# Patient Record
Sex: FEMALE | Race: WHITE | NOT HISPANIC OR LATINO | URBAN - METROPOLITAN AREA
[De-identification: names, ages, dates, MRNs, and addresses within clinical notes are randomized per-mention and may not be internally consistent; named-entity substitution may affect disease eponyms.]

---

## 2019-06-19 ENCOUNTER — INPATIENT (INPATIENT)
Facility: HOSPITAL | Age: 84
LOS: 2 days | Discharge: HOME | DRG: 305 | End: 2019-06-22
Attending: INTERNAL MEDICINE | Admitting: INTERNAL MEDICINE
Payer: MEDICARE

## 2019-06-19 VITALS
DIASTOLIC BLOOD PRESSURE: 79 MMHG | HEIGHT: 64 IN | SYSTOLIC BLOOD PRESSURE: 162 MMHG | RESPIRATION RATE: 20 BRPM | TEMPERATURE: 98 F | OXYGEN SATURATION: 95 % | HEART RATE: 91 BPM | WEIGHT: 175.05 LBS

## 2019-06-19 DIAGNOSIS — Z90.710 ACQUIRED ABSENCE OF BOTH CERVIX AND UTERUS: Chronic | ICD-10-CM

## 2019-06-19 DIAGNOSIS — N39.0 URINARY TRACT INFECTION, SITE NOT SPECIFIED: ICD-10-CM

## 2019-06-19 DIAGNOSIS — Z29.9 ENCOUNTER FOR PROPHYLACTIC MEASURES, UNSPECIFIED: ICD-10-CM

## 2019-06-19 DIAGNOSIS — R53.1 WEAKNESS: ICD-10-CM

## 2019-06-19 DIAGNOSIS — Z87.19 PERSONAL HISTORY OF OTHER DISEASES OF THE DIGESTIVE SYSTEM: Chronic | ICD-10-CM

## 2019-06-19 DIAGNOSIS — R09.89 OTHER SPECIFIED SYMPTOMS AND SIGNS INVOLVING THE CIRCULATORY AND RESPIRATORY SYSTEMS: ICD-10-CM

## 2019-06-19 DIAGNOSIS — I10 ESSENTIAL (PRIMARY) HYPERTENSION: ICD-10-CM

## 2019-06-19 DIAGNOSIS — M79.89 OTHER SPECIFIED SOFT TISSUE DISORDERS: ICD-10-CM

## 2019-06-19 DIAGNOSIS — I48.91 UNSPECIFIED ATRIAL FIBRILLATION: ICD-10-CM

## 2019-06-19 LAB
ALBUMIN SERPL ELPH-MCNC: 3.5 G/DL — SIGNIFICANT CHANGE UP (ref 3.5–5)
ALP SERPL-CCNC: 120 U/L — SIGNIFICANT CHANGE UP (ref 40–120)
ALT FLD-CCNC: 20 U/L DA — SIGNIFICANT CHANGE UP (ref 10–60)
ANION GAP SERPL CALC-SCNC: 8 MMOL/L — SIGNIFICANT CHANGE UP (ref 5–17)
APPEARANCE UR: CLEAR — SIGNIFICANT CHANGE UP
APTT BLD: 32 SEC — SIGNIFICANT CHANGE UP (ref 27.5–36.3)
AST SERPL-CCNC: 14 U/L — SIGNIFICANT CHANGE UP (ref 10–40)
BASOPHILS # BLD AUTO: 0.04 K/UL — SIGNIFICANT CHANGE UP (ref 0–0.2)
BASOPHILS NFR BLD AUTO: 0.5 % — SIGNIFICANT CHANGE UP (ref 0–2)
BILIRUB SERPL-MCNC: 0.4 MG/DL — SIGNIFICANT CHANGE UP (ref 0.2–1.2)
BILIRUB UR-MCNC: NEGATIVE — SIGNIFICANT CHANGE UP
BUN SERPL-MCNC: 14 MG/DL — SIGNIFICANT CHANGE UP (ref 7–18)
CALCIUM SERPL-MCNC: 8.8 MG/DL — SIGNIFICANT CHANGE UP (ref 8.4–10.5)
CHLORIDE SERPL-SCNC: 102 MMOL/L — SIGNIFICANT CHANGE UP (ref 96–108)
CO2 SERPL-SCNC: 27 MMOL/L — SIGNIFICANT CHANGE UP (ref 22–31)
COLOR SPEC: YELLOW — SIGNIFICANT CHANGE UP
CREAT SERPL-MCNC: 1.16 MG/DL — SIGNIFICANT CHANGE UP (ref 0.5–1.3)
DIFF PNL FLD: ABNORMAL
EOSINOPHIL # BLD AUTO: 0.16 K/UL — SIGNIFICANT CHANGE UP (ref 0–0.5)
EOSINOPHIL NFR BLD AUTO: 2 % — SIGNIFICANT CHANGE UP (ref 0–6)
GLUCOSE SERPL-MCNC: 108 MG/DL — HIGH (ref 70–99)
GLUCOSE UR QL: NEGATIVE — SIGNIFICANT CHANGE UP
HCT VFR BLD CALC: 44.2 % — SIGNIFICANT CHANGE UP (ref 34.5–45)
HGB BLD-MCNC: 14.8 G/DL — SIGNIFICANT CHANGE UP (ref 11.5–15.5)
IMM GRANULOCYTES NFR BLD AUTO: 0.5 % — SIGNIFICANT CHANGE UP (ref 0–1.5)
INR BLD: 1.03 RATIO — SIGNIFICANT CHANGE UP (ref 0.88–1.16)
KETONES UR-MCNC: NEGATIVE — SIGNIFICANT CHANGE UP
LEUKOCYTE ESTERASE UR-ACNC: ABNORMAL
LYMPHOCYTES # BLD AUTO: 2.62 K/UL — SIGNIFICANT CHANGE UP (ref 1–3.3)
LYMPHOCYTES # BLD AUTO: 32.5 % — SIGNIFICANT CHANGE UP (ref 13–44)
MCHC RBC-ENTMCNC: 31 PG — SIGNIFICANT CHANGE UP (ref 27–34)
MCHC RBC-ENTMCNC: 33.5 GM/DL — SIGNIFICANT CHANGE UP (ref 32–36)
MCV RBC AUTO: 92.5 FL — SIGNIFICANT CHANGE UP (ref 80–100)
MONOCYTES # BLD AUTO: 0.89 K/UL — SIGNIFICANT CHANGE UP (ref 0–0.9)
MONOCYTES NFR BLD AUTO: 11 % — SIGNIFICANT CHANGE UP (ref 2–14)
NEUTROPHILS # BLD AUTO: 4.32 K/UL — SIGNIFICANT CHANGE UP (ref 1.8–7.4)
NEUTROPHILS NFR BLD AUTO: 53.5 % — SIGNIFICANT CHANGE UP (ref 43–77)
NITRITE UR-MCNC: NEGATIVE — SIGNIFICANT CHANGE UP
NRBC # BLD: 0 /100 WBCS — SIGNIFICANT CHANGE UP (ref 0–0)
NT-PROBNP SERPL-SCNC: 147 PG/ML — SIGNIFICANT CHANGE UP (ref 0–450)
PH UR: 6 — SIGNIFICANT CHANGE UP (ref 5–8)
PLATELET # BLD AUTO: 238 K/UL — SIGNIFICANT CHANGE UP (ref 150–400)
POTASSIUM SERPL-MCNC: 4.1 MMOL/L — SIGNIFICANT CHANGE UP (ref 3.5–5.3)
POTASSIUM SERPL-SCNC: 4.1 MMOL/L — SIGNIFICANT CHANGE UP (ref 3.5–5.3)
PROT SERPL-MCNC: 7.1 G/DL — SIGNIFICANT CHANGE UP (ref 6–8.3)
PROT UR-MCNC: 15
PROTHROM AB SERPL-ACNC: 11.5 SEC — SIGNIFICANT CHANGE UP (ref 10–12.9)
RBC # BLD: 4.78 M/UL — SIGNIFICANT CHANGE UP (ref 3.8–5.2)
RBC # FLD: 12.3 % — SIGNIFICANT CHANGE UP (ref 10.3–14.5)
SODIUM SERPL-SCNC: 137 MMOL/L — SIGNIFICANT CHANGE UP (ref 135–145)
SP GR SPEC: 1.01 — SIGNIFICANT CHANGE UP (ref 1.01–1.02)
TROPONIN I SERPL-MCNC: <0.015 NG/ML — SIGNIFICANT CHANGE UP (ref 0–0.04)
UROBILINOGEN FLD QL: NEGATIVE — SIGNIFICANT CHANGE UP
WBC # BLD: 8.07 K/UL — SIGNIFICANT CHANGE UP (ref 3.8–10.5)
WBC # FLD AUTO: 8.07 K/UL — SIGNIFICANT CHANGE UP (ref 3.8–10.5)

## 2019-06-19 PROCEDURE — 73610 X-RAY EXAM OF ANKLE: CPT | Mod: 26,RT

## 2019-06-19 PROCEDURE — 93971 EXTREMITY STUDY: CPT | Mod: 26,RT

## 2019-06-19 PROCEDURE — 99285 EMERGENCY DEPT VISIT HI MDM: CPT

## 2019-06-19 PROCEDURE — 71045 X-RAY EXAM CHEST 1 VIEW: CPT | Mod: 26

## 2019-06-19 RX ORDER — AMLODIPINE BESYLATE 2.5 MG/1
5 TABLET ORAL ONCE
Refills: 0 | Status: COMPLETED | OUTPATIENT
Start: 2019-06-19 | End: 2019-06-19

## 2019-06-19 RX ORDER — HEPARIN SODIUM 5000 [USP'U]/ML
5000 INJECTION INTRAVENOUS; SUBCUTANEOUS EVERY 8 HOURS
Refills: 0 | Status: DISCONTINUED | OUTPATIENT
Start: 2019-06-19 | End: 2019-06-22

## 2019-06-19 RX ORDER — POLYETHYLENE GLYCOL 3350 17 G/17G
17 POWDER, FOR SOLUTION ORAL DAILY
Refills: 0 | Status: DISCONTINUED | OUTPATIENT
Start: 2019-06-19 | End: 2019-06-22

## 2019-06-19 RX ORDER — GABAPENTIN 400 MG/1
100 CAPSULE ORAL THREE TIMES A DAY
Refills: 0 | Status: DISCONTINUED | OUTPATIENT
Start: 2019-06-19 | End: 2019-06-22

## 2019-06-19 RX ORDER — ZOLPIDEM TARTRATE 10 MG/1
5 TABLET ORAL AT BEDTIME
Refills: 0 | Status: DISCONTINUED | OUTPATIENT
Start: 2019-06-19 | End: 2019-06-22

## 2019-06-19 RX ORDER — CEFTRIAXONE 500 MG/1
1000 INJECTION, POWDER, FOR SOLUTION INTRAMUSCULAR; INTRAVENOUS ONCE
Refills: 0 | Status: COMPLETED | OUTPATIENT
Start: 2019-06-19 | End: 2019-06-19

## 2019-06-19 RX ORDER — CEFTRIAXONE 500 MG/1
1000 INJECTION, POWDER, FOR SOLUTION INTRAMUSCULAR; INTRAVENOUS EVERY 24 HOURS
Refills: 0 | Status: DISCONTINUED | OUTPATIENT
Start: 2019-06-20 | End: 2019-06-20

## 2019-06-19 RX ORDER — AMLODIPINE BESYLATE 2.5 MG/1
10 TABLET ORAL DAILY
Refills: 0 | Status: DISCONTINUED | OUTPATIENT
Start: 2019-06-20 | End: 2019-06-22

## 2019-06-19 RX ORDER — ASPIRIN/CALCIUM CARB/MAGNESIUM 324 MG
81 TABLET ORAL DAILY
Refills: 0 | Status: DISCONTINUED | OUTPATIENT
Start: 2019-06-19 | End: 2019-06-22

## 2019-06-19 RX ORDER — PANTOPRAZOLE SODIUM 20 MG/1
40 TABLET, DELAYED RELEASE ORAL
Refills: 0 | Status: DISCONTINUED | OUTPATIENT
Start: 2019-06-19 | End: 2019-06-22

## 2019-06-19 RX ADMIN — AMLODIPINE BESYLATE 5 MILLIGRAM(S): 2.5 TABLET ORAL at 22:16

## 2019-06-19 RX ADMIN — CEFTRIAXONE 100 MILLIGRAM(S): 500 INJECTION, POWDER, FOR SOLUTION INTRAMUSCULAR; INTRAVENOUS at 18:42

## 2019-06-19 RX ADMIN — ZOLPIDEM TARTRATE 5 MILLIGRAM(S): 10 TABLET ORAL at 23:05

## 2019-06-19 RX ADMIN — CEFTRIAXONE 1000 MILLIGRAM(S): 500 INJECTION, POWDER, FOR SOLUTION INTRAMUSCULAR; INTRAVENOUS at 20:00

## 2019-06-19 RX ADMIN — HEPARIN SODIUM 5000 UNIT(S): 5000 INJECTION INTRAVENOUS; SUBCUTANEOUS at 22:16

## 2019-06-19 RX ADMIN — GABAPENTIN 100 MILLIGRAM(S): 400 CAPSULE ORAL at 22:17

## 2019-06-19 NOTE — H&P ADULT - PROBLEM SELECTOR PLAN 1
-patient noted to have right leg swelling, more at ankle  -Follow US duplex to r/o DVT - high risk given hx of malignancy   -X-Ray ankle to r/o any occult fracture though denies any trauma.   -No signs of cellulitis, no warmth, redness or wbc count.   -Follow ECHO

## 2019-06-19 NOTE — H&P ADULT - ASSESSMENT
Patient is a 89 year old female, from home, 24x7 HHA during weekdays, walks with the help of walker, PMH significant for ovarian cancer 1985 s/p hysterectomy colon cancer 2013 s/p colon resection, L baker's cyst, spinal stenosis, Hypertension, Afib 2017 currently off Eliquis as per her cardiologist, denies any hx of bleeding. Patient's daughter stated that her blood pressure has been fluctuating since last one week, it was noted to be in 150-160's mostly. Her BP has been well controlled on amlodipine for past one year, which is why daughter got concerned. Patient also reported right lower leg swelling x last few days,  mostly at ankle, she denies any pain, recent immobility, no falls, no trauma, no discoloration. She denies any aggravating or relieving factors. She denies any Chest pain, shortness pain, no dyspnea PND, orthopnea, palpitations. All other ROS negative.      In ED, /56, HR 88, SPO2 97%, RR 20

## 2019-06-19 NOTE — H&P ADULT - PROBLEM SELECTOR PLAN 5
IMPROVE VTE score:  [ ] Previous VTE                                                3  [ ] Thrombophilia                                             2  [ ] Lower limb paralysis                                  2        (unable to hold up >15 seconds)    [ ] Current Cancer (within 6 months)            2   [x] Immobilization > 24 hrs                              1  [ ] ICU/CCU stay > 24 hours                            1  [x] Age > 60                                                         1  Improve Score- 2   Heparin SC

## 2019-06-19 NOTE — H&P ADULT - NSHPPHYSICALEXAM_GEN_ALL_CORE
CONSTITUTIONAL: Well appearing, well nourished, awake, alert and in no apparent distress  ENMT: Airway patent, Nasal mucosa clear. Mouth with normal mucosa. Throat has no vesicles, no oropharyngeal exudates and uvula is midline.  EYES: Clear bilaterally, pupils equal, round and reactive to light. EOMI.  CARDIAC: Normal rate, regular rhythm.  Heart sounds S1, S2.  No murmurs, rubs or gallops   RESPIRATORY: Breath sounds clear and equal bilaterally. No wheezes, rhales or rhonchi  MUSCULOSKELETAL: Spine appears normal, range of motion is not limited, no muscle or joint tenderness  EXTREMITIES: right lower extremity swollen , pulses b/l 2+  NEUROLOGICAL: Alert and oriented, no focal deficits, no motor or sensory deficits.  SKIN: No rash, skin turgor

## 2019-06-19 NOTE — ED PROVIDER NOTE - OBJECTIVE STATEMENT
89 y/o F with a significant PMHx of ovarian cancer, colon cancer, in remission and DVT in L leg, was later told it was never there, not on anticoagulation medications, presents to the ED with complaints of R leg swelling x 1 week, primarily around R ankle; denies trauma. Patient endorses fluctuating blood pressure and palpitations. Patient seen by Dr. Muller 5 days ago and was told she had atrial fibrillation at the time. Patient went back today due to fluctuating blood pressure between 140 and 160 systolic; was told to come in for evaluation. Patient also endorsing generalized fatigue. Denies calf pain, chest pain, shortness of breath, nausea, vomiting or any other acute complaints.

## 2019-06-19 NOTE — H&P ADULT - HISTORY OF PRESENT ILLNESS
Patient is a 89 year old female, from home, 24x7 HHA during weekdays, walks with the help of walker, [facility] significant for ovarian cancer 1985 s/p hysterectomy colon cancer 2013 s/p colon resection, L baker's cyst, spinal stenosis, Hypertension, Afib 2017 currently off Eliquis as per her cardiologist, denies any hx of bleeding. Patient's daughter stated that her blood pressure has been fluctuating since last one week, it was noted to be in 150-160's mostly. Her BP has been well controlled on amlodipine for past one year, which is why daughter got concerned. She was also started on furosemide few days ago by her PCP but was asked to stop it this am.     Patient also reported right lower leg swelling x last few days,  mostly at ankle, she denies any pain, recent immobility, no falls, no trauma, no discoloration. She denies any aggravating or relieving factors. She had similar symptoms 2 years ago of high BP and leg swelling. She was Patient is a 89 year old female, from home, 24x7 HHA during weekdays, walks with the help of walker, [facility] significant for ovarian cancer 1985 s/p hysterectomy colon cancer 2013 s/p colon resection, L baker's cyst, spinal stenosis, Hypertension, Afib 2017 currently off Eliquis as per her cardiologist, denies any hx of bleeding. Patient's daughter stated that her blood pressure has been fluctuating since last one week, it was noted to be in 150-160's mostly. Her BP has been well controlled on amlodipine for past one year, which is why daughter got concerned. She was also started on furosemide few days ago by her PCP but was asked to stop it this am.     Patient also reported right lower leg swelling x last few days,  mostly at ankle, she denies any pain, recent immobility, no falls, no trauma, no discoloration. She denies any aggravating or relieving factors. She had similar symptoms 2 years ago of high BP and leg swelling. She was taken to [facility] where she was told to have DVT, however, on further testing was told there was NO blood clot. She denies any Chest pain, shortness pain, no dyspnea PND, orthopnea, palpitations. All other ROS negative.

## 2019-06-19 NOTE — H&P ADULT - PROBLEM SELECTOR PLAN 3
-UA positive  -No urinary symptoms, Follow urine cx**  -Start ceftriaxone, if urine cx negative,  may stop antibiotics in 3 days.

## 2019-06-19 NOTE — ED ADULT NURSE NOTE - ED STAT RN HANDOFF DETAILS 2
received   pt;in bed at 1910 pt.is alert and oriented x 3.denies  chest pain. on CM with NSR.transfer to [address] report given to tele liberty husain.not in distress

## 2019-06-19 NOTE — H&P ADULT - PROBLEM SELECTOR PLAN 2
-Patient's BP noted to be 167/56, she reports that it fluctuates  -Start -Patient's BP noted to be 167/56, she reports that it fluctuates  -C/w amlodipine for now  -Monitor BP, may add more medications if remains elevated.  -Follow ECHO

## 2019-06-19 NOTE — PATIENT PROFILE ADULT - VISION (WITH CORRECTIVE LENSES IF THE PATIENT USUALLY WEARS THEM):
Normal vision: sees adequately in most situations; can see medication labels, newsprint intermittent

## 2019-06-19 NOTE — ED PROVIDER NOTE - CLINICAL SUMMARY MEDICAL DECISION MAKING FREE TEXT BOX
89 y/o F presents with generalized fatigue and R leg swelling, otherwise vitals stable. Will obtain blood work, CXR, ultrasound of lower extremities and reassess.

## 2019-06-20 LAB
24R-OH-CALCIDIOL SERPL-MCNC: 25.6 NG/ML — LOW (ref 30–80)
ALBUMIN SERPL ELPH-MCNC: 3.3 G/DL — LOW (ref 3.5–5)
ALP SERPL-CCNC: 109 U/L — SIGNIFICANT CHANGE UP (ref 40–120)
ALT FLD-CCNC: 18 U/L DA — SIGNIFICANT CHANGE UP (ref 10–60)
ANION GAP SERPL CALC-SCNC: 7 MMOL/L — SIGNIFICANT CHANGE UP (ref 5–17)
AST SERPL-CCNC: 14 U/L — SIGNIFICANT CHANGE UP (ref 10–40)
BASOPHILS # BLD AUTO: 0.06 K/UL — SIGNIFICANT CHANGE UP (ref 0–0.2)
BASOPHILS NFR BLD AUTO: 0.7 % — SIGNIFICANT CHANGE UP (ref 0–2)
BILIRUB SERPL-MCNC: 0.6 MG/DL — SIGNIFICANT CHANGE UP (ref 0.2–1.2)
BUN SERPL-MCNC: 15 MG/DL — SIGNIFICANT CHANGE UP (ref 7–18)
CALCIUM SERPL-MCNC: 9 MG/DL — SIGNIFICANT CHANGE UP (ref 8.4–10.5)
CHLORIDE SERPL-SCNC: 102 MMOL/L — SIGNIFICANT CHANGE UP (ref 96–108)
CHOLEST SERPL-MCNC: 156 MG/DL — SIGNIFICANT CHANGE UP (ref 10–199)
CO2 SERPL-SCNC: 28 MMOL/L — SIGNIFICANT CHANGE UP (ref 22–31)
CREAT SERPL-MCNC: 1.05 MG/DL — SIGNIFICANT CHANGE UP (ref 0.5–1.3)
CULTURE RESULTS: SIGNIFICANT CHANGE UP
EOSINOPHIL # BLD AUTO: 0.24 K/UL — SIGNIFICANT CHANGE UP (ref 0–0.5)
EOSINOPHIL NFR BLD AUTO: 2.9 % — SIGNIFICANT CHANGE UP (ref 0–6)
ERYTHROCYTE [SEDIMENTATION RATE] IN BLOOD: 12 MM/HR — SIGNIFICANT CHANGE UP (ref 0–20)
GLUCOSE SERPL-MCNC: 104 MG/DL — HIGH (ref 70–99)
HBA1C BLD-MCNC: 5.2 % — SIGNIFICANT CHANGE UP (ref 4–5.6)
HCT VFR BLD CALC: 41 % — SIGNIFICANT CHANGE UP (ref 34.5–45)
HDLC SERPL-MCNC: 45 MG/DL — LOW
HGB BLD-MCNC: 13.6 G/DL — SIGNIFICANT CHANGE UP (ref 11.5–15.5)
IMM GRANULOCYTES NFR BLD AUTO: 0.4 % — SIGNIFICANT CHANGE UP (ref 0–1.5)
LIPID PNL WITH DIRECT LDL SERPL: 85 MG/DL — SIGNIFICANT CHANGE UP
LYMPHOCYTES # BLD AUTO: 2.73 K/UL — SIGNIFICANT CHANGE UP (ref 1–3.3)
LYMPHOCYTES # BLD AUTO: 33.2 % — SIGNIFICANT CHANGE UP (ref 13–44)
MAGNESIUM SERPL-MCNC: 2.1 MG/DL — SIGNIFICANT CHANGE UP (ref 1.6–2.6)
MCHC RBC-ENTMCNC: 31.1 PG — SIGNIFICANT CHANGE UP (ref 27–34)
MCHC RBC-ENTMCNC: 33.2 GM/DL — SIGNIFICANT CHANGE UP (ref 32–36)
MCV RBC AUTO: 93.8 FL — SIGNIFICANT CHANGE UP (ref 80–100)
MONOCYTES # BLD AUTO: 0.76 K/UL — SIGNIFICANT CHANGE UP (ref 0–0.9)
MONOCYTES NFR BLD AUTO: 9.2 % — SIGNIFICANT CHANGE UP (ref 2–14)
NEUTROPHILS # BLD AUTO: 4.41 K/UL — SIGNIFICANT CHANGE UP (ref 1.8–7.4)
NEUTROPHILS NFR BLD AUTO: 53.6 % — SIGNIFICANT CHANGE UP (ref 43–77)
NRBC # BLD: 0 /100 WBCS — SIGNIFICANT CHANGE UP (ref 0–0)
PHOSPHATE SERPL-MCNC: 3.6 MG/DL — SIGNIFICANT CHANGE UP (ref 2.5–4.5)
PLATELET # BLD AUTO: 224 K/UL — SIGNIFICANT CHANGE UP (ref 150–400)
POTASSIUM SERPL-MCNC: 3.8 MMOL/L — SIGNIFICANT CHANGE UP (ref 3.5–5.3)
POTASSIUM SERPL-SCNC: 3.8 MMOL/L — SIGNIFICANT CHANGE UP (ref 3.5–5.3)
PROT SERPL-MCNC: 6.5 G/DL — SIGNIFICANT CHANGE UP (ref 6–8.3)
RBC # BLD: 4.37 M/UL — SIGNIFICANT CHANGE UP (ref 3.8–5.2)
RBC # FLD: 12.1 % — SIGNIFICANT CHANGE UP (ref 10.3–14.5)
SODIUM SERPL-SCNC: 137 MMOL/L — SIGNIFICANT CHANGE UP (ref 135–145)
SPECIMEN SOURCE: SIGNIFICANT CHANGE UP
TOTAL CHOLESTEROL/HDL RATIO MEASUREMENT: 3.5 RATIO — SIGNIFICANT CHANGE UP (ref 3.3–7.1)
TRIGL SERPL-MCNC: 131 MG/DL — SIGNIFICANT CHANGE UP (ref 10–149)
TSH SERPL-MCNC: 3.71 UU/ML — SIGNIFICANT CHANGE UP (ref 0.34–4.82)
VIT B12 SERPL-MCNC: 1063 PG/ML — SIGNIFICANT CHANGE UP (ref 232–1245)
WBC # BLD: 8.23 K/UL — SIGNIFICANT CHANGE UP (ref 3.8–10.5)
WBC # FLD AUTO: 8.23 K/UL — SIGNIFICANT CHANGE UP (ref 3.8–10.5)

## 2019-06-20 RX ADMIN — GABAPENTIN 100 MILLIGRAM(S): 400 CAPSULE ORAL at 13:25

## 2019-06-20 RX ADMIN — ZOLPIDEM TARTRATE 5 MILLIGRAM(S): 10 TABLET ORAL at 22:10

## 2019-06-20 RX ADMIN — AMLODIPINE BESYLATE 10 MILLIGRAM(S): 2.5 TABLET ORAL at 06:03

## 2019-06-20 RX ADMIN — POLYETHYLENE GLYCOL 3350 17 GRAM(S): 17 POWDER, FOR SOLUTION ORAL at 17:22

## 2019-06-20 RX ADMIN — CEFTRIAXONE 100 MILLIGRAM(S): 500 INJECTION, POWDER, FOR SOLUTION INTRAMUSCULAR; INTRAVENOUS at 06:28

## 2019-06-20 RX ADMIN — PANTOPRAZOLE SODIUM 40 MILLIGRAM(S): 20 TABLET, DELAYED RELEASE ORAL at 06:03

## 2019-06-20 RX ADMIN — GABAPENTIN 100 MILLIGRAM(S): 400 CAPSULE ORAL at 22:11

## 2019-06-20 RX ADMIN — Medication 81 MILLIGRAM(S): at 12:58

## 2019-06-20 RX ADMIN — HEPARIN SODIUM 5000 UNIT(S): 5000 INJECTION INTRAVENOUS; SUBCUTANEOUS at 13:23

## 2019-06-20 RX ADMIN — GABAPENTIN 100 MILLIGRAM(S): 400 CAPSULE ORAL at 06:03

## 2019-06-20 RX ADMIN — HEPARIN SODIUM 5000 UNIT(S): 5000 INJECTION INTRAVENOUS; SUBCUTANEOUS at 22:11

## 2019-06-20 RX ADMIN — HEPARIN SODIUM 5000 UNIT(S): 5000 INJECTION INTRAVENOUS; SUBCUTANEOUS at 06:03

## 2019-06-20 NOTE — PROGRESS NOTE ADULT - PROBLEM SELECTOR PLAN 2
-BP in 160's   -C/w amlodipine for now  -Monitor BP, may add more medications if remains elevated.  -Follow ECHO for LVH, if present may add losartan

## 2019-06-20 NOTE — PROGRESS NOTE ADULT - PROBLEM SELECTOR PLAN 1
-patient noted to have right leg swelling, more at ankle  -US duplex- no DVT   -X-Ray ankle to r/o any occult fracture though denies any trauma.   -No signs of cellulitis, no warmth, redness or wbc count.   -Follow ECHO

## 2019-06-20 NOTE — PROGRESS NOTE ADULT - SUBJECTIVE AND OBJECTIVE BOX
PGY 1 Note discussed with supervising resident and primary attending    Patient is a 89y old  Female who presents with a chief complaint of Fluctuating BP, leg swelling (2019 20:23)      INTERVAL HPI/OVERNIGHT EVENTS: no events noted overnight.    MEDICATIONS  (STANDING):  amLODIPine   Tablet 10 milliGRAM(s) Oral daily  aspirin enteric coated 81 milliGRAM(s) Oral daily  cefTRIAXone   IVPB 1000 milliGRAM(s) IV Intermittent every 24 hours  gabapentin 100 milliGRAM(s) Oral three times a day  heparin  Injectable 5000 Unit(s) SubCutaneous every 8 hours  pantoprazole    Tablet 40 milliGRAM(s) Oral before breakfast  polyethylene glycol 3350 17 Gram(s) Oral daily  zolpidem 5 milliGRAM(s) Oral at bedtime    MEDICATIONS  (PRN):  guaiFENesin    Syrup 100 milliGRAM(s) Oral every 6 hours PRN Cough      __________________________________________________  REVIEW OF SYSTEMS:    CONSTITUTIONAL: No fever,   EYES: no acute visual disturbances  NECK: No pain or stiffness  RESPIRATORY: No cough; No shortness of breath  CARDIOVASCULAR: No chest pain, no palpitations  GASTROINTESTINAL: No pain. No nausea or vomiting; No diarrhea   NEUROLOGICAL: No headache or numbness, no tremors  MUSCULOSKELETAL: No joint pain, no muscle pain  GENITOURINARY: no dysuria, no frequency, no hesitancy  PSYCHIATRY: no depression , no anxiety  ALL OTHER  ROS negative        Vital Signs Last 24 Hrs  T(C): 36.7 (2019 11:22), Max: 36.8 (2019 23:45)  T(F): 98 (2019 11:22), Max: 98.2 (2019 23:45)  HR: 83 (2019 11:22) (77 - 91)  BP: 160/50 (2019 11:22) (142/56 - 183/65)  BP(mean): --  RR: 16 (2019 11:22) (16 - 20)  SpO2: 97% (2019 11:22) (94% - 97%)    ________________________________________________  PHYSICAL EXAM:  GENERAL: NAD, elderly female   HEENT: Normocephalic;  conjunctivae and sclerae clear; moist mucous membranes;   NECK : supple  CHEST/LUNG: Clear to auscultation bilaterally with good air entry   HEART: S1 S2  regular; no murmurs, gallops or rubs  ABDOMEN: Soft, Nontender, Nondistended; Bowel sounds present  EXTREMITIES: no cyanosis; no edema; no calf tenderness, right ankle swollen   SKIN: warm and dry; no rash  NERVOUS SYSTEM:  Awake and alert; Oriented  to place, person and time ; no new deficits    _________________________________________________  LABS:                        13.6   8.23  )-----------( 224      ( 2019 06:31 )             41.0     06-20    137  |  102  |  15  ----------------------------<  104<H>  3.8   |  28  |  1.05    Ca    9.0      2019 06:31  Phos  3.6     06-20  Mg     2.1     06-20    TPro  6.5  /  Alb  3.3<L>  /  TBili  0.6  /  DBili  x   /  AST  14  /  ALT  18  /  AlkPhos  109  06-20    PT/INR - ( 2019 18:27 )   PT: 11.5 sec;   INR: 1.03 ratio         PTT - ( 2019 18:27 )  PTT:32.0 sec  Urinalysis Basic - ( 2019 17:29 )    Color: Yellow / Appearance: Clear / S.015 / pH: x  Gluc: x / Ketone: Negative  / Bili: Negative / Urobili: Negative   Blood: x / Protein: 15 / Nitrite: Negative   Leuk Esterase: Moderate / RBC: 0-2 /HPF / WBC 26-50 /HPF   Sq Epi: x / Non Sq Epi: Moderate /HPF / Bacteria: Few /HPF      CAPILLARY BLOOD GLUCOSE            RADIOLOGY & ADDITIONAL TESTS:    Imaging Personally Reviewed:  YES    Consultant(s) Notes Reviewed:   YES    Care Discussed with Consultants : YES     Plan of care was discussed with patient and /or primary care giver; all questions and concerns were addressed and care was aligned with patient's wishes.

## 2019-06-21 ENCOUNTER — TRANSCRIPTION ENCOUNTER (OUTPATIENT)
Age: 84
End: 2019-06-21

## 2019-06-21 RX ORDER — LOSARTAN POTASSIUM 100 MG/1
25 TABLET, FILM COATED ORAL DAILY
Refills: 0 | Status: DISCONTINUED | OUTPATIENT
Start: 2019-06-21 | End: 2019-06-22

## 2019-06-21 RX ORDER — FUROSEMIDE 40 MG
20 TABLET ORAL ONCE
Refills: 0 | Status: COMPLETED | OUTPATIENT
Start: 2019-06-21 | End: 2019-06-21

## 2019-06-21 RX ADMIN — Medication 81 MILLIGRAM(S): at 13:12

## 2019-06-21 RX ADMIN — AMLODIPINE BESYLATE 10 MILLIGRAM(S): 2.5 TABLET ORAL at 06:38

## 2019-06-21 RX ADMIN — Medication 100 MILLIGRAM(S): at 19:18

## 2019-06-21 RX ADMIN — PANTOPRAZOLE SODIUM 40 MILLIGRAM(S): 20 TABLET, DELAYED RELEASE ORAL at 06:38

## 2019-06-21 RX ADMIN — GABAPENTIN 100 MILLIGRAM(S): 400 CAPSULE ORAL at 23:09

## 2019-06-21 RX ADMIN — HEPARIN SODIUM 5000 UNIT(S): 5000 INJECTION INTRAVENOUS; SUBCUTANEOUS at 21:24

## 2019-06-21 RX ADMIN — GABAPENTIN 100 MILLIGRAM(S): 400 CAPSULE ORAL at 13:12

## 2019-06-21 RX ADMIN — HEPARIN SODIUM 5000 UNIT(S): 5000 INJECTION INTRAVENOUS; SUBCUTANEOUS at 06:38

## 2019-06-21 RX ADMIN — Medication 20 MILLIGRAM(S): at 13:13

## 2019-06-21 RX ADMIN — Medication 100 MILLIGRAM(S): at 09:18

## 2019-06-21 RX ADMIN — ZOLPIDEM TARTRATE 5 MILLIGRAM(S): 10 TABLET ORAL at 23:09

## 2019-06-21 RX ADMIN — HEPARIN SODIUM 5000 UNIT(S): 5000 INJECTION INTRAVENOUS; SUBCUTANEOUS at 13:12

## 2019-06-21 RX ADMIN — GABAPENTIN 100 MILLIGRAM(S): 400 CAPSULE ORAL at 06:38

## 2019-06-21 NOTE — PROGRESS NOTE ADULT - SUBJECTIVE AND OBJECTIVE BOX
PGY 1 Note discussed with supervising resident and primary attending    Patient is a 89y old  Female who presents with a chief complaint of Fluctuating BP, leg swelling (2019 09:27)      INTERVAL HPI/OVERNIGHT EVENTS: no events noted overnight.    MEDICATIONS  (STANDING):  amLODIPine   Tablet 10 milliGRAM(s) Oral daily  aspirin enteric coated 81 milliGRAM(s) Oral daily  furosemide   Injectable 20 milliGRAM(s) IV Push once  gabapentin 100 milliGRAM(s) Oral three times a day  heparin  Injectable 5000 Unit(s) SubCutaneous every 8 hours  losartan 25 milliGRAM(s) Oral daily  pantoprazole    Tablet 40 milliGRAM(s) Oral before breakfast  polyethylene glycol 3350 17 Gram(s) Oral daily  zolpidem 5 milliGRAM(s) Oral at bedtime    MEDICATIONS  (PRN):  guaiFENesin    Syrup 100 milliGRAM(s) Oral every 6 hours PRN Cough      __________________________________________________  REVIEW OF SYSTEMS:    CONSTITUTIONAL: No fever,   EYES: no acute visual disturbances  NECK: No pain or stiffness  RESPIRATORY: No cough; No shortness of breath  CARDIOVASCULAR: No chest pain, no palpitations  GASTROINTESTINAL: No pain. No nausea or vomiting; No diarrhea   NEUROLOGICAL: No headache or numbness, no tremors  MUSCULOSKELETAL: No joint pain, no muscle pain  GENITOURINARY: no dysuria, no frequency, no hesitancy  PSYCHIATRY: no depression , no anxiety  ALL OTHER  ROS negative        Vital Signs Last 24 Hrs  T(C): 36.8 (2019 07:31), Max: 36.9 (2019 15:58)  T(F): 98.2 (2019 07:31), Max: 98.4 (2019 15:58)  HR: 82 (2019 07:31) (77 - 88)  BP: 138/58 (2019 07:31) (129/44 - 160/50)  BP(mean): --  RR: 18 (2019 07:31) (16 - 19)  SpO2: 98% (2019 07:31) (95% - 98%)    ________________________________________________  PHYSICAL EXAM:  GENERAL: NAD  HEENT: Normocephalic;  conjunctivae and sclerae clear; moist mucous membranes;   NECK : supple  CHEST/LUNG: Clear to auscultation bilaterally with good air entry   HEART: S1 S2  regular; no murmurs, gallops or rubs  ABDOMEN: Soft, Nontender, Nondistended; Bowel sounds present  EXTREMITIES: no cyanosis; b/l leg swelling   SKIN: warm and dry; no rash  NERVOUS SYSTEM:  Awake and alert; Oriented  to place, person and time ; no new deficits    _________________________________________________  LABS:                        13.6   8.23  )-----------( 224      ( 2019 06:31 )             41.0     06-20    137  |  102  |  15  ----------------------------<  104<H>  3.8   |  28  |  1.05    Ca    9.0      2019 06:31  Phos  3.6     06-20  Mg     2.1     06-20    TPro  6.5  /  Alb  3.3<L>  /  TBili  0.6  /  DBili  x   /  AST  14  /  ALT  18  /  AlkPhos  109  06-20    PT/INR - ( 2019 18:27 )   PT: 11.5 sec;   INR: 1.03 ratio         PTT - ( 2019 18:27 )  PTT:32.0 sec  Urinalysis Basic - ( 2019 17:29 )    Color: Yellow / Appearance: Clear / S.015 / pH: x  Gluc: x / Ketone: Negative  / Bili: Negative / Urobili: Negative   Blood: x / Protein: 15 / Nitrite: Negative   Leuk Esterase: Moderate / RBC: 0-2 /HPF / WBC 26-50 /HPF   Sq Epi: x / Non Sq Epi: Moderate /HPF / Bacteria: Few /HPF      CAPILLARY BLOOD GLUCOSE            RADIOLOGY & ADDITIONAL TESTS:    Imaging Personally Reviewed:  YES    Consultant(s) Notes Reviewed:   YES    Care Discussed with Consultants : YES     Plan of care was discussed with patient and /or primary care giver; all questions and concerns were addressed and care was aligned with patient's wishes.

## 2019-06-21 NOTE — PROGRESS NOTE ADULT - PROBLEM SELECTOR PLAN 1
-patient noted to have right leg swelling, more at ankle  -US duplex- no DVT   -X-Ray ankle- cannot r/o medial malleolar fx, however no pain and full ROM, no further work up needed.   -Follow ECHO- grade 4 DD, EF 55%, mild LVH  -S/p one dose of IV Lasix  -Losartan, HCTZ and amlodipine on discharge  -DC planning-tomorrow.

## 2019-06-21 NOTE — DISCHARGE NOTE PROVIDER - CARE PROVIDER_API CALL
Vipin Muller)  Medicine  Dept Director  [address] [place], [place] [id number]  Phone: [phone]  Fax: [phone]  Follow Up Time: 1 week

## 2019-06-21 NOTE — DISCHARGE NOTE PROVIDER - HOSPITAL COURSE
Patient is a 89 year old female, from home, 24x7 HHA during weekdays, walks with the help of walker, [facility] significant for ovarian cancer 1985 s/p hysterectomy colon cancer 2013 s/p colon resection, L baker's cyst, spinal stenosis, Hypertension, Afib 2017 currently off Eliquis as per her cardiologist, denies any hx of bleeding. Patient's daughter stated that her blood pressure has been fluctuating since last one week, it was noted to be in 150-160's mostly. Her BP has been well controlled on amlodipine for past one year, which is why daughter got concerned. She was also started on furosemide few days ago by her PCP but was asked to stop it this am.         Patient also reported right lower leg swelling x last few days,  mostly at ankle, she denies any pain, recent immobility, no falls, no trauma, no discoloration. She denies any aggravating or relieving factors. She had similar symptoms 2 years ago of high BP and leg swelling. She was taken to [facility] where she was told to have DVT, however, on further testing was told there was NO blood clot. She denies any Chest pain, shortness pain, no dyspnea PND, orthopnea, palpitations. All other ROS negative.              Patient was admitted, there was concern for UTI, urine culture was negative. ECHO was done which showed, grade IV DD, EF was normal. Losartan was added for BP control.     Given lower leg edema, US duplex was negative for DVT. X-ray ankle was done, it could not rule out medial malleolar fracture but patient ahd no pain and ROM was intact. No further evaluation needed at this time.         Given patient's improved clinical status and current hemodynamic stability, decision was made to discharge. Discussed with attending    Please refer to patient's complete medical chart with documents for a full hospital course, for this is only a brief summary.

## 2019-06-21 NOTE — DISCHARGE NOTE PROVIDER - NSDCCPCAREPLAN_GEN_ALL_CORE_FT
PRINCIPAL DISCHARGE DIAGNOSIS  Diagnosis: Uncontrolled hypertension  Assessment and Plan of Treatment: You presented with uncontrolled hypertension, cardiologist Dr. Muller was consulted, blood presure was monitored, ECHO showed: mild left ventricular hypertrophy(mild thickening of heart muscles)      SECONDARY DISCHARGE DIAGNOSES  Diagnosis: Afib  Assessment and Plan of Treatment: Afib    Diagnosis: Leg swelling  Assessment and Plan of Treatment: Leg swelling PRINCIPAL DISCHARGE DIAGNOSIS  Diagnosis: Uncontrolled hypertension  Assessment and Plan of Treatment: You presented with uncontrolled hypertension, cardiologist Dr. Muller was consulted, blood presure was monitored, ECHO showed: mild left ventricular hypertrophy(mild thickening of heart muscles) and impaired relaxation of heart. Losartan is added. Please continue taking medications as prescribed.      SECONDARY DISCHARGE DIAGNOSES  Diagnosis: Afib  Assessment and Plan of Treatment: Afib    Diagnosis: Leg swelling  Assessment and Plan of Treatment: Leg swelling PRINCIPAL DISCHARGE DIAGNOSIS  Diagnosis: Uncontrolled hypertension  Assessment and Plan of Treatment: You presented with uncontrolled hypertension, cardiologist Dr. Muller was consulted, blood presure was monitored, ECHO showed: mild left ventricular hypertrophy(mild thickening of heart muscles) and impaired relaxation of heart. Losartan-HCTz is added. Please continue taking medications as prescribed.      SECONDARY DISCHARGE DIAGNOSES  Diagnosis: Leg swelling  Assessment and Plan of Treatment: You complained of leg swelling, there was concern for blood clot in leg, US duplex was done which was normal. Xray right ankle was also done- a small hairline fracture could not excluded but given no pain and full range of movement at joint, no further work up will be done.    Diagnosis: Afib  Assessment and Plan of Treatment: You have hx of atrial fibrillation, currently normal rythm and stable. Follow up with your cardiologist

## 2019-06-22 ENCOUNTER — TRANSCRIPTION ENCOUNTER (OUTPATIENT)
Age: 84
End: 2019-06-22

## 2019-06-22 VITALS
TEMPERATURE: 98 F | HEART RATE: 85 BPM | DIASTOLIC BLOOD PRESSURE: 57 MMHG | OXYGEN SATURATION: 95 % | RESPIRATION RATE: 16 BRPM | SYSTOLIC BLOOD PRESSURE: 124 MMHG

## 2019-06-22 PROCEDURE — 85610 PROTHROMBIN TIME: CPT

## 2019-06-22 PROCEDURE — 93005 ELECTROCARDIOGRAM TRACING: CPT

## 2019-06-22 PROCEDURE — 99285 EMERGENCY DEPT VISIT HI MDM: CPT | Mod: 25

## 2019-06-22 PROCEDURE — 84443 ASSAY THYROID STIM HORMONE: CPT

## 2019-06-22 PROCEDURE — 71045 X-RAY EXAM CHEST 1 VIEW: CPT

## 2019-06-22 PROCEDURE — 84100 ASSAY OF PHOSPHORUS: CPT

## 2019-06-22 PROCEDURE — 80061 LIPID PANEL: CPT

## 2019-06-22 PROCEDURE — 81001 URINALYSIS AUTO W/SCOPE: CPT

## 2019-06-22 PROCEDURE — 85652 RBC SED RATE AUTOMATED: CPT

## 2019-06-22 PROCEDURE — 87086 URINE CULTURE/COLONY COUNT: CPT

## 2019-06-22 PROCEDURE — 36415 COLL VENOUS BLD VENIPUNCTURE: CPT

## 2019-06-22 PROCEDURE — 84484 ASSAY OF TROPONIN QUANT: CPT

## 2019-06-22 PROCEDURE — 73610 X-RAY EXAM OF ANKLE: CPT

## 2019-06-22 PROCEDURE — 82306 VITAMIN D 25 HYDROXY: CPT

## 2019-06-22 PROCEDURE — 85730 THROMBOPLASTIN TIME PARTIAL: CPT

## 2019-06-22 PROCEDURE — 80053 COMPREHEN METABOLIC PANEL: CPT

## 2019-06-22 PROCEDURE — 93306 TTE W/DOPPLER COMPLETE: CPT

## 2019-06-22 PROCEDURE — 96374 THER/PROPH/DIAG INJ IV PUSH: CPT

## 2019-06-22 PROCEDURE — 85027 COMPLETE CBC AUTOMATED: CPT

## 2019-06-22 PROCEDURE — 83880 ASSAY OF NATRIURETIC PEPTIDE: CPT

## 2019-06-22 PROCEDURE — 82607 VITAMIN B-12: CPT

## 2019-06-22 PROCEDURE — 83036 HEMOGLOBIN GLYCOSYLATED A1C: CPT

## 2019-06-22 PROCEDURE — 83735 ASSAY OF MAGNESIUM: CPT

## 2019-06-22 PROCEDURE — 93971 EXTREMITY STUDY: CPT

## 2019-06-22 RX ORDER — LOSARTAN/HYDROCHLOROTHIAZIDE 100MG-25MG
1 TABLET ORAL
Qty: 30 | Refills: 0
Start: 2019-06-22 | End: 2019-07-21

## 2019-06-22 RX ORDER — ASPIRIN/CALCIUM CARB/MAGNESIUM 324 MG
1 TABLET ORAL
Qty: 0 | Refills: 0 | DISCHARGE

## 2019-06-22 RX ADMIN — PANTOPRAZOLE SODIUM 40 MILLIGRAM(S): 20 TABLET, DELAYED RELEASE ORAL at 05:58

## 2019-06-22 RX ADMIN — Medication 81 MILLIGRAM(S): at 11:27

## 2019-06-22 RX ADMIN — GABAPENTIN 100 MILLIGRAM(S): 400 CAPSULE ORAL at 05:54

## 2019-06-22 RX ADMIN — HEPARIN SODIUM 5000 UNIT(S): 5000 INJECTION INTRAVENOUS; SUBCUTANEOUS at 05:54

## 2019-06-22 RX ADMIN — LOSARTAN POTASSIUM 25 MILLIGRAM(S): 100 TABLET, FILM COATED ORAL at 05:54

## 2019-06-22 RX ADMIN — GABAPENTIN 100 MILLIGRAM(S): 400 CAPSULE ORAL at 13:00

## 2019-06-22 RX ADMIN — AMLODIPINE BESYLATE 10 MILLIGRAM(S): 2.5 TABLET ORAL at 05:54

## 2019-06-22 NOTE — DISCHARGE NOTE NURSING/CASE MANAGEMENT/SOCIAL WORK - NSDCDPATPORTLINK_GEN_ALL_CORE
You can access the [facility] Patient Portal, offered by [facility], by registering with the following website: [email or web address]

## 2019-12-13 NOTE — ED PROVIDER NOTE - CADM POA PRESS ULCER
Per order of Dr. Lopez Mitchell a post void residual was done via Bladder scanner.  PVR was 341 cc.     Patient did leave and come back after urinating a little.  PVR was still 343 cc.     No

## 2020-01-23 ENCOUNTER — INPATIENT (INPATIENT)
Facility: HOSPITAL | Age: 85
LOS: 3 days | Discharge: HOME | DRG: 690 | End: 2020-01-27
Attending: INTERNAL MEDICINE | Admitting: INTERNAL MEDICINE
Payer: MEDICARE

## 2020-01-23 VITALS
DIASTOLIC BLOOD PRESSURE: 81 MMHG | HEART RATE: 83 BPM | SYSTOLIC BLOOD PRESSURE: 163 MMHG | WEIGHT: 171.96 LBS | OXYGEN SATURATION: 96 % | TEMPERATURE: 98 F | HEIGHT: 64 IN | RESPIRATION RATE: 18 BRPM

## 2020-01-23 DIAGNOSIS — Z87.19 PERSONAL HISTORY OF OTHER DISEASES OF THE DIGESTIVE SYSTEM: Chronic | ICD-10-CM

## 2020-01-23 DIAGNOSIS — Z90.710 ACQUIRED ABSENCE OF BOTH CERVIX AND UTERUS: Chronic | ICD-10-CM

## 2020-01-23 LAB
ALBUMIN SERPL ELPH-MCNC: 3.8 G/DL — SIGNIFICANT CHANGE UP (ref 3.3–5)
ALP SERPL-CCNC: 169 U/L — HIGH (ref 40–120)
ALT FLD-CCNC: 163 U/L — HIGH (ref 10–45)
ANION GAP SERPL CALC-SCNC: 16 MMOL/L — SIGNIFICANT CHANGE UP (ref 5–17)
APPEARANCE UR: CLEAR — SIGNIFICANT CHANGE UP
AST SERPL-CCNC: 119 U/L — HIGH (ref 10–40)
BACTERIA # UR AUTO: ABNORMAL
BASOPHILS # BLD AUTO: 0.03 K/UL — SIGNIFICANT CHANGE UP (ref 0–0.2)
BASOPHILS NFR BLD AUTO: 0.3 % — SIGNIFICANT CHANGE UP (ref 0–2)
BILIRUB SERPL-MCNC: 0.7 MG/DL — SIGNIFICANT CHANGE UP (ref 0.2–1.2)
BILIRUB UR-MCNC: NEGATIVE — SIGNIFICANT CHANGE UP
BUN SERPL-MCNC: 17 MG/DL — SIGNIFICANT CHANGE UP (ref 7–23)
CALCIUM SERPL-MCNC: 9.4 MG/DL — SIGNIFICANT CHANGE UP (ref 8.4–10.5)
CHLORIDE SERPL-SCNC: 90 MMOL/L — LOW (ref 96–108)
CO2 SERPL-SCNC: 20 MMOL/L — LOW (ref 22–31)
COLOR SPEC: YELLOW — SIGNIFICANT CHANGE UP
CREAT SERPL-MCNC: 0.95 MG/DL — SIGNIFICANT CHANGE UP (ref 0.5–1.3)
DIFF PNL FLD: NEGATIVE — SIGNIFICANT CHANGE UP
EOSINOPHIL # BLD AUTO: 0.14 K/UL — SIGNIFICANT CHANGE UP (ref 0–0.5)
EOSINOPHIL NFR BLD AUTO: 1.2 % — SIGNIFICANT CHANGE UP (ref 0–6)
EPI CELLS # UR: 4 /HPF — SIGNIFICANT CHANGE UP
GAS PNL BLDV: SIGNIFICANT CHANGE UP
GLUCOSE SERPL-MCNC: 130 MG/DL — HIGH (ref 70–99)
GLUCOSE UR QL: NEGATIVE — SIGNIFICANT CHANGE UP
HCT VFR BLD CALC: 39.2 % — SIGNIFICANT CHANGE UP (ref 34.5–45)
HGB BLD-MCNC: 13.3 G/DL — SIGNIFICANT CHANGE UP (ref 11.5–15.5)
HYALINE CASTS # UR AUTO: 1 /LPF — SIGNIFICANT CHANGE UP (ref 0–2)
IMM GRANULOCYTES NFR BLD AUTO: 0.3 % — SIGNIFICANT CHANGE UP (ref 0–1.5)
KETONES UR-MCNC: NEGATIVE — SIGNIFICANT CHANGE UP
LEUKOCYTE ESTERASE UR-ACNC: ABNORMAL
LIDOCAIN IGE QN: 624 U/L — HIGH (ref 7–60)
LYMPHOCYTES # BLD AUTO: 26 % — SIGNIFICANT CHANGE UP (ref 13–44)
LYMPHOCYTES # BLD AUTO: 3.02 K/UL — SIGNIFICANT CHANGE UP (ref 1–3.3)
MCHC RBC-ENTMCNC: 31.2 PG — SIGNIFICANT CHANGE UP (ref 27–34)
MCHC RBC-ENTMCNC: 33.9 GM/DL — SIGNIFICANT CHANGE UP (ref 32–36)
MCV RBC AUTO: 92 FL — SIGNIFICANT CHANGE UP (ref 80–100)
MONOCYTES # BLD AUTO: 0.99 K/UL — HIGH (ref 0–0.9)
MONOCYTES NFR BLD AUTO: 8.5 % — SIGNIFICANT CHANGE UP (ref 2–14)
NEUTROPHILS # BLD AUTO: 7.4 K/UL — SIGNIFICANT CHANGE UP (ref 1.8–7.4)
NEUTROPHILS NFR BLD AUTO: 63.7 % — SIGNIFICANT CHANGE UP (ref 43–77)
NITRITE UR-MCNC: NEGATIVE — SIGNIFICANT CHANGE UP
NRBC # BLD: 0 /100 WBCS — SIGNIFICANT CHANGE UP (ref 0–0)
PH UR: 6.5 — SIGNIFICANT CHANGE UP (ref 5–8)
PLATELET # BLD AUTO: 231 K/UL — SIGNIFICANT CHANGE UP (ref 150–400)
POTASSIUM SERPL-MCNC: 4.5 MMOL/L — SIGNIFICANT CHANGE UP (ref 3.5–5.3)
POTASSIUM SERPL-SCNC: 4.5 MMOL/L — SIGNIFICANT CHANGE UP (ref 3.5–5.3)
PROT SERPL-MCNC: 6.9 G/DL — SIGNIFICANT CHANGE UP (ref 6–8.3)
PROT UR-MCNC: NEGATIVE — SIGNIFICANT CHANGE UP
RAPID RVP RESULT: SIGNIFICANT CHANGE UP
RBC # BLD: 4.26 M/UL — SIGNIFICANT CHANGE UP (ref 3.8–5.2)
RBC # FLD: 12.3 % — SIGNIFICANT CHANGE UP (ref 10.3–14.5)
RBC CASTS # UR COMP ASSIST: 3 /HPF — SIGNIFICANT CHANGE UP (ref 0–4)
SODIUM SERPL-SCNC: 126 MMOL/L — LOW (ref 135–145)
SP GR SPEC: 1.01 — SIGNIFICANT CHANGE UP (ref 1.01–1.02)
TROPONIN T, HIGH SENSITIVITY RESULT: 36 NG/L — SIGNIFICANT CHANGE UP (ref 0–51)
TROPONIN T, HIGH SENSITIVITY RESULT: 44 NG/L — SIGNIFICANT CHANGE UP (ref 0–51)
UROBILINOGEN FLD QL: ABNORMAL
WBC # BLD: 11.62 K/UL — HIGH (ref 3.8–10.5)
WBC # FLD AUTO: 11.62 K/UL — HIGH (ref 3.8–10.5)
WBC UR QL: 53 /HPF — HIGH (ref 0–5)

## 2020-01-23 PROCEDURE — 99285 EMERGENCY DEPT VISIT HI MDM: CPT | Mod: GC

## 2020-01-23 PROCEDURE — 74177 CT ABD & PELVIS W/CONTRAST: CPT | Mod: 26

## 2020-01-23 PROCEDURE — 93010 ELECTROCARDIOGRAM REPORT: CPT | Mod: GC

## 2020-01-23 PROCEDURE — 71046 X-RAY EXAM CHEST 2 VIEWS: CPT | Mod: 26

## 2020-01-23 RX ORDER — SODIUM CHLORIDE 9 MG/ML
1000 INJECTION INTRAMUSCULAR; INTRAVENOUS; SUBCUTANEOUS ONCE
Refills: 0 | Status: COMPLETED | OUTPATIENT
Start: 2020-01-23 | End: 2020-01-23

## 2020-01-23 RX ADMIN — SODIUM CHLORIDE 1000 MILLILITER(S): 9 INJECTION INTRAMUSCULAR; INTRAVENOUS; SUBCUTANEOUS at 21:47

## 2020-01-23 NOTE — ED PROVIDER NOTE - ATTENDING CONTRIBUTION TO CARE
89y F hx of PPM, AFib on AC, HTN, ovarian and colon CA, GERD here with c/o epigastric pressure like pain assoc with some difficulty breathing, intermittent, lasts hours at a time, relief with antacids. +Cough. 89 y/o F pmhx of colon cancer, ovarian cancer, anxiety, GERD, HTN, and pshx of total hysterectomy, SBO, and recent dx of UTI presents to the ED c/o epigastric pain that began X3 days. As per daughter, pain has been intermittent throughout the days. has taken Swapna-[place] with relief.  Last night, pt experienced intense pain out of the 3 days. Some difficulty taking deep breath in, but that has been ongoing for the past year. Denies change in color stool, abd pain, fever, n/v/d. No hx of gallstones or alcohol use.    On exam elderly female, well appearing in nad, RRR, lungs CTA BL, +BS, soft, NTND, neg murphys, no grr. Ext wwp. Possibly gastritis, gerd, pancreatitis, ACS. Will check labs, CXR, EKG, Pain medication as needed and consider imaging pending lab results.

## 2020-01-23 NOTE — ED PROVIDER NOTE - PSH
H/O small bowel obstruction    H/O total hysterectomy  with BSO  No significant past surgical history

## 2020-01-23 NOTE — ED ADULT NURSE NOTE - NSIMPLEMENTINTERV_GEN_ALL_ED
Implemented All Fall with Harm Risk Interventions:  [place] to call system. Call bell, personal items and telephone within reach. Instruct patient to call for assistance. Room bathroom lighting operational. Non-slip footwear when patient is off stretcher. Physically safe environment: no spills, clutter or unnecessary equipment. Stretcher in lowest position, wheels locked, appropriate side rails in place. Provide visual cue, wrist band, yellow gown, etc. Monitor gait and stability. Monitor for mental status changes and reorient to person, place, and time. Review medications for side effects contributing to fall risk. Reinforce activity limits and safety measures with patient and family. Provide visual clues: red socks.

## 2020-01-23 NOTE — ED PROVIDER NOTE - CLINICAL SUMMARY MEDICAL DECISION MAKING FREE TEXT BOX
Inga Main MD: 89yo F with PMH of afib not on AC s/p pacemaker placed 9/19, HTN, ovarian ca s/p hysterectomy, colon ca s/p colectomy, GERD who presents with chest pain for 3 days. Pt hemodynamically stable, afebrile, not hypoxic, well appearing. Abd is unremarkable. No active CP in ED with normal paced EKG. Recent cough with R crackles, will get RVP, CXR to r/o flu, pna. Must consider ACS, will get trop, CXR. Possible pain is due to worsening acid reflux, will reassess and dispo accordingly.

## 2020-01-23 NOTE — ED PROVIDER NOTE - PHYSICAL EXAMINATION
CONSTITUTIONAL: Nontoxic, well nourished, well developed, elderly female, resting comfortably in no acute distress  HEAD: Normocephalic; atraumatic  EYES: Normal inspection, EOMI  ENMT: External appears normal; normal oropharynx  NECK: Supple; non-tender; no cervical lymphadenopathy  CARD: RRR; no audible murmurs, rubs, or gallops  RESP: No respiratory distress, R basilar crackles   ABD: Soft, non-distended; non-tender; no rebound or guarding  EXT: No LE pitting edema or calf tenderness; distal pulses intact with good capillary refill  SKIN: Warm, dry, intact  NEURO: aaox3, moving all extremities spontaneously

## 2020-01-23 NOTE — ED ADULT NURSE NOTE - OBJECTIVE STATEMENT
89 YOF A&OX3 ambulatory with walker with pmh of pacemaker, a-fib, HTN, ovarian cancer, colon cancer presents to ED for intermittent chest pain since last tuesday. pt states has had 3 episodes of chest pain that would last about an hour and then go away. pt placed on cardiac monitor shows paced rhythm. pt noted to have swelling of calves bilaterally, pt states this is normal for her. pt denies chest pain, sob, n/v/d, headaches, dizziness, blurry vision at this time. pt appears to be in no acute distress. safety maintained.

## 2020-01-23 NOTE — ED PROVIDER NOTE - PROGRESS NOTE DETAILS
HPI: 89 y/o F pmhx of colon cancer, ovarian cancer, anxiety, GERD, HTN, and pshx of total hysterectomy, SBO, and recent dx of UTI presents to the ED c/o epigastric pain that began X3 days. As per daughter, pain has been intermittent throughout the days. In the past year, pt has been breathing heavily and been SOB. Last night, pt experienced intense pain out of the 3 days. Pt has taken Swapna-Warren with relief. +pain with breathing. Denies change in color stool, abd pain, fever, n/v/d. No hx of gallstones or alcohol use. mdm: 89 y/o F presents with epigastric pain. No tenderness on exam. Cardiac v. GI. Will check labs, EKG, and CXR. Pain medication as needed and consider imaging pending lab results.     PE: well appearing, no reproducible abd pain. No guarding or rebound Inga Main MD: Pt denies pain. Elevated lipase, LFTs concerning for pancreatitis. CT unremarkable. No gallstones on US. Will treat UTI with ceftriaxone and admit for pancreatitis. Spoke to Dr. Lew on the phone, will admit under him. PE: well appearing, no reproducible abd pain. No guarding or rebound

## 2020-01-23 NOTE — ED PROVIDER NOTE - CARE PLAN
Principal Discharge DX:	Pancreatitis  Secondary Diagnosis:	Cystitis Principal Discharge DX:	Pancreatitis  Secondary Diagnosis:	Cystitis  Secondary Diagnosis:	Elevated LFTs  Secondary Diagnosis:	Hyponatremia

## 2020-01-23 NOTE — ED PROVIDER NOTE - NS ED ROS FT
General: denies fever, chills  HENT: denies nasal congestion, sore throat, rhinorrhea  Eyes: denies vision changes  CV: +chest pain  Resp: denies difficulty breathing, +cough  Abdominal: denies nausea, vomiting, diarrhea, abdominal pain, blood in stool, dark stool  : denies pain with urination  MSK: denies recent trauma  Neuro: denies headaches, numbness, tingling, dizziness, lightheadedness.  Skin: denies new rashes  Endocrine: denies recent weight loss

## 2020-01-23 NOTE — ED PROVIDER NOTE - OBJECTIVE STATEMENT
Inga Main MD: 89yo F with PMH of afib not on AC s/p pacemaker placed 9/19, HTN, ovarian ca s/p hysterectomy, colon ca s/p colectomy, GERD who presents with chest pain for 3 days. As per daughter at bedside, pt has pressure in epigastric region lasting hours each day with some difficulty taking a breath during the episodes, relief with Pepto-Bismol and Swapna-Royal. Hx of reflux and regurgitation episodes since colon surgery. Admits to recent dry cough, sneezing today. Recent UTI, final dose of nitrofurantoin today. Recent echo on 6/19 with EF of 55%. No fever, chills, SOB, N/V/D, syncope, lightheadedness, dysuria, hematuria, vaginal bleeding/discharge, hematochezia, melena, focal neurological deficits, recent sick contacts, recent travel. Pt lives at home with aid 24/7.

## 2020-01-23 NOTE — ED PROVIDER NOTE - PMH
Anxiety    Cancer    Colon cancer    GERD (gastroesophageal reflux disease)    Hypertension    Ovarian cancer

## 2020-01-24 DIAGNOSIS — I48.0 PAROXYSMAL ATRIAL FIBRILLATION: ICD-10-CM

## 2020-01-24 DIAGNOSIS — N30.00 ACUTE CYSTITIS WITHOUT HEMATURIA: ICD-10-CM

## 2020-01-24 DIAGNOSIS — I10 ESSENTIAL (PRIMARY) HYPERTENSION: ICD-10-CM

## 2020-01-24 DIAGNOSIS — E87.1 HYPO-OSMOLALITY AND HYPONATREMIA: ICD-10-CM

## 2020-01-24 DIAGNOSIS — R07.9 CHEST PAIN, UNSPECIFIED: ICD-10-CM

## 2020-01-24 DIAGNOSIS — K21.9 GASTRO-ESOPHAGEAL REFLUX DISEASE WITHOUT ESOPHAGITIS: ICD-10-CM

## 2020-01-24 DIAGNOSIS — Z29.9 ENCOUNTER FOR PROPHYLACTIC MEASURES, UNSPECIFIED: ICD-10-CM

## 2020-01-24 DIAGNOSIS — K85.90 ACUTE PANCREATITIS WITHOUT NECROSIS OR INFECTION, UNSPECIFIED: ICD-10-CM

## 2020-01-24 DIAGNOSIS — R94.5 ABNORMAL RESULTS OF LIVER FUNCTION STUDIES: ICD-10-CM

## 2020-01-24 DIAGNOSIS — Z02.9 ENCOUNTER FOR ADMINISTRATIVE EXAMINATIONS, UNSPECIFIED: ICD-10-CM

## 2020-01-24 DIAGNOSIS — R74.8 ABNORMAL LEVELS OF OTHER SERUM ENZYMES: ICD-10-CM

## 2020-01-24 DIAGNOSIS — K85.30 DRUG INDUCED ACUTE PANCREATITIS WITHOUT NECROSIS OR INFECTION: ICD-10-CM

## 2020-01-24 PROBLEM — C18.9 MALIGNANT NEOPLASM OF COLON, UNSPECIFIED: Chronic | Status: ACTIVE | Noted: 2019-06-19

## 2020-01-24 PROBLEM — C56.9 MALIGNANT NEOPLASM OF UNSPECIFIED OVARY: Chronic | Status: ACTIVE | Noted: 2019-06-19

## 2020-01-24 LAB
ANION GAP SERPL CALC-SCNC: 9 MMOL/L — SIGNIFICANT CHANGE UP (ref 5–17)
BUN SERPL-MCNC: 13 MG/DL — SIGNIFICANT CHANGE UP (ref 7–23)
CALCIUM SERPL-MCNC: 9.2 MG/DL — SIGNIFICANT CHANGE UP (ref 8.4–10.5)
CHLORIDE SERPL-SCNC: 99 MMOL/L — SIGNIFICANT CHANGE UP (ref 96–108)
CO2 SERPL-SCNC: 27 MMOL/L — SIGNIFICANT CHANGE UP (ref 22–31)
CREAT SERPL-MCNC: 0.92 MG/DL — SIGNIFICANT CHANGE UP (ref 0.5–1.3)
GLUCOSE SERPL-MCNC: 130 MG/DL — HIGH (ref 70–99)
POTASSIUM SERPL-MCNC: 3.9 MMOL/L — SIGNIFICANT CHANGE UP (ref 3.5–5.3)
POTASSIUM SERPL-SCNC: 3.9 MMOL/L — SIGNIFICANT CHANGE UP (ref 3.5–5.3)
SODIUM SERPL-SCNC: 135 MMOL/L — SIGNIFICANT CHANGE UP (ref 135–145)

## 2020-01-24 PROCEDURE — 99223 1ST HOSP IP/OBS HIGH 75: CPT

## 2020-01-24 PROCEDURE — 76705 ECHO EXAM OF ABDOMEN: CPT | Mod: 26,RT

## 2020-01-24 RX ORDER — CEFTRIAXONE 500 MG/1
1000 INJECTION, POWDER, FOR SOLUTION INTRAMUSCULAR; INTRAVENOUS EVERY 24 HOURS
Refills: 0 | Status: COMPLETED | OUTPATIENT
Start: 2020-01-25 | End: 2020-01-26

## 2020-01-24 RX ORDER — ZOLPIDEM TARTRATE 10 MG/1
2.5 TABLET ORAL AT BEDTIME
Refills: 0 | Status: DISCONTINUED | OUTPATIENT
Start: 2020-01-24 | End: 2020-01-27

## 2020-01-24 RX ORDER — POLYETHYLENE GLYCOL 3350 17 G/17G
17 POWDER, FOR SOLUTION ORAL AT BEDTIME
Refills: 0 | Status: DISCONTINUED | OUTPATIENT
Start: 2020-01-24 | End: 2020-01-27

## 2020-01-24 RX ORDER — SODIUM CHLORIDE 9 MG/ML
1000 INJECTION INTRAMUSCULAR; INTRAVENOUS; SUBCUTANEOUS ONCE
Refills: 0 | Status: COMPLETED | OUTPATIENT
Start: 2020-01-24 | End: 2020-01-24

## 2020-01-24 RX ORDER — NYSTATIN CREAM 100000 [USP'U]/G
1 CREAM TOPICAL EVERY 12 HOURS
Refills: 0 | Status: DISCONTINUED | OUTPATIENT
Start: 2020-01-24 | End: 2020-01-27

## 2020-01-24 RX ORDER — CEFTRIAXONE 500 MG/1
1000 INJECTION, POWDER, FOR SOLUTION INTRAMUSCULAR; INTRAVENOUS ONCE
Refills: 0 | Status: COMPLETED | OUTPATIENT
Start: 2020-01-24 | End: 2020-01-24

## 2020-01-24 RX ORDER — GABAPENTIN 400 MG/1
100 CAPSULE ORAL THREE TIMES A DAY
Refills: 0 | Status: DISCONTINUED | OUTPATIENT
Start: 2020-01-24 | End: 2020-01-27

## 2020-01-24 RX ORDER — ASPIRIN/CALCIUM CARB/MAGNESIUM 324 MG
81 TABLET ORAL DAILY
Refills: 0 | Status: DISCONTINUED | OUTPATIENT
Start: 2020-01-24 | End: 2020-01-27

## 2020-01-24 RX ORDER — PANTOPRAZOLE SODIUM 20 MG/1
40 TABLET, DELAYED RELEASE ORAL
Refills: 0 | Status: DISCONTINUED | OUTPATIENT
Start: 2020-01-24 | End: 2020-01-27

## 2020-01-24 RX ORDER — HEPARIN SODIUM 5000 [USP'U]/ML
5000 INJECTION INTRAVENOUS; SUBCUTANEOUS EVERY 8 HOURS
Refills: 0 | Status: DISCONTINUED | OUTPATIENT
Start: 2020-01-24 | End: 2020-01-27

## 2020-01-24 RX ORDER — ATENOLOL 25 MG/1
25 TABLET ORAL DAILY
Refills: 0 | Status: DISCONTINUED | OUTPATIENT
Start: 2020-01-24 | End: 2020-01-27

## 2020-01-24 RX ORDER — AMLODIPINE BESYLATE 2.5 MG/1
1 TABLET ORAL
Qty: 0 | Refills: 0 | DISCHARGE

## 2020-01-24 RX ADMIN — ATENOLOL 25 MILLIGRAM(S): 25 TABLET ORAL at 11:47

## 2020-01-24 RX ADMIN — ZOLPIDEM TARTRATE 2.5 MILLIGRAM(S): 10 TABLET ORAL at 22:27

## 2020-01-24 RX ADMIN — SODIUM CHLORIDE 1000 MILLILITER(S): 9 INJECTION INTRAMUSCULAR; INTRAVENOUS; SUBCUTANEOUS at 04:48

## 2020-01-24 RX ADMIN — GABAPENTIN 100 MILLIGRAM(S): 400 CAPSULE ORAL at 21:19

## 2020-01-24 RX ADMIN — HEPARIN SODIUM 5000 UNIT(S): 5000 INJECTION INTRAVENOUS; SUBCUTANEOUS at 21:20

## 2020-01-24 RX ADMIN — NYSTATIN CREAM 1 APPLICATION(S): 100000 CREAM TOPICAL at 16:05

## 2020-01-24 RX ADMIN — PANTOPRAZOLE SODIUM 40 MILLIGRAM(S): 20 TABLET, DELAYED RELEASE ORAL at 11:47

## 2020-01-24 RX ADMIN — Medication 81 MILLIGRAM(S): at 11:47

## 2020-01-24 RX ADMIN — CEFTRIAXONE 100 MILLIGRAM(S): 500 INJECTION, POWDER, FOR SOLUTION INTRAMUSCULAR; INTRAVENOUS at 04:47

## 2020-01-24 RX ADMIN — GABAPENTIN 100 MILLIGRAM(S): 400 CAPSULE ORAL at 11:47

## 2020-01-24 RX ADMIN — HEPARIN SODIUM 5000 UNIT(S): 5000 INJECTION INTRAVENOUS; SUBCUTANEOUS at 11:48

## 2020-01-24 RX ADMIN — Medication 1 TABLET(S): at 11:47

## 2020-01-24 NOTE — H&P ADULT - PROBLEM SELECTOR PLAN 10
Transitions of Care Status:  1.  Name of PCP:  2.  PCP Contacted on Admission: [ ] Y    [ ] N    3.  PCP contacted at Discharge: [ ] Y    [ ] N    [ ] N/A  4.  Post-Discharge Appointment Date and Location:  5.  Summary of Handoff given to PCP: Transitions of Care Status:  1.  Name of PCP: Yohana Lew  2.  PCP Contacted on Admission: [x ] Y    [ ] N    3.  PCP contacted at Discharge: [ ] Y    [ ] N    [ ] N/A  4.  Post-Discharge Appointment Date and Location:  5.  Summary of Handoff given to PCP:

## 2020-01-24 NOTE — H&P ADULT - ASSESSMENT
89F with h/o Afib not on AC s/p pacemaker placed 9/19, HTN, ovarian ca s/p hysterectomy, colon ca s/p colectomy, GERD who presents with c/o burning epigastric  pain for 3 days. Physical exam is unremarkable. Labs are notable for negative hs trop x 2, hyponatremia, elevated LFTs, elevated Lipase, leukocytosis and UA positive for UTI. CT abd is negative for pancreatitis, RUQ US is unremarkable.

## 2020-01-24 NOTE — H&P ADULT - PROBLEM SELECTOR PLAN 2
- s/p recent outpt treatment for UTI ( nitrofurantoin x 4 days)  - UA positive ; pt also w/mild leukocytosis  - c/w Ceftriaxone x 3 days  - f/up urine cx - s/p recent outpt treatment for UTI ( nitrofurantoin x 5 days)  - UA positive ; pt also w/mild leukocytosis  - c/w Ceftriaxone x 3 days  - f/up urine cx

## 2020-01-24 NOTE — CONSULT NOTE ADULT - ATTENDING COMMENTS
Patient was seen and examined,interim events noted,labs and radiology studies reviewed.  Vipin Muller MD,FACC.  [address] [place] [address].  [id number].  [phone]

## 2020-01-24 NOTE — H&P ADULT - PROBLEM SELECTOR PLAN 1
- does not appear to be cardiac in etiology  - negative trops x 2, no EKG changes  - pain is epigastric and burning in nature ; now resolved  - will start Protonix 40mg po qd  - Seen by Cardiology ; f/up reccs

## 2020-01-24 NOTE — H&P ADULT - PROBLEM SELECTOR PLAN 7
- unclear etiology  - CT ABD, RUQ US unremarkable  - will send hepatitis panel - unclear etiology ; likely medication side effect from Nitrofurantoin  - Pt denies abd pain   CT ABD, RUQ US unremarkable  - will monitor

## 2020-01-24 NOTE — H&P ADULT - NSICDXPASTMEDICALHX_GEN_ALL_CORE_FT
PAST MEDICAL HISTORY:  Anxiety     Cancer     Colon cancer     GERD (gastroesophageal reflux disease)     Hypertension     Ovarian cancer

## 2020-01-24 NOTE — H&P ADULT - NSHPSOCIALHISTORY_GEN_ALL_CORE
Lives with family  Denies tobacco/Etoh/Illicit drug use Lives alone ; has 24/7 HHA  Pt's daughter Mercy much involved in pt's care   Denies tobacco/Etoh/Illicit drug use

## 2020-01-24 NOTE — H&P ADULT - PROBLEM SELECTOR PLAN 3
- Na 126 ; may be 2/2 poor PO intake  - s/p 2L IVF in ED  - repeat BMP now  - if persistent will send urine studies

## 2020-01-24 NOTE — H&P ADULT - HISTORY OF PRESENT ILLNESS
89yo F with PMH of afib not on AC s/p pacemaker placed 9/19, HTN, ovarian ca s/p hysterectomy, colon ca s/p colectomy, GERD who presents with chest pain for 3 days 89F with h/o Afib not on AC s/p pacemaker placed 9/19, HTN, ovarian ca s/p hysterectomy, colon ca s/p colectomy, GERD who presents with c/o chest pain for 3 days. Pain is epigastric, burning in nature , occurs at rest and on exertion. She notes mild associated sob. She denies CP at this time. Of note pt was recently treated for UTI after presenting to Urgent care for generalized weakness and malaise. She completed a course of Nitrofurantoin ( 4 days). Denies abd pain, dysuria, fever/chills, n/v, hematuria, urgency, frequency. Pt reports 2 episodes of diarrhea ( yesterday and one this morning).    ED COURSE  VS :153/53  74  18  O2 95%on room air T 97.7F  Labs:  wbc  11.62 trop 44, 36  Na 126  bun/cr 17/0.95       BILI 0.7 RVP negative  UA: LE+, WBC 53, bact few  Lipase 624  Treatment : Ceftriaxone 1g ivpb x 1, IVF NS 1L x 2 89F with h/o Afib not on AC s/p pacemaker placed 9/19, HTN, ovarian ca s/p hysterectomy, colon ca s/p colectomy, GERD who presents with c/o chest pain for 3 days. Pain is epigastric, burning in nature , occurs at rest and on exertion. She notes mild associated sob. She denies CP at this time. Of note pt was recently treated for UTI after presenting to Urgent care for generalized weakness and malaise. She completed a course of Nitrofurantoin ( 5 days), she was also prescribed Phenazopyridine. Denies abd pain, dysuria, fever/chills, n/v, hematuria, urgency, frequency. Pt reports 2 episodes of diarrhea ( yesterday and one this morning).    ED COURSE  VS :153/53  74  18  O2 95%on room air T 97.7F  Labs:  wbc  11.62 trop 44, 36  Na 126  bun/cr 17/0.95       BILI 0.7 RVP negative  UA: LE+, WBC 53, bact few  Lipase 624  Treatment : Ceftriaxone 1g ivpb x 1, IVF NS 1L x 2

## 2020-01-24 NOTE — H&P ADULT - NSICDXPASTSURGICALHX_GEN_ALL_CORE_FT
PAST SURGICAL HISTORY:  H/O small bowel obstruction     H/O total hysterectomy with BSO    No significant past surgical history

## 2020-01-24 NOTE — H&P ADULT - PROBLEM SELECTOR PLAN 6
- unclear etiology  - clinically pt does not show signs/symptoms of pancreatitis  - CT negative for pancreatitis  - will monitor - unclear etiology ; may be 2/2 Nitrofurantoin (known adverse reaction is pancreatitis)  - clinically pt does not show signs/symptoms of pancreatitis  - CT negative for pancreatitis  - will monitor

## 2020-01-24 NOTE — CONSULT NOTE ADULT - SUBJECTIVE AND OBJECTIVE BOX
CHIEF COMPLAINT:Chest Pain    HPI:89yo F with PMH of afib not on AC s/p pacemaker placed 9/19, HTN, ovarian ca s/p hysterectomy, colon ca s/p colectomy, GERD who presents with chest pain for 3 days. Recent UTI, final dose of nitrofurantoin today. Recent echo on 6/19 with EF of 55%. No fever, chills, SOB, N/V/D, syncope, lightheadedness, dysuria, hematuria, vaginal bleeding/discharge, hematochezia, melena, focal neurological deficits, recent sick contacts, recent travel.       PAST MEDICAL & SURGICAL HISTORY:  Colon cancer  Ovarian cancer  Anxiety  GERD (gastroesophageal reflux disease)  Cancer  Hypertension  No significant past surgical history  H/O total hysterectomy: with BSO  H/O small bowel obstruction      MEDICATIONS  (HOME):  · 	losartan-hydrochlorothiazide 50mg-12.5mg oral tablet: 1 tab(s) orally once a day   · 	aspirin 81 mg oral delayed release tablet: 1 tab(s) orally once a day  · 	amLODIPine 10 mg oral tablet: 1 tab(s) orally once a day  · 	omeprazole 20 mg oral delayed release capsule: 1 cap(s) orally once a day  · 	meloxicam 15 mg oral tablet: 1 tab(s) orally once a day  · 	zolpidem 5 mg oral tablet: 0.5 tab(s) orally once a day (at bedtime)  · 	promethazine 6.25 mg/5 mL oral syrup: 5 milliliter(s) orally 3 times a day  · 	gabapentin 100 mg oral capsule: 1 cap(s) orally 3 times a day      FAMILY HISTORY:  No family history of premature coronary artery disease or sudden cardiac death    SOCIAL HISTORY:  Smoking-Non Smoker  Alcohol-Denies  Ilicit Drug use-Denies    REVIEW OF SYSTEMS:  Constitutional: [ ] fever, [ ]weight loss, [x ]fatigue Activity [ ] Bedbound,[x ] Ambulates [ ] Unassisted[x ] Cane/Walker [ ] Assistence.  Eyes: [ ] visual changes  Respiratory: [ ]shortness of breath;  [ ] cough, [ ]wheezing, [ ]chills, [ ]hemoptysis  Cardiovascular: [x ] chest pain, [ ]palpitations, [ ]dizziness,  [ ]leg swelling[ ]orthopnea [ ]PND  Gastrointestinal: [x ] abdominal pain, [x ]nausea, [ ]vomiting,  [ ]diarrhea,[ ]constipation  Genitourinary: [ ] dysuria, [ ] hematuria  Neurologic: [ ] headaches [ ] tremors[ ] weakness  Skin: [ ] itching, [ ]burning, [ ] rashes  Endocrine: [ ] heat or cold intolerance  Musculoskeletal: [ ] joint pain or swelling; [ ] muscle, back, or extremity pain  Psychiatric: [ ] depression, [ ]anxiety, [ ]mood swings, or [ ]difficulty sleeping  Hematologic: [ ] easy bruising, [ ] bleeding gums       [ x] All others negative	  [ ] Unable to obtain    Vital Signs Last 24 Hrs  T(C): 37 (24 Jan 2020 05:11), Max: 37 (24 Jan 2020 05:11)  T(F): 98.6 (24 Jan 2020 05:11), Max: 98.6 (24 Jan 2020 05:11)  HR: 87 (24 Jan 2020 05:11) (74 - 87)  BP: 157/62 (24 Jan 2020 05:11) (153/53 - 163/81)  RR: 18 (24 Jan 2020 05:11) (14 - 19)  SpO2: 95% (24 Jan 2020 05:11) (95% - 100%)  I&O's Summary      PHYSICAL EXAM:  General: No acute distress BMI-21  HEENT: EOMI, PERRL[ ] Icteric  Neck: Supple, No JVD  Lungs: Equal air entry bilaterally; [ ] Rales [ ] Rhonchi [ ] Wheezing  Heart: Regular rate and rhythm;[x ] Murmurs-  2 /6 [x ] Systolic [ ] Diastolic [ ] Radiation,No rubs, or gallops  Abdomen: Epigastric tender, bowel sounds present  Extremities: No clubbing, cyanosis, or edema[ ] Calf tenderness  Nervous system:  Alert & Oriented X3, no focal deficits  Psychiatric: Normal affect  Skin: No rashes or lesions      LABS:  01-23    126<L>  |  90<L>  |  17  ----------------------------<  130<H>  4.5   |  20<L>  |  0.95    Ca    9.4      23 Jan 2020 20:24    TPro  6.9  /  Alb  3.8  /  TBili  0.7  /  DBili  x   /  AST  119<H>  /  ALT  163<H>  /  AlkPhos  169<H>  01-23    Creatinine Trend: 0.95<--                        13.3   11.62 )-----------( 231      ( 23 Jan 2020 20:24 )             39.2     Lipase, Serum (01.23.20 @ 20:24)    Lipase, Serum: 624 U/L        RADIOLOGY:   CT ABDOMEN AND PELVIS IC               IMPRESSION: -No CT evidence of pancreatitis. No bowel obstruction or inflammation. Sigmoid anastomosis.     US ABDOMEN RT UPR QUADRANT      IMPRESSION:-No cholelithiasis or cholecystitis.    ECG [my interpretation]:Sinus rhythm 1Degree AVB no acute St T wave abnormalities    ECHO:Study Date: 6/20/2019  Grossly normal left ventricular systolic function.  Grade IV diastolic dysfunction.  Mild mitral regurgitation.

## 2020-01-24 NOTE — H&P ADULT - NSHPLABSRESULTS_GEN_ALL_CORE
Labs reviewed : negative hs trop x 2, hyponatremia, elevated LFTs, elevated Lipase, leukocytosis, UA positive for UTI    CT abd /pelvis personally reviewed : No CT evidence of pancreatitis    US ABDOMEN RT UPR QUADRANT :  Liver is within normal limits.   No cholelithiasis or cholecystitis.      EKG personally reviewed : HR 82 bpm, pacemaker: good capture, regular rhythm and appropriate intervals.

## 2020-01-25 LAB
ALBUMIN SERPL ELPH-MCNC: 3.9 G/DL — SIGNIFICANT CHANGE UP (ref 3.3–5)
ALP SERPL-CCNC: 135 U/L — HIGH (ref 40–120)
ALT FLD-CCNC: 82 U/L — HIGH (ref 10–45)
ANION GAP SERPL CALC-SCNC: 13 MMOL/L — SIGNIFICANT CHANGE UP (ref 5–17)
AST SERPL-CCNC: 35 U/L — SIGNIFICANT CHANGE UP (ref 10–40)
BILIRUB SERPL-MCNC: 0.5 MG/DL — SIGNIFICANT CHANGE UP (ref 0.2–1.2)
BUN SERPL-MCNC: 14 MG/DL — SIGNIFICANT CHANGE UP (ref 7–23)
CALCIUM SERPL-MCNC: 9.5 MG/DL — SIGNIFICANT CHANGE UP (ref 8.4–10.5)
CHLORIDE SERPL-SCNC: 99 MMOL/L — SIGNIFICANT CHANGE UP (ref 96–108)
CO2 SERPL-SCNC: 23 MMOL/L — SIGNIFICANT CHANGE UP (ref 22–31)
CREAT SERPL-MCNC: 1 MG/DL — SIGNIFICANT CHANGE UP (ref 0.5–1.3)
CULTURE RESULTS: SIGNIFICANT CHANGE UP
GLUCOSE SERPL-MCNC: 118 MG/DL — HIGH (ref 70–99)
POTASSIUM SERPL-MCNC: 3.8 MMOL/L — SIGNIFICANT CHANGE UP (ref 3.5–5.3)
POTASSIUM SERPL-SCNC: 3.8 MMOL/L — SIGNIFICANT CHANGE UP (ref 3.5–5.3)
PROT SERPL-MCNC: 6.6 G/DL — SIGNIFICANT CHANGE UP (ref 6–8.3)
SODIUM SERPL-SCNC: 135 MMOL/L — SIGNIFICANT CHANGE UP (ref 135–145)
SPECIMEN SOURCE: SIGNIFICANT CHANGE UP

## 2020-01-25 RX ORDER — POTASSIUM CHLORIDE 20 MEQ
20 PACKET (EA) ORAL ONCE
Refills: 0 | Status: COMPLETED | OUTPATIENT
Start: 2020-01-25 | End: 2020-01-25

## 2020-01-25 RX ADMIN — GABAPENTIN 100 MILLIGRAM(S): 400 CAPSULE ORAL at 22:13

## 2020-01-25 RX ADMIN — Medication 1 TABLET(S): at 13:04

## 2020-01-25 RX ADMIN — CEFTRIAXONE 100 MILLIGRAM(S): 500 INJECTION, POWDER, FOR SOLUTION INTRAMUSCULAR; INTRAVENOUS at 04:34

## 2020-01-25 RX ADMIN — Medication 81 MILLIGRAM(S): at 13:04

## 2020-01-25 RX ADMIN — GABAPENTIN 100 MILLIGRAM(S): 400 CAPSULE ORAL at 13:04

## 2020-01-25 RX ADMIN — NYSTATIN CREAM 1 APPLICATION(S): 100000 CREAM TOPICAL at 17:58

## 2020-01-25 RX ADMIN — HEPARIN SODIUM 5000 UNIT(S): 5000 INJECTION INTRAVENOUS; SUBCUTANEOUS at 22:13

## 2020-01-25 RX ADMIN — HEPARIN SODIUM 5000 UNIT(S): 5000 INJECTION INTRAVENOUS; SUBCUTANEOUS at 05:28

## 2020-01-25 RX ADMIN — GABAPENTIN 100 MILLIGRAM(S): 400 CAPSULE ORAL at 05:27

## 2020-01-25 RX ADMIN — PANTOPRAZOLE SODIUM 40 MILLIGRAM(S): 20 TABLET, DELAYED RELEASE ORAL at 05:27

## 2020-01-25 RX ADMIN — HEPARIN SODIUM 5000 UNIT(S): 5000 INJECTION INTRAVENOUS; SUBCUTANEOUS at 13:04

## 2020-01-25 RX ADMIN — NYSTATIN CREAM 1 APPLICATION(S): 100000 CREAM TOPICAL at 05:28

## 2020-01-25 RX ADMIN — Medication 20 MILLIEQUIVALENT(S): at 13:03

## 2020-01-25 RX ADMIN — ATENOLOL 25 MILLIGRAM(S): 25 TABLET ORAL at 05:27

## 2020-01-25 RX ADMIN — ZOLPIDEM TARTRATE 2.5 MILLIGRAM(S): 10 TABLET ORAL at 22:13

## 2020-01-25 NOTE — PROGRESS NOTE ADULT - PROBLEM SELECTOR PLAN 6
- clinically pt does not show signs/symptoms of pancreatitis  - CT negative for pancreatitis  - LFTs are trending down

## 2020-01-25 NOTE — PROGRESS NOTE ADULT - SUBJECTIVE AND OBJECTIVE BOX
PRESENTING CC:    SUBJ:       PMH -reviewed admission note, no change since admission  Heart failure: acute [ ] chronic [ ] acute or chronic [ ] diastolic [ ] systolic [ ] combined systolic and diastolic[ ]  NAILA: ATN[ ] renal medullary necrosis [ ] CKD I [ ]CKDII [ ]CKD III [ ]CKD IV [ ]CKD V [ ]Other pathological lesions [ ]    MEDICATIONS  (STANDING):  aspirin enteric coated 81 milliGRAM(s) Oral daily  ATENolol  Tablet 25 milliGRAM(s) Oral daily  cefTRIAXone   IVPB 1000 milliGRAM(s) IV Intermittent every 24 hours  gabapentin 100 milliGRAM(s) Oral three times a day  heparin  Injectable 5000 Unit(s) SubCutaneous every 8 hours  hydrocodone/homatropine Syrup 5 milliLiter(s) Oral at bedtime  multivitamin 1 Tablet(s) Oral daily  nystatin Powder 1 Application(s) Topical every 12 hours  pantoprazole    Tablet 40 milliGRAM(s) Oral before breakfast    MEDICATIONS  (PRN):  polyethylene glycol 3350 17 Gram(s) Oral at bedtime PRN Constipation  zolpidem 2.5 milliGRAM(s) Oral at bedtime PRN Insomnia              REVIEW OF SYSTEMS:  Constitutional: [ ] fever, [ ]weight loss,  [ ]fatigue  Eyes: [ ] visual changes  Respiratory: [ ]shortness of breath;  [ ] cough, [ ]wheezing, [ ]chills, [ ]hemoptysis  Cardiovascular: [ ] chest pain, [ ]palpitations, [ ]dizziness,  [ ]leg swelling[ ]orthopnea[ ]PND  Gastrointestinal: [ ] abdominal pain, [ ]nausea, [ ]vomiting,  [ ]diarrhea   Genitourinary: [ ] dysuria, [ ] hematuria  Neurologic: [ ] headaches [ ] tremors[ ]weakness  Skin: [ ] itching, [ ]burning, [ ] rashes  Endocrine: [ ] heat or cold intolerance  Musculoskeletal: [ ] joint pain or swelling; [ ] muscle, back, or extremity pain  Psychiatric: [ ] depression, [ ]anxiety, [ ]mood swings, or [ ]difficulty sleeping  Hematologic: [ ] easy bruising, [ ] bleeding gums    [x] All remaining systems negative except as per above.   [ ]Unable to obtain.    Vital Signs Last 24 Hrs  T(C): 36.6 (25 Jan 2020 04:29), Max: 37.1 (24 Jan 2020 13:48)  T(F): 97.8 (25 Jan 2020 04:29), Max: 98.7 (24 Jan 2020 13:48)  HR: 62 (25 Jan 2020 04:29) (60 - 76)  BP: 147/69 (25 Jan 2020 04:29) (134/73 - 171/67)  BP(mean): 101 (24 Jan 2020 09:01) (101 - 101)  RR: 18 (25 Jan 2020 04:29) (17 - 18)  SpO2: 94% (25 Jan 2020 04:29) (94% - 96%)  I&O's Summary    24 Jan 2020 07:01  -  25 Jan 2020 07:00  --------------------------------------------------------  IN: 720 mL / OUT: 0 mL / NET: 720 mL        PHYSICAL EXAM:  General: No acute distress BMI-  HEENT: EOMI, PERRL  Neck: Supple, [ ] JVD  Lungs: Equal air entry bilaterally; [ ] rales [ ] wheezing [ ] rhonchi  Heart: Regular rate and rhythm; [ ] murmur   /6 [ ] systolic [ ] diastolic [ ] radiation[ ] rubs [ ]  gallops  Abdomen: Nontender, bowel sounds present  Extremities: No clubbing, cyanosis, [ ] edema  Nervous system:  Alert & Oriented X3, no focal deficits  Psychiatric: Normal affect  Skin: No rashes or lesions    LABS:  01-25    135  |  99  |  14  ----------------------------<  118<H>  3.8   |  23  |  1.00    Ca    9.5      25 Jan 2020 06:31    TPro  6.6  /  Alb  3.9  /  TBili  0.5  /  DBili  x   /  AST  35  /  ALT  82<H>  /  AlkPhos  135<H>  01-25    Creatinine Trend: 1.00<--, 0.92<--, 0.95<--                        13.3   11.62 )-----------( 231      ( 23 Jan 2020 20:24 )             39.2       Lipid Panel:   Cardiac Enzymes:           RADIOLOGY:    ECG [my interpretation]:    TELEMETRY:    ECHO:    STRESS TEST:    CATHETERIZATION:      IMPRESSION AND PLAN:

## 2020-01-25 NOTE — CONSULT NOTE ADULT - ASSESSMENT
89F with h/o Afib not on AC s/p pacemaker placed 9/19, HTN, ovarian ca s/p hysterectomy, colon ca s/p colectomy, GERD who presents with c/o chest pain for 3 days. Pain is epigastric, burning in nature , occurs at rest and on exertion. She notes mild associated sob. She denies CP at this time. Of note pt was recently treated for UTI after presenting to Urgent care for generalized weakness and malaise. She completed a course of Nitrofurantoin ( 5 days), she was also prescribed Phenazopyridine. Denies abd pain, dysuria, fever/chills, n/v, hematuria, urgency. Pt reports 2 episodes of diarrhea ( yesterday and one this morning).  c/o increased urinary frequency.     ED COURSE  VS :153/53  74  18  O2 95%on room air T 97.7F  Labs:  wbc  11.62 trop 44, 36  Na 126  bun/cr 17/0.95       BILI 0.7 RVP negative  UA: LE+, WBC 53, bact few  Lipase 624  Treatment : Ceftriaxone 1g ivpb x 1, IVF NS 1L x 2 (24 Jan 2020 09:01)    Cxr clear lungs, ABD US no cholelithiasis or cholecystitis, CT ap no pancreatitis.  ID consult called for further abx managment.           Acute cystitis without hematuria:    - Recently treated for UTI as outpt, s/p course of macrobid.  Pt c/o continued urinary frequency.  UA (+).  Agree with rocephin    - f/u urine cx and adjust abx as appropriate.  Monitor for clinical response.       GERD:    - c/o epigastric discomfort, slow food transit.  Pt on Omeprazole at home, now on Protonix for therapeutic interchange.       Elevated lipase:    - Associated with elevated transaminases.  No pancreatitis on CT ap.  Cont to trend LFTs.    - Check hepatitits panel, CMV and EBV serologies.      - Trend lipase, cont to monitor serial abd exam.   - RUQ US unremarkable.       Will follow,    Faye Ley  [phone]

## 2020-01-25 NOTE — PHYSICAL THERAPY INITIAL EVALUATION ADULT - ADDITIONAL COMMENTS
Patient lives alone in private home, 4 steps to enter with unilateral railing, 1 flight to bedroom / bathroom with chair lift. Per case management assessment patient has 24hour HHA

## 2020-01-25 NOTE — PROGRESS NOTE ADULT - SUBJECTIVE AND OBJECTIVE BOX
Patient is a 89y old  Female who presents with a chief complaint of chest pain (25 Jan 2020 13:03)      SUBJECTIVE / OVERNIGHT EVENTS:    Events noted.  CONSTITUTIONAL: No fever,  or fatigue  RESPIRATORY: No cough, wheezing,  No shortness of breath  CARDIOVASCULAR: No chest pain, palpitations, dizziness, or leg swelling  GASTROINTESTINAL: abd pain  NEUROLOGICAL: No headaches,     MEDICATIONS  (STANDING):  aspirin enteric coated 81 milliGRAM(s) Oral daily  ATENolol  Tablet 25 milliGRAM(s) Oral daily  cefTRIAXone   IVPB 1000 milliGRAM(s) IV Intermittent every 24 hours  gabapentin 100 milliGRAM(s) Oral three times a day  heparin  Injectable 5000 Unit(s) SubCutaneous every 8 hours  hydrocodone/homatropine Syrup 5 milliLiter(s) Oral at bedtime  multivitamin 1 Tablet(s) Oral daily  nystatin Powder 1 Application(s) Topical every 12 hours  pantoprazole    Tablet 40 milliGRAM(s) Oral before breakfast    MEDICATIONS  (PRN):  polyethylene glycol 3350 17 Gram(s) Oral at bedtime PRN Constipation  zolpidem 2.5 milliGRAM(s) Oral at bedtime PRN Insomnia        CAPILLARY BLOOD GLUCOSE        I&O's Summary    24 Jan 2020 07:01  -  25 Jan 2020 07:00  --------------------------------------------------------  IN: 720 mL / OUT: 0 mL / NET: 720 mL        PHYSICAL EXAM:    NECK: Supple, No JVD  CHEST/LUNG: Clear to auscultation bilaterally; No wheezing.  HEART: Regular rate and rhythm; No murmurs, rubs, or gallops  ABDOMEN: Soft, Nontender, Nondistended; Bowel sounds present  EXTREMITIES:   No edema  NEUROLOGY: AAO       LABS:    01-25    135  |  99  |  14  ----------------------------<  118<H>  3.8   |  23  |  1.00    Ca    9.5      25 Jan 2020 06:31    TPro  6.6  /  Alb  3.9  /  TBili  0.5  /  DBili  x   /  AST  35  /  ALT  82<H>  /  AlkPhos  135<H>  01-25            CAPILLARY BLOOD GLUCOSE        01-24 @ 02:00  Culture-urine --  Culture results   <10,000 CFU/mL Normal Urogenital Marissa  method type --  Organism --  Organism Identification --  Specimen source .Urine Clean Catch (Midstream)           01-24 @ 02:00  Culture blood --  Culture results   <10,000 CFU/mL Normal Urogenital Marissa  Gram stain --  Gram stain blood --  Method type --  Organism --  Organism identification --  Specimen source .Urine Clean Catch (Midstream)      RADIOLOGY & ADDITIONAL TESTS:    Imaging Personally Reviewed:    Consultant(s) Notes Reviewed:      Care Discussed with Consultants/Other Providers:

## 2020-01-25 NOTE — PHYSICAL THERAPY INITIAL EVALUATION ADULT - PERTINENT HX OF CURRENT PROBLEM, REHAB EVAL
89F with h/o Afib not on AC s/p pacemaker placed 9/19, HTN, ovarian ca s/p hysterectomy, colon ca s/p colectomy, GERD who presents with c/o chest pain for 3 days. 1/24: CT abdomen: No CT evidence of pancreatitis

## 2020-01-25 NOTE — PROGRESS NOTE ADULT - PROBLEM SELECTOR PLAN 7
- unclear etiology ; likely medication side effect from Nitrofurantoin  - Pt denies abd pain   CT ABD, RUQ US unremarkable

## 2020-01-25 NOTE — CONSULT NOTE ADULT - SUBJECTIVE AND OBJECTIVE BOX
Patient is a 89y old  Female who presents with a chief complaint of chest pain (2020 08:10)      HPI:    89F with h/o Afib not on AC s/p pacemaker placed , HTN, ovarian ca s/p hysterectomy, colon ca s/p colectomy, GERD who presents with c/o chest pain for 3 days. Pain is epigastric, burning in nature , occurs at rest and on exertion. She notes mild associated sob. She denies CP at this time. Of note pt was recently treated for UTI after presenting to Urgent care for generalized weakness and malaise. She completed a course of Nitrofurantoin ( 5 days), she was also prescribed Phenazopyridine. Denies abd pain, dysuria, fever/chills, n/v, hematuria, urgency. Pt reports 2 episodes of diarrhea ( yesterday and one this morning).  c/o increased urinary frequency.     ED COURSE  VS :153/53  74  18  O2 95%on room air T 97.7F  Labs:  wbc  11.62 trop 44, 36  Na 126  bun/cr 17/0.95       BILI 0.7 RVP negative  UA: LE+, WBC 53, bact few  Lipase 624  Treatment : Ceftriaxone 1g ivpb x 1, IVF NS 1L x 2 (2020 09:01)    Cxr clear lungs, ABD US no cholelithiasis or cholecystitis, CT ap no pancreatitis.  ID consult called for further abx managment.       REVIEW OF SYSTEMS:    CONSTITUTIONAL: No fever, weight loss, or fatigue  EYES: No eye pain, visual disturbances, or discharge  ENMT:  No sore throat  NECK: No pain or stiffness  RESPIRATORY: No cough, wheezing, chills or hemoptysis; No shortness of breath  CARDIOVASCULAR: No chest pain, palpitations, dizziness, or leg swelling  GASTROINTESTINAL: No abdominal or epigastric pain. No nausea, vomiting, or hematemesis; No diarrhea or constipation. No melena or hematochezia.  GENITOURINARY: No dysuria, frequency, hematuria, or incontinence  NEUROLOGICAL: No headaches, memory loss, loss of strength, numbness, or tremors  SKIN: No itching, burning, rashes, or lesions   LYMPH NODES: No enlarged glands  MUSCULOSKELETAL: No joint pain or swelling; No muscle, back, or extremity pain      PAST MEDICAL & SURGICAL HISTORY:  Colon cancer  Ovarian cancer  Anxiety  GERD (gastroesophageal reflux disease)  Cancer  Hypertension  No significant past surgical history  H/O total hysterectomy: with BSO  H/O small bowel obstruction      Allergies    No Known Allergies    Intolerances        FAMILY HISTORY:  No pertinent family history in first degree relatives  No pertinent family history in first degree relatives      SOCIAL HISTORY:    Lives alone ; has  HHA  	Pt's daughter Mercy much involved in pt's care   Denies tobacco/Etoh/Illicit drug use    MEDICATIONS  (STANDING):  aspirin enteric coated 81 milliGRAM(s) Oral daily  ATENolol  Tablet 25 milliGRAM(s) Oral daily  cefTRIAXone   IVPB 1000 milliGRAM(s) IV Intermittent every 24 hours  gabapentin 100 milliGRAM(s) Oral three times a day  heparin  Injectable 5000 Unit(s) SubCutaneous every 8 hours  hydrocodone/homatropine Syrup 5 milliLiter(s) Oral at bedtime  multivitamin 1 Tablet(s) Oral daily  nystatin Powder 1 Application(s) Topical every 12 hours  pantoprazole    Tablet 40 milliGRAM(s) Oral before breakfast  potassium chloride    Tablet ER 20 milliEquivalent(s) Oral once    MEDICATIONS  (PRN):  polyethylene glycol 3350 17 Gram(s) Oral at bedtime PRN Constipation  zolpidem 2.5 milliGRAM(s) Oral at bedtime PRN Insomnia      Vital Signs Last 24 Hrs  T(C): 36.6 (2020 04:29), Max: 37.1 (2020 13:48)  T(F): 97.8 (2020 04:29), Max: 98.7 (2020 13:48)  HR: 62 (2020 04:29) (60 - 62)  BP: 147/69 (2020 04:29) (134/73 - 154/68)  BP(mean): --  RR: 18 (2020 04:29) (17 - 18)  SpO2: 94% (2020 04:29) (94% - 96%)    PHYSICAL EXAM:    GENERAL: NAD, well-groomed  HEAD:  Atraumatic, Normocephalic  EYES: EOMI, PERRLA, conjunctiva and sclera clear  ENMT: No tonsillar erythema, exudates, or enlargement; Moist mucous membranes  NECK: Supple, No JVD  CHEST/LUNG: Clear to percussion bilaterally; No rales, rhonchi, wheezing, or rubs  HEART: Regular rate and rhythm; No murmurs, rubs, or gallops  ABDOMEN: Soft, Nontender, Nondistended; Bowel sounds present  EXTREMITIES:  2+ Peripheral Pulses, No clubbing, cyanosis, or edema  LYMPH: No lymphadenopathy noted  SKIN: No rashes or lesions    LABS:  CBC Full  -  ( 2020 20:24 )  WBC Count : 11.62 K/uL  RBC Count : 4.26 M/uL  Hemoglobin : 13.3 g/dL  Hematocrit : 39.2 %  Platelet Count - Automated : 231 K/uL  Mean Cell Volume : 92.0 fl  Mean Cell Hemoglobin : 31.2 pg  Mean Cell Hemoglobin Concentration : 33.9 gm/dL  Auto Neutrophil # : 7.40 K/uL  Auto Lymphocyte # : 3.02 K/uL  Auto Monocyte # : 0.99 K/uL  Auto Eosinophil # : 0.14 K/uL  Auto Basophil # : 0.03 K/uL  Auto Neutrophil % : 63.7 %  Auto Lymphocyte % : 26.0 %  Auto Monocyte % : 8.5 %  Auto Eosinophil % : 1.2 %  Auto Basophil % : 0.3 %          135  |  99  |  14  ----------------------------<  118<H>  3.8   |  23  |  1.00    Ca    9.5      2020 06:31    TPro  6.6  /  Alb  3.9  /  TBili  0.5  /  DBili  x   /  AST  35  /  ALT  82<H>  /  AlkPhos  135<H>        LIVER FUNCTIONS - ( 2020 06:31 )  Alb: 3.9 g/dL / Pro: 6.6 g/dL / ALK PHOS: 135 U/L / ALT: 82 U/L / AST: 35 U/L / GGT: x                               MICROBIOLOGY:        Urinalysis Basic - ( 2020 22:53 )    Color: Yellow / Appearance: Clear / S.010 / pH: x  Gluc: x / Ketone: Negative  / Bili: Negative / Urobili: 2 mg/dL   Blood: x / Protein: Negative / Nitrite: Negative   Leuk Esterase: Large / RBC: 3 /hpf / WBC 53 /HPF   Sq Epi: x / Non Sq Epi: 4 /hpf / Bacteria: Few                RADIOLOGY:    < from: US Abdomen Upper Quadrant Right (20 @ 03:21) >  FINDINGS:    Liver: Within normal limits.    Bile ducts: Normal caliber. Common bile duct measures 5 mm.     Gallbladder: Intraluminal sludge. No cholelithiasis, pericholecystic fluid, wall thickening or sonographic Perez's sign.        Pancreas: Visualized portions are within normal limits.    Right kidney: 9.5 cm. No hydronephrosis.     Ascites: None.    Aorta/IVC: Visualized portions are within normal limits.    IMPRESSION:     No cholelithiasis or cholecystitis.    < end of copied text >          < from: CT Abdomen and Pelvis w/ IV Cont (20 @ 22:32) >  EXAM:  CT ABDOMEN AND PELVIS IC                            PROCEDURE DATE:  2020            INTERPRETATION:  CLINICAL INFORMATION: Epigastric pain and elevated lipase.     COMPARISON: CT abdomen and pelvis 2015.    PROCEDURE:   CT of theAbdomen and Pelvis was performed with intravenous contrast.   Arterial and Portal Venous phases were acquired.  Intravenous contrast: 90 ml Omnipaque 350. 10 ml discarded.  Oral contrast: Water was administered.  Sagittal and coronal reformats were performed.    FINDINGS:    LOWER CHEST: Pacemaker leads in the right atrium and right ventricle. Cardiomegaly. Coronary artery calcifications. Bibasilar subsegmental atelectasis. Right lower lobe calcified granuloma.    LIVER: Within normal limits.  BILE DUCTS: Normal caliber.  GALLBLADDER: Within normal limits.  SPLEEN: Within normal limits.  PANCREAS: Atrophic. Homogenous enhancement of the pancreas. No peripancreatic fat stranding or fluid collection.  ADRENALS: Within normal limits.  KIDNEYS/URETERS: Symmetric enhancement. No hydronephrosis. Subcentimeter bilateral renal hypodense lesions which are too small for accurate characterization.    BLADDER: Within normal limits.  REPRODUCTIVE ORGANS: Hysterectomy. No adnexal masses.    BOWEL: No bowel obstruction or inflammation. Sigmoid anastomosis. Moderate amount of stool throughout the colon. Appendix is not visualized. No secondary signs of appendicitis. Small hiatal hernia.  PERITONEUM: No ascites.  VESSELS: Atherosclerotic changes. The celiac, superior mesenteric, and inferior mesenteric arteries are patent. The superior mesenteric vein is patent.   RETROPERITONEUM/LYMPH NODES: No lymphadenopathy.    ABDOMINAL WALL: Varices in the midline anterior pelvic wall.  BONES: Mild degenerative changes of the spine. Partially visualized left hip gamma nail. Redemonstration of a T11 vertebral body compression fracture deformity. Old left rib fracture deformities.    IMPRESSION:     No CT evidence of pancreatitis. Please note that early pancreatitis may be occult on CT.    < end of copied text >          < from: Xray Chest 2 Views PA/Lat (20 @ 21:29) >  FINDINGS:     Left chest wall cardiac pacemaker.    No focal consolidations, pleural effusions, or pneumothorax.  The cardiomediastinal silhouette is unremarkable.  The visualized osseous structures demonstrate no acute pathology.    IMPRESSION:  Clear lungs.    < end of copied text >

## 2020-01-26 LAB
ALBUMIN SERPL ELPH-MCNC: 3.5 G/DL — SIGNIFICANT CHANGE UP (ref 3.3–5)
ALP SERPL-CCNC: 117 U/L — SIGNIFICANT CHANGE UP (ref 40–120)
ALT FLD-CCNC: 56 U/L — HIGH (ref 10–45)
AMYLASE P1 CFR SERPL: 71 U/L — SIGNIFICANT CHANGE UP (ref 25–125)
ANION GAP SERPL CALC-SCNC: 17 MMOL/L — SIGNIFICANT CHANGE UP (ref 5–17)
AST SERPL-CCNC: 25 U/L — SIGNIFICANT CHANGE UP (ref 10–40)
BILIRUB SERPL-MCNC: 0.4 MG/DL — SIGNIFICANT CHANGE UP (ref 0.2–1.2)
BUN SERPL-MCNC: 15 MG/DL — SIGNIFICANT CHANGE UP (ref 7–23)
CALCIUM SERPL-MCNC: 9.2 MG/DL — SIGNIFICANT CHANGE UP (ref 8.4–10.5)
CHLORIDE SERPL-SCNC: 98 MMOL/L — SIGNIFICANT CHANGE UP (ref 96–108)
CO2 SERPL-SCNC: 21 MMOL/L — LOW (ref 22–31)
CREAT SERPL-MCNC: 0.93 MG/DL — SIGNIFICANT CHANGE UP (ref 0.5–1.3)
GLUCOSE SERPL-MCNC: 122 MG/DL — HIGH (ref 70–99)
HAV IGM SER-ACNC: SIGNIFICANT CHANGE UP
HBV CORE IGM SER-ACNC: SIGNIFICANT CHANGE UP
HBV SURFACE AG SER-ACNC: SIGNIFICANT CHANGE UP
HCV AB S/CO SERPL IA: 0.17 S/CO — SIGNIFICANT CHANGE UP (ref 0–0.99)
HCV AB SERPL-IMP: SIGNIFICANT CHANGE UP
LIDOCAIN IGE QN: 145 U/L — HIGH (ref 7–60)
POTASSIUM SERPL-MCNC: 4 MMOL/L — SIGNIFICANT CHANGE UP (ref 3.5–5.3)
POTASSIUM SERPL-SCNC: 4 MMOL/L — SIGNIFICANT CHANGE UP (ref 3.5–5.3)
PROT SERPL-MCNC: 6.3 G/DL — SIGNIFICANT CHANGE UP (ref 6–8.3)
SODIUM SERPL-SCNC: 136 MMOL/L — SIGNIFICANT CHANGE UP (ref 135–145)

## 2020-01-26 RX ADMIN — HEPARIN SODIUM 5000 UNIT(S): 5000 INJECTION INTRAVENOUS; SUBCUTANEOUS at 12:39

## 2020-01-26 RX ADMIN — Medication 81 MILLIGRAM(S): at 12:39

## 2020-01-26 RX ADMIN — GABAPENTIN 100 MILLIGRAM(S): 400 CAPSULE ORAL at 21:03

## 2020-01-26 RX ADMIN — NYSTATIN CREAM 1 APPLICATION(S): 100000 CREAM TOPICAL at 18:03

## 2020-01-26 RX ADMIN — CEFTRIAXONE 100 MILLIGRAM(S): 500 INJECTION, POWDER, FOR SOLUTION INTRAMUSCULAR; INTRAVENOUS at 05:30

## 2020-01-26 RX ADMIN — NYSTATIN CREAM 1 APPLICATION(S): 100000 CREAM TOPICAL at 05:34

## 2020-01-26 RX ADMIN — HEPARIN SODIUM 5000 UNIT(S): 5000 INJECTION INTRAVENOUS; SUBCUTANEOUS at 21:03

## 2020-01-26 RX ADMIN — PANTOPRAZOLE SODIUM 40 MILLIGRAM(S): 20 TABLET, DELAYED RELEASE ORAL at 05:30

## 2020-01-26 RX ADMIN — GABAPENTIN 100 MILLIGRAM(S): 400 CAPSULE ORAL at 12:39

## 2020-01-26 RX ADMIN — ATENOLOL 25 MILLIGRAM(S): 25 TABLET ORAL at 05:30

## 2020-01-26 RX ADMIN — HEPARIN SODIUM 5000 UNIT(S): 5000 INJECTION INTRAVENOUS; SUBCUTANEOUS at 05:30

## 2020-01-26 RX ADMIN — GABAPENTIN 100 MILLIGRAM(S): 400 CAPSULE ORAL at 05:29

## 2020-01-26 RX ADMIN — ZOLPIDEM TARTRATE 2.5 MILLIGRAM(S): 10 TABLET ORAL at 23:30

## 2020-01-26 RX ADMIN — Medication 1 TABLET(S): at 12:39

## 2020-01-26 NOTE — PROGRESS NOTE ADULT - SUBJECTIVE AND OBJECTIVE BOX
Subjective and Objective: 89y old  Female who presents with a chief complaint of chest pain        SUBJECTIVE / OVERNIGHT EVENTS:Afebrile ID consult noted            MEDICATIONS  (STANDING):  aspirin enteric coated 81 milliGRAM(s) Oral daily  ATENolol  Tablet 25 milliGRAM(s) Oral daily  cefTRIAXone   IVPB 1000 milliGRAM(s) IV Intermittent every 24 hours  gabapentin 100 milliGRAM(s) Oral three times a day  heparin  Injectable 5000 Unit(s) SubCutaneous every 8 hours  hydrocodone/homatropine Syrup 5 milliLiter(s) Oral at bedtime  multivitamin 1 Tablet(s) Oral daily  nystatin Powder 1 Application(s) Topical every 12 hours  pantoprazole    Tablet 40 milliGRAM(s) Oral before breakfast    MEDICATIONS  (PRN):  polyethylene glycol 3350 17 Gram(s) Oral at bedtime PRN Constipation  zolpidem 2.5 milliGRAM(s) Oral at bedtime PRN Insomnia    PHYSICAL EXAM:    NECK: Supple, No JVD  CHEST/LUNG: Clear to auscultation bilaterally; No wheezing.  HEART: Regular rate and rhythm; No murmurs, rubs, or gallops  ABDOMEN: Soft, Nontender, Nondistended; Bowel sounds present  EXTREMITIES:   No edema  NEUROLOGY: AAO   LABS:    01-25    135  |  99  |  14  ----------------------------<  118<H>  3.8   |  23  |  1.00    Ca    9.5      25 Jan 2020 06:31    TPro  6.6  /  Alb  3.9  /  TBili  0.5  /  DBili  x   /  AST  35  /  ALT  82<H>  /  AlkPhos  135<H>  01-25    Assessment and Plan:   · Assessment		  89F with h/o Atrial Fibrillation not on AC s/p pacemaker placed 9/19, HTN, ovarian ca s/p hysterectomy, colon ca s/p colectomy, GERD who presents with c/o burning epigastric  pain for 3 days. Physical exam is unremarkable. Labs are notable for negative hs trop x 2, hyponatremia, elevated LFTs, elevated Lipase, leukocytosis and UA positive for UTI. CT abd is negative for pancreatitis, RUQ US is unremarkable.    Problem/Plan - 1:  ·  Problem: Chest pain, unspecified type.  Plan: no evidence for cardiac injury    Problem/Plan - 2:  ·  Problem: Acute cystitis without hematuria.  Plan: S/p completion of abx.   Culture - Urine (01.24.20 @ 02:00)    Specimen Source: .Urine Clean Catch (Midstream)    Culture Results:   <10,000 CFU/mL Normal Urogenital Marissa      Problem/Plan - 3:  ·  Problem: Hyponatremia.  Plan: Na improved.     Problem/Plan - 4:  ·  Problem: Essential hypertension.  Plan: - c/w Atenolol 25mg po qd (home medications).     Problem/Plan - 5:  ·  Problem: Gastroesophageal reflux disease, esophagitis presence not specified.  Plan: - Cont with Protonix.     Problem/Plan - 6:  Problem: Elevated lipase. Plan: - clinically pt does not show signs/symptoms of pancreatitis  - CT negative for pancreatitis      Problem/Plan - 7:  ·  Problem: Elevated LFTs.  Plan: - unclear etiology ; likely medication side effect from Nitrofurantoin  - Pt denies abd pain   CT ABD, RUQ US unremarkable.     Problem/Plan - 8:  ·  Problem: Paroxysmal atrial fibrillation.  Plan: - not on AC  - continue to monitor.

## 2020-01-26 NOTE — PROGRESS NOTE ADULT - ATTENDING COMMENTS
Patient was seen and examined,interim events noted,labs and radiology studies reviewed.  Vipin Muller MD,FACC.  [address] [place] [address].  [id number].  [phone]
Dw family.
Dw family.

## 2020-01-27 ENCOUNTER — TRANSCRIPTION ENCOUNTER (OUTPATIENT)
Age: 85
End: 2020-01-27

## 2020-01-27 VITALS
RESPIRATION RATE: 17 BRPM | OXYGEN SATURATION: 97 % | SYSTOLIC BLOOD PRESSURE: 151 MMHG | TEMPERATURE: 98 F | DIASTOLIC BLOOD PRESSURE: 81 MMHG | HEART RATE: 74 BPM

## 2020-01-27 LAB
CMV DNA CSF QL NAA+PROBE: SIGNIFICANT CHANGE UP
CMV DNA SPEC NAA+PROBE-LOG#: SIGNIFICANT CHANGE UP LOG10IU/ML
EBV EA AB SER IA-ACNC: 22.6 U/ML — HIGH
EBV EA AB TITR SER IF: POSITIVE
EBV EA IGG SER-ACNC: POSITIVE
EBV NA IGG SER IA-ACNC: 303 U/ML — HIGH
EBV PATRN SPEC IB-IMP: SIGNIFICANT CHANGE UP
EBV VCA IGG AVIDITY SER QL IA: POSITIVE
EBV VCA IGM SER IA-ACNC: 120 U/ML — HIGH
EBV VCA IGM SER IA-ACNC: <10 U/ML — SIGNIFICANT CHANGE UP
EBV VCA IGM TITR FLD: NEGATIVE — SIGNIFICANT CHANGE UP

## 2020-01-27 PROCEDURE — 84132 ASSAY OF SERUM POTASSIUM: CPT

## 2020-01-27 PROCEDURE — 82803 BLOOD GASES ANY COMBINATION: CPT

## 2020-01-27 PROCEDURE — 86665 EPSTEIN-BARR CAPSID VCA: CPT

## 2020-01-27 PROCEDURE — 85027 COMPLETE CBC AUTOMATED: CPT

## 2020-01-27 PROCEDURE — 82435 ASSAY OF BLOOD CHLORIDE: CPT

## 2020-01-27 PROCEDURE — 74177 CT ABD & PELVIS W/CONTRAST: CPT

## 2020-01-27 PROCEDURE — 82947 ASSAY GLUCOSE BLOOD QUANT: CPT

## 2020-01-27 PROCEDURE — 84295 ASSAY OF SERUM SODIUM: CPT

## 2020-01-27 PROCEDURE — 86663 EPSTEIN-BARR ANTIBODY: CPT

## 2020-01-27 PROCEDURE — 97161 PT EVAL LOW COMPLEX 20 MIN: CPT

## 2020-01-27 PROCEDURE — 80053 COMPREHEN METABOLIC PANEL: CPT

## 2020-01-27 PROCEDURE — 80074 ACUTE HEPATITIS PANEL: CPT

## 2020-01-27 PROCEDURE — 84484 ASSAY OF TROPONIN QUANT: CPT

## 2020-01-27 PROCEDURE — 93005 ELECTROCARDIOGRAM TRACING: CPT

## 2020-01-27 PROCEDURE — 99238 HOSP IP/OBS DSCHRG MGMT 30/<: CPT

## 2020-01-27 PROCEDURE — 82330 ASSAY OF CALCIUM: CPT

## 2020-01-27 PROCEDURE — 80048 BASIC METABOLIC PNL TOTAL CA: CPT

## 2020-01-27 PROCEDURE — 87486 CHLMYD PNEUM DNA AMP PROBE: CPT

## 2020-01-27 PROCEDURE — 82150 ASSAY OF AMYLASE: CPT

## 2020-01-27 PROCEDURE — 71046 X-RAY EXAM CHEST 2 VIEWS: CPT

## 2020-01-27 PROCEDURE — 76705 ECHO EXAM OF ABDOMEN: CPT

## 2020-01-27 PROCEDURE — 87633 RESP VIRUS 12-25 TARGETS: CPT

## 2020-01-27 PROCEDURE — 86664 EPSTEIN-BARR NUCLEAR ANTIGEN: CPT

## 2020-01-27 PROCEDURE — 85014 HEMATOCRIT: CPT

## 2020-01-27 PROCEDURE — 99285 EMERGENCY DEPT VISIT HI MDM: CPT

## 2020-01-27 PROCEDURE — 87086 URINE CULTURE/COLONY COUNT: CPT

## 2020-01-27 PROCEDURE — 87581 M.PNEUMON DNA AMP PROBE: CPT

## 2020-01-27 PROCEDURE — 83690 ASSAY OF LIPASE: CPT

## 2020-01-27 PROCEDURE — 83605 ASSAY OF LACTIC ACID: CPT

## 2020-01-27 PROCEDURE — 81001 URINALYSIS AUTO W/SCOPE: CPT

## 2020-01-27 PROCEDURE — 87798 DETECT AGENT NOS DNA AMP: CPT

## 2020-01-27 RX ORDER — POLYETHYLENE GLYCOL 3350 17 G/17G
17 POWDER, FOR SOLUTION ORAL
Qty: 14 | Refills: 0
Start: 2020-01-27 | End: 2020-02-09

## 2020-01-27 RX ORDER — GABAPENTIN 400 MG/1
1 CAPSULE ORAL
Qty: 0 | Refills: 0 | DISCHARGE

## 2020-01-27 RX ORDER — ATENOLOL 25 MG/1
1 TABLET ORAL
Qty: 0 | Refills: 0 | DISCHARGE
Start: 2020-01-27

## 2020-01-27 RX ORDER — ATENOLOL 25 MG/1
1 TABLET ORAL
Qty: 0 | Refills: 0 | DISCHARGE

## 2020-01-27 RX ORDER — NYSTATIN CREAM 100000 [USP'U]/G
1 CREAM TOPICAL
Qty: 1 | Refills: 0
Start: 2020-01-27 | End: 2020-01-31

## 2020-01-27 RX ORDER — ASPIRIN/CALCIUM CARB/MAGNESIUM 324 MG
1 TABLET ORAL
Qty: 0 | Refills: 0 | DISCHARGE
Start: 2020-01-27

## 2020-01-27 RX ORDER — GABAPENTIN 400 MG/1
1 CAPSULE ORAL
Qty: 0 | Refills: 0 | DISCHARGE
Start: 2020-01-27

## 2020-01-27 RX ORDER — MELOXICAM 15 MG/1
1 TABLET ORAL
Qty: 0 | Refills: 0 | DISCHARGE

## 2020-01-27 RX ADMIN — Medication 81 MILLIGRAM(S): at 12:05

## 2020-01-27 RX ADMIN — PANTOPRAZOLE SODIUM 40 MILLIGRAM(S): 20 TABLET, DELAYED RELEASE ORAL at 05:06

## 2020-01-27 RX ADMIN — GABAPENTIN 100 MILLIGRAM(S): 400 CAPSULE ORAL at 05:05

## 2020-01-27 RX ADMIN — NYSTATIN CREAM 1 APPLICATION(S): 100000 CREAM TOPICAL at 05:05

## 2020-01-27 RX ADMIN — HEPARIN SODIUM 5000 UNIT(S): 5000 INJECTION INTRAVENOUS; SUBCUTANEOUS at 05:05

## 2020-01-27 RX ADMIN — ATENOLOL 25 MILLIGRAM(S): 25 TABLET ORAL at 05:05

## 2020-01-27 RX ADMIN — Medication 1 TABLET(S): at 12:05

## 2020-01-27 NOTE — DISCHARGE NOTE PROVIDER - CARE PROVIDER_API CALL
Vipin Muller)  Cardiology  [address] [id number]  Phone: [phone]  Fax: [phone]  Follow Up Time:

## 2020-01-27 NOTE — DISCHARGE NOTE NURSING/CASE MANAGEMENT/SOCIAL WORK - PATIENT PORTAL LINK FT
You can access the FollowMyHealth Patient Portal offered by [facility] by registering at the following website: [email or web address]. By joining [facility]’s FollowMyHealth portal, you will also be able to view your health information using other applications (apps) compatible with our system.

## 2020-01-27 NOTE — DISCHARGE NOTE PROVIDER - HOSPITAL COURSE
89F with h/o Atrial Fibrillation not on AC s/p pacemaker placed 9/19, HTN, ovarian ca s/p hysterectomy, colon ca s/p colectomy, GERD who presents with c/o burning epigastric  pain for 3 days. Physical exam is unremarkable. Labs are notable for negative hs trop x 2, hyponatremia, elevated LFTs, elevated Lipase, leukocytosis and UA positive for UTI. CT abd is negative for pancreatitis, RUQ US is unremarkable    No clinical s/s of active infection .    Pt completed  course  of abx . will be discharged home with [employer]

## 2020-01-27 NOTE — DISCHARGE NOTE PROVIDER - NSDCCPCAREPLAN_GEN_ALL_CORE_FT
PRINCIPAL DISCHARGE DIAGNOSIS  Diagnosis: Atypical chest pain  Assessment and Plan of Treatment:   HOME CARE INSTRUCTIONS  For the next few days, avoid physical activities that bring on chest pain. Continue physical activities as directed.  Do not smoke.  Avoid drinking alcohol.   Only take over-the-counter or prescription medicine for pain, discomfort, or fever as directed by your caregiver.  Follow your caregiver's suggestions for further testing if your chest pain does not go away.  Keep any follow-up appointments you made. If you do not go to an appointment, you could develop lasting (chronic) problems with pain. If there is any problem keeping an appointment, you must call to reschedule.   SEEK MEDICAL CARE IF:  You think you are having problems from the medicine you are taking. Read your medicine instructions carefully.  Your chest pain does not go away, even after treatment.  You develop a rash with blisters on your chest.  SEEK IMMEDIATE MEDICAL CARE IF:  You have increased chest pain or pain that spreads to your arm, neck, jaw, back, or abdomen.   You develop shortness of breath, an increasing cough, or you are coughing up blood.  You have severe back or abdominal pain, feel nauseous, or vomit.  You develop severe weakness, fainting, or chills.  You have a fever.  THIS IS AN EMERGENCY. Do not wait to see if the pain will go away. Get medical help at once. Call your local emergency services (_____________________). Do not drive yourself to the hospital.        SECONDARY DISCHARGE DIAGNOSES  Diagnosis: Gastroesophageal reflux disease, esophagitis presence not specified  Assessment and Plan of Treatment: HOME CARE INSTRUCTIONS  Change the factors that you can control. Ask your caregiver for guidance concerning weight loss, quitting smoking, and alcohol consumption.  Avoid foods and drinks that make your symptoms worse, such as:   Caffeine or alcoholic drinks.   Chocolate.   Peppermint or mint flavorings.  Garlic and onions.  Spicy foods.   Citrus fruits, such as oranges, isai, or limes.   Tomato-based foods such as sauce, chili, salsa, and pizza.  Fried and fatty foods.  Avoid lying down for the 3 hours prior to your bedtime or prior to taking a nap.  Eat small, frequent meals instead of large meals.   Wear loose-fitting clothing. Do not wear anything tight around your waist that causes pressure on your stomach.  Raise the head of your bed 6 to 8 inches with wood blocks to help you sleep. Extra pillows will not help.  Only take over-the-counter or prescription medicines for pain, discomfort, or fever as directed by your caregiver.   Do not take aspirin, ibuprofen, or other nonsteroidal anti-inflammatory drugs (NSAIDs).  SEEK IMMEDIATE MEDICAL CARE IF:  You have pain in your arms, neck, jaw, teeth, or back.   Your pain increases or changes in intensity or duration.   You develop nausea, vomiting, or sweating (diaphoresis).  You develop shortness of breath, or you faint.  Your vomit is green, yellow, black, or looks like coffee grounds or blood.  Your stool is red, bloody, or black.  These symptoms could be signs of other problems, such as heart disease, gastric bleeding, or esophageal bleeding.      Diagnosis: Essential hypertension  Assessment and Plan of Treatment: Follow up with your medical doctor to establish long term blood pressure treatment goals.      Diagnosis: Acute cystitis without hematuria  Assessment and Plan of Treatment: HOME CARE INSTRUCTIONS  f you were prescribed antibiotics, take them exactly as your caregiver instructs you. Finish the medication even if you feel better after you have only taken some of the medication.  Drink enough water and fluids to keep your urine clear or pale yellow.  Avoid caffeine, tea, and carbonated beverages. They tend to irritate your bladder.  Empty your bladder often. Avoid holding urine for long periods of time.  Empty your bladder before and after sexual intercourse.  After a bowel movement, women should cleanse from front to back. Use each tissue only once.  SEEK MEDICAL CARE IF:  You have back pain.  You develop a fever.  Your symptoms do not begin to resolve within 3 days.  SEEK IMMEDIATE MEDICAL CARE IF:  You have severe back pain or lower abdominal pain.  You develop chills.  You have nausea or vomiting.  You have continued burning or discomfort with urination.      Diagnosis: Hyponatremia  Assessment and Plan of Treatment: resolved    Diagnosis: Elevated LFTs  Assessment and Plan of Treatment: secondary to Nitrofurontoin  US and CT abdomen  with no chloicystitis    Diagnosis: Cystitis  Assessment and Plan of Treatment:

## 2020-01-27 NOTE — DISCHARGE NOTE PROVIDER - NSDCMRMEDTOKEN_GEN_ALL_CORE_FT
aspirin 81 mg oral delayed release tablet: 1 tab(s) orally once a day  atenolol 25 mg oral tablet: 1 tab(s) orally once a day  gabapentin 100 mg oral capsule: 1 cap(s) orally 3 times a day  hydrocodocone chlopheniramine: 5 milliliter(s) orally once a day (at bedtime)  meloxicam 15 mg oral tablet: 1 tab(s) orally once a day  Multiple Vitamins oral tablet: 1 tab(s) orally once a day  omeprazole 20 mg oral delayed release capsule: 1 cap(s) orally once a day  Vitamin B12: 5 milliliter(s) orally once a day  zolpidem 5 mg oral tablet: 0.5 tab(s) orally once a day (at bedtime) aspirin 81 mg oral delayed release tablet: 1 tab(s) orally once a day  atenolol 25 mg oral tablet: 1 tab(s) orally once a day  gabapentin 100 mg oral capsule: 1 cap(s) orally 3 times a day  hydrocodocone chlopheniramine: 5 milliliter(s) orally once a day (at bedtime)  Multiple Vitamins oral tablet: 1 tab(s) orally once a day  nystatin 100,000 units/g topical powder: 1 application topically every 12 hours  omeprazole 20 mg oral delayed release capsule: 1 cap(s) orally once a day  polyethylene glycol 3350 oral powder for reconstitution: 17 gram(s) orally once a day (at bedtime), As needed, Constipation  Vitamin B12: 5 milliliter(s) orally once a day  zolpidem 5 mg oral tablet: 0.5 tab(s) orally once a day (at bedtime)

## 2020-06-03 NOTE — PROGRESS NOTE ADULT - PROVIDER SPECIALTY LIST ADULT
Internal Medicine Render Post-Care Instructions In Note?: yes Consent: The patient's consent was obtained including but not limited to risks of crusting, scabbing, blistering, scarring, darker or lighter pigmentary change, recurrence, incomplete removal and infection. Post-Care Instructions: I reviewed with the patient in detail post-care instructions. Patient is to wear sunprotection, and avoid picking at any of the treated lesions. Pt may apply Vaseline to crusted or scabbing areas. Render Note In Bullet Format When Appropriate: No Duration Of Freeze Thaw-Cycle (Seconds): 5 Detail Level: Detailed Number Of Freeze-Thaw Cycles: 2 freeze-thaw cycles

## 2021-07-22 NOTE — ED PROVIDER NOTE - ADMIT DISPOSITION PRESENT ON ADMISSION SEPSIS
89F, PMH HTN, mod MR/TR, diastolic CHF, CAD, TIA, PAD, breast CA,   s/p TF-TAVR via [facility] 7/14 with Dr. Jimenez discharged to ED after left leg pain and swelling followed by outpatient US today showed LLE DVT.  Patient ambulating, eating, using bathroom. Otherwise denies pain, bleeding, SOB, chest pain, abdominal pain or fevers.  Denies other pertinent medical problems.  Denies any overt substance use. No

## 2021-07-25 ENCOUNTER — APPOINTMENT (OUTPATIENT)
Dept: NEUROSURGERY | Facility: HOSPITAL | Age: 86
End: 2021-07-25

## 2021-07-25 ENCOUNTER — TRANSCRIPTION ENCOUNTER (OUTPATIENT)
Age: 86
End: 2021-07-25

## 2021-07-25 ENCOUNTER — INPATIENT (INPATIENT)
Facility: HOSPITAL | Age: 86
LOS: 14 days | Discharge: NURSING, REHAB OR LONG-TERM CARE | DRG: 64 | End: 2021-08-09
Attending: PSYCHIATRY & NEUROLOGY | Admitting: NEUROLOGICAL SURGERY
Payer: MEDICARE

## 2021-07-25 VITALS
RESPIRATION RATE: 16 BRPM | HEART RATE: 88 BPM | HEIGHT: 64 IN | DIASTOLIC BLOOD PRESSURE: 49 MMHG | SYSTOLIC BLOOD PRESSURE: 93 MMHG | OXYGEN SATURATION: 98 % | WEIGHT: 238.98 LBS

## 2021-07-25 DIAGNOSIS — I63.9 CEREBRAL INFARCTION, UNSPECIFIED: ICD-10-CM

## 2021-07-25 DIAGNOSIS — Z87.19 PERSONAL HISTORY OF OTHER DISEASES OF THE DIGESTIVE SYSTEM: Chronic | ICD-10-CM

## 2021-07-25 DIAGNOSIS — Z90.710 ACQUIRED ABSENCE OF BOTH CERVIX AND UTERUS: Chronic | ICD-10-CM

## 2021-07-25 LAB
A1C WITH ESTIMATED AVERAGE GLUCOSE RESULT: 5.2 % — SIGNIFICANT CHANGE UP (ref 4–5.6)
ALBUMIN SERPL ELPH-MCNC: 4.1 G/DL — SIGNIFICANT CHANGE UP (ref 3.3–5)
ALP SERPL-CCNC: 103 U/L — SIGNIFICANT CHANGE UP (ref 40–120)
ALT FLD-CCNC: 32 U/L — SIGNIFICANT CHANGE UP (ref 10–45)
ANION GAP SERPL CALC-SCNC: 12 MMOL/L — SIGNIFICANT CHANGE UP (ref 5–17)
ANION GAP SERPL CALC-SCNC: 14 MMOL/L — SIGNIFICANT CHANGE UP (ref 5–17)
ANION GAP SERPL CALC-SCNC: 15 MMOL/L — SIGNIFICANT CHANGE UP (ref 5–17)
APTT BLD: 31.5 SEC — SIGNIFICANT CHANGE UP (ref 27.5–35.5)
AST SERPL-CCNC: 41 U/L — HIGH (ref 10–40)
BASE EXCESS BLDV CALC-SCNC: 2.9 MMOL/L — HIGH (ref -2–2)
BASOPHILS # BLD AUTO: 0.05 K/UL — SIGNIFICANT CHANGE UP (ref 0–0.2)
BASOPHILS NFR BLD AUTO: 0.4 % — SIGNIFICANT CHANGE UP (ref 0–2)
BILIRUB SERPL-MCNC: 0.6 MG/DL — SIGNIFICANT CHANGE UP (ref 0.2–1.2)
BUN SERPL-MCNC: 22 MG/DL — SIGNIFICANT CHANGE UP (ref 7–23)
BUN SERPL-MCNC: 24 MG/DL — HIGH (ref 7–23)
BUN SERPL-MCNC: 26 MG/DL — HIGH (ref 7–23)
CALCIUM SERPL-MCNC: 8.8 MG/DL — SIGNIFICANT CHANGE UP (ref 8.4–10.5)
CALCIUM SERPL-MCNC: 8.9 MG/DL — SIGNIFICANT CHANGE UP (ref 8.4–10.5)
CALCIUM SERPL-MCNC: 9.6 MG/DL — SIGNIFICANT CHANGE UP (ref 8.4–10.5)
CHLORIDE SERPL-SCNC: 104 MMOL/L — SIGNIFICANT CHANGE UP (ref 96–108)
CHLORIDE SERPL-SCNC: 105 MMOL/L — SIGNIFICANT CHANGE UP (ref 96–108)
CHLORIDE SERPL-SCNC: 99 MMOL/L — SIGNIFICANT CHANGE UP (ref 96–108)
CHOLEST SERPL-MCNC: 214 MG/DL — HIGH
CO2 BLDV-SCNC: 30 MMOL/L — SIGNIFICANT CHANGE UP (ref 22–30)
CO2 SERPL-SCNC: 18 MMOL/L — LOW (ref 22–31)
CO2 SERPL-SCNC: 20 MMOL/L — LOW (ref 22–31)
CO2 SERPL-SCNC: 22 MMOL/L — SIGNIFICANT CHANGE UP (ref 22–31)
CREAT SERPL-MCNC: 1 MG/DL — SIGNIFICANT CHANGE UP (ref 0.5–1.3)
CREAT SERPL-MCNC: 1.08 MG/DL — SIGNIFICANT CHANGE UP (ref 0.5–1.3)
CREAT SERPL-MCNC: 1.21 MG/DL — SIGNIFICANT CHANGE UP (ref 0.5–1.3)
EOSINOPHIL # BLD AUTO: 0.11 K/UL — SIGNIFICANT CHANGE UP (ref 0–0.5)
EOSINOPHIL NFR BLD AUTO: 0.8 % — SIGNIFICANT CHANGE UP (ref 0–6)
ESTIMATED AVERAGE GLUCOSE: 103 MG/DL — SIGNIFICANT CHANGE UP (ref 68–114)
GLUCOSE BLDC GLUCOMTR-MCNC: 148 MG/DL — HIGH (ref 70–99)
GLUCOSE SERPL-MCNC: 124 MG/DL — HIGH (ref 70–99)
GLUCOSE SERPL-MCNC: 127 MG/DL — HIGH (ref 70–99)
GLUCOSE SERPL-MCNC: 179 MG/DL — HIGH (ref 70–99)
HCO3 BLDV-SCNC: 29 MMOL/L — SIGNIFICANT CHANGE UP (ref 21–29)
HCT VFR BLD CALC: 40.8 % — SIGNIFICANT CHANGE UP (ref 34.5–45)
HCT VFR BLD CALC: 43.3 % — SIGNIFICANT CHANGE UP (ref 34.5–45)
HCT VFR BLD CALC: 45.6 % — HIGH (ref 34.5–45)
HDLC SERPL-MCNC: 30 MG/DL — LOW
HGB BLD-MCNC: 13.4 G/DL — SIGNIFICANT CHANGE UP (ref 11.5–15.5)
HGB BLD-MCNC: 14.4 G/DL — SIGNIFICANT CHANGE UP (ref 11.5–15.5)
HGB BLD-MCNC: 15.4 G/DL — SIGNIFICANT CHANGE UP (ref 11.5–15.5)
IMM GRANULOCYTES NFR BLD AUTO: 0.5 % — SIGNIFICANT CHANGE UP (ref 0–1.5)
INR BLD: 1.14 RATIO — SIGNIFICANT CHANGE UP (ref 0.88–1.16)
INR BLD: 1.18 RATIO — HIGH (ref 0.88–1.16)
LIPID PNL WITH DIRECT LDL SERPL: 123 MG/DL — HIGH
LYMPHOCYTES # BLD AUTO: 1.92 K/UL — SIGNIFICANT CHANGE UP (ref 1–3.3)
LYMPHOCYTES # BLD AUTO: 14.4 % — SIGNIFICANT CHANGE UP (ref 13–44)
MAGNESIUM SERPL-MCNC: 1.8 MG/DL — SIGNIFICANT CHANGE UP (ref 1.6–2.6)
MCHC RBC-ENTMCNC: 30.5 PG — SIGNIFICANT CHANGE UP (ref 27–34)
MCHC RBC-ENTMCNC: 31.2 PG — SIGNIFICANT CHANGE UP (ref 27–34)
MCHC RBC-ENTMCNC: 31.8 PG — SIGNIFICANT CHANGE UP (ref 27–34)
MCHC RBC-ENTMCNC: 32.8 GM/DL — SIGNIFICANT CHANGE UP (ref 32–36)
MCHC RBC-ENTMCNC: 33.3 GM/DL — SIGNIFICANT CHANGE UP (ref 32–36)
MCHC RBC-ENTMCNC: 33.8 GM/DL — SIGNIFICANT CHANGE UP (ref 32–36)
MCV RBC AUTO: 92.9 FL — SIGNIFICANT CHANGE UP (ref 80–100)
MCV RBC AUTO: 93.9 FL — SIGNIFICANT CHANGE UP (ref 80–100)
MCV RBC AUTO: 94 FL — SIGNIFICANT CHANGE UP (ref 80–100)
MONOCYTES # BLD AUTO: 0.88 K/UL — SIGNIFICANT CHANGE UP (ref 0–0.9)
MONOCYTES NFR BLD AUTO: 6.6 % — SIGNIFICANT CHANGE UP (ref 2–14)
NEUTROPHILS # BLD AUTO: 10.26 K/UL — HIGH (ref 1.8–7.4)
NEUTROPHILS NFR BLD AUTO: 77.3 % — HIGH (ref 43–77)
NON HDL CHOLESTEROL: 184 MG/DL — HIGH
NRBC # BLD: 0 /100 WBCS — SIGNIFICANT CHANGE UP (ref 0–0)
PCO2 BLDV: 50 MMHG — SIGNIFICANT CHANGE UP (ref 35–50)
PH BLDV: 7.38 — SIGNIFICANT CHANGE UP (ref 7.35–7.45)
PHOSPHATE SERPL-MCNC: 4.4 MG/DL — SIGNIFICANT CHANGE UP (ref 2.5–4.5)
PLATELET # BLD AUTO: 209 K/UL — SIGNIFICANT CHANGE UP (ref 150–400)
PLATELET # BLD AUTO: 219 K/UL — SIGNIFICANT CHANGE UP (ref 150–400)
PLATELET # BLD AUTO: 227 K/UL — SIGNIFICANT CHANGE UP (ref 150–400)
PO2 BLDV: 32 MMHG — SIGNIFICANT CHANGE UP (ref 25–45)
POTASSIUM SERPL-MCNC: 4.4 MMOL/L — SIGNIFICANT CHANGE UP (ref 3.5–5.3)
POTASSIUM SERPL-MCNC: 4.5 MMOL/L — SIGNIFICANT CHANGE UP (ref 3.5–5.3)
POTASSIUM SERPL-MCNC: 5.4 MMOL/L — HIGH (ref 3.5–5.3)
POTASSIUM SERPL-SCNC: 4.4 MMOL/L — SIGNIFICANT CHANGE UP (ref 3.5–5.3)
POTASSIUM SERPL-SCNC: 4.5 MMOL/L — SIGNIFICANT CHANGE UP (ref 3.5–5.3)
POTASSIUM SERPL-SCNC: 5.4 MMOL/L — HIGH (ref 3.5–5.3)
PROT SERPL-MCNC: 7.1 G/DL — SIGNIFICANT CHANGE UP (ref 6–8.3)
PROTHROM AB SERPL-ACNC: 13.6 SEC — SIGNIFICANT CHANGE UP (ref 10.6–13.6)
PROTHROM AB SERPL-ACNC: 14 SEC — HIGH (ref 10.6–13.6)
RBC # BLD: 4.39 M/UL — SIGNIFICANT CHANGE UP (ref 3.8–5.2)
RBC # BLD: 4.61 M/UL — SIGNIFICANT CHANGE UP (ref 3.8–5.2)
RBC # BLD: 4.85 M/UL — SIGNIFICANT CHANGE UP (ref 3.8–5.2)
RBC # FLD: 12.9 % — SIGNIFICANT CHANGE UP (ref 10.3–14.5)
RBC # FLD: 12.9 % — SIGNIFICANT CHANGE UP (ref 10.3–14.5)
RBC # FLD: 13 % — SIGNIFICANT CHANGE UP (ref 10.3–14.5)
SAO2 % BLDV: 56 % — LOW (ref 67–88)
SARS-COV-2 RNA SPEC QL NAA+PROBE: SIGNIFICANT CHANGE UP
SODIUM SERPL-SCNC: 135 MMOL/L — SIGNIFICANT CHANGE UP (ref 135–145)
SODIUM SERPL-SCNC: 137 MMOL/L — SIGNIFICANT CHANGE UP (ref 135–145)
SODIUM SERPL-SCNC: 137 MMOL/L — SIGNIFICANT CHANGE UP (ref 135–145)
T3 SERPL-MCNC: 91 NG/DL — SIGNIFICANT CHANGE UP (ref 80–200)
T4 AB SER-ACNC: 7.2 UG/DL — SIGNIFICANT CHANGE UP (ref 4.6–12)
TRIGL SERPL-MCNC: 303 MG/DL — HIGH
TROPONIN T, HIGH SENSITIVITY RESULT: 24 NG/L — SIGNIFICANT CHANGE UP (ref 0–51)
TSH SERPL-MCNC: 5.09 UIU/ML — HIGH (ref 0.27–4.2)
WBC # BLD: 12.13 K/UL — HIGH (ref 3.8–10.5)
WBC # BLD: 13.29 K/UL — HIGH (ref 3.8–10.5)
WBC # BLD: 14.54 K/UL — HIGH (ref 3.8–10.5)
WBC # FLD AUTO: 12.13 K/UL — HIGH (ref 3.8–10.5)
WBC # FLD AUTO: 13.29 K/UL — HIGH (ref 3.8–10.5)
WBC # FLD AUTO: 14.54 K/UL — HIGH (ref 3.8–10.5)

## 2021-07-25 PROCEDURE — 99292 CRITICAL CARE ADDL 30 MIN: CPT

## 2021-07-25 PROCEDURE — 93010 ELECTROCARDIOGRAM REPORT: CPT | Mod: GC

## 2021-07-25 PROCEDURE — 70450 CT HEAD/BRAIN W/O DYE: CPT | Mod: 26,MA

## 2021-07-25 PROCEDURE — 99292 CRITICAL CARE ADDL 30 MIN: CPT | Mod: GC

## 2021-07-25 PROCEDURE — 99291 CRITICAL CARE FIRST HOUR: CPT | Mod: GC

## 2021-07-25 PROCEDURE — 36224 PLACE CATH CAROTD ART: CPT | Mod: LT

## 2021-07-25 PROCEDURE — 99291 CRITICAL CARE FIRST HOUR: CPT

## 2021-07-25 RX ORDER — MAGNESIUM SULFATE 500 MG/ML
1 VIAL (ML) INJECTION ONCE
Refills: 0 | Status: COMPLETED | OUTPATIENT
Start: 2021-07-25 | End: 2021-07-25

## 2021-07-25 RX ORDER — SENNA PLUS 8.6 MG/1
1 TABLET ORAL DAILY
Refills: 0 | Status: DISCONTINUED | OUTPATIENT
Start: 2021-07-25 | End: 2021-08-06

## 2021-07-25 RX ORDER — INSULIN LISPRO 100/ML
VIAL (ML) SUBCUTANEOUS EVERY 6 HOURS
Refills: 0 | Status: DISCONTINUED | OUTPATIENT
Start: 2021-07-25 | End: 2021-07-27

## 2021-07-25 RX ORDER — ASPIRIN/CALCIUM CARB/MAGNESIUM 324 MG
81 TABLET ORAL DAILY
Refills: 0 | Status: DISCONTINUED | OUTPATIENT
Start: 2021-07-25 | End: 2021-07-26

## 2021-07-25 RX ORDER — ENOXAPARIN SODIUM 100 MG/ML
40 INJECTION SUBCUTANEOUS
Refills: 0 | Status: DISCONTINUED | OUTPATIENT
Start: 2021-07-26 | End: 2021-07-29

## 2021-07-25 RX ORDER — SODIUM CHLORIDE 9 MG/ML
1000 INJECTION INTRAMUSCULAR; INTRAVENOUS; SUBCUTANEOUS
Refills: 0 | Status: DISCONTINUED | OUTPATIENT
Start: 2021-07-25 | End: 2021-07-30

## 2021-07-25 RX ORDER — ACETAMINOPHEN 500 MG
650 TABLET ORAL EVERY 6 HOURS
Refills: 0 | Status: DISCONTINUED | OUTPATIENT
Start: 2021-07-25 | End: 2021-07-29

## 2021-07-25 RX ORDER — CHLORHEXIDINE GLUCONATE 213 G/1000ML
1 SOLUTION TOPICAL
Refills: 0 | Status: DISCONTINUED | OUTPATIENT
Start: 2021-07-25 | End: 2021-07-26

## 2021-07-25 RX ORDER — ATORVASTATIN CALCIUM 80 MG/1
80 TABLET, FILM COATED ORAL AT BEDTIME
Refills: 0 | Status: DISCONTINUED | OUTPATIENT
Start: 2021-07-25 | End: 2021-08-06

## 2021-07-25 RX ORDER — POLYETHYLENE GLYCOL 3350 17 G/17G
17 POWDER, FOR SOLUTION ORAL DAILY
Refills: 0 | Status: DISCONTINUED | OUTPATIENT
Start: 2021-07-25 | End: 2021-08-06

## 2021-07-25 RX ORDER — ATENOLOL 25 MG/1
25 TABLET ORAL DAILY
Refills: 0 | Status: DISCONTINUED | OUTPATIENT
Start: 2021-07-25 | End: 2021-08-06

## 2021-07-25 RX ORDER — PANTOPRAZOLE SODIUM 20 MG/1
40 TABLET, DELAYED RELEASE ORAL
Refills: 0 | Status: DISCONTINUED | OUTPATIENT
Start: 2021-07-25 | End: 2021-07-26

## 2021-07-25 RX ADMIN — Medication 100 GRAM(S): at 22:08

## 2021-07-25 RX ADMIN — CHLORHEXIDINE GLUCONATE 1 APPLICATION(S): 213 SOLUTION TOPICAL at 22:08

## 2021-07-25 NOTE — H&P ADULT - ASSESSMENT
89 y/o F with PMH afib not on AC, PPM, HTN, ovarian and colon ca, GERD presents to the ED as a transfer from [facility] for dysarthria and R hemiparesis. NIHSS 8 on repeat exam. R hemiparesis, R facial droop, aphasia (not fluent, not intact to repetition), and dysarthria. 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     Impression: R hemiparesis, R facial droop, aphasia (not fluent, not intact to repetition), and dysarthria likely 2/2 L brain dysfunction 2/2 L M1 occlusion found on CTA likely 2/2 cardioembolic etiology (hx of afib not on AC)    Plan:  [] mechanical thrombectomy per neuro IR  [] monitoring in NSCU post thrombectomy  [] NPO unless passes dysphagia screen; Swallow eval if fails.   [] BP management per NSCU  [] MRI brain without contrast   [] MRA brain without contrast; MRA neck with contrast   [] TTE w/ bubble   [] check HA1c, lipid panel  [] anticoagulation/antiplatelet per NSCU  [] PT/OT; S/S evaluation     Case discussed with stroke fellow, Dr. Benedict. Case to be discussed with attending 89 y/o F with PMH afib not on AC, PPM, HTN, ovarian and colon ca, GERD presents to the ED as a transfer from [facility] for dysarthria and R hemiparesis. NIHSS 8 on repeat exam. R hemiparesis, R facial droop, aphasia (not fluent, not intact to repetition), and dysarthria. 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     Impression: R hemiparesis, R facial droop, aphasia (not fluent, not intact to repetition), and dysarthria likely 2/2 L brain dysfunction 2/2 L M1 occlusion found on CTA likely 2/2 cardioembolic etiology (hx of afib not on AC)    Plan:  [] mechanical thrombectomy per neuro IR  [] monitoring in NSCU post thrombectomy  [] NPO unless passes dysphagia screen; Swallow eval if fails.   [] BP management per NSCU  [] MRI brain without contrast   [] MRA brain without contrast; MRA neck with contrast   [] TTE w/ bubble   [] check HA1c, lipid panel  [] anticoagulation/antiplatelet per NSCU  [] PT/OT; S/S evaluation   [] confirm dx of afib with outpatient provider    Case discussed with stroke fellow, Dr. eBnedict. Case to be discussed with attending

## 2021-07-25 NOTE — STROKE CODE NOTE - DISPOSITION
mechanical thrombectomy, NSCU/Other discussed with stroke fellow, Dr. Benedict, and Dr. Galvez, neuro IR fellow. Dr. Benedict discussed case with Dr. Jun Hart, stroke attending about medical decision making for IV tpa and mechanical thrombectomy./Other

## 2021-07-25 NOTE — PATIENT PROFILE ADULT - ABILITY TO HEAR (WITH HEARING AID OR HEARING APPLIANCE IF NORMALLY USED):
left > right/Mildly to Moderately Impaired: difficulty hearing in some environments or speaker may need to increase volume or speak distinctly

## 2021-07-25 NOTE — ED ADULT NURSE NOTE - NS ED NURSE REPORT GIVEN TO FT
report given to on coming nurse [facility] RN. Understands pmh, medications given and plan of care for patient. Patient in stable condition, vital signs updated, has no complaints at this time and has been updated on care plan. Explained to patient that it is change of shift and new nurse is taking over, pt verbalized understanding.

## 2021-07-25 NOTE — ED PROVIDER NOTE - CLINICAL SUMMARY MEDICAL DECISION MAKING FREE TEXT BOX
5 y/o F with PMH afib not on AC, PPM, HTN, ovarian and colon ca, GERD presents to the ED as a transfer for stroke. Patient hypotensive, vitals otherwise stable. Exam with prominent R sided deficits and aphasia. CODE STROKE called upon arrival. Neuro at bedside. Patient to go to IR for thrombectomy. Romeo Beltrán,  [id number]

## 2021-07-25 NOTE — DISCHARGE NOTE NURSING/CASE MANAGEMENT/SOCIAL WORK - PATIENT PORTAL LINK FT
You can access the FollowMyHealth Patient Portal offered by [facility] by registering at the following website: [email or web address]. By joining [facility]’s FollowMyHealth portal, you will also be able to view your health information using other applications (apps) compatible with our system.

## 2021-07-25 NOTE — ED ADULT NURSE NOTE - OBJECTIVE STATEMENT
89 y/o F with PMH afib, PPM, HTN, ovarian and colon ca, GERD presents to the ED as a transfer from [facility] for stroke. She presented to OSH this morning with aphasia and R sided weakness at 730am. last seen normal at 330am when she was assisted to bathroom. son found pt this morning at 0730 with right sided weakness, slurred speech and right sided facial droop. Was found to have a L M1 occlusion on CT and transferred for IR thrombectomy. upon arrival, Fs completed and sent to CT for repeat scan per stroke team.

## 2021-07-25 NOTE — PROGRESS NOTE ADULT - SUBJECTIVE AND OBJECTIVE BOX
HPI: 89 yo female a.fib no a/c, ovarian and colon CA, x-shobha from OSH for thrombectomy.  LKN 0330 7/25    On admission, the patient was:  GCS: 10  NIHSS: 16    *** HIGH RISK OF DVT PRESENT ON ADMISSION ***    VITALS:  T(C): , Max: 35.6 (07-25-21 @ 15:20)  HR:  (71 - 88)  BP:  (93/49 - 93/49)  ABP:  (143/53 - 143/53)  RR:  (16 - 21)  SpO2:  (98% - 98%)  Wt(kg): --      LABS:  Na: 135 (07-25 @ 10:25)  K: 5.4 (07-25 @ 10:25)  Cl: 99 (07-25 @ 10:25)  CO2: 22 (07-25 @ 10:25)  BUN: 26 (07-25 @ 10:25)  Cr: 1.21 (07-25 @ 10:25)  Glu: 127(07-25 @ 10:25)    Hgb: 15.4 (07-25 @ 10:25)  Hct: 45.6 (07-25 @ 10:25)  WBC: 13.29 (07-25 @ 10:25)  Plt: 227 (07-25 @ 10:25)  PT: 13.6 (07-25 @ 10:25)  INR: 1.14 (07-25 @ 10:25)  aPTT: 31.5 (07-25 @ 10:25)    IMAGING:   Recent imaging studies were reviewed.    MEDICATIONS:  acetaminophen   Tablet .. 650 milliGRAM(s) Oral every 6 hours PRN  aspirin enteric coated 81 milliGRAM(s) Oral daily  ATENolol  Tablet 25 milliGRAM(s) Oral daily  atorvastatin 80 milliGRAM(s) Oral at bedtime  chlorhexidine 4% Liquid 1 Application(s) Topical <User Schedule>  multivitamin 1 Tablet(s) Oral daily  pantoprazole    Tablet 40 milliGRAM(s) Oral before breakfast  polyethylene glycol 3350 17 Gram(s) Oral daily  senna 1 Tablet(s) Oral daily  sodium chloride 0.9%. 1000 milliLiter(s) IV Continuous <Continuous>    EXAMINATION:  General:  calm  HEENT:  MMM  Neuro:  opens eyes, PERRL, L gaze, nonverbal, aphasic, LUE spontaneous, LLE at least 3/5, RUE proximal 3/5, distal 0/5, RLE distal 0/5  Cards:  RRR  Respiratory:  no respiratory distress  Adomen:  soft  Extremities:  no edema, no hematoma  Skin:  warm/dry

## 2021-07-25 NOTE — ED ADULT TRIAGE NOTE - CHIEF COMPLAINT QUOTE
tx from [facility] ED/ stroke, right sided weakness, aphasia since this morning

## 2021-07-25 NOTE — H&P ADULT - NSHPLABSRESULTS_GEN_ALL_CORE
LABORATORY:  CBC                       15.4   13.29 )-----------( 227      ( 25 Jul 2021 10:25 )             45.6     Chem 07-25    135  |  99  |  26<H>  ----------------------------<  127<H>  5.4<H>   |  22  |  1.21    Ca    9.6      25 Jul 2021 10:25    TPro  7.1  /  Alb  4.1  /  TBili  0.6  /  DBili  x   /  AST  41<H>  /  ALT  32  /  AlkPhos  103  07-25    LFTs LIVER FUNCTIONS - ( 25 Jul 2021 10:25 )  Alb: 4.1 g/dL / Pro: 7.1 g/dL / ALK PHOS: 103 U/L / ALT: 32 U/L / AST: 41 U/L / GGT: x           Coagulopathy PT/INR - ( 25 Jul 2021 10:25 )   PT: 13.6 sec;   INR: 1.14 ratio         PTT - ( 25 Jul 2021 10:25 )  PTT:31.5 sec    STUDIES & IMAGING:    Radiology (XR, CT, MR, U/S, TTE/ANA):    < from: CT Head No Cont (07.25.21 @ 10:42) >  IMPRESSION:    Evaluation limited secondary to recent contrast ministration.  No acute infarction or hemorrhage. Consider MRI as clinically warranted    < end of copied text >    CTA (8:10am) from [facility] w/ L M1 occlusion  CTP  (8:10am) from [facility] w/ no core infarct and penumbra 81cc

## 2021-07-25 NOTE — ED PROVIDER NOTE - ATTENDING CONTRIBUTION TO CARE
attending Gael: code stroke called on arrival  89yF h/o Afib not on AC, PPM, HTN, ovarian and colon ca, GERD transferred from OSH for stroke. Pt presented to OSH with aphasia and R sided weakness, found to have M1 occlusion and transferred for IR thrombectomy. LKN 4:30. Will obtain labs, STAT neuro eval, additional imaging as per neuro, will admit for IR intervention

## 2021-07-25 NOTE — PROGRESS NOTE ADULT - SUBJECTIVE AND OBJECTIVE BOX
O: groin hematoma     T(C): 36.6 (07-25-21 @ 17:00), Max: 36.6 (07-25-21 @ 17:00)  HR: 70 (07-25-21 @ 19:15) (70 - 88)  BP: 93/49 (07-25-21 @ 10:17) (93/49 - 93/49)  RR: 15 (07-25-21 @ 19:15) (13 - 21)  SpO2: 99% (07-25-21 @ 19:15) (95% - 99%)  07-25-21 @ 07:01  -  07-25-21 @ 19:46  --------------------------------------------------------  IN: 210 mL / OUT: 225 mL / NET: -15 mL    acetaminophen   Tablet .. 650 milliGRAM(s) Oral every 6 hours PRN  aspirin enteric coated 81 milliGRAM(s) Oral daily  ATENolol  Tablet 25 milliGRAM(s) Oral daily  atorvastatin 80 milliGRAM(s) Oral at bedtime  chlorhexidine 4% Liquid 1 Application(s) Topical <User Schedule>  insulin lispro (ADMELOG) corrective regimen sliding scale   SubCutaneous every 6 hours  multivitamin 1 Tablet(s) Oral daily  pantoprazole    Tablet 40 milliGRAM(s) Oral before breakfast  polyethylene glycol 3350 17 Gram(s) Oral daily  senna 1 Tablet(s) Oral daily  sodium chloride 0.9%. 1000 milliLiter(s) IV Continuous <Continuous>    NEURO EXAM   awake, alert, globally aphasic, mercedes,  left gaze preference, left side at least antigravity, right UE and LE withdraws   has a right groin hematoma, nl pulses in the legs    LABS:  Na: 137 (07-25 @ 15:56), 135 (07-25 @ 10:25)  K: 4.4 (07-25 @ 15:56), 5.4 (07-25 @ 10:25)  Cl: 104 (07-25 @ 15:56), 99 (07-25 @ 10:25)  CO2: 18 (07-25 @ 15:56), 22 (07-25 @ 10:25)  BUN: 24 (07-25 @ 15:56), 26 (07-25 @ 10:25)  Cr: 1.08 (07-25 @ 15:56), 1.21 (07-25 @ 10:25)  Glu: 179(07-25 @ 15:56), 127(07-25 @ 10:25)    Hgb: 14.4 (07-25 @ 15:56), 15.4 (07-25 @ 10:25)  Hct: 43.3 (07-25 @ 15:56), 45.6 (07-25 @ 10:25)  WBC: 12.13 (07-25 @ 15:56), 13.29 (07-25 @ 10:25)  Plt: 219 (07-25 @ 15:56), 227 (07-25 @ 10:25)    INR: 1.14 07-25-21 @ 10:25  PTT: 31.5 07-25-21 @ 10:25            a/p  aSAH PBD  s/p     DCI prophylaxis: euvolemia (                      ) I&O's Summary    25 Jul 2021 07:01  -  25 Jul 2021 19:46  --------------------------------------------------------  IN: 210 mL / OUT: 225 mL / NET: -15 mL      Last TCD velocities   nimodipine    Hydrocephalus: EVD at     , keep ICP< 22 mmhg. CPP> 65-70   Brain edema, hypertonic saline                 , keep sodium in                  , BMP q 6 hr   CV : SBP goal   100-160 mmhg   Pulm: acute respiratory failure, intubated, ABG and adjust vent settings accordingly     GI: on TF, at goal   Renal: see neuro   ID afebrile  Endo: DM, target sugar 120-180   Hem: SCD,   hold chemoprophylaxis as POD     35 critical care time as patient is at risk for brain henrniation, ICP crisis, seizures, ICH  O: groin hematoma     T(C): 36.6 (07-25-21 @ 17:00), Max: 36.6 (07-25-21 @ 17:00)  HR: 70 (07-25-21 @ 19:15) (70 - 88)  BP: 93/49 (07-25-21 @ 10:17) (93/49 - 93/49)  RR: 15 (07-25-21 @ 19:15) (13 - 21)  SpO2: 99% (07-25-21 @ 19:15) (95% - 99%)  07-25-21 @ 07:01  -  07-25-21 @ 19:46  --------------------------------------------------------  IN: 210 mL / OUT: 225 mL / NET: -15 mL    acetaminophen   Tablet .. 650 milliGRAM(s) Oral every 6 hours PRN  aspirin enteric coated 81 milliGRAM(s) Oral daily  ATENolol  Tablet 25 milliGRAM(s) Oral daily  atorvastatin 80 milliGRAM(s) Oral at bedtime  chlorhexidine 4% Liquid 1 Application(s) Topical <User Schedule>  insulin lispro (ADMELOG) corrective regimen sliding scale   SubCutaneous every 6 hours  multivitamin 1 Tablet(s) Oral daily  pantoprazole    Tablet 40 milliGRAM(s) Oral before breakfast  polyethylene glycol 3350 17 Gram(s) Oral daily  senna 1 Tablet(s) Oral daily  sodium chloride 0.9%. 1000 milliLiter(s) IV Continuous <Continuous>    NEURO EXAM   awake, alert, globally aphasic, mercedes,  left gaze preference, left side at least antigravity, right UE and LE withdraws   has a right groin hematoma, nl pulses in the legs    LABS:  Na: 137 (07-25 @ 15:56), 135 (07-25 @ 10:25)  K: 4.4 (07-25 @ 15:56), 5.4 (07-25 @ 10:25)  Cl: 104 (07-25 @ 15:56), 99 (07-25 @ 10:25)  CO2: 18 (07-25 @ 15:56), 22 (07-25 @ 10:25)  BUN: 24 (07-25 @ 15:56), 26 (07-25 @ 10:25)  Cr: 1.08 (07-25 @ 15:56), 1.21 (07-25 @ 10:25)  Glu: 179(07-25 @ 15:56), 127(07-25 @ 10:25)    Hgb: 14.4 (07-25 @ 15:56), 15.4 (07-25 @ 10:25)  Hct: 43.3 (07-25 @ 15:56), 45.6 (07-25 @ 10:25)  WBC: 12.13 (07-25 @ 15:56), 13.29 (07-25 @ 10:25)  Plt: 219 (07-25 @ 15:56), 227 (07-25 @ 10:25)    INR: 1.14 07-25-21 @ 10:25  PTT: 31.5 07-25-21 @ 10:25            a/p left MCA ischemic stroke with left M1 occlusion s/p thrombectomy with unsuccessful recanalization   neuro checks q 2 hr  CT head in am   concern for malignant cerebral edema, if CT head shows worsening edema will start hypertonic saline, parag crani watch, NS on consult   aspirin, lipitor   permissive Hypertension for brain perfusion   CV : SBP goal   100-200 mmhg   TTE pending   has a small groin hematoma, will monitor with groin checks q 1 hr   Pulm: RA   GI: swallow eval, npo for now   Renal: NS 75 ml/hr while NPO   ID afebrile  Endo: DM, target sugar 120-180   Hem: SCD,   lovenox 40 mg sc qhs if hgb stable   repeat cbc given oozing of blood and small hematoma in the right groin at the femoral puncture     30 critical care time as patient is at risk for brain henrniation, ICP crisis, seizures, ICH  O: groin hematoma     T(C): 36.6 (07-25-21 @ 17:00), Max: 36.6 (07-25-21 @ 17:00)  HR: 70 (07-25-21 @ 19:15) (70 - 88)  BP: 93/49 (07-25-21 @ 10:17) (93/49 - 93/49)  RR: 15 (07-25-21 @ 19:15) (13 - 21)  SpO2: 99% (07-25-21 @ 19:15) (95% - 99%)  07-25-21 @ 07:01  -  07-25-21 @ 19:46  --------------------------------------------------------  IN: 210 mL / OUT: 225 mL / NET: -15 mL    acetaminophen   Tablet .. 650 milliGRAM(s) Oral every 6 hours PRN  aspirin enteric coated 81 milliGRAM(s) Oral daily  ATENolol  Tablet 25 milliGRAM(s) Oral daily  atorvastatin 80 milliGRAM(s) Oral at bedtime  chlorhexidine 4% Liquid 1 Application(s) Topical <User Schedule>  insulin lispro (ADMELOG) corrective regimen sliding scale   SubCutaneous every 6 hours  multivitamin 1 Tablet(s) Oral daily  pantoprazole    Tablet 40 milliGRAM(s) Oral before breakfast  polyethylene glycol 3350 17 Gram(s) Oral daily  senna 1 Tablet(s) Oral daily  sodium chloride 0.9%. 1000 milliLiter(s) IV Continuous <Continuous>    NEURO EXAM   awake, alert, globally aphasic, mercedes,  left gaze preference, left side at least antigravity, right UE and LE withdraws   has a right groin hematoma, nl pulses in the legs    LABS:  Na: 137 (07-25 @ 15:56), 135 (07-25 @ 10:25)  K: 4.4 (07-25 @ 15:56), 5.4 (07-25 @ 10:25)  Cl: 104 (07-25 @ 15:56), 99 (07-25 @ 10:25)  CO2: 18 (07-25 @ 15:56), 22 (07-25 @ 10:25)  BUN: 24 (07-25 @ 15:56), 26 (07-25 @ 10:25)  Cr: 1.08 (07-25 @ 15:56), 1.21 (07-25 @ 10:25)  Glu: 179(07-25 @ 15:56), 127(07-25 @ 10:25)    Hgb: 14.4 (07-25 @ 15:56), 15.4 (07-25 @ 10:25)  Hct: 43.3 (07-25 @ 15:56), 45.6 (07-25 @ 10:25)  WBC: 12.13 (07-25 @ 15:56), 13.29 (07-25 @ 10:25)  Plt: 219 (07-25 @ 15:56), 227 (07-25 @ 10:25)    INR: 1.14 07-25-21 @ 10:25  PTT: 31.5 07-25-21 @ 10:25            a/p left MCA ischemic stroke with left M1 occlusion s/p thrombectomy with unsuccessful recanalization   neuro checks q 2 hr  CT head in am   concern for malignant cerebral edema, if CT head shows worsening edema will start hypertonic saline, parag crani watch, NS on consult   aspirin, lipitor   permissive Hypertension for brain perfusion   CV : SBP goal   100-180 mmhg   TTE pending   has a small groin hematoma, will monitor with groin checks q 1 hr   Pulm: RA   GI: swallow eval, npo for now   Renal: NS 70 ml/hr while NPO   ID afebrile  Endo: DM, target sugar 120-180   Hem: SCD,   lovenox 40 mg sc qhs if hgb stable   repeat cbc given oozing of blood and small hematoma in the right groin at the femoral puncture     30 critical care time as patient is at risk for brain henrniation, ICP crisis, seizures, ICH

## 2021-07-25 NOTE — ED PROVIDER NOTE - PHYSICAL EXAMINATION
GENERAL: Awake, alert, no distress  HEENT: NC/AT, moist mucous membranes, PERRL  LUNGS: CTAB, no wheezes or crackles   CARDIAC: irregularly irregular rate/rhythm, no m/r/g  ABDOMEN: Soft, non tender, non distended, no rebound, no guarding  EXT: No edema, no calf tenderness, 2+ DP pulses bilaterally, no deformities.  NEURO: Follows commands. 1/5 strengths RUE and RLE, 5/5 strengths LUE and LLE, +aphasia  SKIN: Warm and dry. No rash.  PSYCH: Normal affect.

## 2021-07-25 NOTE — PATIENT PROFILE ADULT - FUNCTIONAL SCREEN CURRENT LEVEL: COMMUNICATION, MLM
0 = understands/communicates without difficulty 2 = difficulty understanding and speaking (not related to language barrier)

## 2021-07-25 NOTE — PROGRESS NOTE ADULT - ASSESSMENT
Summary: 89 yo female with L M1 stroke    NEURO:  q1h neuro checks; CT brain in AM; stroke neurology consult    CARDS:  -180    PULM:  sat > 92%    RENAL:  NS @ 70    GASTRO:  NPO for now  ---> Stress ulcer prophylaxis:  PPI    HEME:  monitor H/H, ASA 81  ---> DVT prophylaxis: SCDs, hold anticoagulation, fresh post-angio    ENDO:  euglycemia    ID:  afebrile    Code status:  Full code  Disposition:  ICU    This patient was at high risk of neurologic deterioration and/or death due to: LMCA stroke

## 2021-07-25 NOTE — CHART NOTE - NSCHARTNOTEFT_GEN_A_CORE
CAPRINI SCORE [CLOT] Score on Admission for     AGE RELATED RISK FACTORS                                                       MOBILITY RELATED FACTORS  [ ] Age 41-60 years                                            (1 Point)                  [ ] Bed rest                                                        (1 Point)  [ ] Age: 61-74 years                                           (2 Points)                 [ ] Plaster cast                                                   (2 Points)  [ ] Age= 75 years                                              (3 Points)                 [ ] Bed bound for more than 72 hours                 (2 Points)    DISEASE RELATED RISK FACTORS                                               GENDER SPECIFIC FACTORS  [ ] Edema in the lower extremities                       (1 Point)                  [ ] Pregnancy                                                     (1 Point)  [ ] Varicose veins                                               (1 Point)                  [ ] Post-partum < 6 weeks                                   (1 Point)             [x ] BMI > 25 Kg/m2                                            (1 Point)                  [ ] Hormonal therapy  or oral contraception          (1 Point)                 [ ] Sepsis (in the previous month)                        (1 Point)                  [ ] History of pregnancy complications                 (1 point)  [ ] Pneumonia or serious lung disease                                               [ ] Unexplained or recurrent                     (1 Point)           (in the previous month)                               (1 Point)  [ ] Abnormal pulmonary function test                     (1 Point)                 SURGERY RELATED RISK FACTORS (include planned surgeries)  [ ] Acute myocardial infarction                              (1 Point)                 [ ]  Section                                             (1 Point)  [ ] Congestive heart failure (in the previous month)  (1 Point)               [ ] Minor surgery                                                  (1 Point)   [ ] Inflammatory bowel disease                             (1 Point)                 [ ] Arthroscopic surgery                                        (2 Points)  [ ] Central venous access                                      (2 Points)                [ ] General surgery lasting more than 45 minutes   (2 Points)       [x ] Stroke (in the previous month)                          (5 Points)               [ ] Elective arthroplasty                                         (5 Points)            [ x] Current or past malignancy                                (2 Points)                                                                                                     HEMATOLOGY RELATED FACTORS                                                 TRAUMA RELATED RISK FACTORS  [ ] Prior episodes of VTE                                     (3 Points)                [ ] Fracture of the hip, pelvis, or leg                       (5 Points)  [ ] Positive family history for VTE                         (3 Points)                 [ ] Acute spinal cord injury (in the previous month)  (5 Points)  [ ] Prothrombin 22841 A                                     (3 Points)                 [ ] Paralysis  (less than 1 month)                             (5 Points)  [ ] Factor V Leiden                                             (3 Points)                  [ ] Multiple Trauma within 1 month                        (5 Points)  [ ] Lupus anticoagulants                                     (3 Points)                                                           [ ] Anticardiolipin antibodies                               (3 Points)                                                       [ ] High homocysteine in the blood                      (3 Points)                                             [ ] Other congenital or acquired thrombophilia      (3 Points)                                                [ ] Heparin induced thrombocytopenia                  (3 Points)                                          Total Score [    8      ]    Risk:  Very low 0   Low 1 to 2   Moderate 3 to 4   High =5       VTE Prophylasix Recommednations:  [x ] mechanical pneumatic compression devices                                      [ ] contraindicated: _____________________  [ ] chemo prophylasix                                                                                   [ ] contraindicated _____________________    **** HIGH LIKELIHOOD DVT PRESENT ON ADMISSION  [x ] (please order LE dopplers within 24 hours of admission)

## 2021-07-25 NOTE — CONSULT NOTE ADULT - ATTENDING COMMENTS
seen in NSCU. Briefly  89 y/o F with PMH afib not on AC, PPM, HTN, ovarian and colon ca, GERD presents to the ED as a transfer from [facility] for dysarthria and R hemiparesis.   unclear why not received tpa at [place]. transfer for MT which was unsuccesful.   CTA with L M1  repeat CTH with evolving L MCA   , A1c 5.2   Etiology of infarct likely CE from AF of AC  - AP if able to swallow  - statin therapy  - okay to transfer to stroke unit   - unable for MRI 2/2 PPM  - hold AC for 10-14days. repeat imaging to start DOAC.   - telemetry  - PT/OT/SS/SLP, OOBC  - permissive HTN, -180mmHg x 24hrs   - check FS, glucose control <180  - GI/DVT ppx  - Counseling on diet, exercise, and medication adherence was done  - Counseling on smoking cessation and alcohol consumption offered when appropriate.  - Pain assessed and judicious use of narcotics when appropriate was discussed.    - Stroke education given when appropriate.  - Importance of fall prevention discussed.   - Differential diagnosis and plan of care discussed with patient and/or family and primary team  - Thank you for allowing me to participate in the care of this patient. Call with questions.   Jun Hart MD  Vascular Neurology

## 2021-07-25 NOTE — ED PROVIDER NOTE - OBJECTIVE STATEMENT
89 y/o F with PMH afib not on AC, PPM, HTN, ovarian and colon ca, GERD presents to the ED as a transfer for stroke. She presented to OSH this morning with aphasia and R sided weakness. Was found to have a L M1 occlusion and transferred for IR thrombectomy. She was last seen well at 4:30 am when she got up to use the bathroom. When she woke up at 7:30am she was altered and unable to walk or speak.

## 2021-07-25 NOTE — H&P ADULT - NSHPPHYSICALEXAM_GEN_ALL_CORE
General:  Constitutional: Obese Female, appears stated age, in no apparent distress including pain  Head: Normocephalic & atraumatic.  Respiratory: No increased work of breathing  Extremities: No cyanosis, clubbing, or edema.  Skin: No rashes, bruising, or discoloration.    Neurological (>12):  MS: Awake, alert, not oriented to person, place, situation, time. Normal affect. Follows commands (squeeze hands, close eyes) intermittently    Language: Speech is not fluent, unable to repeat. Comprehension intact to following commands    CNs: PERRL (R = 3mm, L = 3mm). VFF to blink to threat. EOMI no nystagmus, no diplopia. V1-3 intact to LT, R facial droop, full eye closure strength b/l. Hearing grossly normal (rubbing fingers) b/l. Gag reflex deferred. Tongue midline, normal movements, no atrophy.    Motor: Normal muscle bulk. No noticeable tremor. RUE drift but does not hit bed. LUE no drift. RLE drift but does not hit bed. LLE no drift.    Sensation: Intact to painful stimuli b/l throughout.     Cortical: Extinction on DSS (neglect): none    Reflexes: deferred    Coordination: Limited by strength. No dysmetria to FTN on L sided    Gait: deferred

## 2021-07-25 NOTE — CHART NOTE - NSCHARTNOTEFT_GEN_A_CORE
Interventional Neuro- Radiology   Procedure Note    Procedure: Selective Cerebral Angiography and Thrombectomy  Pre- Procedure Diagnosis: L M1 occlusion stroke  Post- Procedure Diagnosis: L M1 occlusion stroke; unsuccessful recanalization     : Dr. Chris MD  Fellow: Dr. Lenny MD  Physician Assistant: Love Verma PA-C    RN: Delilah  Tech: Soy/ Elías    Anesthesia: Dr. Etelvina Schofield MD  (general anesthesia)    I/Os:  Fluids: 700 cc DTV  Contrast: 31 cc  Estimated Blood Loss: <10cc    NIHSS post procedure:     Preliminary Report:  Under general anesthesia, using a 6Fr 90 BMX sheath to the right groin, and right radial artery wrist puncture, examination of left internal carotid artery via selective cerebral angiography demonstrates L M1 occlusion stroke with unsuccessful recanalization after multiple attempts. (Official note to follow).    Patient tolerated procedure well, vital signs stable, hemodynamically stable, no change in neurological status compared to baseline. Results discussed with neurosurgery/ patient and their family. Groin sheath d/c'ed, manual compression held to hemostasis, no active bleeding, small stable hematoma appreciated, vascade closure device deployed, quick clot and safeguard balloon dressing applied at 14;45h. stroke order set post-thrombectomy ordered. Patient transferred to NSCU for further care/ monitoring.     Love Verma PA-C Interventional Neuro- Radiology   Procedure Note    Procedure: Selective Cerebral Angiography and Thrombectomy  Pre- Procedure Diagnosis: L M1 occlusion stroke  Post- Procedure Diagnosis: L M1 occlusion stroke; unsuccessful recanalization     : Dr. Chris MD  Fellow: Dr. Lenny MD  Physician Assistant: Love Verma PA-C    RN: Delilah  Tech: Soy/ Elías    Anesthesia: Dr. Etelvina Schofield MD  (general anesthesia)    I/Os:  Fluids: 700 cc DTV  Contrast: 31 cc  Estimated Blood Loss: <10cc    NIHSS post procedure:     Preliminary Report:  Under general anesthesia, using a 6Fr 90 BMX sheath to the right groin, and right radial artery wrist puncture, examination of left internal carotid artery via selective cerebral angiography demonstrates L M1 occlusion stroke with unsuccessful recanalization after multiple attempts. (Official note to follow).    Patient tolerated procedure well, vital signs stable, hemodynamically stable, no change in neurological status compared to baseline. Results discussed with neurosurgery/ patient and their family. Groin sheath d/c'ed, manual compression held to hemostasis, no active bleeding, no hematoma, vascade closure device deployed, quick clot and safeguard balloon dressing applied at 14:45h. stroke order set post-thrombectomy ordered. Patient transferred to NSCU for further care/ monitoring.     Love Verma PA-C

## 2021-07-25 NOTE — PATIENT PROFILE ADULT - STATED REASON FOR ADMISSION
Marguerite calls stating her insurance is denying lab work that was done in September. Asked Marguerite to bring in the statement so we could look into it further for her. She will bring it in next week.   stroke

## 2021-07-25 NOTE — CHART NOTE - NSCHARTNOTEFT_GEN_A_CORE
Interventional Neuro Radiology  Pre-Procedure Note    This is a 89 y/o F with PMH afib not on AC, PPM, HTN, ovarian and colon ca, GERD presents to the ED as a transfer from [facility] for stroke. Per son, pt woke up at 3:30am and went to the bathroom, no issues with gait or speech at that time, accompanied by son. At 7:30 am this morning, pt's son found her half off the bed and unable to speak or walk. Pt was found to have L M1 occlusion on CTA, CTP w/ no core infarct and penumbra 81cc, and transferred for IR thrombectomy. Exam at [facility] notable for severe dysarthria and R hemiparesis. Pt did not receive tpa. NIHSS initially 16 on arrival to [facility] ED, NIHSS 8 on repeat exam. At baseline, son reports that pt is able to do own ADLs (showers, reads) but has an aid to make sure she does not fall, walks with a walker without assistance, speech is fluent.    LKN: 7/25/21 at 3:30am  NIHSS: 16  preMRS: 1  Pt is not a candidate for tpa due to mild, non-disabling deficit  Pt taken for mechanical thrombectomy due to L M1 occlusion on CTA        Neuro Exam:   MS: Awake, alert, not oriented to person, place, situation, time. Normal affect. Follows commands (squeeze hands, close eyes) intermittently  Language: Speech is not fluent, unable to repeat. Comprehension intact to following commands  CNs: PERRL (R = 3mm, L = 3mm). VFF to blink to threat. EOMI no nystagmus, no diplopia. V1-3 intact to LT, R facial droop, full eye closure strength b/l. Hearing grossly normal (rubbing fingers) b/l. Gag reflex deferred. Tongue midline, normal movements, no atrophy.  Motor: Normal muscle bulk. No noticeable tremor. RUE drift but does not hit bed. LUE no drift. RLE drift but does not hit bed. LLE no drift.  Sensation: Intact to painful stimuli b/l throughout.   Cortical: Extinction on DSS (neglect): none  Reflexes: deferred  Coordination: Limited by strength. No dysmetria to FTN on L sided  Gait: deferred    PAST MEDICAL & SURGICAL HISTORY:  Hypertension    Cancer    GERD (gastroesophageal reflux disease)    Anxiety    Ovarian cancer    Colon cancer    H/O small bowel obstruction    H/O total hysterectomy  with BSO    No significant past surgical history        Social History:   Denies tobacco use    FAMILY HISTORY:  No pertinent family history in first degree relatives    No pertinent family history in first degree relatives      No pertinent family history    Allergies: No Known Allergies      Current Medications:   sodium chloride 0.9%. 1000 milliLiter(s) IV Continuous <Continuous>  aspirin 81 mg oral delayed release tablet: 1 tab(s) orally once a day (27 Jan 2020 11:10)  atenolol 25 mg oral tablet: 1 tab(s) orally once a day (27 Jan 2020 11:10)  gabapentin 100 mg oral capsule: 1 cap(s) orally 3 times a day (27 Jan 2020 11:10)  hydrocodocone chlopheniramine: 5 milliliter(s) orally once a day (at bedtime) (24 Jan 2020 11:01)  Multiple Vitamins oral tablet: 1 tab(s) orally once a day (24 Jan 2020 11:01)  omeprazole 20 mg oral delayed release capsule: 1 cap(s) orally once a day (24 Jan 2020 11:01)  Vitamin B12: 5 milliliter(s) orally once a day (24 Jan 2020 11:01)  zolpidem 5 mg oral tablet: 0.5 tab(s) orally once a day (at bedtime) (24 Jan 2020 11:    Labs:                         15.4   13.29 )-----------( 227      ( 25 Jul 2021 10:25 )             45.6       07-25    135  |  99  |  26<H>  ----------------------------<  127<H>  5.4<H>   |  22  |  1.21    Ca    9.6      25 Jul 2021 10:25    TPro  7.1  /  Alb  4.1  /  TBili  0.6  /  DBili  x   /  AST  41<H>  /  ALT  32  /  AlkPhos  103  07-25        Blood Bank: 07-25-21  B  --  Positive      Assessment/Plan:   This is a 89y/o Female who presents with L M1 occlusion stroke. Patient presents to neuro-IR for selective cerebral angiography and thrombectomy. Procedure/ risks/ benefits/ goals/ alternatives were explained. Risks include but are not limited to stroke/ vessel injury/ hemorrhage/ groin hematoma. All questions answered. Informed content obtained from patient's son, Romeo. Consent placed in chart. H and P not completed due to emergent nature of procedure.    Love Verma PA-C

## 2021-07-25 NOTE — STROKE CODE NOTE - MRS SCORE
(2) Slight disablitiy.  Able to look after own affairs without assistance, but unable to carry out all previous activities. (1) No significant disability. Able to carry out all usual activities, despite some symptoms.

## 2021-07-25 NOTE — CONSULT NOTE ADULT - ASSESSMENT
89 y/o F with PMH afib not on AC, PPM, HTN, ovarian and colon ca, GERD presents to the ED as a transfer from [facility] for dysarthria and R hemiparesis. NIHSS 8 on repeat exam. R hemiparesis, R facial droop, aphasia (not fluent, not intact to repetition), and dysarthria. 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     Impression: R hemiparesis, R facial droop, aphasia (not fluent, not intact to repetition), and dysarthria likely 2/2 L brain dysfunction 2/2 L M1 occlusion found on CTA likely 2/2 cardioembolic etiology (hx of afib not on AC)    Plan:  [] mechanical thrombectomy per neuro IR  [] monitoring in NSCU post thrombectomy  [] NPO unless passes dysphagia screen; Swallow eval if fails.   [] BP management per NSCU  [] MRI brain without contrast   [] MRA brain without contrast; MRA neck with contrast   [] TTE w/ bubble   [] check HA1c, lipid panel  [] anticoagulation/antiplatelet per NSCU  [] PT/OT; S/S evaluation     Case discussed with stroke fellow, Dr. Benedict. Case to be discussed with attending       89 y/o F with PMH afib not on AC, PPM, HTN, ovarian and colon ca, GERD presents to the ED as a transfer from [facility] for dysarthria and R hemiparesis.     Impression: R hemiparesis, R facial droop, aphasia (not fluent, not intact to repetition), and dysarthria likely 2/2 L brain dysfunction 2/2 L M1 occlusion found on CTA likely 2/2 cardioembolic etiology (hx of afib not on AC)    Plan:  [] mechanical thrombectomy per neuro IR  [] monitoring in NSCU post thrombectomy  [] NPO unless passes dysphagia screen; Swallow eval if fails.   [] BP management per NSCU  [] MRI brain without contrast   [] MRA brain without contrast; MRA neck with contrast   [] TTE w/ bubble   [] check HA1c, lipid panel  [] anticoagulation/antiplatelet per NSCU  [] PT/OT; S/S evaluation 89 y/o F with PMH afib not on AC, PPM, HTN, ovarian and colon ca, GERD presents to the ED as a transfer from [facility] for dysarthria and R hemiparesis.     Impression: R hemiparesis, R facial droop, aphasia (not fluent, not intact to repetition), and dysarthria likely 2/2 L brain dysfunction 2/2 L M1 occlusion found on CTA likely 2/2 cardioembolic etiology (hx of afib not on AC)    Plan:  [] mechanical thrombectomy per neuro IR  [] monitoring in NSCU post thrombectomy  [] NPO unless passes dysphagia screen; Swallow eval if fails.   [] BP management per NSCU  [] MRI brain without contrast   [] MRA brain without contrast; MRA neck with contrast   [] TTE w/ bubble   [] check HA1c, lipid panel  [] anticoagulation/antiplatelet per NSCU  [] PT/OT; S/S evaluation    Case discussed with stroke fellow, Dr. Benedict, and Dr. Galvez, neuro IR fellow. Dr. Benedict discussed case with Dr. Jun Hart, stroke attending about medical decision making for IV tpa and mechanical thrombectomy.

## 2021-07-25 NOTE — ED ADULT NURSE NOTE - NSIMPLEMENTINTERV_GEN_ALL_ED
Implemented All Fall with Harm Risk Interventions:  [place] to call system. Call bell, personal items and telephone within reach. Instruct patient to call for assistance. Room bathroom lighting operational. Non-slip footwear when patient is off stretcher. Physically safe environment: no spills, clutter or unnecessary equipment. Stretcher in lowest position, wheels locked, appropriate side rails in place. Provide visual cue, wrist band, yellow gown, etc. Monitor gait and stability. Monitor for mental status changes and reorient to person, place, and time. Review medications for side effects contributing to fall risk. Reinforce activity limits and safety measures with patient and family. Provide visual clues: red socks.

## 2021-07-25 NOTE — H&P ADULT - HISTORY OF PRESENT ILLNESS
89 y/o F with PMH afib not on AC, PPM, HTN, ovarian and colon ca, GERD presents to the ED as a transfer from [facility] for stroke. Per son, pt woke up at 3:30am and went to the bathroom, no issues with gait or speech at that time, accompanied by son. At 7:30 am this morning, pt's son found her half off the bed and unable to speak or walk. Pt was found to have L M1 occlusion on CTA, CTP w/ no core infarct and penumbra 81cc, and transferred for IR thrombectomy. Exam at [facility] notable for severe dysarthria and R hemiparesis. Pt did not receive tpa. NIHSS initially 16 on arrival to [facility] ED, NIHSS 8 on repeat exam. At baseline, son reports that pt is able to do own ADLs (showers, reads) but has an aid to make sure she does not fall, walks with a walker without assistance, speech is fluent.

## 2021-07-26 LAB
APPEARANCE UR: CLEAR — SIGNIFICANT CHANGE UP
BACTERIA # UR AUTO: NEGATIVE — SIGNIFICANT CHANGE UP
BILIRUB UR-MCNC: NEGATIVE — SIGNIFICANT CHANGE UP
COLOR SPEC: SIGNIFICANT CHANGE UP
DIFF PNL FLD: ABNORMAL
EPI CELLS # UR: 3 /HPF — SIGNIFICANT CHANGE UP
GLUCOSE BLDC GLUCOMTR-MCNC: 115 MG/DL — HIGH (ref 70–99)
GLUCOSE BLDC GLUCOMTR-MCNC: 119 MG/DL — HIGH (ref 70–99)
GLUCOSE BLDC GLUCOMTR-MCNC: 125 MG/DL — HIGH (ref 70–99)
GLUCOSE BLDC GLUCOMTR-MCNC: 127 MG/DL — HIGH (ref 70–99)
GLUCOSE BLDC GLUCOMTR-MCNC: 138 MG/DL — HIGH (ref 70–99)
GLUCOSE UR QL: NEGATIVE — SIGNIFICANT CHANGE UP
HYALINE CASTS # UR AUTO: 1 /LPF — SIGNIFICANT CHANGE UP (ref 0–2)
KETONES UR-MCNC: NEGATIVE — SIGNIFICANT CHANGE UP
LEUKOCYTE ESTERASE UR-ACNC: ABNORMAL
NITRITE UR-MCNC: NEGATIVE — SIGNIFICANT CHANGE UP
PH UR: 6.5 — SIGNIFICANT CHANGE UP (ref 5–8)
PROT UR-MCNC: ABNORMAL
RBC CASTS # UR COMP ASSIST: 1 /HPF — SIGNIFICANT CHANGE UP (ref 0–4)
SP GR SPEC: 1.03 — HIGH (ref 1.01–1.02)
UROBILINOGEN FLD QL: NEGATIVE — SIGNIFICANT CHANGE UP
WBC UR QL: 15 /HPF — HIGH (ref 0–5)

## 2021-07-26 PROCEDURE — 70450 CT HEAD/BRAIN W/O DYE: CPT | Mod: 26

## 2021-07-26 PROCEDURE — 93970 EXTREMITY STUDY: CPT | Mod: 26

## 2021-07-26 PROCEDURE — 99223 1ST HOSP IP/OBS HIGH 75: CPT

## 2021-07-26 PROCEDURE — 71045 X-RAY EXAM CHEST 1 VIEW: CPT | Mod: 26

## 2021-07-26 PROCEDURE — 99233 SBSQ HOSP IP/OBS HIGH 50: CPT

## 2021-07-26 RX ORDER — PANTOPRAZOLE SODIUM 20 MG/1
40 TABLET, DELAYED RELEASE ORAL DAILY
Refills: 0 | Status: DISCONTINUED | OUTPATIENT
Start: 2021-07-26 | End: 2021-08-02

## 2021-07-26 RX ORDER — ASPIRIN/CALCIUM CARB/MAGNESIUM 324 MG
300 TABLET ORAL DAILY
Refills: 0 | Status: DISCONTINUED | OUTPATIENT
Start: 2021-07-26 | End: 2021-08-01

## 2021-07-26 RX ORDER — ACETAMINOPHEN 500 MG
1000 TABLET ORAL ONCE
Refills: 0 | Status: COMPLETED | OUTPATIENT
Start: 2021-07-26 | End: 2021-07-26

## 2021-07-26 RX ADMIN — PANTOPRAZOLE SODIUM 40 MILLIGRAM(S): 20 TABLET, DELAYED RELEASE ORAL at 20:11

## 2021-07-26 RX ADMIN — Medication 400 MILLIGRAM(S): at 23:29

## 2021-07-26 RX ADMIN — ENOXAPARIN SODIUM 40 MILLIGRAM(S): 100 INJECTION SUBCUTANEOUS at 17:15

## 2021-07-26 RX ADMIN — Medication 300 MILLIGRAM(S): at 13:35

## 2021-07-26 RX ADMIN — SODIUM CHLORIDE 70 MILLILITER(S): 9 INJECTION INTRAMUSCULAR; INTRAVENOUS; SUBCUTANEOUS at 06:01

## 2021-07-26 RX ADMIN — Medication 1000 MILLIGRAM(S): at 23:45

## 2021-07-26 RX ADMIN — SODIUM CHLORIDE 70 MILLILITER(S): 9 INJECTION INTRAMUSCULAR; INTRAVENOUS; SUBCUTANEOUS at 23:29

## 2021-07-26 RX ADMIN — SODIUM CHLORIDE 70 MILLILITER(S): 9 INJECTION INTRAMUSCULAR; INTRAVENOUS; SUBCUTANEOUS at 12:17

## 2021-07-26 NOTE — SWALLOW BEDSIDE ASSESSMENT ADULT - SLP PERTINENT HISTORY OF CURRENT PROBLEM
89 y/o F with PMH afib not on AC, PPM, HTN, ovarian and colon ca, GERD presents to the ED as a transfer from [facility] for stroke. Per son, pt woke up at 3:30am and went to the bathroom, no issues with gait or speech at that time, accompanied by son. At 7:30 am this morning, pt's son found her half off the bed and unable to speak or walk. Pt was found to have L M1 occlusion on CTA, CTP w/ no core infarct and penumbra 81cc, and transferred for IR thrombectomy. Exam at [facility] notable for severe dysarthria and R hemiparesis. Pt did not receive tpa. NIHSS initially 16 on arrival to [facility] ED, NIHSS 8 on repeat exam. At baseline, son reports that pt is able to do own ADLs (showers, reads) but has an aid to make sure she does not fall, walks with a walker without assistance, speech is fluent.

## 2021-07-26 NOTE — SWALLOW BEDSIDE ASSESSMENT ADULT - SWALLOW EVAL: DIAGNOSIS
89 yo F admitted for L MCA CVA presents with 1) oropharyngeal dysphagia; reduced secretion management; fair oral grading however significantly latent swallow trigger (>20s) on limited po trial; wet breath sounds on honey thickened liquids and ice chip suggestive of laryngeal penetration/aspiration  2) mixed aphasia; attentive however non-verbal; follows limited commands with visual cueing; left gaze preference; moves left hand however unable to point or use gestures for functional communication purposes.

## 2021-07-26 NOTE — SWALLOW BEDSIDE ASSESSMENT ADULT - SLP GENERAL OBSERVATIONS
Pt upright in bed; wet breath sounds at baseline; visually attentive however limited command following. Opens mouth and sticks out tongue, wiggles toes with visual cueing.

## 2021-07-26 NOTE — PROGRESS NOTE ADULT - SUBJECTIVE AND OBJECTIVE BOX
O: groin hematoma. Remained stable, Neuro exam stable.    T(C): 36.6 (07-25-21 @ 17:00), Max: 36.6 (07-25-21 @ 17:00)  HR: 70 (07-25-21 @ 19:15) (70 - 88)  BP: 93/49 (07-25-21 @ 10:17) (93/49 - 93/49)  RR: 15 (07-25-21 @ 19:15) (13 - 21)  SpO2: 99% (07-25-21 @ 19:15) (95% - 99%)  07-25-21 @ 07:01  -  07-25-21 @ 19:46  --------------------------------------------------------  IN: 210 mL / OUT: 225 mL / NET: -15 mL    acetaminophen   Tablet .. 650 milliGRAM(s) Oral every 6 hours PRN  aspirin enteric coated 81 milliGRAM(s) Oral daily  ATENolol  Tablet 25 milliGRAM(s) Oral daily  atorvastatin 80 milliGRAM(s) Oral at bedtime  chlorhexidine 4% Liquid 1 Application(s) Topical <User Schedule>  insulin lispro (ADMELOG) corrective regimen sliding scale   SubCutaneous every 6 hours  multivitamin 1 Tablet(s) Oral daily  pantoprazole    Tablet 40 milliGRAM(s) Oral before breakfast  polyethylene glycol 3350 17 Gram(s) Oral daily  senna 1 Tablet(s) Oral daily  sodium chloride 0.9%. 1000 milliLiter(s) IV Continuous <Continuous>    NEURO EXAM   awake, alert, globally aphasic, mercedes,  left gaze preference, left side at least antigravity, right UE and LE withdraws   has a right groin hematoma, nl pulses in the legs unchanged from previous    LABS:  Na: 137 (07-25 @ 15:56), 135 (07-25 @ 10:25)  K: 4.4 (07-25 @ 15:56), 5.4 (07-25 @ 10:25)  Cl: 104 (07-25 @ 15:56), 99 (07-25 @ 10:25)  CO2: 18 (07-25 @ 15:56), 22 (07-25 @ 10:25)  BUN: 24 (07-25 @ 15:56), 26 (07-25 @ 10:25)  Cr: 1.08 (07-25 @ 15:56), 1.21 (07-25 @ 10:25)  Glu: 179(07-25 @ 15:56), 127(07-25 @ 10:25)    Hgb: 14.4 (07-25 @ 15:56), 15.4 (07-25 @ 10:25)  Hct: 43.3 (07-25 @ 15:56), 45.6 (07-25 @ 10:25)  WBC: 12.13 (07-25 @ 15:56), 13.29 (07-25 @ 10:25)  Plt: 219 (07-25 @ 15:56), 227 (07-25 @ 10:25)    INR: 1.14 07-25-21 @ 10:25  PTT: 31.5 07-25-21 @ 10:25            a/p 89 yo F PMH a fib not on AC, PPM, HTN, ovarian Ca, Colon Ca, SBO, GERD p/w aphasia, R weakness LSN 3:30 am found to have left MCA ischemic stroke with left M1 occlusion s/p thrombectomy with unsuccessful recanalization   neuro checks q 2 hr  CT head in am is stable, needs MRI  aspirin, lipitor   permissive Hypertension for brain perfusion   CV : SBP goal   100-180 mmhg   TTE pending , FU  has a small groin hematoma, will monitor with groin checks q 1 hr   Pulm: RA   GI: swallow eval, npo for now   Renal: NS 70 ml/hr while NPO   ID afebrile  Endo: DM, target sugar 120-180   Hem: SCD,   lovenox 40 mg sc qhs if hgb stable   repeat cbc given oozing of blood and small hematoma in the right groin at the femoral puncture   PT OT Speech eval  GOC    30 critical care time as patient is at risk for brain henrniation, ICP crisis, seizures, ICH  DISPO: pt transferred to Stroke unit

## 2021-07-26 NOTE — SWALLOW BEDSIDE ASSESSMENT ADULT - COMMENTS
Failed dysphagia screen in ED 7/25 11:14  CT Brain 7/25: Acute left MCA territory infarct without evidence of hemorrhagic transformation which has progressed since 7/25/2021 CT Brain 7/25: Acute left MCA territory infarct without evidence of hemorrhagic transformation which has progressed since 7/25/2021  Failed dysphagia screen in ED 7/25 11:14  Thrombectomy unsuccessful

## 2021-07-26 NOTE — CHART NOTE - NSCHARTNOTEFT_GEN_A_CORE
Spoke with patient's daughter, Merline at bedside. Patient evaluated by S & S with recommendation of NPO, daughter would like her mom to be re-evaluated Tuesday, 07/27, prior to an NGT being placed. She will discuss with her brother about PEG placement.

## 2021-07-26 NOTE — CONSULT NOTE ADULT - SUBJECTIVE AND OBJECTIVE BOX
Patient is a 89y old  Female who presents with a chief complaint of stroke transfer (26 Jul 2021 12:16)    Admission HPI:  89 y/o F with PMH afib not on AC, PPM, HTN, ovarian and colon ca, GERD presents to the ED as a transfer from [facility] for stroke. Per son, pt woke up at 3:30am and went to the bathroom, no issues with gait or speech at that time, accompanied by son. At 7:30 am this morning, pt's son found her half off the bed and unable to speak or walk. Pt was found to have L M1 occlusion on CTA, CTP w/ no core infarct and penumbra 81cc, and transferred for IR thrombectomy. Exam at [facility] notable for severe dysarthria and R hemiparesis. Pt did not receive tpa. NIHSS initially 16 on arrival to [facility] ED, NIHSS 8 on repeat exam. At baseline, son reports that pt is able to do own ADLs (showers, reads) but has an aid to make sure she does not fall, walks with a walker without assistance, speech is fluent. (25 Jul 2021 18:03)    Interval History:  Patient had CT Head No Cont (07.26.21 @ 08:09):  Noted Acute left MCA territory infarct without evidence of hemorrhagic transformation which has progressed since 7/25/2021.    Neuro work up in progress    REVIEW OF SYSTEMS: No chest pain, shortness of breath, nausea, vomiting or diarhea; other ROS neg     PAST MEDICAL & SURGICAL HISTORY  Hypertension    Cancer    GERD (gastroesophageal reflux disease)    Anxiety    Ovarian cancer    Colon cancer    H/O small bowel obstruction    H/O total hysterectomy    FUNCTIONAL HISTORY:   Lives   Independent    FAMILY HISTORY   No pertinent family history in first degree relatives    MEDICATIONS   acetaminophen   Tablet .. 650 milliGRAM(s) Oral every 6 hours PRN  aspirin Suppository 300 milliGRAM(s) Rectal daily  ATENolol  Tablet 25 milliGRAM(s) Oral daily  atorvastatin 80 milliGRAM(s) Oral at bedtime  enoxaparin Injectable 40 milliGRAM(s) SubCutaneous <User Schedule>  insulin lispro (ADMELOG) corrective regimen sliding scale   SubCutaneous every 6 hours  multivitamin 1 Tablet(s) Oral daily  pantoprazole    Tablet 40 milliGRAM(s) Oral before breakfast  polyethylene glycol 3350 17 Gram(s) Oral daily  senna 1 Tablet(s) Oral daily  sodium chloride 0.9%. 1000 milliLiter(s) IV Continuous <Continuous>      ALLERGIES  No Known Allergies      VITALS  T(C): 37.4 (07-26-21 @ 07:00), Max: 37.4 (07-26-21 @ 07:00)  HR: 81 (07-26-21 @ 10:00) (70 - 82)  BP: 153/57 (07-26-21 @ 10:00) (129/52 - 165/63)  RR: 21 (07-26-21 @ 10:00) (13 - 25)  SpO2: 96% (07-26-21 @ 10:00) (94% - 100%)  Wt(kg): --    PHYSICAL EXAM  Constitutional - NAD, Comfortable  HEENT - NCAT, EOMI  Neck - Supple, No limited ROM  Chest - CTA bilaterally, No wheeze, No rhonchi, No crackles  Cardiovascular - RRR, S1S2, No murmurs  Abdomen - BS+, Soft, NTND  Extremities - No C/C/E, No calf tenderness   Neurologic Exam -                    Cognitive - Awake, Alert, AAO to self, place, date, year, situation     Communication - Fluent, No dysarthria, no aphasia     Cranial Nerves - CN 2-12 intact     Motor - No focal deficits      Sensory - Intact to LT     Reflexes - DTR Intact, No primitive reflexive  Psychiatric - Mood stable, Affect WNL    RECENT LABS/IMAGING  CBC Full  -  ( 25 Jul 2021 19:54 )  WBC Count : 14.54 K/uL  RBC Count : 4.39 M/uL  Hemoglobin : 13.4 g/dL  Hematocrit : 40.8 %  Platelet Count - Automated : 209 K/uL  Mean Cell Volume : 92.9 fl  Mean Cell Hemoglobin : 30.5 pg  Mean Cell Hemoglobin Concentration : 32.8 gm/dL  Auto Neutrophil # : x  Auto Lymphocyte # : x  Auto Monocyte # : x  Auto Eosinophil # : x  Auto Basophil # : x  Auto Neutrophil % : x  Auto Lymphocyte % : x  Auto Monocyte % : x  Auto Eosinophil % : x  Auto Basophil % : x    07-25    137  |  105  |  22  ----------------------------<  124<H>  4.5   |  20<L>  |  1.00    Ca    8.8      25 Jul 2021 19:54  Phos  4.4     07-25  Mg     1.8     07-25    TPro  7.1  /  Alb  4.1  /  TBili  0.6  /  DBili  x   /  AST  41<H>  /  ALT  32  /  AlkPhos  103  07-25    Impression:  89 yo with functional deficits secondary to diagnosis of L MCA CVA    Plan:  PT- ROM, Bed Mob, Transfers, Amb w AD and bracing as needed  OT- ADLs, bracing  SLP- Dysphagia eval and treat  Prec- Falls, Cardiac  DVT Prophylaxis- Lovenox  Skin- Turn q2 h  Dispo-  Patient is a 89y old  Female who presents with a chief complaint of stroke transfer (26 Jul 2021 12:16)    Admission HPI:  89 y/o F with PMH afib not on AC, PPM, HTN, ovarian and colon ca, GERD presents to the ED as a transfer from [facility] for stroke. Per son, pt woke up at 3:30am and went to the bathroom, no issues with gait or speech at that time, accompanied by son. At 7:30 am this morning, pt's son found her half off the bed and unable to speak or walk. Pt was found to have L M1 occlusion on CTA, CTP w/ no core infarct and penumbra 81cc, and transferred for IR thrombectomy. Exam at [facility] notable for severe dysarthria and R hemiparesis. Pt did not receive tpa. NIHSS initially 16 on arrival to [facility] ED, NIHSS 8 on repeat exam. At baseline, son reports that pt is able to do own ADLs (showers, reads) but has an aid to make sure she does not fall, walks with a walker without assistance, speech is fluent. (25 Jul 2021 18:03)    Interval History:  Patient had CT Head No Cont (07.26.21 @ 08:09):  Noted Acute left MCA territory infarct without evidence of hemorrhagic transformation which has progressed since 7/25/2021.    Neuro work up in progress  Daughter at bedside.    REVIEW OF SYSTEMS: Unobtainable due to aphasia    PAST MEDICAL & SURGICAL HISTORY  Hypertension    Cancer    GERD (gastroesophageal reflux disease)    Anxiety    Ovarian cancer    Colon cancer    H/O small bowel obstruction    H/O total hysterectomy    FUNCTIONAL HISTORY:   Per her daughter lived alone and had some assistance with ADLs..    FAMILY HISTORY   No pertinent family history in first degree relatives    MEDICATIONS   acetaminophen   Tablet .. 650 milliGRAM(s) Oral every 6 hours PRN  aspirin Suppository 300 milliGRAM(s) Rectal daily  ATENolol  Tablet 25 milliGRAM(s) Oral daily  atorvastatin 80 milliGRAM(s) Oral at bedtime  enoxaparin Injectable 40 milliGRAM(s) SubCutaneous <User Schedule>  insulin lispro (ADMELOG) corrective regimen sliding scale   SubCutaneous every 6 hours  multivitamin 1 Tablet(s) Oral daily  pantoprazole    Tablet 40 milliGRAM(s) Oral before breakfast  polyethylene glycol 3350 17 Gram(s) Oral daily  senna 1 Tablet(s) Oral daily  sodium chloride 0.9%. 1000 milliLiter(s) IV Continuous <Continuous>    ALLERGIES  No Known Allergies    VITALS  T(C): 37.4 (07-26-21 @ 07:00), Max: 37.4 (07-26-21 @ 07:00)  HR: 81 (07-26-21 @ 10:00) (70 - 82)  BP: 153/57 (07-26-21 @ 10:00) (129/52 - 165/63)  RR: 21 (07-26-21 @ 10:00) (13 - 25)  SpO2: 96% (07-26-21 @ 10:00) (94% - 100%)  Wt(kg): --    PHYSICAL EXAM  Constitutional - NAD, Comfortable  HEENT - NCAT, EOMI  Neck - Supple, No limited ROM  Chest - CTA bilaterally, No wheeze, No rhonchi, No crackles  Cardiovascular - RRR, S1S2, No murmurs  Abdomen - BS+, Soft, NTND  Extremities - No C/C/E, No calf tenderness   Neurologic Exam -                 Alert  Facial droop  + mixed aphasia  Follows minimal instruction, only w visual cues  R side -0/5 UE/LEs; L side 4+/5     Psychiatric - Flat Affect    RECENT LABS/IMAGING  CBC Full  -  ( 25 Jul 2021 19:54 )  WBC Count : 14.54 K/uL  RBC Count : 4.39 M/uL  Hemoglobin : 13.4 g/dL  Hematocrit : 40.8 %  Platelet Count - Automated : 209 K/uL  Mean Cell Volume : 92.9 fl  Mean Cell Hemoglobin : 30.5 pg  Mean Cell Hemoglobin Concentration : 32.8 gm/dL  Auto Neutrophil # : x  Auto Lymphocyte # : x  Auto Monocyte # : x  Auto Eosinophil # : x  Auto Basophil # : x  Auto Neutrophil % : x  Auto Lymphocyte % : x  Auto Monocyte % : x  Auto Eosinophil % : x  Auto Basophil % : x    07-25    137  |  105  |  22  ----------------------------<  124<H>  4.5   |  20<L>  |  1.00    Ca    8.8      25 Jul 2021 19:54  Phos  4.4     07-25  Mg     1.8     07-25    TPro  7.1  /  Alb  4.1  /  TBili  0.6  /  DBili  x   /  AST  41<H>  /  ALT  32  /  AlkPhos  103  07-25    Impression:  89 yo with functional deficits secondary to diagnosis of L MCA CVA w R hemiparesis, aphasia, dysphagia    Plan:  PT- ROM, Bed Mob, Transfers, Amb w AD and bracing as needed  OT- ADLs, bracing  SLP- Dysphagia eval and treat  Prec- Falls, Cardiac  DVT Prophylaxis- Lovenox  Skin- Turn q2 h  Dispo- Had lengthy discussion with her daughter regarding functional prognosis and rehab plan and answered all questions. Patient at this time does not appear to have a good functional prognosis and will require [facility]LES and likely will need assistance w ADLs

## 2021-07-26 NOTE — PROGRESS NOTE ADULT - SUBJECTIVE AND OBJECTIVE BOX
Patient seen and examined at bedside.    --Anticoagulation--  aspirin enteric coated 81 milliGRAM(s) Oral daily  enoxaparin Injectable 40 milliGRAM(s) SubCutaneous <User Schedule>    T(C): 37.2 (07-25-21 @ 23:15), Max: 37.2 (07-25-21 @ 23:15)  HR: 71 (07-26-21 @ 00:00) (70 - 88)  BP: 140/57 (07-26-21 @ 00:00) (93/49 - 152/52)  RR: 20 (07-26-21 @ 00:00) (13 - 21)  SpO2: 96% (07-26-21 @ 00:00) (95% - 100%)  Wt(kg): --    Exam:  Awake, Aphasic, left gaze, left side spont AG, R side minimal movement

## 2021-07-27 DIAGNOSIS — I10 ESSENTIAL (PRIMARY) HYPERTENSION: ICD-10-CM

## 2021-07-27 DIAGNOSIS — Z29.9 ENCOUNTER FOR PROPHYLACTIC MEASURES, UNSPECIFIED: ICD-10-CM

## 2021-07-27 DIAGNOSIS — K21.9 GASTRO-ESOPHAGEAL REFLUX DISEASE WITHOUT ESOPHAGITIS: ICD-10-CM

## 2021-07-27 DIAGNOSIS — I63.9 CEREBRAL INFARCTION, UNSPECIFIED: ICD-10-CM

## 2021-07-27 DIAGNOSIS — C18.9 MALIGNANT NEOPLASM OF COLON, UNSPECIFIED: ICD-10-CM

## 2021-07-27 DIAGNOSIS — I48.91 UNSPECIFIED ATRIAL FIBRILLATION: ICD-10-CM

## 2021-07-27 LAB
ANION GAP SERPL CALC-SCNC: 14 MMOL/L — SIGNIFICANT CHANGE UP (ref 5–17)
BASOPHILS # BLD AUTO: 0.06 K/UL — SIGNIFICANT CHANGE UP (ref 0–0.2)
BASOPHILS NFR BLD AUTO: 0.5 % — SIGNIFICANT CHANGE UP (ref 0–2)
BUN SERPL-MCNC: 16 MG/DL — SIGNIFICANT CHANGE UP (ref 7–23)
CALCIUM SERPL-MCNC: 9.1 MG/DL — SIGNIFICANT CHANGE UP (ref 8.4–10.5)
CHLORIDE SERPL-SCNC: 105 MMOL/L — SIGNIFICANT CHANGE UP (ref 96–108)
CO2 SERPL-SCNC: 19 MMOL/L — LOW (ref 22–31)
CREAT SERPL-MCNC: 0.86 MG/DL — SIGNIFICANT CHANGE UP (ref 0.5–1.3)
EOSINOPHIL # BLD AUTO: 0.06 K/UL — SIGNIFICANT CHANGE UP (ref 0–0.5)
EOSINOPHIL NFR BLD AUTO: 0.5 % — SIGNIFICANT CHANGE UP (ref 0–6)
GLUCOSE BLDC GLUCOMTR-MCNC: 122 MG/DL — HIGH (ref 70–99)
GLUCOSE BLDC GLUCOMTR-MCNC: 128 MG/DL — HIGH (ref 70–99)
GLUCOSE SERPL-MCNC: 112 MG/DL — HIGH (ref 70–99)
HCT VFR BLD CALC: 39 % — SIGNIFICANT CHANGE UP (ref 34.5–45)
HGB BLD-MCNC: 12.9 G/DL — SIGNIFICANT CHANGE UP (ref 11.5–15.5)
IMM GRANULOCYTES NFR BLD AUTO: 0.6 % — SIGNIFICANT CHANGE UP (ref 0–1.5)
LYMPHOCYTES # BLD AUTO: 2.97 K/UL — SIGNIFICANT CHANGE UP (ref 1–3.3)
LYMPHOCYTES # BLD AUTO: 24.6 % — SIGNIFICANT CHANGE UP (ref 13–44)
MCHC RBC-ENTMCNC: 31.7 PG — SIGNIFICANT CHANGE UP (ref 27–34)
MCHC RBC-ENTMCNC: 33.1 GM/DL — SIGNIFICANT CHANGE UP (ref 32–36)
MCV RBC AUTO: 95.8 FL — SIGNIFICANT CHANGE UP (ref 80–100)
MONOCYTES # BLD AUTO: 1.28 K/UL — HIGH (ref 0–0.9)
MONOCYTES NFR BLD AUTO: 10.6 % — SIGNIFICANT CHANGE UP (ref 2–14)
NEUTROPHILS # BLD AUTO: 7.64 K/UL — HIGH (ref 1.8–7.4)
NEUTROPHILS NFR BLD AUTO: 63.2 % — SIGNIFICANT CHANGE UP (ref 43–77)
NRBC # BLD: 0 /100 WBCS — SIGNIFICANT CHANGE UP (ref 0–0)
PLATELET # BLD AUTO: 188 K/UL — SIGNIFICANT CHANGE UP (ref 150–400)
POTASSIUM SERPL-MCNC: 3.8 MMOL/L — SIGNIFICANT CHANGE UP (ref 3.5–5.3)
POTASSIUM SERPL-SCNC: 3.8 MMOL/L — SIGNIFICANT CHANGE UP (ref 3.5–5.3)
RBC # BLD: 4.07 M/UL — SIGNIFICANT CHANGE UP (ref 3.8–5.2)
RBC # FLD: 13.1 % — SIGNIFICANT CHANGE UP (ref 10.3–14.5)
SODIUM SERPL-SCNC: 138 MMOL/L — SIGNIFICANT CHANGE UP (ref 135–145)
WBC # BLD: 12.08 K/UL — HIGH (ref 3.8–10.5)
WBC # FLD AUTO: 12.08 K/UL — HIGH (ref 3.8–10.5)

## 2021-07-27 PROCEDURE — 99232 SBSQ HOSP IP/OBS MODERATE 35: CPT

## 2021-07-27 RX ORDER — DEXTROSE 50 % IN WATER 50 %
25 SYRINGE (ML) INTRAVENOUS ONCE
Refills: 0 | Status: DISCONTINUED | OUTPATIENT
Start: 2021-07-27 | End: 2021-07-27

## 2021-07-27 RX ORDER — DEXTROSE 50 % IN WATER 50 %
12.5 SYRINGE (ML) INTRAVENOUS ONCE
Refills: 0 | Status: DISCONTINUED | OUTPATIENT
Start: 2021-07-27 | End: 2021-07-27

## 2021-07-27 RX ORDER — GLUCAGON INJECTION, SOLUTION 0.5 MG/.1ML
1 INJECTION, SOLUTION SUBCUTANEOUS ONCE
Refills: 0 | Status: DISCONTINUED | OUTPATIENT
Start: 2021-07-27 | End: 2021-08-09

## 2021-07-27 RX ORDER — DEXTROSE 50 % IN WATER 50 %
15 SYRINGE (ML) INTRAVENOUS ONCE
Refills: 0 | Status: DISCONTINUED | OUTPATIENT
Start: 2021-07-27 | End: 2021-07-27

## 2021-07-27 RX ADMIN — ENOXAPARIN SODIUM 40 MILLIGRAM(S): 100 INJECTION SUBCUTANEOUS at 18:08

## 2021-07-27 RX ADMIN — Medication 300 MILLIGRAM(S): at 12:09

## 2021-07-27 RX ADMIN — PANTOPRAZOLE SODIUM 40 MILLIGRAM(S): 20 TABLET, DELAYED RELEASE ORAL at 12:09

## 2021-07-27 RX ADMIN — SODIUM CHLORIDE 70 MILLILITER(S): 9 INJECTION INTRAMUSCULAR; INTRAVENOUS; SUBCUTANEOUS at 12:10

## 2021-07-27 NOTE — SPEECH LANGUAGE PATHOLOGY EVALUATION - SLP DIAGNOSIS
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 Pt is non-verbal; unable to name; unable to repeat; unable to ID by pointing. Patient has right sided weakness, left gaze preference however is able to track to auditory stimuli on right. Patient is attentive to clinician and generally to environment. Limited command following noted with use of max visual cues.

## 2021-07-27 NOTE — OCCUPATIONAL THERAPY INITIAL EVALUATION ADULT - LIVES WITH, PROFILE
As per chart- pt lives alone in PVT house, +4STE, 2 floors, bed and bath on 2nd floor, pt uses chair lift. PLOF: Assist with all ADLs and functional transfers. HHA 7 days/ week. AD/DME- rolling walker, chair lift./alone

## 2021-07-27 NOTE — CONSULT NOTE ADULT - SUBJECTIVE AND OBJECTIVE BOX
Chief Complaint:  Patient is a 89y old  Female who presents with a chief complaint of stroke transfer (28 Jul 2021 07:34)      HPI:  89 y/o F with PMH afib not on AC, PPM, HTN, ovarian and colon ca, GERD presents to the ED as a transfer from [facility] for stroke. Per son, pt woke up at 3:30am and went to the bathroom, no issues with gait or speech at that time, accompanied by son. At 7:30 am this morning, pt's son found her half off the bed and unable to speak or walk. Pt was found to have L M1 occlusion on CTA, CTP w/ no core infarct and penumbra 81cc, and transferred for IR thrombectomy. Exam at [facility] notable for severe dysarthria and R hemiparesis. Pt did not receive tpa. NIHSS initially 16 on arrival to [facility] ED, NIHSS 8 on repeat exam. At baseline, son reports that pt is able to do own ADLs (showers, reads) but has an aid to make sure she does not fall, walks with a walker without assistance, speech is fluent.  Pt. s/p 2 failed S&S and family approached for PEG.  Allergies:  No Known Allergies      Home Medications:  aspirin 81 mg oral delayed release tablet: 1 tab(s) orally once a day (27 Jan 2020 11:10)  atenolol 25 mg oral tablet: 1 tab(s) orally once a day (27 Jan 2020 11:10)  gabapentin 100 mg oral capsule: 1 cap(s) orally 3 times a day (27 Jan 2020 11:10)  hydrocodocone chlopheniramine: 5 milliliter(s) orally once a day (at bedtime) (24 Jan 2020 11:01)  Multiple Vitamins oral tablet: 1 tab(s) orally once a day (24 Jan 2020 11:01)  omeprazole 20 mg oral delayed release capsule: 1 cap(s) orally once a day (24 Jan 2020 11:01)  Vitamin B12: 5 milliliter(s) orally once a day (24 Jan 2020 11:01)  zolpidem 5 mg oral tablet: 0.5 tab(s) orally once a day (at bedtime) (24 Jan 2020 11:01)        Hospital Medications:  aspirin Suppository 300 milliGRAM(s) Rectal daily  ATENolol  Tablet 25 milliGRAM(s) Oral daily  atorvastatin 80 milliGRAM(s) Oral at bedtime  enoxaparin Injectable 40 milliGRAM(s) SubCutaneous <User Schedule>  glucagon  Injectable 1 milliGRAM(s) IntraMuscular once  multivitamin 1 Tablet(s) Oral daily  pantoprazole  Injectable 40 milliGRAM(s) IV Push daily  polyethylene glycol 3350 17 Gram(s) Oral daily  senna 1 Tablet(s) Oral daily  sodium chloride 0.9%. 1000 milliLiter(s) IV Continuous <Continuous>    MEDICATIONS  (PRN):  acetaminophen   Tablet .. 650 milliGRAM(s) Oral every 6 hours PRN Temp greater or equal to 38C (100.4F), Mild Pain (1 - 3)    ___________  Active diet:  Diet, NPO  ___________________      PMHX/PSHX:  Hypertension    Cancer    GERD (gastroesophageal reflux disease)    Anxiety    Ovarian cancer    Colon cancer    H/O small bowel obstruction    H/O total hysterectomy    No significant past surgical history        Family history:  No pertinent family history in first degree relatives    No pertinent family history in first degree relatives        Social History: Tobacco: [ ] yes  [x ] no  EtOH: [ ] yes  [x ] no  Illicit drugs: denies      ROS:     General:  No wt loss, fevers, chills, night sweats, fatigue,   Eyes:  Good vision, no reported pain  ENT:  No sore throat, pain, runny nose, dysphagia  CV:  No pain, palpitations, hypo/hypertension  Resp:  No dyspnea, cough, tachypnea, wheezing  GI:  No pain, No nausea, No vomiting, No diarrhea, No constipation, No weight loss, No fever, No pruritis, No rectal bleeding, No tarry stools, No dysphagia,  :  No pain, bleeding, incontinence, nocturia  Muscle:  No pain, weakness  Neuro:  No weakness, tingling, memory problems  Psych:  No fatigue, insomnia, mood problems, depression  Endocrine:  No polyuria, polydipsia, cold/heat intolerance  Heme:  No petechiae, ecchymosis, easy bruisability  Skin:  No rash, tattoos, scars, edema      PHYSICAL EXAM:   Vital Signs Last 24 Hrs  T(C): 36.7 (27 Jul 2021 04:00), Max: 37.4 (26 Jul 2021 07:00)  T(F): 98 (27 Jul 2021 04:00), Max: 99.3 (26 Jul 2021 07:00)  HR: 73 (27 Jul 2021 06:00) (70 - 82)  BP: 167/59 (27 Jul 2021 04:00) (133/79 - 179/58)  BP(mean): 90 (27 Jul 2021 04:00) (80 - 122)  RR: 18 (27 Jul 2021 06:00) (12 - 25)  SpO2: 100% (27 Jul 2021 06:00) (94% - 100%)         GENERAL:  Appears stated age, well-groomed, well-nourished, no distress  HEENT:  NC/AT,  conjunctivae clear and pink, no thyromegaly, nodules, adenopathy, no JVD, sclera -anicteric  NECK: Supple, No mass  CHEST:  Full & symmetric excursion, no increased effort, breath sounds clear  HEART:  Regular rhythm, S1, S2, no murmur/rub/S3/S4, no abdominal bruit, no edema  ABDOMEN:  Soft, non-tender, non-distended, normoactive bowel sounds,  no masses ,no hepato-splenomegaly, no signs of chronic liver disease  EXTEREMITIES:  no cyanosis,clubbing or edema  SKIN:  No rash/erythema/ecchymoses/petechiae/wounds/abscess/warm/dry  NEURO:  Alert, oriented, no asterixis, no tremor, no encephalopathy  LN: No Lymphadenopathy, No Mass  RECTAL: Heme     LABS:                        12.9   12.08 )-----------( 188      ( 27 Jul 2021 05:35 )             39.0    07-27    138  |  105  |  16  ----------------------------<  112<H>  3.8   |  19<L>  |  0.86    Ca    9.1      27 Jul 2021 05:35  Phos  4.4     07-25  Mg     1.8     07-25    TPro  7.1  /  Alb  4.1  /  TBili  0.6  /  DBili  x   /  AST  41<H>  /  ALT  32  /  AlkPhos  103  07-25  PT/INR - ( 25 Jul 2021 19:53 )   PT: 14.0 sec;   INR: 1.18 ratio         PTT - ( 25 Jul 2021 10:25 )  PTT:31.5 sec      UI reviewed the 12 Lead ECG (07.25.21 @ 10:52) >  Ventricular Rate 70 BPM    Atrial Rate 75 BPM    QRS Duration 188 ms    Q-T Interval 490 ms    QTC Calculation(Bazett) 529 ms    P Axis -18 degrees    R Axis 127 degrees    T Axis -36 degrees    Diagnosis Line VENTRICULAR PACEMAKER

## 2021-07-27 NOTE — PROGRESS NOTE ADULT - SUBJECTIVE AND OBJECTIVE BOX
Patient is a 89y old  Female who presents with a chief complaint of stroke transfer (26 Jul 2021 12:16)    Patient doing a little better.  Following some verbal instruction with therapy.    MEDICATIONS  (STANDING):  aspirin Suppository 300 milliGRAM(s) Rectal daily  ATENolol  Tablet 25 milliGRAM(s) Oral daily  atorvastatin 80 milliGRAM(s) Oral at bedtime  enoxaparin Injectable 40 milliGRAM(s) SubCutaneous <User Schedule>  insulin lispro (ADMELOG) corrective regimen sliding scale   SubCutaneous every 6 hours  multivitamin 1 Tablet(s) Oral daily  pantoprazole  Injectable 40 milliGRAM(s) IV Push daily  polyethylene glycol 3350 17 Gram(s) Oral daily  senna 1 Tablet(s) Oral daily  sodium chloride 0.9%. 1000 milliLiter(s) (70 mL/Hr) IV Continuous <Continuous>    MEDICATIONS  (PRN):  acetaminophen   Tablet .. 650 milliGRAM(s) Oral every 6 hours PRN Temp greater or equal to 38C (100.4F), Mild Pain (1 - 3)    Vital Signs Last 24 Hrs  T(C): 36.8 (27 Jul 2021 08:00), Max: 36.9 (26 Jul 2021 16:00)  T(F): 98.3 (27 Jul 2021 08:00), Max: 98.5 (26 Jul 2021 16:00)  HR: 80 (27 Jul 2021 08:00) (70 - 80)  BP: 165/59 (27 Jul 2021 08:00) (146/72 - 179/58)  BP(mean): 88 (27 Jul 2021 08:00) (87 - 122)  RR: 19 (27 Jul 2021 08:00) (12 - 19)  SpO2: 100% (27 Jul 2021 08:00) (94% - 100%)      PHYSICAL EXAM  Constitutional - NAD, Comfortable  HEENT - NCAT, EOMI  Extremities - No C/C/E, No calf tenderness   Neurologic Exam -                 Alert  Facial droop  + mixed aphasia  Follows some simple instruction  RLE - prox fair grade, distal trace; RUE -0/5 UE/LEs; L side 4+/5     Psychiatric - Nml Affect                          12.9   12.08 )-----------( 188      ( 27 Jul 2021 05:35 )             39.0       07-27    138  |  105  |  16  ----------------------------<  112<H>  3.8   |  19<L>  |  0.86    Ca    9.1      27 Jul 2021 05:35  Phos  4.4     07-25  Mg     1.8     07-25    Impression:  89 yo with functional deficits secondary to diagnosis of L MCA CVA w R hemiparesis, aphasia, dysphagia    Plan:  PT- ROM, Bed Mob, Transfers, Amb w AD and bracing as needed  OT- ADLs, bracing  SLP- Dysphagia eval and treat  Prec- Falls, Cardiac  DVT Prophylaxis- Lovenox  Skin- Turn q2 h  Dispo- [facility]LES

## 2021-07-27 NOTE — PHYSICAL THERAPY INITIAL EVALUATION ADULT - ACTIVE RANGE OF MOTION EXAMINATION, REHAB EVAL
RUE flaccid/Left LE Active ROM was WFL (within functional limits)/bilateral  lower extremity Active ROM was WFL (within functional limits)

## 2021-07-27 NOTE — PROGRESS NOTE ADULT - ASSESSMENT
ASSESSMENT: 89 y/o F with PMH afib not on AC, PPM, HTN, ovarian and colon ca, GERD presents to the ED as a transfer from [facility] for dysarthria and R hemiparesis. NIHSS 8 on repeat exam. R hemiparesis, R facial droop, aphasia (not fluent, not intact to repetition), and dysarthria. 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     Impression: R hemiparesis, R facial droop, aphasia (not fluent, not intact to repetition), and dysarthria likely 2/2 L brain dysfunction 2/2 L M1 occlusion found on CTA likely 2/2 cardioembolic etiology (hx of afib not on AC)    NEURO: Continue close monitoring for neurologic deterioration, permissive HTN, titrate statin to LDL goal less than 70, MRI Brain w/o, MRA Head w/o and Neck w/contrast. Physical therapy/OT/Speech eval/treatment.     ANTITHROMBOTIC THERAPY:     PULMONARY: CXR clear, protecting airway, saturating well     CARDIOVASCULAR: check TTE, cardiac monitoring                              SBP goal:     GASTROINTESTINAL:  dysphagia screen       Diet:     RENAL: BUN/Cr stable, good urine output      Na Goal: Greater than 135     Kessler:    HEMATOLOGY: H/H stable, Platelets normal      DVT ppx: Heparin s.c [] LMWH []     ID: afebrile, no leukocytosis     OTHER:     DISPOSITION: Rehab or home depending on PT eval once stable and workup is complete      CORE MEASURES:        Admission NIHSS:      TPA: [] YES [] NO      LDL/HDL:     Depression Screen:      Statin Therapy:     Dysphagia Screen: [] PASS [] FAIL     Smoking [] YES [] NO      Afib [] YES [] NO     Stroke Education [] YES [] NO    Obtain screening lower extremity venous ultrasound in patients who meet 1 or more of the following criteria as patient is high risk for DVT/PE on admission:   [] History of DVT/PE  []Hypercoagulable states (Factor V Leiden, Cancer, OCP, etc. )  []Prolonged immobility (hemiplegia/hemiparesis/post operative or any other extended immobilization)  [] Transferred from outside facility (Rehab or Long term care)  [] Age </= to 50 ASSESSMENT: 89 y/o F with PMH afib not on AC, PPM, HTN, ovarian and colon ca, GERD presents to the ED as a transfer from [facility] for dysarthria and R hemiparesis. NIHSS 8 on repeat exam. R hemiparesis, R facial droop, aphasia (not fluent, not intact to repetition), and dysarthria. 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     Impression: R hemiparesis, R facial droop, aphasia (not fluent, not intact to repetition), and dysarthria likely 2/2 L brain dysfunction 2/2 L M1 occlusion found on CTA likely 2/2 cardioembolic etiology (hx of afib not on AC).     NEURO: Continue close monitoring for neurologic deterioration. Goal for SBP of 100-180. Patient on statin for LDL goal less than 70. MRI Brain would not change medical management as pt has hx of afib and was not on AC. Head CT as noted above. Physical therapy and OT recommend [facility].     ANTITHROMBOTIC THERAPY:  rectal. Will begin Eliquis when patient has PO access.     PULMONARY: CXR clear, protecting airway, saturating well on room air     CARDIOVASCULAR: TTE is pending, continue cardiac monitoring. No events thus far                               SBP goal: 100-180 mmhg     GASTROINTESTINAL:  dysphagia screen- failed 7/26. Re eval today, 7/27. Family refusing NGT at this time. If fails again, will pursue PEG option      Diet: NPO     RENAL: BUN/Cr within normal limits, good urine output.      Na Goal: Greater than 135     Kessler: no     HEMATOLOGY: H/H within normal limits, Platelets normal at 188      DVT ppx: lovenox subq     ID: afebrile, slight leukocytosis- down trending. no s/sx of infection. will continue to monitor     OTHER:     DISPOSITION: [facility] once stable and workup is complete      CORE MEASURES:        Admission NIHSS: 16      TPA: [] YES [x] NO      LDL/HDL: 123/30     Depression Screen: p     Statin Therapy: yes     Dysphagia Screen: [] PASS [x] FAIL     Smoking [] YES [x] NO      Afib [x] YES [] NO     Stroke Education [x] YES [] NO    Obtain screening lower extremity venous ultrasound in patients who meet 1 or more of the following criteria as patient is high risk for DVT/PE on admission:   [] History of DVT/PE  []Hypercoagulable states (Factor V Leiden, Cancer, OCP, etc. )  []Prolonged immobility (hemiplegia/hemiparesis/post operative or any other extended immobilization)  [] Transferred from outside facility (Rehab or Long term care)  [] Age </= to 50     ASSESSMENT: 89 y/o F with PMH afib not on AC, PPM, HTN, ovarian and colon ca, GERD presents to the ED as a transfer from [facility] for dysarthria and R hemiparesis. NIHSS 8 on repeat exam. R hemiparesis, R facial droop, aphasia (not fluent, not intact to repetition), and dysarthria. 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     Impression: R hemiparesis, R facial droop, aphasia (not fluent, not intact to repetition), and dysarthria likely 2/2 L brain dysfunction 2/2 L M1 occlusion found on CTA likely 2/2 cardioembolic etiology (hx of afib not on AC).     NEURO: Patient is neurologically stable. Continue close monitoring for neurologic deterioration. Goal for SBP of 100-180. Patient on statin for LDL goal less than 70. MRI Brain would not change medical management as pt has hx of afib and was not on AC. Head CT as noted above. Physical therapy and OT recommend CHARLES.     ANTITHROMBOTIC THERAPY:  rectal. Will begin Eliquis 5mh BID in 2 weeks after repeat stable head CT.     PULMONARY: CXR clear, protecting airway, saturating well on room air     CARDIOVASCULAR: TTE is pending, continue cardiac monitoring. No events thus far                               SBP goal: 100-180 mmhg     GASTROINTESTINAL:  dysphagia screen- failed 7/26. Re eval today, 7/27. Family refusing NGT at this time. If fails again, will pursue PEG option with GI      Diet: NPO     RENAL: BUN/Cr within normal limits, good urine output.      Na Goal: Greater than 135     Kessler: no     HEMATOLOGY: H/H within normal limits, Platelets normal at 188      DVT ppx: lovenox subq     ID: afebrile, slight leukocytosis- down trending. no s/sx of infection. will continue to monitor     OTHER:     DISPOSITION: [facility] once stable and workup is complete      CORE MEASURES:        Admission NIHSS: 16      TPA: [] YES [x] NO      LDL/HDL: 123/30     Depression Screen: p     Statin Therapy: yes     Dysphagia Screen: [] PASS [x] FAIL     Smoking [] YES [x] NO      Afib [x] YES [] NO     Stroke Education [x] YES [] NO    Obtain screening lower extremity venous ultrasound in patients who meet 1 or more of the following criteria as patient is high risk for DVT/PE on admission:   [] History of DVT/PE  []Hypercoagulable states (Factor V Leiden, Cancer, OCP, etc. )  []Prolonged immobility (hemiplegia/hemiparesis/post operative or any other extended immobilization)  [] Transferred from outside facility (Rehab or Long term care)  [] Age </= to 50     ASSESSMENT: 89 y/o F with PMH afib not on AC, PPM, HTN, ovarian and colon ca, GERD presents to the ED as a transfer from [facility] for dysarthria and R hemiparesis. NIHSS 8 on repeat exam. R hemiparesis, R facial droop, aphasia (not fluent, not intact to repetition), and dysarthria. 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     Impression: R hemiparesis, R facial droop, aphasia (not fluent, not intact to repetition), and dysarthria likely 2/2 L brain dysfunction 2/2 L M1 occlusion found on CTA likely 2/2 cardioembolic etiology (hx of afib not on AC).     NEURO: Patient is neurologically stable. Continue close monitoring for neurologic deterioration. Mechanical thrombectomy was unsuccessful at time of admission. Nsx noted severe tortuosity preventing access to left ICA intracranially. Hemicrani deferred due to advanced age. Goal for SBP of 100-180. Patient on statin for LDL goal less than 70. MRI Brain would not change medical management as pt has hx of afib and was not on AC. Head CT as noted above. Physical therapy and OT recommend CHARLES.     ANTITHROMBOTIC THERAPY:  rectal. Will begin Eliquis 5mh BID in 2 weeks after repeat stable head CT.     PULMONARY: CXR clear, protecting airway, saturating well on room air     CARDIOVASCULAR: TTE is pending, continue cardiac monitoring. No events thus far                               SBP goal: 100-180 mmhg     GASTROINTESTINAL:  dysphagia screen- failed 7/26. Re eval today, 7/27. Family refusing NGT at this time. If fails again, will pursue PEG option with GI      Diet: NPO     RENAL: BUN/Cr within normal limits, good urine output.      Na Goal: Greater than 135     Kessler: no     HEMATOLOGY: H/H within normal limits, Platelets normal at 188      DVT ppx: lovenox subq     ID: afebrile, slight leukocytosis- down trending. no s/sx of infection. will continue to monitor     OTHER:     DISPOSITION: [facility] once stable and workup is complete      CORE MEASURES:        Admission NIHSS: 16      TPA: [] YES [x] NO      LDL/HDL: 123/30     Depression Screen: p     Statin Therapy: yes     Dysphagia Screen: [] PASS [x] FAIL     Smoking [] YES [x] NO      Afib [x] YES [] NO     Stroke Education [x] YES [] NO    Obtain screening lower extremity venous ultrasound in patients who meet 1 or more of the following criteria as patient is high risk for DVT/PE on admission:   [] History of DVT/PE  []Hypercoagulable states (Factor V Leiden, Cancer, OCP, etc. )  []Prolonged immobility (hemiplegia/hemiparesis/post operative or any other extended immobilization)  [] Transferred from outside facility (Rehab or Long term care)  [] Age </= to 50

## 2021-07-27 NOTE — DIETITIAN INITIAL EVALUATION ADULT. - OTHER INFO
Pt seen for: Stroke Unit length of stay                                   GI issues:  none noted          Last  BM: none since adm               Food Allergies/Intolerances:  NKFA                Vitamins/Supplements: multivitamin, vit B12  Wt hx:  not available at this time                               Skin: no pressure injuries documented     Subjective/Objective: pt aphasic, unable to shake head yes/no to questions, no visitors at bedside     Wt used for nutrition needs: dosing wt 7/25 83.1 kg for energy needs, IBW 54.5 kg for protein needs

## 2021-07-27 NOTE — CONSULT NOTE ADULT - ASSESSMENT
89 y/o F with PMH afib not on AC, PPM, HTN, ovarian and colon ca, GERD presents to the ED as a transfer from [facility] for stroke. Per son, pt woke up at 3:30am and went to the bathroom, no issues with gait or speech at that time, accompanied by son. At 7:30 am this morning, pt's son found her half off the bed and unable to speak or walk. Pt was found to have L M1 occlusion on CTA, CTP w/ no core infarct and penumbra 81cc, and transferred for IR thrombectomy. Exam at [facility] notable for severe dysarthria and R hemiparesis. Pt did not receive tpa. NIHSS initially 16 on arrival to [facility] ED, NIHSS 8 on repeat exam. At baseline, son reports that pt is able to do own ADLs (showers, reads) but has an aid to make sure she does not fall, walks with a walker without assistance, speech is fluent.  Pt. s/p 2 failed S&S and family approached for PEG.    Pt. will benefit from a PEG for long term nutritional support if family consents  Cont. IVF  May cont. ASA  Will speak to family re their wishes.    I had a prolonged conversation with pt. and family re. likely diagnosis and plan who verbalizes clear understanding,  Differential diagnosis and plan of care discussed with patient after the evaluation.   Advanced care planning options discussed.   Pain assessed and judicious use of narcotics when appropriate was discussed.  Importance of Fall prevention discussed.  Counseling on Smoking and Alcohol cessation was offered when appropriate.  Counseling on Diet, exercise, and medication compliance was done.    Nicholas Mcconnell M.D.  Gastroenterology and Hepatic Diseases  Cell: [phone]

## 2021-07-27 NOTE — PHYSICAL THERAPY INITIAL EVALUATION ADULT - PERTINENT HX OF CURRENT PROBLEM, REHAB EVAL
89 y/o F with PMH afib not on AC, PPM, HTN, ovarian and colon ca, GERD presents to the ED as a transfer from [facility] for dysarthria and R hemiparesis. Found Lt M1 occlusion. VA duplex (-) for DVT b/l LEs, CT Head 7/26, acute Lt MCA infarct. ECG (-)

## 2021-07-27 NOTE — DIETITIAN INITIAL EVALUATION ADULT. - PERTINENT MEDS FT
MEDICATIONS  (STANDING):  aspirin Suppository 300 milliGRAM(s) Rectal daily  ATENolol  Tablet 25 milliGRAM(s) Oral daily  atorvastatin 80 milliGRAM(s) Oral at bedtime  enoxaparin Injectable 40 milliGRAM(s) SubCutaneous <User Schedule>  insulin lispro (ADMELOG) corrective regimen sliding scale   SubCutaneous every 6 hours  multivitamin 1 Tablet(s) Oral daily  pantoprazole  Injectable 40 milliGRAM(s) IV Push daily  polyethylene glycol 3350 17 Gram(s) Oral daily  senna 1 Tablet(s) Oral daily  sodium chloride 0.9%. 1000 milliLiter(s) (70 mL/Hr) IV Continuous <Continuous>

## 2021-07-27 NOTE — OCCUPATIONAL THERAPY INITIAL EVALUATION ADULT - ADL RETRAINING, OT EVAL
Pt will perform LB dressing (I), AE as needed, withint 4 weeks. Pt will perform UB dressing (I), within 4 weeks. Pt will perform LB dressing (I), AE as needed, within 4 weeks. Pt will perform UB dressing (I), within 4 weeks.

## 2021-07-27 NOTE — OCCUPATIONAL THERAPY INITIAL EVALUATION ADULT - BALANCE TRAINING, PT EVAL
Pt will increase 1/2 grade dynamic standing balance to increase ADLs and functional transfers by 4 weeks.

## 2021-07-27 NOTE — OCCUPATIONAL THERAPY INITIAL EVALUATION ADULT - DIAGNOSIS, OT EVAL
Pt p/w deficits in ADLs and functional transfers due to impairments in strength, ROM, cognition, coordination and balance.

## 2021-07-27 NOTE — CONSULT NOTE ADULT - ASSESSMENT
Patient is a 89 y/o Female with PMH afib not on AC, PPM, HTN, ovarian and colon ca, GERD presents to the ED as a transfer from [facility] for stroke.

## 2021-07-27 NOTE — SPEECH LANGUAGE PATHOLOGY EVALUATION - COMMENTS
CT Brain 7/25: Acute left MCA territory infarct without evidence of hemorrhagic transformation which has progressed since 7/25/2021  Failed dysphagia screen in ED 7/25 11:14  Thrombectomy unsuccessful  Initial bedside swallow evaluation 7/26; re-evaluated 7/27; oropharyngeal dysphagia with recommendation for NPO, with non-oral nutrition/hydration/medications. GOC conversation planned. unable to assess facial gesture may be relied on somewhat for pleasure/pain; smile is at times appropriately evident; patient pulls away/avoidance with head for discomfort awake, alert, visually attentive to environment was dysarthric in ED and was able to state name; now non-verbal with right sided facial droop; right labial/buccal weakness noted unable

## 2021-07-27 NOTE — DIETITIAN INITIAL EVALUATION ADULT. - ENTERAL
If pt requires tube feeds, recommend Jevity 1.2 at 50cc/hr x 24 hrs to provide 1440 kcals, 67gm protein, 968cc free water  meets 17 Kcal/Kg dosing wt 83.1 kg, 1.2 Gm protein/kg IBW 54.4

## 2021-07-27 NOTE — OCCUPATIONAL THERAPY INITIAL EVALUATION ADULT - RANGE OF MOTION EXAMINATION, UPPER EXTREMITY
+R-side inattention/Left UE Active ROM was WFL (within functional limits)/Right UE Passive ROM was WFL  (within functional limits)

## 2021-07-27 NOTE — OCCUPATIONAL THERAPY INITIAL EVALUATION ADULT - ADDITIONAL COMMENTS
CT brain (7/26)- Acute left MCA territory infarct without evidence of hemorrhagic transformation which has progressed since 7/25/2021.  US lower BI (7/26)- No evidence of deep venous thrombosis in either lower extremity.

## 2021-07-27 NOTE — OCCUPATIONAL THERAPY INITIAL EVALUATION ADULT - PRECAUTIONS/LIMITATIONS, REHAB EVAL
fall precautions Pt was found to have L M1 occlusion on CTA, CTP w/ no core infarct and penumbra 81cc, and transferred for IR thrombectomy. Exam at [facility] notable for severe dysarthria and R hemiparesis. Pt did not receive tpa. NIHSS initially 16 on arrival to [facility] ED, NIHSS 8 on repeat exam. At baseline, son reports that pt is able to do own ADLs (showers, reads) but has an aid to make sure she does not fall, walks with a walker without assistance, speech is fluent./fall precautions

## 2021-07-27 NOTE — SPEECH LANGUAGE PATHOLOGY EVALUATION - SLP PERTINENT HISTORY OF CURRENT PROBLEM
89 y/o F with PMH afib not on AC, PPM, HTN, ovarian and colon ca, GERD presents to the ED as a transfer from [facility] for stroke. Per son, pt woke up at 3:30am and went to the bathroom, no issues with gait or speech at that time, accompanied by son. At 7:30 am this morning, pt's son found her half off the bed and unable to speak or walk. Pt was found to have L M1 occlusion on CTA, CTP w/ no core infarct and penumbra 81cc, and transferred for IR thrombectomy. Exam at [facility] notable for severe dysarthria and R hemiparesis. Pt did not receive tpa. NIHSS initially 16 on arrival to [facility] ED, NIHSS 8 on repeat exam. At baseline, son reports that pt is able to do own ADLs (showers, reads) but has an aid to make sure she does not fall, walks with a walker without assistance, speech is fluent.

## 2021-07-27 NOTE — PROGRESS NOTE ADULT - SUBJECTIVE AND OBJECTIVE BOX
THE PATIENT WAS SEEN AND EXAMINED BY ME WITH THE HOUSESTAFF AND STROKE TEAM DURING MORNING ROUNDS.   HPI:  89 y/o F with PMH afib not on AC, PPM, HTN, ovarian and colon ca, GERD presents to the ED as a transfer from [facility] for stroke. Per son, pt woke up at 3:30am on 7/25 and went to the bathroom, no issues with gait or speech at that time, accompanied by son. At 7:30 am this morning, pt's son found her half off the bed and unable to speak or walk. Pt was found to have L M1 occlusion on CTA, CTP w/ no core infarct and penumbra 81cc, and transferred for IR thrombectomy. Exam at [facility] notable for severe dysarthria and R hemiparesis. Pt did not receive tpa. NIHSS initially 16 on arrival to [facility] ED, NIHSS 8 on repeat exam. At baseline, son reports that pt is able to do own ADLs (showers, reads) but has an aid to make sure she does not fall, walks with a walker without assistance, speech is fluent.    SUBJECTIVE: No events overnight.  No new neurologic complaints.  ROS reported negative unless otherwise noted.    MEDICATIONS  (STANDING):  aspirin Suppository 300 milliGRAM(s) Rectal daily  ATENolol  Tablet 25 milliGRAM(s) Oral daily  atorvastatin 80 milliGRAM(s) Oral at bedtime  enoxaparin Injectable 40 milliGRAM(s) SubCutaneous <User Schedule>  insulin lispro (ADMELOG) corrective regimen sliding scale   SubCutaneous every 6 hours  multivitamin 1 Tablet(s) Oral daily  pantoprazole  Injectable 40 milliGRAM(s) IV Push daily  polyethylene glycol 3350 17 Gram(s) Oral daily  senna 1 Tablet(s) Oral daily  sodium chloride 0.9%. 1000 milliLiter(s) (70 mL/Hr) IV Continuous <Continuous>    MEDICATIONS  (PRN):  acetaminophen   Tablet .. 650 milliGRAM(s) Oral every 6 hours PRN Temp greater or equal to 38C (100.4F), Mild Pain (1 - 3)    PHYSICAL EXAM:   Vital Signs Last 24 Hrs  T(C): 36.7 (27 Jul 2021 04:00), Max: 37.4 (26 Jul 2021 07:00)  T(F): 98 (27 Jul 2021 04:00), Max: 99.3 (26 Jul 2021 07:00)  HR: 73 (27 Jul 2021 06:00) (70 - 82)  BP: 167/59 (27 Jul 2021 04:00) (133/79 - 179/58)  BP(mean): 90 (27 Jul 2021 04:00) (80 - 122)  RR: 18 (27 Jul 2021 06:00) (12 - 25)  SpO2: 100% (27 Jul 2021 06:00) (94% - 100%)    General: No acute distress  HEENT: EOM intact, visual fields full  Abdomen: Soft, nontender, nondistended   Extremities: No edema    NEUROLOGICAL EXAM:  Mental status: Awake, alert, not oriented to person, place, situation, time. Normal affect. Follows commands (squeeze hands, close eyes) intermittently. Speech is not fluent, unable to repeat. Comprehension intact to following commands.   Cranial Nerves: R facial droop, no nystagmus, no dysarthria,  tongue midline  Motor exam: Normal tone, RUE drift but does not hit bed. LUE no drift. RLE drift but does not hit bed. LLE no drift. 5/5 RUE, 5/5 RLE, 5/5 LUE, 5/5 LLE, normal fine finger movements.  Sensation: Intact to light touch and painful stimuli b/l throughout   Coordination/ Gait: No dysmetria, VIELKA intact and symmetric bilaterally. gait deferred     LABS:                        12.9   12.08 )-----------( 188      ( 27 Jul 2021 05:35 )             39.0    07-27    138  |  105  |  16  ----------------------------<  112<H>  3.8   |  19<L>  |  0.86    Ca    9.1      27 Jul 2021 05:35  Phos  4.4     07-25  Mg     1.8     07-25    TPro  7.1  /  Alb  4.1  /  TBili  0.6  /  DBili  x   /  AST  41<H>  /  ALT  32  /  AlkPhos  103  07-25  PT/INR - ( 25 Jul 2021 19:53 )   PT: 14.0 sec;   INR: 1.18 ratio         PTT - ( 25 Jul 2021 10:25 )  PTT:31.5 sec      IMAGING: Reviewed by me.     CT Head No Cont (07.26.21)   Acute left MCA territory infarct without evidence of hemorrhagic transformation which has progressed since 7/25/2021.           THE PATIENT WAS SEEN AND EXAMINED BY ME WITH THE HOUSESTAFF AND STROKE TEAM DURING MORNING ROUNDS.   HPI:  89 y/o F with PMH afib not on AC, PPM, HTN, ovarian and colon ca, GERD presents to the ED as a transfer from [facility] for stroke. Per son, pt woke up at 3:30am on 7/25 and went to the bathroom, no issues with gait or speech at that time, accompanied by son. At 7:30 am this morning, pt's son found her half off the bed and unable to speak or walk. Pt was found to have L M1 occlusion on CTA, CTP w/ no core infarct and penumbra 81cc, and transferred for IR thrombectomy. Exam at [facility] notable for severe dysarthria and R hemiparesis. Pt did not receive tpa. NIHSS initially 16 on arrival to [facility] ED, NIHSS 8 on repeat exam. At baseline, son reports that pt is able to do own ADLs (showers, reads) but has an aid to make sure she does not fall, walks with a walker without assistance, speech is fluent.    SUBJECTIVE: No events overnight.  No new neurologic complaints.  ROS reported negative unless otherwise noted.    MEDICATIONS  (STANDING):  aspirin Suppository 300 milliGRAM(s) Rectal daily  ATENolol  Tablet 25 milliGRAM(s) Oral daily  atorvastatin 80 milliGRAM(s) Oral at bedtime  enoxaparin Injectable 40 milliGRAM(s) SubCutaneous <User Schedule>  insulin lispro (ADMELOG) corrective regimen sliding scale   SubCutaneous every 6 hours  multivitamin 1 Tablet(s) Oral daily  pantoprazole  Injectable 40 milliGRAM(s) IV Push daily  polyethylene glycol 3350 17 Gram(s) Oral daily  senna 1 Tablet(s) Oral daily  sodium chloride 0.9%. 1000 milliLiter(s) (70 mL/Hr) IV Continuous <Continuous>    MEDICATIONS  (PRN):  acetaminophen   Tablet .. 650 milliGRAM(s) Oral every 6 hours PRN Temp greater or equal to 38C (100.4F), Mild Pain (1 - 3)    PHYSICAL EXAM:   Vital Signs Last 24 Hrs  T(C): 36.7 (27 Jul 2021 04:00), Max: 37.4 (26 Jul 2021 07:00)  T(F): 98 (27 Jul 2021 04:00), Max: 99.3 (26 Jul 2021 07:00)  HR: 73 (27 Jul 2021 06:00) (70 - 82)  BP: 167/59 (27 Jul 2021 04:00) (133/79 - 179/58)  BP(mean): 90 (27 Jul 2021 04:00) (80 - 122)  RR: 18 (27 Jul 2021 06:00) (12 - 25)  SpO2: 100% (27 Jul 2021 06:00) (94% - 100%)    General: No acute distress  HEENT: EOM intact, visual fields full  Abdomen: Soft, nontender, nondistended   Extremities: No edema    NEUROLOGICAL EXAM:  Mental status: Awake, alert, not oriented to person, place, situation, time. Normal affect. Follows commands (squeeze hands, close eyes) intermittently. Speech is not fluent, unable to repeat. Unable to follow complex commands.   Cranial Nerves: R facial droop, no nystagmus, no dysarthria, tongue midline. Blink to threat b/l.   Motor exam: Normal tone, RUE drift but does not hit bed. LUE no drift. RLE drift but does not hit bed. LLE no drift. 3/5 RUE, 2/5 RLE, 5/5 LUE, and 5/5 LLE. Normal fine finger movements.  Sensation: Intact to light touch and painful stimuli b/l throughout   Coordination/ Gait: No dysmetria, VIELKA intact and symmetric bilaterally. gait deferred     LABS:                        12.9   12.08 )-----------( 188      ( 27 Jul 2021 05:35 )             39.0    07-27    138  |  105  |  16  ----------------------------<  112<H>  3.8   |  19<L>  |  0.86    Ca    9.1      27 Jul 2021 05:35  Phos  4.4     07-25  Mg     1.8     07-25    TPro  7.1  /  Alb  4.1  /  TBili  0.6  /  DBili  x   /  AST  41<H>  /  ALT  32  /  AlkPhos  103  07-25  PT/INR - ( 25 Jul 2021 19:53 )   PT: 14.0 sec;   INR: 1.18 ratio         PTT - ( 25 Jul 2021 10:25 )  PTT:31.5 sec      IMAGING: Reviewed by me.     CT Head No Cont (07.26.21)   Acute left MCA territory infarct without evidence of hemorrhagic transformation which has progressed since 7/25/2021.           THE PATIENT WAS SEEN AND EXAMINED BY ME WITH THE HOUSESTAFF AND STROKE TEAM DURING MORNING ROUNDS.   HPI:  89 y/o F with PMH afib not on AC, PPM, HTN, ovarian and colon ca, GERD presents to the ED as a transfer from [facility] for stroke. Per son, pt woke up at 3:30am on 7/25 and went to the bathroom, no issues with gait or speech at that time, accompanied by son. At 7:30 am this morning, pt's son found her half off the bed and unable to speak or walk. Pt was found to have L M1 occlusion on CTA, CTP w/ no core infarct and penumbra 81cc, and transferred for IR thrombectomy. Exam at [facility] notable for severe dysarthria and R hemiparesis. Pt did not receive tpa. NIHSS initially 16 on arrival to [facility] ED, NIHSS 8 on repeat exam. At baseline, son reports that pt is able to do own ADLs (showers, reads) but has an aid to make sure she does not fall, walks with a walker without assistance, speech is fluent.    SUBJECTIVE: No events overnight.  No new neurologic complaints.  ROS reported negative unless otherwise noted.    MEDICATIONS  (STANDING):  aspirin Suppository 300 milliGRAM(s) Rectal daily  ATENolol  Tablet 25 milliGRAM(s) Oral daily  atorvastatin 80 milliGRAM(s) Oral at bedtime  enoxaparin Injectable 40 milliGRAM(s) SubCutaneous <User Schedule>  insulin lispro (ADMELOG) corrective regimen sliding scale   SubCutaneous every 6 hours  multivitamin 1 Tablet(s) Oral daily  pantoprazole  Injectable 40 milliGRAM(s) IV Push daily  polyethylene glycol 3350 17 Gram(s) Oral daily  senna 1 Tablet(s) Oral daily  sodium chloride 0.9%. 1000 milliLiter(s) (70 mL/Hr) IV Continuous <Continuous>    MEDICATIONS  (PRN):  acetaminophen   Tablet .. 650 milliGRAM(s) Oral every 6 hours PRN Temp greater or equal to 38C (100.4F), Mild Pain (1 - 3)    PHYSICAL EXAM:   Vital Signs Last 24 Hrs  T(C): 36.7 (27 Jul 2021 04:00), Max: 37.4 (26 Jul 2021 07:00)  T(F): 98 (27 Jul 2021 04:00), Max: 99.3 (26 Jul 2021 07:00)  HR: 73 (27 Jul 2021 06:00) (70 - 82)  BP: 167/59 (27 Jul 2021 04:00) (133/79 - 179/58)  BP(mean): 90 (27 Jul 2021 04:00) (80 - 122)  RR: 18 (27 Jul 2021 06:00) (12 - 25)  SpO2: 100% (27 Jul 2021 06:00) (94% - 100%)    General: No acute distress  HEENT: EOM intact, visual fields full  Abdomen: Soft, nontender, nondistended   Extremities: No edema    NEUROLOGICAL EXAM:  Mental status: Awake, alert, not oriented to person, place, situation, or time. Normal affect. Does not follow simple or complex commands. Speech is not fluent, unable to repeat.   Cranial Nerves: R facial droop, no nystagmus, no dysarthria, tongue midline. Blink to threat b/l.   Motor exam: Normal tone, RUE drift but does not hit bed. LUE no drift. RLE drift but does not hit bed. LLE no drift. 3/5 RUE, 2/5 RLE, 5/5 LUE, and 5/5 LLE. Normal fine finger movements.  Sensation: Intact to light touch and painful stimuli b/l throughout   Coordination/ Gait: No dysmetria, VIELKA intact and symmetric bilaterally. gait deferred     LABS:                        12.9   12.08 )-----------( 188      ( 27 Jul 2021 05:35 )             39.0    07-27    138  |  105  |  16  ----------------------------<  112<H>  3.8   |  19<L>  |  0.86    Ca    9.1      27 Jul 2021 05:35  Phos  4.4     07-25  Mg     1.8     07-25    TPro  7.1  /  Alb  4.1  /  TBili  0.6  /  DBili  x   /  AST  41<H>  /  ALT  32  /  AlkPhos  103  07-25  PT/INR - ( 25 Jul 2021 19:53 )   PT: 14.0 sec;   INR: 1.18 ratio         PTT - ( 25 Jul 2021 10:25 )  PTT:31.5 sec      IMAGING: Reviewed by me.     CT Head No Cont (07.26.21)   Acute left MCA territory infarct without evidence of hemorrhagic transformation which has progressed since 7/25/2021.

## 2021-07-27 NOTE — OCCUPATIONAL THERAPY INITIAL EVALUATION ADULT - PERTINENT HX OF CURRENT PROBLEM, REHAB EVAL
89 y/o F with PMH afib not on AC, PPM, HTN, ovarian and colon ca, GERD presents to the ED as a transfer from [facility] for stroke. Per son, pt woke up at 3:30am and went to the bathroom, no issues with gait or speech at that time, accompanied by son. At 7:30 am this morning, pt's son found her half off the bed and unable to speak or walk.

## 2021-07-27 NOTE — CONSULT NOTE ADULT - SUBJECTIVE AND OBJECTIVE BOX
Patient is a 89 y/o Female with PMH afib not on AC, PPM, HTN, ovarian and colon ca, GERD presents to the ED as a transfer from [facility] for stroke. Per son, pt woke up at 3:30am and went to the bathroom, no issues with gait or speech at that time, accompanied by son. At 7:30 am this morning, pt's son found her half off the bed and unable to speak or walk. Pt was found to have L M1 occlusion on CTA, CTP w/ no core infarct and penumbra 81cc, and transferred for IR thrombectomy. Exam at [facility] notable for severe dysarthria and R hemiparesis. Pt did not receive tpa. NIHSS initially 16 on arrival to [facility] ED, NIHSS 8 on repeat exam. At baseline, son reports that pt is able to do own ADLs (showers, reads) but has an aid to make sure she does not fall, walks with a walker without assistance, speech is fluent.     PAST MEDICAL & SURGICAL HISTORY:  Hypertension  Cancer  GERD (gastroesophageal reflux disease)  Anxiety  Ovarian cancer  Colon cancer  H/O small bowel obstruction  H/O total hysterectomy  with BSO  No significant past surgical history    MEDICATIONS  (STANDING):  aspirin Suppository 300 milliGRAM(s) Rectal daily  ATENolol  Tablet 25 milliGRAM(s) Oral daily  atorvastatin 80 milliGRAM(s) Oral at bedtime  enoxaparin Injectable 40 milliGRAM(s) SubCutaneous <User Schedule>  glucagon  Injectable 1 milliGRAM(s) IntraMuscular once  multivitamin 1 Tablet(s) Oral daily  pantoprazole  Injectable 40 milliGRAM(s) IV Push daily  polyethylene glycol 3350 17 Gram(s) Oral daily  senna 1 Tablet(s) Oral daily  sodium chloride 0.9%. 1000 milliLiter(s) (70 mL/Hr) IV Continuous <Continuous>    MEDICATIONS  (PRN):  acetaminophen   Tablet .. 650 milliGRAM(s) Oral every 6 hours PRN Temp greater or equal to 38C (100.4F), Mild Pain (1 - 3)    Home Medications:  aspirin 81 mg oral delayed release tablet: 1 tab(s) orally once a day (2020 11:10)  atenolol 25 mg oral tablet: 1 tab(s) orally once a day (2020 11:10)  gabapentin 100 mg oral capsule: 1 cap(s) orally 3 times a day (2020 11:10)  hydrocodocone chlopheniramine: 5 milliliter(s) orally once a day (at bedtime) (2020 11:01)  Multiple Vitamins oral tablet: 1 tab(s) orally once a day (2020 11:01)  omeprazole 20 mg oral delayed release capsule: 1 cap(s) orally once a day (2020 11:01)  Vitamin B12: 5 milliliter(s) orally once a day (2020 11:01)  zolpidem 5 mg oral tablet: 0.5 tab(s) orally once a day (at bedtime) (2020 11:01)    Allergies    No Known Allergies    Intolerances      Vital Signs Last 24 Hrs  T(C): 37.1 (2021 20:00), Max: 37.1 (2021 20:00)  T(F): 98.8 (2021 20:00), Max: 98.8 (2021 20:00)  HR: 81 (2021 20:00) (70 - 81)  BP: 159/80 (2021 20:00) (145/75 - 179/58)  BP(mean): 105 (2021 20:00) (83 - 128)  RR: 20 (2021 20:00) (14 - 23)  SpO2: 100% (2021 20:00) (86% - 100%)    Appearance: Normal	  HEENT:   Normal oral mucosa, PERRL, EOMI	  Lymphatic: No lymphadenopathy , no edema  Cardiovascular: Normal S1 S2  Respiratory: Lungs clear to auscultation, normal effort 	  Gastrointestinal:  Soft, Non-tender, + BS	  Skin: No rashes, No ecchymoses, No cyanosis, warm to touch  Musculoskeletal: Normal range of motion, normal strength  Psychiatry:  Mood & affect appropriate  Ext: No edema                          12.9   12.08 )-----------( 188      ( 2021 05:35 )             39.0               07    138  |  105  |  16  ----------------------------<  112<H>  3.8   |  19<L>  |  0.86    Ca    9.1      2021 05:35           Urinalysis Basic - ( 2021 13:16 )    Color: Light Yellow / Appearance: Clear / S.033 / pH: x  Gluc: x / Ketone: Negative  / Bili: Negative / Urobili: Negative   Blood: x / Protein: 30 mg/dL / Nitrite: Negative   Leuk Esterase: Moderate / RBC: 1 /hpf / WBC 15 /HPF   Sq Epi: x / Non Sq Epi: 3 /hpf / Bacteria: Negative    < from: CT Head No Cont (21 @ 08:09) >    IMPRESSION:    Acute left MCA territory infarct without evidence of hemorrhagic transformation which has progressed since 2021.      < from: IR Neuro (21 @ 17:22) >  Impression: Diagnostic cerebral angiogram demonstrating complete occlusion of the distal M1 segment of the left MCA. Attempted mechanical thrombectomy was unsuccessful due to severe tortuosity of the aortic arch and left commoncarotid artery.

## 2021-07-27 NOTE — CHART NOTE - NSCHARTNOTEFT_GEN_A_CORE
89 y/o F with PMH afib not on AC, PPM, HTN, ovarian and colon ca, GERD presents to the ED as a transfer from [facility] for stroke. Per son, pt woke up at 3:30am and went to the bathroom, no issues with gait or speech at that time, accompanied by son. At 7:30 am this morning, pt's son found her half off the bed and unable to speak or walk. Pt was found to have L M1 occlusion on CTA, CTP w/ no core infarct and penumbra 81cc, and transferred for IR thrombectomy. Exam at [facility] notable for severe dysarthria and R hemiparesis. Pt did not receive tpa. NIHSS initially 16 on arrival to [facility] ED, NIHSS 8 on repeat exam. At baseline, son reports that pt is able to do own ADLs (showers, reads) but has an aid to make sure she does not fall, walks with a walker without assistance, speech is fluent.    CT Brain 7/25: Acute left MCA territory infarct without evidence of hemorrhagic transformation which has progressed since 7/25/2021.   Thrombectomy unsuccessful.  Failed dysphagia screen in ED 7/25 11:14    Initial bedside evaluation completed 7/26 found oropharyngeal dysphagia with recommendation for NPO, with non-oral nutrition/hydration/medications.     Patient seen today for re-evaluation of bedside swallow. Patient is found awake, alert, attentive to clinician and environment 89 y/o F with PMH afib not on AC, PPM, HTN, ovarian and colon ca, GERD presents to the ED as a transfer from [facility] for stroke. Per son, pt woke up at 3:30am and went to the bathroom, no issues with gait or speech at that time, accompanied by son. At 7:30 am this morning, pt's son found her half off the bed and unable to speak or walk. Pt was found to have L M1 occlusion on CTA, CTP w/ no core infarct and penumbra 81cc, and transferred for IR thrombectomy. Exam at [facility] notable for severe dysarthria and R hemiparesis. Pt did not receive tpa. NIHSS initially 16 on arrival to [facility] ED, NIHSS 8 on repeat exam. At baseline, son reports that pt is able to do own ADLs (showers, reads) but has an aid to make sure she does not fall, walks with a walker without assistance, speech is fluent.    CT Brain 7/25: Acute left MCA territory infarct without evidence of hemorrhagic transformation which has progressed since 7/25/2021.   Thrombectomy unsuccessful.  Failed dysphagia screen in ED 7/25 11:14    Initial bedside evaluation completed 7/26 found oropharyngeal dysphagia with recommendation for NPO, with non-oral nutrition/hydration/medications.     Patient seen today for re-evaluation of bedside swallow. Patient is found awake, alert, non-verbal, follows limited commands 10% of the time given max verbal/visual cueing. Wet breath sounds at baseline, suggestive of reduced secretion management. Oral care and suctioning provided to remove min-mod thick secretions on palate. PO trials of 1/2 tsp honey thick liquid and ice chips provided. Adequate anticipation/orientation to food/utensil. Adequate stripping of utensil. Delayed/reduced oral transfer; suspected premature loss and delayed (>20s), at times absent, pharyngeal trigger. Increased wet breath sounds noted post trials, suggestive of laryngeal penetration/aspiration. Inconsistent ability to respond to cue for volitional throat clear/cough however attempts to clear are ineffective nonetheless. Oral care/suctioning provided to remove residual material.    IMPRESSIONS/RECOMMENDATIONS: Patient continues to present with oropharyngeal dysphagia without significant improvement from initial evaluation yesterday. NPO, with non-oral nutrition/hydration/medications continues to be recommended. Given overwhelming deficits in both safety and efficiency of swallow, would currently defer objective study as would not likely . GOC discussion with family is recommended to clarify options for obtaining nutrition/hydration and medications while maintaining quality of life. If alternate means is not in keeping with pt/family wishes or if there is a delay in determining GOC, consider providing patient with small ice chips in the interim for comfort and quality of life purposes only. Diligent oral care; oral suctioning prn. Will continue to follow. dc to rehab pending GOC d/w family.    Pt left in NAD, reclined in chair; seat alarm on; VSS. 5 Ps addressed.    d/w ANAYELI Buchanan MA, CCC-SLP  Speech Language Pathologist  pager [phone]

## 2021-07-27 NOTE — OCCUPATIONAL THERAPY INITIAL EVALUATION ADULT - FINE MOTOR COORDINATION, LEFT HAND, MANIPULATION OF OBJECTS, OT EVAL
unable to perform Unable to assess, secondary to decrease command follow and non-verbal and questionable aphasia/unable to perform

## 2021-07-27 NOTE — OCCUPATIONAL THERAPY INITIAL EVALUATION ADULT - PHYSICAL ASSIST/NONPHYSICAL ASSIST: SUPINE/SIT, REHAB EVAL
supervision/verbal cues/nonverbal cues (demo/gestures)/1 person assist supervision/verbal cues/nonverbal cues (demo/gestures)/2 person assist

## 2021-07-28 LAB
APPEARANCE UR: CLEAR — SIGNIFICANT CHANGE UP
BACTERIA # UR AUTO: NEGATIVE — SIGNIFICANT CHANGE UP
BASOPHILS # BLD AUTO: 0.05 K/UL — SIGNIFICANT CHANGE UP (ref 0–0.2)
BASOPHILS NFR BLD AUTO: 0.3 % — SIGNIFICANT CHANGE UP (ref 0–2)
BILIRUB UR-MCNC: NEGATIVE — SIGNIFICANT CHANGE UP
COLOR SPEC: YELLOW — SIGNIFICANT CHANGE UP
DIFF PNL FLD: ABNORMAL
EOSINOPHIL # BLD AUTO: 0.1 K/UL — SIGNIFICANT CHANGE UP (ref 0–0.5)
EOSINOPHIL NFR BLD AUTO: 0.7 % — SIGNIFICANT CHANGE UP (ref 0–6)
EPI CELLS # UR: 1 /HPF — SIGNIFICANT CHANGE UP
GLUCOSE BLDC GLUCOMTR-MCNC: 112 MG/DL — HIGH (ref 70–99)
GLUCOSE BLDC GLUCOMTR-MCNC: 123 MG/DL — HIGH (ref 70–99)
GLUCOSE UR QL: NEGATIVE — SIGNIFICANT CHANGE UP
HCT VFR BLD CALC: 40.2 % — SIGNIFICANT CHANGE UP (ref 34.5–45)
HGB BLD-MCNC: 13.7 G/DL — SIGNIFICANT CHANGE UP (ref 11.5–15.5)
HYALINE CASTS # UR AUTO: 0 /LPF — SIGNIFICANT CHANGE UP (ref 0–2)
IMM GRANULOCYTES NFR BLD AUTO: 0.8 % — SIGNIFICANT CHANGE UP (ref 0–1.5)
KETONES UR-MCNC: ABNORMAL
LEUKOCYTE ESTERASE UR-ACNC: NEGATIVE — SIGNIFICANT CHANGE UP
LYMPHOCYTES # BLD AUTO: 17.2 % — SIGNIFICANT CHANGE UP (ref 13–44)
LYMPHOCYTES # BLD AUTO: 2.49 K/UL — SIGNIFICANT CHANGE UP (ref 1–3.3)
MCHC RBC-ENTMCNC: 31.5 PG — SIGNIFICANT CHANGE UP (ref 27–34)
MCHC RBC-ENTMCNC: 34.1 GM/DL — SIGNIFICANT CHANGE UP (ref 32–36)
MCV RBC AUTO: 92.4 FL — SIGNIFICANT CHANGE UP (ref 80–100)
MONOCYTES # BLD AUTO: 1.11 K/UL — HIGH (ref 0–0.9)
MONOCYTES NFR BLD AUTO: 7.7 % — SIGNIFICANT CHANGE UP (ref 2–14)
NEUTROPHILS # BLD AUTO: 10.6 K/UL — HIGH (ref 1.8–7.4)
NEUTROPHILS NFR BLD AUTO: 73.3 % — SIGNIFICANT CHANGE UP (ref 43–77)
NITRITE UR-MCNC: NEGATIVE — SIGNIFICANT CHANGE UP
NRBC # BLD: 0 /100 WBCS — SIGNIFICANT CHANGE UP (ref 0–0)
PH UR: 6.5 — SIGNIFICANT CHANGE UP (ref 5–8)
PLATELET # BLD AUTO: 215 K/UL — SIGNIFICANT CHANGE UP (ref 150–400)
PROT UR-MCNC: ABNORMAL
RBC # BLD: 4.35 M/UL — SIGNIFICANT CHANGE UP (ref 3.8–5.2)
RBC # FLD: 12.6 % — SIGNIFICANT CHANGE UP (ref 10.3–14.5)
RBC CASTS # UR COMP ASSIST: 4 /HPF — SIGNIFICANT CHANGE UP (ref 0–4)
SP GR SPEC: 1.02 — SIGNIFICANT CHANGE UP (ref 1.01–1.02)
UROBILINOGEN FLD QL: ABNORMAL
WBC # BLD: 14.46 K/UL — HIGH (ref 3.8–10.5)
WBC # FLD AUTO: 14.46 K/UL — HIGH (ref 3.8–10.5)
WBC UR QL: 9 /HPF — HIGH (ref 0–5)

## 2021-07-28 PROCEDURE — 93306 TTE W/DOPPLER COMPLETE: CPT | Mod: 26

## 2021-07-28 RX ORDER — ACETAMINOPHEN 500 MG
1000 TABLET ORAL ONCE
Refills: 0 | Status: COMPLETED | OUTPATIENT
Start: 2021-07-28 | End: 2021-07-29

## 2021-07-28 RX ORDER — ACETAMINOPHEN 500 MG
1000 TABLET ORAL ONCE
Refills: 0 | Status: COMPLETED | OUTPATIENT
Start: 2021-07-28 | End: 2021-07-28

## 2021-07-28 RX ADMIN — ENOXAPARIN SODIUM 40 MILLIGRAM(S): 100 INJECTION SUBCUTANEOUS at 17:03

## 2021-07-28 RX ADMIN — Medication 300 MILLIGRAM(S): at 11:27

## 2021-07-28 RX ADMIN — PANTOPRAZOLE SODIUM 40 MILLIGRAM(S): 20 TABLET, DELAYED RELEASE ORAL at 11:27

## 2021-07-28 RX ADMIN — Medication 1000 MILLIGRAM(S): at 05:11

## 2021-07-28 RX ADMIN — Medication 400 MILLIGRAM(S): at 04:41

## 2021-07-28 NOTE — PROGRESS NOTE ADULT - ASSESSMENT
ASSESSMENT: 89 y/o F with PMH afib not on AC, PPM, HTN, ovarian and colon ca, GERD presents to the ED as a transfer from [facility] for dysarthria and R hemiparesis. NIHSS 8 on repeat exam. R hemiparesis, R facial droop, aphasia (not fluent, not intact to repetition), and dysarthria. 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     Impression: R hemiparesis, R facial droop, aphasia (not fluent, not intact to repetition), and dysarthria likely 2/2 L brain dysfunction 2/2 L M1 occlusion found on CTA likely 2/2 cardioembolic etiology (hx of afib)    NEURO: Patient is neurologically without acute change, overall improving in regards to degree of alertness and attempted interaction. Continue close monitoring for neurologic deterioration.   Goal for SBP of 100-180mmHg with overall goal of normotension. Patient on statin for LDL goal less than 70. MRI Brain would not change medical management as pt has hx of atrial fibrillation, hx ppm as well. Head CT as noted above. Physical therapy and OT recommend CHARLES.     ANTITHROMBOTIC THERAPY:  rectal. Will begin Eliquis 5mg BID in 2 weeks from stroke onset pending clinical and radiological stability after repeat CTH.     PULMONARY: CXR without acute findings, protecting airway, saturating well on room air     CARDIOVASCULAR: TTE is pending, continue cardiac monitoring. No events noted this far, hx PPM                               SBP goal: 100-180 mmhg     GASTROINTESTINAL:  dysphagia screen- failed 7/26. Re eval today, 7/27. Family refusing NGT at this time. If fails again, will pursue PEG option with GI      Diet: NPO     RENAL: BUN/Cr within normal limits, good urine output. maintain hydration as tolerated       Na Goal: Greater than 135     Kessler: no     HEMATOLOGY: H/H within normal limits, Platelets normal at 215, no active bleeding noted      DVT ppx: lovenox subq     ID: afebrile, slight leukocytosis-  fluctuating . no s/sx of infection. will continue to monitor     OTHER: condition and plan of care d/w patient's daughter and son, discussed risks/benefits/alternatives to goals of care including but not limited to NGT vs. comfort feeds vs. PEG, at this time they wish to further discuss with palliative care and GI prior to making a decision.     DISPOSITION: [facility]        CORE MEASURES:        Admission NIHSS: 16      TPA: [] YES [x] NO      LDL/HDL: 123/30     Depression Screen: o- unable to participate at this time      Statin Therapy: yes     Dysphagia Screen: [] PASS [x] FAIL     Smoking [] YES [x] NO      Afib [x] YES [] NO     Stroke Education [x] YES [] NO    Obtain screening lower extremity venous ultrasound in patients who meet 1 or more of the following criteria as patient is high risk for DVT/PE on admission:   [] History of DVT/PE  []Hypercoagulable states (Factor V Leiden, Cancer, OCP, etc. )  []Prolonged immobility (hemiplegia/hemiparesis/post operative or any other extended immobilization)  [] Transferred from outside facility (Rehab or Long term care)  [] Age </= to 50     ASSESSMENT: 89 y/o F with PMH afib not on AC, PPM, HTN, ovarian and colon ca, GERD presents to the ED as a transfer from [facility] for dysarthria and R hemiparesis. NIHSS 8 on repeat exam. R hemiparesis, R facial droop, aphasia (not fluent, not intact to repetition), and dysarthria. 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     Impression: R hemiparesis, R facial droop, aphasia (not fluent, not intact to repetition), and dysarthria likely 2/2 L brain dysfunction 2/2 L M1 occlusion found on CTA likely 2/2 cardioembolic etiology (hx of afib)    NEURO: Patient is neurologically without acute change, overall improving in regards to degree of alertness and attempted interaction. Continue close monitoring for neurologic deterioration.   Goal for SBP of 100-180mmHg with overall goal of normotension. Patient on statin for LDL goal less than 70. MRI Brain would not change medical management as pt has hx of atrial fibrillation, hx ppm as well. Head CT as noted above. Physical therapy and OT recommend CHARLES.     ANTITHROMBOTIC THERAPY:  rectal. Will begin Eliquis 5mg BID in 2 weeks from stroke onset pending clinical and radiological stability after repeat CTH.     PULMONARY: CXR without acute findings, protecting airway, saturating well on room air     CARDIOVASCULAR: TTE is pending, continue cardiac monitoring. No events noted this far, hx PPM                               SBP goal: 100-180 mmhg     GASTROINTESTINAL:  dysphagia screen- failed 7/26. Re eval NPO, 7/27. Family deferred NGT at this time after discussing risks/benefits. will further d/w GI and palliative care for goals of care.      Diet: NPO     RENAL: BUN/Cr within normal limits, good urine output. maintain hydration as tolerated       Na Goal: Greater than 135     Kessler: no     HEMATOLOGY: H/H within normal limits, Platelets normal at 215, no active bleeding noted      DVT ppx: lovenox subq     ID: afebrile, slight leukocytosis-  fluctuating . no s/sx of infection. will continue to monitor     OTHER: condition and plan of care d/w patient's daughter and son, discussed risks/benefits/alternatives to goals of care including but not limited to NGT vs. comfort feeds vs. PEG, at this time they wish to further discuss with palliative care and GI prior to making a decision.     DISPOSITION: [facility]        CORE MEASURES:        Admission NIHSS: 16      TPA: [] YES [x] NO      LDL/HDL: 123/30     Depression Screen: o- unable to participate at this time      Statin Therapy: yes     Dysphagia Screen: [] PASS [x] FAIL     Smoking [] YES [x] NO      Afib [x] YES [] NO     Stroke Education [x] YES [] NO    Obtain screening lower extremity venous ultrasound in patients who meet 1 or more of the following criteria as patient is high risk for DVT/PE on admission:   [] History of DVT/PE  []Hypercoagulable states (Factor V Leiden, Cancer, OCP, etc. )  []Prolonged immobility (hemiplegia/hemiparesis/post operative or any other extended immobilization)  [] Transferred from outside facility (Rehab or Long term care)  [] Age </= to 50

## 2021-07-28 NOTE — PROGRESS NOTE ADULT - SUBJECTIVE AND OBJECTIVE BOX
Name of Patient : SHELBY LESTER  MRN: [id number]  DATE OF SERVICE: 21 @ 16:28    Subjective: Patient seen and examined. No new events except as noted.   calm    MEDICATIONS:  MEDICATIONS  (STANDING):  aspirin Suppository 300 milliGRAM(s) Rectal daily  ATENolol  Tablet 25 milliGRAM(s) Oral daily  atorvastatin 80 milliGRAM(s) Oral at bedtime  enoxaparin Injectable 40 milliGRAM(s) SubCutaneous <User Schedule>  glucagon  Injectable 1 milliGRAM(s) IntraMuscular once  multivitamin 1 Tablet(s) Oral daily  pantoprazole  Injectable 40 milliGRAM(s) IV Push daily  polyethylene glycol 3350 17 Gram(s) Oral daily  senna 1 Tablet(s) Oral daily  sodium chloride 0.9%. 1000 milliLiter(s) (70 mL/Hr) IV Continuous <Continuous>      PHYSICAL EXAM:  T(C): 37.1 (21 @ 15:08), Max: 37.1 (21 @ 20:00)  HR: 70 (21 @ 14:00) (70 - 85)  BP: 156/55 (21 @ 14:00) (136/66 - 161/76)  RR: 20 (21 @ 14:00) (14 - 23)  SpO2: 99% (21 @ 14:00) (96% - 100%)  Wt(kg): --  I&O's Summary    2021 07:  -  2021 07:00  --------------------------------------------------------  IN: 910 mL / OUT: 1700 mL / NET: -790 mL    2021 07:  -  2021 16:28  --------------------------------------------------------  IN: 0 mL / OUT: 300 mL / NET: -300 mL          Appearance: awake, clam 	  HEENT:  PERRLA   Lymphatic: No lymphadenopathy   Cardiovascular: Normal S1 S2  Respiratory: normal effort , clear  Gastrointestinal:  Soft, Non-tender  Skin: No rashes,  warm to touch  Psychiatry:  Mood & affect appropriate  Musculuskeletal: No edema      All labs, Imaging and EKGs personally reviewed     21 @ 07:  -  21 @ 07:00  --------------------------------------------------------  IN: 910 mL / OUT: 1700 mL / NET: -790 mL    21 @ 07:  -  21 @ 16:28  --------------------------------------------------------  IN: 0 mL / OUT: 300 mL / NET: -300 mL                            13.7   14.46 )-----------( 215      ( 2021 04:47 )             40.2                   138  |  105  |  16  ----------------------------<  112<H>  3.8   |  19<L>  |  0.86    Ca    9.1      2021 05:35                         Urinalysis Basic - ( 2021 12:36 )    Color: Yellow / Appearance: Clear / S.018 / pH: x  Gluc: x / Ketone: Small  / Bili: Negative / Urobili: 2 mg/dL   Blood: x / Protein: 30 mg/dL / Nitrite: Negative   Leuk Esterase: Negative / RBC: 4 /hpf / WBC 9 /HPF   Sq Epi: x / Non Sq Epi: 1 /hpf / Bacteria: Negative

## 2021-07-28 NOTE — PROGRESS NOTE ADULT - ASSESSMENT
Patient is a 89 y/o Female with PMH afib not on AC, PPM, HTN, ovarian and colon ca, GERD presents to the ED as a transfer from [facility] for stroke.     Problem/Recommendation - 1:  Problem: Stroke. Recommendation: Care as per neurology   Angio noted   ASA/ Statin   protonix  BP control  speech and swallow   GI eval for PEG placement appreicated   palliative eval     Problem/Recommendation - 2:  ·  Problem: Atrial fibrillation.  Recommendation: rate control  Atenolol 25 oral daily.     Problem/Recommendation - 3:  ·  Problem: Hypertension.  Recommendation: resuem BP meds  monitor and adjust as tolerated.     Problem/Recommendation - 4:  ·  Problem: Colon cancer.     Problem/Recommendation - 5:  ·  Problem: GERD (gastroesophageal reflux disease).  Recommendation: PPI.     Problem/Recommendation - 6:  Problem: Prophylactic measure. Recommendation: DVT and gI PPX.

## 2021-07-28 NOTE — CHART NOTE - NSCHARTNOTEFT_GEN_A_CORE
Palliative consulted to assist in nutritional GOC discussion in the setting of patient with advanced illness. LMSW placed call to patient's daughter Susu this AM, and arranged family meeting for tomorrow, 7/29, at 2 PM, with patient's son and daughter at bedside. LMSW to confirm meeting time tomorrow AM as well.     Palliative will continue to follow this case.    (phone) [phone]  (pager) [phone]

## 2021-07-28 NOTE — PROGRESS NOTE ADULT - ASSESSMENT
89 y/o F with PMH afib not on AC, PPM, HTN, ovarian and colon ca, GERD presents to the ED as a transfer from [facility] for stroke. Per son, pt woke up at 3:30am and went to the bathroom, no issues with gait or speech at that time, accompanied by son. At 7:30 am this morning, pt's son found her half off the bed and unable to speak or walk. Pt was found to have L M1 occlusion on CTA, CTP w/ no core infarct and penumbra 81cc, and transferred for IR thrombectomy. Exam at [facility] notable for severe dysarthria and R hemiparesis. Pt did not receive tpa. NIHSS initially 16 on arrival to [facility] ED, NIHSS 8 on repeat exam. At baseline, son reports that pt is able to do own ADLs (showers, reads) but has an aid to make sure she does not fall, walks with a walker without assistance, speech is fluent.  Pt. s/p 2 failed S&S and family approached for PEG.    Pt. will benefit from a PEG for long term nutritional support if family consents  Cont. IVF  May cont. ASA  I had a long conversation with both son and dtr. and they both cosent to a PEG placement, but   they wish to meet with PCU team.  Would repeat UA to R/O UTI

## 2021-07-28 NOTE — PROGRESS NOTE ADULT - SUBJECTIVE AND OBJECTIVE BOX
THE PATIENT WAS SEEN AND EXAMINED BY ME WITH THE HOUSESTAFF AND STROKE TEAM DURING MORNING ROUNDS.   HPI:  89 y/o F with PMH afib not on AC, PPM, HTN, ovarian and colon ca, GERD presents to the ED as a transfer from [facility] for stroke. Per son, pt woke up at 3:30am on 7/25 and went to the bathroom, no issues with gait or speech at that time, accompanied by son. At 7:30 am this morning, pt's son found her half off the bed and unable to speak or walk. Pt was found to have L M1 occlusion on CTA, CTP w/ no core infarct and penumbra 81cc, and transferred for IR thrombectomy. Exam at [facility] notable for severe dysarthria and R hemiparesis. Pt did not receive tpa. NIHSS initially 16 on arrival to [facility] ED, NIHSS 8 on repeat exam. At baseline, son reports that pt is able to do own ADLs (showers, reads) but has an aid to make sure she does not fall, walks with a walker without assistance, speech is fluent.    SUBJECTIVE: No events overnight.  No new neurologic complaints.  ROS reported negative unless otherwise noted.    acetaminophen   Tablet .. 650 milliGRAM(s) Oral every 6 hours PRN  aspirin Suppository 300 milliGRAM(s) Rectal daily  ATENolol  Tablet 25 milliGRAM(s) Oral daily  atorvastatin 80 milliGRAM(s) Oral at bedtime  enoxaparin Injectable 40 milliGRAM(s) SubCutaneous <User Schedule>  glucagon  Injectable 1 milliGRAM(s) IntraMuscular once  multivitamin 1 Tablet(s) Oral daily  pantoprazole  Injectable 40 milliGRAM(s) IV Push daily  polyethylene glycol 3350 17 Gram(s) Oral daily  senna 1 Tablet(s) Oral daily  sodium chloride 0.9%. 1000 milliLiter(s) IV Continuous <Continuous>      PHYSICAL EXAM:   Vital Signs Last 24 Hrs  T(C): 37.1 (28 Jul 2021 07:30), Max: 37.1 (27 Jul 2021 20:00)  T(F): 98.7 (28 Jul 2021 07:30), Max: 98.8 (27 Jul 2021 20:00)  HR: 72 (28 Jul 2021 06:00) (70 - 81)  BP: 150/58 (28 Jul 2021 06:00) (137/54 - 165/59)  BP(mean): 82 (28 Jul 2021 06:00) (74 - 128)  RR: 14 (28 Jul 2021 06:00) (14 - 23)  SpO2: 96% (28 Jul 2021 06:00) (86% - 100%)    General: No acute distress  HEENT: EOM intact, visual fields full  Abdomen: Soft, nontender, nondistended   Extremities: No edema    NEUROLOGICAL EXAM:  Mental status: Awake, alert, not oriented to person, place, situation, or time. Normal affect. Does not follow simple or complex commands. Speech is not fluent, unable to repeat.   Cranial Nerves: R facial droop, no nystagmus, no dysarthria, tongue midline. Blink to threat b/l.   Motor exam: Normal tone, RUE drift but does not hit bed. LUE no drift. RLE drift but does not hit bed. LLE no drift. 3/5 RUE, 2/5 RLE, 5/5 LUE, and 5/5 LLE. Normal fine finger movements.  Sensation: Intact to light touch and painful stimuli b/l throughout   Coordination/ Gait: No dysmetria, VIELKA intact and symmetric bilaterally. gait deferred       LABS:                        13.7   14.46 )-----------( 215      ( 28 Jul 2021 04:47 )             40.2    07-27    138  |  105  |  16  ----------------------------<  112<H>  3.8   |  19<L>  |  0.86    Ca    9.1      27 Jul 2021 05:35          IMAGING: Reviewed by me.   CT Head No Cont (07.26.21)   Acute left MCA territory infarct without evidence of hemorrhagic transformation which has progressed since 7/25/2021.    IR Neuro (07.27.21)  Diagnostic cerebral angiogram demonstrating complete occlusion of the distal M1 segment of the left MCA. Attempted mechanical thrombectomy was unsuccessful due to severe tortuosity of the aortic arch and left commoncarotid artery.         THE PATIENT WAS SEEN AND EXAMINED BY ME WITH THE HOUSESTAFF AND STROKE TEAM DURING MORNING ROUNDS.   HPI:  89 y/o F with PMH afib not on AC, PPM, HTN, ovarian and colon ca, GERD presents to the ED as a transfer from [facility] for stroke. Per son, pt woke up at 3:30am on 7/25 and went to the bathroom, no issues with gait or speech at that time, accompanied by son. At 7:30 am this morning, pt's son found her half off the bed and unable to speak or walk. Pt was found to have L M1 occlusion on CTA, CTP w/ no core infarct and penumbra 81cc, and transferred for IR thrombectomy. Exam at [facility] notable for severe dysarthria and R hemiparesis. Pt did not receive tpa. NIHSS initially 16 on arrival to [facility] ED, NIHSS 8 on repeat exam. At baseline, son reports that pt is able to do own ADLs (showers, reads) but has an aid to make sure she does not fall, walks with a walker without assistance, speech is fluent.    SUBJECTIVE: No events overnight.  No new neurologic complaints.  ROS reported negative unless otherwise noted.    acetaminophen   Tablet .. 650 milliGRAM(s) Oral every 6 hours PRN  aspirin Suppository 300 milliGRAM(s) Rectal daily  ATENolol  Tablet 25 milliGRAM(s) Oral daily  atorvastatin 80 milliGRAM(s) Oral at bedtime  enoxaparin Injectable 40 milliGRAM(s) SubCutaneous <User Schedule>  glucagon  Injectable 1 milliGRAM(s) IntraMuscular once  multivitamin 1 Tablet(s) Oral daily  pantoprazole  Injectable 40 milliGRAM(s) IV Push daily  polyethylene glycol 3350 17 Gram(s) Oral daily  senna 1 Tablet(s) Oral daily  sodium chloride 0.9%. 1000 milliLiter(s) IV Continuous <Continuous>      PHYSICAL EXAM:   Vital Signs Last 24 Hrs  T(C): 37.1 (28 Jul 2021 07:30), Max: 37.1 (27 Jul 2021 20:00)  T(F): 98.7 (28 Jul 2021 07:30), Max: 98.8 (27 Jul 2021 20:00)  HR: 72 (28 Jul 2021 06:00) (70 - 81)  BP: 150/58 (28 Jul 2021 06:00) (137/54 - 165/59)  BP(mean): 82 (28 Jul 2021 06:00) (74 - 128)  RR: 14 (28 Jul 2021 06:00) (14 - 23)  SpO2: 96% (28 Jul 2021 06:00) (86% - 100%)    General: No acute distress  HEENT: EOM intact, visual fields full  Abdomen: Soft, nontender, nondistended   Extremities: No edema    NEUROLOGICAL EXAM:  Mental status: Awake, alert, does gesture facial expressions relatively in context to situation (smiles appropriately), not following commands, no verbal output   Cranial Nerves: R facial droop, no nystagmus, tongue midline. Blink to threat b/l.   Motor exam: right hemiplegia with trace flexion in arm and triple flexion in leg, left side moves well against gravity but inattentive and with drift   Sensation: decreased on right   Coordination/ Gait: gait not assessed       LABS:                        13.7   14.46 )-----------( 215      ( 28 Jul 2021 04:47 )             40.2    07-27    138  |  105  |  16  ----------------------------<  112<H>  3.8   |  19<L>  |  0.86    Ca    9.1      27 Jul 2021 05:35          IMAGING: Reviewed by me.   CT Head No Cont (07.26.21)   Acute left MCA territory infarct without evidence of hemorrhagic transformation which has progressed since 7/25/2021.    IR Neuro (07.27.21)  Diagnostic cerebral angiogram demonstrating complete occlusion of the distal M1 segment of the left MCA. Attempted mechanical thrombectomy was unsuccessful due to severe tortuosity of the aortic arch and left commoncarotid artery.

## 2021-07-28 NOTE — PROGRESS NOTE ADULT - SUBJECTIVE AND OBJECTIVE BOX
Chief Complaint:  Patient is a 89y old  Female who presents with a chief complaint of stroke transfer (2021 16:28)      Interval Events:   pt. is still non-verbal  Allergies:  No Known Allergies        Home Medications:  aspirin 81 mg oral delayed release tablet: 1 tab(s) orally once a day (2020 11:10)  atenolol 25 mg oral tablet: 1 tab(s) orally once a day (2020 11:10)  gabapentin 100 mg oral capsule: 1 cap(s) orally 3 times a day (2020 11:10)  hydrocodocone chlopheniramine: 5 milliliter(s) orally once a day (at bedtime) (2020 11:01)  Multiple Vitamins oral tablet: 1 tab(s) orally once a day (2020 11:01)  omeprazole 20 mg oral delayed release capsule: 1 cap(s) orally once a day (2020 11:01)  Vitamin B12: 5 milliliter(s) orally once a day (2020 11:01)  zolpidem 5 mg oral tablet: 0.5 tab(s) orally once a day (at bedtime) (2020 11:01)        Hospital Medications:  acetaminophen  IVPB .. 1000 milliGRAM(s) IV Intermittent once  aspirin Suppository 300 milliGRAM(s) Rectal daily  ATENolol  Tablet 25 milliGRAM(s) Oral daily  atorvastatin 80 milliGRAM(s) Oral at bedtime  enoxaparin Injectable 40 milliGRAM(s) SubCutaneous <User Schedule>  glucagon  Injectable 1 milliGRAM(s) IntraMuscular once  multivitamin 1 Tablet(s) Oral daily  pantoprazole  Injectable 40 milliGRAM(s) IV Push daily  polyethylene glycol 3350 17 Gram(s) Oral daily  senna 1 Tablet(s) Oral daily  sodium chloride 0.9%. 1000 milliLiter(s) IV Continuous <Continuous>    MEDICATIONS  (PRN):  acetaminophen   Tablet .. 650 milliGRAM(s) Oral every 6 hours PRN Temp greater or equal to 38C (100.4F), Mild Pain (1 - 3)    ___________  Active diet:  Diet, NPO  ___________________        ROS  GI: [- ] Nausea, [- ] vomiting, [ -]abdominal pain    PHYSICAL EXAM:   Vital Signs:  Vital Signs Last 24 Hrs  T(C): 36.9 (2021 20:00), Max: 37.1 (2021 07:30)  T(F): 98.4 (2021 20:00), Max: 98.8 (2021 15:08)  HR: 72 (2021 22:00) (70 - 85)  BP: 158/53 (2021 22:00) (131/66 - 158/53)  BP(mean): 84 (2021 22:00) (60 - 88)  RR: 23 (2021 22:00) (14 - 27)  SpO2: 98% (2021 22:00) (96% - 100%)  Daily     Daily     GENERAL:  Appears stated age, well-groomed, well-nourished, no distress  HEENT:  NC/AT,  conjunctivae clear and pink, no thyromegaly, nodules, adenopathy, no JVD, sclera -anicteric  NECK: Supple, No masses  CHEST:  Full & symmetric excursion, no increased effort, breath sounds clear  HEART:  Regular rhythm, S1, S2, no murmur/rub/S3/S4, no abdominal bruit, no edema  ABDOMEN:  Soft, non-tender, non-distended, normoactive bowel sounds,  no masses ,no hepato-splenomegaly, no signs of chronic liver disease  EXTEREMITIES:  no cyanosis,clubbing or edema  SKIN:  No rash/erythema/ecchymoses/petechiae/wounds/abscess/warm/dry  LN: No lymphadenopathy, No masses  NEURO:  Alert, oriented, no asterixis, no tremor, no encephalopathy    LABS:                        13.7   14.46 )-----------( 215      ( 2021 04:47 )             40.2     07-    138  |  105  |  16  ----------------------------<  112<H>  3.8   |  19<L>  |  0.86    Ca    9.1      2021 05:35          Urinalysis Basic - ( 2021 12:36 )    Color: Yellow / Appearance: Clear / S.018 / pH: x  Gluc: x / Ketone: Small  / Bili: Negative / Urobili: 2 mg/dL   Blood: x / Protein: 30 mg/dL / Nitrite: Negative   Leuk Esterase: Negative / RBC: 4 /hpf / WBC 9 /HPF   Sq Epi: x / Non Sq Epi: 1 /hpf / Bacteria: Negative          Imaging:

## 2021-07-29 ENCOUNTER — TRANSCRIPTION ENCOUNTER (OUTPATIENT)
Age: 86
End: 2021-07-29

## 2021-07-29 DIAGNOSIS — R53.81 OTHER MALAISE: ICD-10-CM

## 2021-07-29 DIAGNOSIS — R13.10 DYSPHAGIA, UNSPECIFIED: ICD-10-CM

## 2021-07-29 DIAGNOSIS — Z51.5 ENCOUNTER FOR PALLIATIVE CARE: ICD-10-CM

## 2021-07-29 DIAGNOSIS — I63.9 CEREBRAL INFARCTION, UNSPECIFIED: ICD-10-CM

## 2021-07-29 LAB
ANION GAP SERPL CALC-SCNC: 15 MMOL/L — SIGNIFICANT CHANGE UP (ref 5–17)
BASOPHILS # BLD AUTO: 0.04 K/UL — SIGNIFICANT CHANGE UP (ref 0–0.2)
BASOPHILS NFR BLD AUTO: 0.2 % — SIGNIFICANT CHANGE UP (ref 0–2)
BUN SERPL-MCNC: 21 MG/DL — SIGNIFICANT CHANGE UP (ref 7–23)
CALCIUM SERPL-MCNC: 9.1 MG/DL — SIGNIFICANT CHANGE UP (ref 8.4–10.5)
CHLORIDE SERPL-SCNC: 104 MMOL/L — SIGNIFICANT CHANGE UP (ref 96–108)
CO2 SERPL-SCNC: 18 MMOL/L — LOW (ref 22–31)
CREAT SERPL-MCNC: 0.84 MG/DL — SIGNIFICANT CHANGE UP (ref 0.5–1.3)
CRP SERPL-MCNC: 115 MG/L — HIGH (ref 0–4)
CULTURE RESULTS: NO GROWTH — SIGNIFICANT CHANGE UP
EOSINOPHIL # BLD AUTO: 0.04 K/UL — SIGNIFICANT CHANGE UP (ref 0–0.5)
EOSINOPHIL NFR BLD AUTO: 0.2 % — SIGNIFICANT CHANGE UP (ref 0–6)
GLUCOSE BLDC GLUCOMTR-MCNC: 109 MG/DL — HIGH (ref 70–99)
GLUCOSE BLDC GLUCOMTR-MCNC: 117 MG/DL — HIGH (ref 70–99)
GLUCOSE BLDC GLUCOMTR-MCNC: 118 MG/DL — HIGH (ref 70–99)
GLUCOSE BLDC GLUCOMTR-MCNC: 120 MG/DL — HIGH (ref 70–99)
GLUCOSE BLDC GLUCOMTR-MCNC: 126 MG/DL — HIGH (ref 70–99)
GLUCOSE SERPL-MCNC: 121 MG/DL — HIGH (ref 70–99)
HCT VFR BLD CALC: 41.5 % — SIGNIFICANT CHANGE UP (ref 34.5–45)
HGB BLD-MCNC: 14 G/DL — SIGNIFICANT CHANGE UP (ref 11.5–15.5)
IMM GRANULOCYTES NFR BLD AUTO: 0.6 % — SIGNIFICANT CHANGE UP (ref 0–1.5)
LYMPHOCYTES # BLD AUTO: 13.3 % — SIGNIFICANT CHANGE UP (ref 13–44)
LYMPHOCYTES # BLD AUTO: 2.19 K/UL — SIGNIFICANT CHANGE UP (ref 1–3.3)
MCHC RBC-ENTMCNC: 31.2 PG — SIGNIFICANT CHANGE UP (ref 27–34)
MCHC RBC-ENTMCNC: 33.7 GM/DL — SIGNIFICANT CHANGE UP (ref 32–36)
MCV RBC AUTO: 92.4 FL — SIGNIFICANT CHANGE UP (ref 80–100)
MONOCYTES # BLD AUTO: 1.16 K/UL — HIGH (ref 0–0.9)
MONOCYTES NFR BLD AUTO: 7.1 % — SIGNIFICANT CHANGE UP (ref 2–14)
NEUTROPHILS # BLD AUTO: 12.9 K/UL — HIGH (ref 1.8–7.4)
NEUTROPHILS NFR BLD AUTO: 78.6 % — HIGH (ref 43–77)
NRBC # BLD: 0 /100 WBCS — SIGNIFICANT CHANGE UP (ref 0–0)
PLATELET # BLD AUTO: 205 K/UL — SIGNIFICANT CHANGE UP (ref 150–400)
POTASSIUM SERPL-MCNC: 3.6 MMOL/L — SIGNIFICANT CHANGE UP (ref 3.5–5.3)
POTASSIUM SERPL-SCNC: 3.6 MMOL/L — SIGNIFICANT CHANGE UP (ref 3.5–5.3)
PROCALCITONIN SERPL-MCNC: 0.06 NG/ML — SIGNIFICANT CHANGE UP (ref 0.02–0.1)
RBC # BLD: 4.49 M/UL — SIGNIFICANT CHANGE UP (ref 3.8–5.2)
RBC # FLD: 12.7 % — SIGNIFICANT CHANGE UP (ref 10.3–14.5)
SODIUM SERPL-SCNC: 137 MMOL/L — SIGNIFICANT CHANGE UP (ref 135–145)
SPECIMEN SOURCE: SIGNIFICANT CHANGE UP
WBC # BLD: 16.43 K/UL — HIGH (ref 3.8–10.5)
WBC # FLD AUTO: 16.43 K/UL — HIGH (ref 3.8–10.5)

## 2021-07-29 PROCEDURE — 99233 SBSQ HOSP IP/OBS HIGH 50: CPT

## 2021-07-29 PROCEDURE — 99497 ADVNCD CARE PLAN 30 MIN: CPT

## 2021-07-29 PROCEDURE — 71045 X-RAY EXAM CHEST 1 VIEW: CPT | Mod: 26

## 2021-07-29 RX ORDER — IPRATROPIUM/ALBUTEROL SULFATE 18-103MCG
3 AEROSOL WITH ADAPTER (GRAM) INHALATION ONCE
Refills: 0 | Status: COMPLETED | OUTPATIENT
Start: 2021-07-29 | End: 2021-07-29

## 2021-07-29 RX ORDER — MORPHINE SULFATE 50 MG/1
2 CAPSULE, EXTENDED RELEASE ORAL ONCE
Refills: 0 | Status: DISCONTINUED | OUTPATIENT
Start: 2021-07-29 | End: 2021-08-02

## 2021-07-29 RX ORDER — PIPERACILLIN AND TAZOBACTAM 4; .5 G/20ML; G/20ML
3.38 INJECTION, POWDER, LYOPHILIZED, FOR SOLUTION INTRAVENOUS ONCE
Refills: 0 | Status: COMPLETED | OUTPATIENT
Start: 2021-07-29 | End: 2021-07-29

## 2021-07-29 RX ORDER — PIPERACILLIN AND TAZOBACTAM 4; .5 G/20ML; G/20ML
3.38 INJECTION, POWDER, LYOPHILIZED, FOR SOLUTION INTRAVENOUS EVERY 8 HOURS
Refills: 0 | Status: DISCONTINUED | OUTPATIENT
Start: 2021-07-29 | End: 2021-08-03

## 2021-07-29 RX ORDER — ACETAMINOPHEN 500 MG
1000 TABLET ORAL ONCE
Refills: 0 | Status: COMPLETED | OUTPATIENT
Start: 2021-07-29 | End: 2021-07-30

## 2021-07-29 RX ORDER — ONDANSETRON 8 MG/1
4 TABLET, FILM COATED ORAL ONCE
Refills: 0 | Status: DISCONTINUED | OUTPATIENT
Start: 2021-07-29 | End: 2021-07-29

## 2021-07-29 RX ADMIN — PIPERACILLIN AND TAZOBACTAM 25 GRAM(S): 4; .5 INJECTION, POWDER, LYOPHILIZED, FOR SOLUTION INTRAVENOUS at 21:23

## 2021-07-29 RX ADMIN — Medication 3 MILLILITER(S): at 22:28

## 2021-07-29 RX ADMIN — SODIUM CHLORIDE 70 MILLILITER(S): 9 INJECTION INTRAMUSCULAR; INTRAVENOUS; SUBCUTANEOUS at 00:01

## 2021-07-29 RX ADMIN — PIPERACILLIN AND TAZOBACTAM 200 GRAM(S): 4; .5 INJECTION, POWDER, LYOPHILIZED, FOR SOLUTION INTRAVENOUS at 16:22

## 2021-07-29 RX ADMIN — SODIUM CHLORIDE 70 MILLILITER(S): 9 INJECTION INTRAMUSCULAR; INTRAVENOUS; SUBCUTANEOUS at 21:23

## 2021-07-29 RX ADMIN — Medication 300 MILLIGRAM(S): at 11:19

## 2021-07-29 RX ADMIN — Medication 1000 MILLIGRAM(S): at 00:31

## 2021-07-29 RX ADMIN — PANTOPRAZOLE SODIUM 40 MILLIGRAM(S): 20 TABLET, DELAYED RELEASE ORAL at 11:18

## 2021-07-29 RX ADMIN — Medication 400 MILLIGRAM(S): at 00:01

## 2021-07-29 NOTE — PROGRESS NOTE ADULT - ASSESSMENT
Patient is a 89 y/o Female with PMH afib not on AC, PPM, HTN, ovarian and colon ca, GERD presents to the ED as a transfer from [facility] for stroke.     Problem/Recommendation - 1:  Problem: Stroke. Recommendation: Care as per neurology   Angio noted   ASA/ Statin   protonix  BP control  speech and swallow   GI eval for PEG placement appreicated   palliative eval     worsening leukocytosis  mild low grade T  check procal level  UA Noted,   started on zosyn   CXR  high risk of aspiration  may need CT chest     Problem/Recommendation - 2:  ·  Problem: Atrial fibrillation.  Recommendation: rate control  Atenolol 25 oral daily.     Problem/Recommendation - 3:  ·  Problem: Hypertension.  Recommendation: resuem BP meds  monitor and adjust as tolerated.     Problem/Recommendation - 4:  ·  Problem: Colon cancer.     Problem/Recommendation - 5:  ·  Problem: GERD (gastroesophageal reflux disease).  Recommendation: PPI.     Problem/Recommendation - 6:  Problem: Prophylactic measure. Recommendation: DVT and gI PPX.

## 2021-07-29 NOTE — PROGRESS NOTE ADULT - ASSESSMENT
89 y/o F with PMH afib not on AC, PPM, HTN, ovarian and colon ca, GERD presents to the ED as a transfer from [facility] for dysarthria and R hemiparesis. NIHSS 8 on repeat exam. R hemiparesis, R facial droop, aphasia (not fluent, not intact to repetition), and dysarthria. 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     Impression: R hemiparesis, R facial droop, aphasia (not fluent, not intact to repetition), and dysarthria likely 2/2 L brain dysfunction 2/2 L M1 occlusion found on CTA likely 2/2 cardioembolic etiology (hx of afib)    NEURO: Patient is neurologically without acute change, overall improving in regards to degree of alertness and attempted interaction. Continue close monitoring for neurologic deterioration.   Goal for SBP of 100-180mmHg with overall goal of normotension. Patient on statin for LDL goal less than 70. MRI Brain would not change medical management as pt has hx of atrial fibrillation, hx ppm as well. Head CT as noted above. Physical therapy and OT recommend CHARLES.     ANTITHROMBOTIC THERAPY:  rectal. Will begin Eliquis 5mg BID in 2 weeks from stroke onset pending clinical and radiological stability after repeat CTH.     PULMONARY: CXR without acute findings, protecting airway, saturating well on room air     CARDIOVASCULAR: TTE is pending, continue cardiac monitoring. No events noted this far, hx PPM                               SBP goal: 100-180 mmhg     GASTROINTESTINAL:  dysphagia screen- failed 7/26. Re eval NPO, 7/27. Family deferred NGT at this time after discussing risks/benefits. will further d/w GI and palliative care for goals of care.      Diet: NPO     RENAL: BUN/Cr within normal limits, good urine output. maintain hydration as tolerated       Na Goal: Greater than 135     Kessler: no     HEMATOLOGY: H/H within normal limits, Platelets normal at 215, no active bleeding noted      DVT ppx: lovenox subq     ID: afebrile, leukocytosis-  fluctuating . no s/sx of infection. will continue to monitor     OTHER: condition and plan of care d/w patient's daughter and son, discussed risks/benefits/alternatives to goals of care including but not limited to NGT vs. comfort feeds vs. PEG, at this time they wish to further discuss with palliative care and GI prior to making a decision.     DISPOSITION: [facility]        CORE MEASURES:        Admission NIHSS: 16      TPA: [] YES [x] NO      LDL/HDL: 123/30     Depression Screen: o- unable to participate at this time      Statin Therapy: yes     Dysphagia Screen: [] PASS [x] FAIL     Smoking [] YES [x] NO      Afib [x] YES [] NO     Stroke Education [x] YES [] NO    Obtain screening lower extremity venous ultrasound in patients who meet 1 or more of the following criteria as patient is high risk for DVT/PE on admission:   [] History of DVT/PE  []Hypercoagulable states (Factor V Leiden, Cancer, OCP, etc. )  []Prolonged immobility (hemiplegia/hemiparesis/post operative or any other extended immobilization)  [] Transferred from outside facility (Rehab or Long term care)  [] Age </= to 50  89 y/o F with PMH afib not on AC, PPM, HTN, ovarian and colon ca, GERD presents to the ED as a transfer from [facility] for dysarthria and R hemiparesis. NIHSS 8 on repeat exam. R hemiparesis, R facial droop, aphasia (not fluent, not intact to repetition), and dysarthria. 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     Impression: R hemiparesis, R facial droop, aphasia (not fluent, not intact to repetition), and dysarthria likely 2/2 L brain dysfunction 2/2 L M1 occlusion found on CTA likely 2/2 cardioembolic etiology (hx of afib)    NEURO: Patient is neurologically without acute change, overall improving in regards to degree of alertness and attempted interaction. Continue close monitoring for neurologic deterioration.   Goal for SBP of 100-180mmHg with overall goal of normotension. Patient on statin for LDL goal less than 70. MRI Brain would not change medical management as pt has hx of atrial fibrillation, hx ppm as well. Head CT as noted above. Physical therapy and OT recommend CHARLES.     ANTITHROMBOTIC THERAPY:  rectal. Will begin Eliquis 5mg BID in 2 weeks from stroke onset pending clinical and radiological stability after repeat CTH.     PULMONARY: CXR without acute findings, protecting airway, saturating well on room air     CARDIOVASCULAR: TTE from 7/28 shows mild-moderate mitral regurgitation, mild aortic regurgitation, severe LV systolic dysfunction, normal RV size and function, mild tricuspid regurgitation, thickened pericardium with small pericardial effusion. Continue cardiac monitoring. No events noted this far, hx PPM                               SBP goal: 100-180 mmhg     GASTROINTESTINAL:  dysphagia screen- failed 7/26. Re eval NPO, 7/27. Family deferred NGT at this time after discussing risks/benefits. PEG scheduled to be placed today at 4 pm by GI.       Diet: NPO     RENAL: BUN/Cr within normal limits, good urine output. maintain hydration via IVF as tolerated       Na Goal: Greater than 135     Kessler: no     HEMATOLOGY: H/H within normal limits, Platelets normal at 215, no active bleeding noted      DVT ppx: lovenox subq     ID: Leukocytosis now uptrending. Febrile to 99.3 F overnight. No s/sx of infection, will continue to monitor     OTHER: condition and plan of care d/w patient's daughter and son, discussed risks/benefits/alternatives to goals of care including but not limited to NGT vs. comfort feeds vs. PEG, at this time they wish to further discuss with palliative care and GI prior to making a decision. Palliative meeting today at 2 pm     DISPOSITION: [facility]        CORE MEASURES:        Admission NIHSS: 16      TPA: [] YES [x] NO      LDL/HDL: 123/30     Depression Screen: o- unable to participate at this time      Statin Therapy: yes     Dysphagia Screen: [] PASS [x] FAIL     Smoking [] YES [x] NO      Afib [x] YES [] NO     Stroke Education [x] YES [] NO    Obtain screening lower extremity venous ultrasound in patients who meet 1 or more of the following criteria as patient is high risk for DVT/PE on admission:   [] History of DVT/PE  []Hypercoagulable states (Factor V Leiden, Cancer, OCP, etc. )  []Prolonged immobility (hemiplegia/hemiparesis/post operative or any other extended immobilization)  [] Transferred from outside facility (Rehab or Long term care)  [] Age </= to 50  89 y/o F with PMH afib not on AC, PPM, HTN, ovarian and colon ca, GERD presents to the ED as a transfer from [facility] for dysarthria and R hemiparesis. NIHSS 8 on repeat exam. R hemiparesis, R facial droop, aphasia (not fluent, not intact to repetition), and dysarthria. 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     Impression: R hemiparesis, R facial droop, aphasia (not fluent, not intact to repetition), and dysarthria likely 2/2 L brain dysfunction 2/2 L M1 occlusion found on CTA likely 2/2 cardioembolic etiology (hx of afib)    NEURO: Patient is neurologically without acute change, overall improving in regards to degree of alertness and attempted interaction. Continue close monitoring for neurologic deterioration.   Goal for SBP of 100-180mmHg with overall goal of normotension. Patient on statin for LDL goal less than 70. MRI Brain would not change medical management as pt has hx of atrial fibrillation, hx ppm as well. Head CT as noted above. Physical therapy and OT recommend CHARLES.     ANTITHROMBOTIC THERAPY:  rectal. Will begin Eliquis 5mg BID in 2 weeks from stroke onset pending clinical and radiological stability after repeat CTH.     PULMONARY: CXR without acute findings, protecting airway, saturating well on room air     CARDIOVASCULAR: TTE from 7/28 shows mild-moderate mitral regurgitation, mild aortic regurgitation, severe LV systolic dysfunction, normal RV size and function, mild tricuspid regurgitation, thickened pericardium with small pericardial effusion. Continue cardiac monitoring. No events noted this far, hx PPM                               SBP goal: 100-180 mmhg     GASTROINTESTINAL:  dysphagia screen- failed 7/26. Re eval NPO, 7/27. Family deferred NGT at this time after discussing risks/benefits. PEG scheduled to be placed today at 4 pm by GI.  9 bowel movements in past 24 hours, no noted unusual odor or current concern for c diff     Diet: NPO     RENAL: BUN/Cr within normal limits, good urine output. maintain hydration via IVF as tolerated       Na Goal: Greater than 135     Kessler: no     HEMATOLOGY: H/H within normal limits, Platelets normal at 215, no active bleeding noted      DVT ppx: lovenox subq     ID: Leukocytosis now uptrending. Febrile to 99.3 F overnight. No s/sx of infection, will continue to monitor     OTHER: condition and plan of care d/w patient's daughter and son, discussed risks/benefits/alternatives to goals of care including but not limited to NGT vs. comfort feeds vs. PEG, at this time they wish to further discuss with palliative care and GI prior to making a decision. Palliative meeting today at 2 pm     DISPOSITION: [facility]        CORE MEASURES:        Admission NIHSS: 16      TPA: [] YES [x] NO      LDL/HDL: 123/30     Depression Screen: o- unable to participate at this time      Statin Therapy: yes     Dysphagia Screen: [] PASS [x] FAIL     Smoking [] YES [x] NO      Afib [x] YES [] NO     Stroke Education [x] YES [] NO    Obtain screening lower extremity venous ultrasound in patients who meet 1 or more of the following criteria as patient is high risk for DVT/PE on admission:   [] History of DVT/PE  []Hypercoagulable states (Factor V Leiden, Cancer, OCP, etc. )  []Prolonged immobility (hemiplegia/hemiparesis/post operative or any other extended immobilization)  [] Transferred from outside facility (Rehab or Long term care)  [] Age </= to 50

## 2021-07-29 NOTE — DISCHARGE NOTE PROVIDER - CARE PROVIDER_API CALL
Demetri Ybarra (DO)  Neurology; Vascular Neurology  [address] [facility] [address]  [place] [address]  Phone: [phone]  Fax: [phone]  Follow Up Time: 1 month   Demetri Ybarra (DO)  Neurology; Vascular Neurology  [address] [facility] [address], [address]  [place] [address]  Phone: [phone]  Fax: [phone]  Follow Up Time: 1 month    Ifeanyi Stone (DO)  Cardiology; Internal Medicine  [address] [facility], [address]  [place] [address]  Phone: [phone]  Fax: [phone]  Follow Up Time: 1 month    Nicholas Mcconnell)  Medicine  [address] [place] [address]  Phone: [phone]  Fax: [phone]  Follow Up Time: 1 month

## 2021-07-29 NOTE — CONSULT NOTE ADULT - NSCONSULTADDITIONALINFOA_GEN_ALL_CORE
Palliative Medicine Service      The patient's symptoms are well controlled.  The patient's care plan has been delineated.  Thank you for the consult, feel free to reconsult when clinical needs arise      In the event of newly developing, evolving, or worsening symptoms, please contact the Palliative Medicine team via pager (if the patient is at [facility] #[phone] or if the patient is at [facility] #[phone]) The Geriatric and Palliative Medicine service has coverage 24 hours a day/ 7 days a week to provide medical recommendations regarding symptom management needs via telephone        Robert Gonsales MD   of Geriatric and Palliative Medicine  [facility]      Please page the following number for clinical matters between the hours of 9 am and 5 pm   from Monday through Friday : [phone]    After 5pm and on weekends, please see the contact information below:    In the event of newly developing, evolving, or worsening symptoms, please contact the Palliative Medicine team via pager (if the patient is at [facility] #[phone] or if the patient is at [facility] #[phone]) The Geriatric and Palliative Medicine service has coverage 24 hours a day/ 7 days a week to provide medical recommendations regarding symptom management needs via telephone

## 2021-07-29 NOTE — PROGRESS NOTE ADULT - SUBJECTIVE AND OBJECTIVE BOX
Chief Complaint:  Patient is a 89y old  Female who presents with a chief complaint of stroke transfer (2021 11:00)      Interval Events:   pt. more awake this AM  Allergies:  No Known Allergies        Home Medications:  aspirin 81 mg oral delayed release tablet: 1 tab(s) orally once a day (2020 11:10)  atenolol 25 mg oral tablet: 1 tab(s) orally once a day (2020 11:10)  gabapentin 100 mg oral capsule: 1 cap(s) orally 3 times a day (2020 11:10)  hydrocodocone chlopheniramine: 5 milliliter(s) orally once a day (at bedtime) (2020 11:01)  Multiple Vitamins oral tablet: 1 tab(s) orally once a day (2020 11:01)  omeprazole 20 mg oral delayed release capsule: 1 cap(s) orally once a day (2020 11:01)  Vitamin B12: 5 milliliter(s) orally once a day (2020 11:01)  zolpidem 5 mg oral tablet: 0.5 tab(s) orally once a day (at bedtime) (2020 11:01)        Hospital Medications:  albuterol/ipratropium for Nebulization. 3 milliLiter(s) Nebulizer once  aspirin Suppository 300 milliGRAM(s) Rectal daily  ATENolol  Tablet 25 milliGRAM(s) Oral daily  atorvastatin 80 milliGRAM(s) Oral at bedtime  enoxaparin Injectable 40 milliGRAM(s) SubCutaneous <User Schedule>  glucagon  Injectable 1 milliGRAM(s) IntraMuscular once  morphine  - Injectable 2 milliGRAM(s) IV Push once  multivitamin 1 Tablet(s) Oral daily  pantoprazole  Injectable 40 milliGRAM(s) IV Push daily  polyethylene glycol 3350 17 Gram(s) Oral daily  senna 1 Tablet(s) Oral daily  sodium chloride 0.9%. 1000 milliLiter(s) IV Continuous <Continuous>    MEDICATIONS  (PRN):  acetaminophen   Tablet .. 650 milliGRAM(s) Oral every 6 hours PRN Temp greater or equal to 38C (100.4F), Mild Pain (1 - 3)    ___________  Active diet:  Diet, NPO  ___________________        ROS  GI: [- ] Nausea, [- ] vomiting, [ -]abdominal pain    PHYSICAL EXAM:   Vital Signs:  Vital Signs Last 24 Hrs  T(C): 36.9 (2021 07:55), Max: 37.4 (2021 04:00)  T(F): 98.4 (2021 07:55), Max: 99.3 (2021 04:00)  HR: 83 (2021 12:00) (70 - 83)  BP: 168/41 (2021 12:00) (114/54 - 168/41)  BP(mean): 79 (2021 12:00) (72 - 88)  RR: 24 (2021 12:00) (13 - 29)  SpO2: 98% (2021 12:00) (93% - 99%)  Daily     Daily     GENERAL:  Appears stated age, well-groomed, well-nourished, no distress  HEENT:  NC/AT,  conjunctivae clear and pink, no thyromegaly, nodules, adenopathy, no JVD, sclera -anicteric  NECK: Supple, No masses  CHEST:  Full & symmetric excursion, no increased effort, breath sounds clear  HEART:  Regular rhythm, S1, S2, no murmur/rub/S3/S4, no abdominal bruit, no edema  ABDOMEN:  Soft, non-tender, non-distended, normoactive bowel sounds,  no masses ,no hepato-splenomegaly, no signs of chronic liver disease  EXTEREMITIES:  no cyanosis,clubbing or edema  SKIN:  No rash/erythema/ecchymoses/petechiae/wounds/abscess/warm/dry  LN: No lymphadenopathy, No masses  NEURO:  Alert, oriented, no asterixis, no tremor, no encephalopathy    LABS:                        14.0   16.43 )-----------( 205      ( 2021 05:39 )             41.5     -    137  |  104  |  21  ----------------------------<  121<H>  3.6   |  18<L>  |  0.84    Ca    9.1      2021 05:39          Urinalysis Basic - ( 2021 12:36 )    Color: Yellow / Appearance: Clear / S.018 / pH: x  Gluc: x / Ketone: Small  / Bili: Negative / Urobili: 2 mg/dL   Blood: x / Protein: 30 mg/dL / Nitrite: Negative   Leuk Esterase: Negative / RBC: 4 /hpf / WBC 9 /HPF   Sq Epi: x / Non Sq Epi: 1 /hpf / Bacteria: Negative          Imaging:

## 2021-07-29 NOTE — DISCHARGE NOTE PROVIDER - NSDCMRMEDTOKEN_GEN_ALL_CORE_FT
aspirin 81 mg oral delayed release tablet: 1 tab(s) orally once a day  atenolol 25 mg oral tablet: 1 tab(s) orally once a day  gabapentin 100 mg oral capsule: 1 cap(s) orally 3 times a day  hydrocodocone chlopheniramine: 5 milliliter(s) orally once a day (at bedtime)  Multiple Vitamins oral tablet: 1 tab(s) orally once a day  nystatin 100,000 units/g topical powder: 1 application topically every 12 hours  omeprazole 20 mg oral delayed release capsule: 1 cap(s) orally once a day  polyethylene glycol 3350 oral powder for reconstitution: 17 gram(s) orally once a day (at bedtime), As needed, Constipation  Vitamin B12: 5 milliliter(s) orally once a day  zolpidem 5 mg oral tablet: 0.5 tab(s) orally once a day (at bedtime)   apixaban 5 mg oral tablet: 1 tab(s) orally every 12 hours  atenolol 25 mg oral tablet: 1 tab(s) orally once a day  atorvastatin 80 mg oral tablet: 1 tab(s) orally once a day (at bedtime)  budesonide 0.5 mg/2 mL inhalation suspension: 2 milliliter(s) inhaled 2 times a day  furosemide 40 mg/5 mL oral solution: 5 milliliter(s) orally once a day  gabapentin 250 mg/5 mL oral solution: 2 milliliter(s) orally once a day (at bedtime)  ipratropium-albuterol 0.5 mg-2.5 mg/3 mL inhalation solution: 3 milliliter(s) inhaled every 6 hours  melatonin 3 mg oral tablet: 1 tab(s) orally once a day (at bedtime)  Multiple Vitamins oral tablet: 1 tab(s) orally once a day  pantoprazole 40 mg oral granule, delayed release: 40 milligram(s) by gastrostomy tube once a day  polyethylene glycol 3350 oral powder for reconstitution: 17 gram(s) orally once a day  senna oral tablet: 1 tab(s) orally once a day  zaleplon 5 mg oral capsule: 1 cap(s) orally once a day (at bedtime), As needed, Insomnia

## 2021-07-29 NOTE — DISCHARGE NOTE PROVIDER - HOSPITAL COURSE
89 y/o F with PMH afib not on AC, PPM, HTN, ovarian and colon ca, GERD presents to the ED as a transfer from [facility] for stroke. Per son, pt woke up at 3:30am on 7/25 and went to the bathroom, no issues with gait or speech at that time, accompanied by son. At 7:30 am this morning, pt's son found her half off the bed and unable to speak or walk. Pt was found to have L M1 occlusion on CTA, CTP w/ no core infarct and penumbra 81cc, and transferred for IR thrombectomy. Exam at [facility] notable for severe dysarthria and R hemiparesis. Pt did not receive tpa. NIHSS initially 16 on arrival to [facility] ED, NIHSS 8 on repeat exam. At baseline, son reports that pt is able to do own ADLs (showers, reads) but has an aid to make sure she does not fall, walks with a walker without assistance, speech is fluent.    CT Head No Cont (07.26.21)   Acute left MCA territory infarct without evidence of hemorrhagic transformation which has progressed since 7/25/2021.    IR Neuro (07.27.21)  Diagnostic cerebral angiogram demonstrating complete occlusion of the distal M1 segment of the left MCA. Attempted mechanical thrombectomy was unsuccessful due to severe tortuosity of the aortic arch and left common carotid artery.     TTE with Doppler (w/Cont) (07.28.21)   1. Tethered mitral valve leaflets with normal opening.  Mild-moderate mitral regurgitation.  2. Calcified trileaflet aortic valve with normal opening.  Mild aortic regurgitation.  3. Endocardial visualization enhanced with intravenous  injection of Ultrasonic Enhancing Agent (Definity).  Severe  global left ventricular systolic dysfunction. No left  ventricular thrombus.  4. Normal right ventricular size and function. A device  wire is noted in the right heart.  5. Normal tricuspid valve. Mild tricuspid regurgitation.  6. Agitated saline injection and color flow Doppler  demonstrates no evidence of a patent foramen ovale.  7. Thickened pericardium with small pericardial effusion.  There is a 1cm organized pericardial effusion/pericardial  thrombus adherent to the right ventricle.   There is no  significant inflow variation measured across the mitral or  tricuspid valves.  No evidence of right atrial or right  ventricular collapse.    Impression: R hemiparesis, R facial droop, aphasia (not fluent, not intact to repetition), and dysarthria likely 2/2 L brain dysfunction 2/2 L M1 occlusion found on CTA likely 2/2 cardioembolic etiology (hx of afib) 89 y/o F with PMH afib not on AC, PPM, HTN, ovarian and colon ca, GERD presents to the ED as a transfer from [facility] for stroke. Per son, pt woke up at 3:30am on 7/25 and went to the bathroom, no issues with gait or speech at that time, accompanied by son. At 7:30 am this morning, pt's son found her half off the bed and unable to speak or walk. Pt was found to have L M1 occlusion on CTA, CTP w/ no core infarct and penumbra 81cc, and transferred for IR thrombectomy. Exam at [facility] notable for severe dysarthria and R hemiparesis. Pt did not receive tpa. NIHSS initially 16 on arrival to [facility] ED, NIHSS 8 on repeat exam. At baseline, son reports that pt is able to do own ADLs (showers, reads) but has an aid to make sure she does not fall, walks with a walker without assistance, speech is fluent.    CT Head No Cont (07.26.21)   Acute left MCA territory infarct without evidence of hemorrhagic transformation which has progressed since 7/25/2021.    IR Neuro (07.27.21)  Diagnostic cerebral angiogram demonstrating complete occlusion of the distal M1 segment of the left MCA. Attempted mechanical thrombectomy was unsuccessful due to severe tortuosity of the aortic arch and left common carotid artery.     TTE with Doppler (w/Cont) (07.28.21)   1. Tethered mitral valve leaflets with normal opening.  Mild-moderate mitral regurgitation.  2. Calcified trileaflet aortic valve with normal opening.  Mild aortic regurgitation.  3. Endocardial visualization enhanced with intravenous  injection of Ultrasonic Enhancing Agent (Definity).  Severe  global left ventricular systolic dysfunction. No left  ventricular thrombus.  4. Normal right ventricular size and function. A device  wire is noted in the right heart.  5. Normal tricuspid valve. Mild tricuspid regurgitation.  6. Agitated saline injection and color flow Doppler  demonstrates no evidence of a patent foramen ovale.  7. Thickened pericardium with small pericardial effusion.  There is a 1cm organized pericardial effusion/pericardial  thrombus adherent to the right ventricle.   There is no  significant inflow variation measured across the mitral or  tricuspid valves.  No evidence of right atrial or right  ventricular collapse.    Impression: Left MCA infarction, Left M1 occlusion found on CTA likely secondary to cardioembolic etiology (hx of afib), post unsuccessful recanalization.     Failed S & S, PEG placed by IR on 07/30/21    Evaluated by PT/OT and was recommended CHARLES. Patient stable for discharge. 89 y/o F with PMH afib not on AC, PPM, HTN, ovarian and colon ca, GERD presents to the ED as a transfer from [facility] for stroke. Per son, pt woke up at 3:30am on 7/25 and went to the bathroom, no issues with gait or speech at that time, accompanied by son. At 7:30 am this morning, pt's son found her half off the bed and unable to speak or walk. Pt was found to have L M1 occlusion on CTA, CTP w/ no core infarct and penumbra 81cc, and transferred for IR thrombectomy. Exam at [facility] notable for severe dysarthria and R hemiparesis. Pt did not receive tpa. NIHSS initially 16 on arrival to [facility] ED, NIHSS 8 on repeat exam. At baseline, son reports that pt is able to do own ADLs (showers, reads) but has an aid to make sure she does not fall, walks with a walker without assistance, speech is fluent.    CT chest (08/02/21) Small bilateral pleural effusions with adjacent areas of atelectasis.Cardiomegaly. Interlobular septal thickening is suggestive of pulmonary edema given the other findings.Pneumoperitoneum, likely from recent PEG tube placement.    CT Head No Cont (08.01.21) Continued evolution of left insular and posterior frontotemporal stroke. No areas of intraparenchymal hemorrhage.    CT Head No Cont (07.26.21)   Acute left MCA territory infarct without evidence of hemorrhagic transformation which has progressed since 7/25/2021.    IR Neuro (07.27.21)  Diagnostic cerebral angiogram demonstrating complete occlusion of the distal M1 segment of the left MCA. Attempted mechanical thrombectomy was unsuccessful due to severe tortuosity of the aortic arch and left common carotid artery.     TTE with Doppler (w/Cont) (07.28.21)   1. Tethered mitral valve leaflets with normal opening.  Mild-moderate mitral regurgitation.  2. Calcified trileaflet aortic valve with normal opening.  Mild aortic regurgitation.  3. Endocardial visualization enhanced with intravenous  injection of Ultrasonic Enhancing Agent (Definity).  Severe  global left ventricular systolic dysfunction. No left  ventricular thrombus.  4. Normal right ventricular size and function. A device  wire is noted in the right heart.  5. Normal tricuspid valve. Mild tricuspid regurgitation.  6. Agitated saline injection and color flow Doppler  demonstrates no evidence of a patent foramen ovale.  7. Thickened pericardium with small pericardial effusion.  There is a 1cm organized pericardial effusion/pericardial  thrombus adherent to the right ventricle.   There is no  significant inflow variation measured across the mitral or  tricuspid valves.  No evidence of right atrial or right  ventricular collapse.    Impression: Left MCA infarction, Left M1 occlusion likely secondary to cardioembolic etiology (hx of afib), post unsuccessful recanalization.   Started on Eliquis 5 mg BID on 08/09 after repeat CT Head.    Patient has PPM placed in 2019. EP interrogated and found multiple episodes of atrial fibrillation and flutter.    Failed S & S, PEG placed by IR on 07/30/21, tolerating feeds.    Evaluated by PT/OT and was recommended CHARLES. Patient stable for discharge.

## 2021-07-29 NOTE — PROGRESS NOTE ADULT - ASSESSMENT
89 y/o F with PMH afib not on AC, PPM, HTN, ovarian and colon ca, GERD presents to the ED as a transfer from [facility] for stroke. Per son, pt woke up at 3:30am and went to the bathroom, no issues with gait or speech at that time, accompanied by son. At 7:30 am this morning, pt's son found her half off the bed and unable to speak or walk. Pt was found to have L M1 occlusion on CTA, CTP w/ no core infarct and penumbra 81cc, and transferred for IR thrombectomy. Exam at [facility] notable for severe dysarthria and R hemiparesis. Pt did not receive tpa. NIHSS initially 16 on arrival to [facility] ED, NIHSS 8 on repeat exam. At baseline, son reports that pt is able to do own ADLs (showers, reads) but has an aid to make sure she does not fall, walks with a walker without assistance, speech is fluent.  Pt. s/p 2 failed S&S and family approached for PEG.    Pt. will benefit from a PEG for long term nutritional support if family consents  Cont. IVF  May cont. ASA  I had a long conversation with both son and dtr. and they both consent to a PEG placement,   but they wish to meet with PCU team.  Concerned about rising WBC  Will continue to trend CBC  Pt. scheduled for PEG in AM

## 2021-07-29 NOTE — PROGRESS NOTE ADULT - SUBJECTIVE AND OBJECTIVE BOX
THE PATIENT WAS SEEN AND EXAMINED BY ME WITH THE HOUSESTAFF AND STROKE TEAM DURING MORNING ROUNDS.   HPI:  89 y/o F with PMH afib not on AC, PPM, HTN, ovarian and colon ca, GERD presents to the ED as a transfer from [facility] for stroke. Per son, pt woke up at 3:30am on 7/25 and went to the bathroom, no issues with gait or speech at that time, accompanied by son. At 7:30 am this morning, pt's son found her half off the bed and unable to speak or walk. Pt was found to have L M1 occlusion on CTA, CTP w/ no core infarct and penumbra 81cc, and transferred for IR thrombectomy. Exam at [facility] notable for severe dysarthria and R hemiparesis. Pt did not receive tpa. NIHSS initially 16 on arrival to [facility] ED, NIHSS 8 on repeat exam. At baseline, son reports that pt is able to do own ADLs (showers, reads) but has an aid to make sure she does not fall, walks with a walker without assistance, speech is fluent.      SUBJECTIVE: No events overnight.  No new neurologic complaints.      acetaminophen   Tablet .. 650 milliGRAM(s) Oral every 6 hours PRN  albuterol/ipratropium for Nebulization. 3 milliLiter(s) Nebulizer once  aspirin Suppository 300 milliGRAM(s) Rectal daily  ATENolol  Tablet 25 milliGRAM(s) Oral daily  atorvastatin 80 milliGRAM(s) Oral at bedtime  enoxaparin Injectable 40 milliGRAM(s) SubCutaneous <User Schedule>  glucagon  Injectable 1 milliGRAM(s) IntraMuscular once  morphine  - Injectable 2 milliGRAM(s) IV Push once  multivitamin 1 Tablet(s) Oral daily  pantoprazole  Injectable 40 milliGRAM(s) IV Push daily  polyethylene glycol 3350 17 Gram(s) Oral daily  senna 1 Tablet(s) Oral daily  sodium chloride 0.9%. 1000 milliLiter(s) IV Continuous <Continuous>      PHYSICAL EXAM:   Vital Signs Last 24 Hrs  T(C): 37.4 (29 Jul 2021 04:00), Max: 37.4 (29 Jul 2021 04:00)  T(F): 99.3 (29 Jul 2021 04:00), Max: 99.3 (29 Jul 2021 04:00)  HR: 70 (29 Jul 2021 06:00) (70 - 85)  BP: 134/51 (29 Jul 2021 06:00) (114/54 - 158/53)  BP(mean): 75 (29 Jul 2021 06:00) (60 - 88)  RR: 19 (29 Jul 2021 06:00) (17 - 29)  SpO2: 96% (29 Jul 2021 06:00) (93% - 99%)    General: No acute distress  HEENT: EOM intact, visual fields full  Abdomen: Soft, nontender, nondistended   Extremities: No edema    NEUROLOGICAL EXAM:  Mental status: Awake, alert, does gesture facial expressions relatively in context to situation (smiles appropriately), not following commands, no verbal output   Cranial Nerves: R facial droop, no nystagmus, tongue midline. Blink to threat b/l.   Motor exam: right hemiplegia with trace flexion in arm and triple flexion in leg, left side moves well against gravity but inattentive and with drift   Sensation: decreased on right   Coordination/ Gait: gait not assessed       LABS:                        14.0   16.43 )-----------( 205      ( 29 Jul 2021 05:39 )             41.5    07-29    137  |  104  |  21  ----------------------------<  121<H>  3.6   |  18<L>  |  0.84    Ca    9.1      29 Jul 2021 05:39          IMAGING: Reviewed by me.   CT Head No Cont (07.26.21)   Acute left MCA territory infarct without evidence of hemorrhagic transformation which has progressed since 7/25/2021.    IR Neuro (07.27.21)  Diagnostic cerebral angiogram demonstrating complete occlusion of the distal M1 segment of the left MCA. Attempted mechanical thrombectomy was unsuccessful due to severe tortuosity of the aortic arch and left commoncarotid artery.   THE PATIENT WAS SEEN AND EXAMINED BY ME WITH THE HOUSESTAFF AND STROKE TEAM DURING MORNING ROUNDS.   HPI:  89 y/o F with PMH afib not on AC, PPM, HTN, ovarian and colon ca, GERD presents to the ED as a transfer from [facility] for stroke. Per son, pt woke up at 3:30am on 7/25 and went to the bathroom, no issues with gait or speech at that time, accompanied by son. At 7:30 am this morning, pt's son found her half off the bed and unable to speak or walk. Pt was found to have L M1 occlusion on CTA, CTP w/ no core infarct and penumbra 81cc, and transferred for IR thrombectomy. Exam at [facility] notable for severe dysarthria and R hemiparesis. Pt did not receive tpa. NIHSS initially 16 on arrival to [facility] ED, NIHSS 8 on repeat exam. At baseline, son reports that pt is able to do own ADLs (showers, reads) but has an aid to make sure she does not fall, walks with a walker without assistance, speech is fluent.      SUBJECTIVE: No events overnight.  No new neurologic complaints.      MEDICATIONS  (STANDING):  albuterol/ipratropium for Nebulization. 3 milliLiter(s) Nebulizer once  aspirin Suppository 300 milliGRAM(s) Rectal daily  ATENolol  Tablet 25 milliGRAM(s) Oral daily  atorvastatin 80 milliGRAM(s) Oral at bedtime  enoxaparin Injectable 40 milliGRAM(s) SubCutaneous <User Schedule>  glucagon  Injectable 1 milliGRAM(s) IntraMuscular once  morphine  - Injectable 2 milliGRAM(s) IV Push once  multivitamin 1 Tablet(s) Oral daily  pantoprazole  Injectable 40 milliGRAM(s) IV Push daily  polyethylene glycol 3350 17 Gram(s) Oral daily  senna 1 Tablet(s) Oral daily  sodium chloride 0.9%. 1000 milliLiter(s) (70 mL/Hr) IV Continuous <Continuous>    MEDICATIONS  (PRN):  acetaminophen   Tablet .. 650 milliGRAM(s) Oral every 6 hours PRN Temp greater or equal to 38C (100.4F), Mild Pain (1 - 3)    PHYSICAL EXAM:   Vital Signs Last 24 Hrs  T(C): 37.4 (29 Jul 2021 04:00), Max: 37.4 (29 Jul 2021 04:00)  T(F): 99.3 (29 Jul 2021 04:00), Max: 99.3 (29 Jul 2021 04:00)  HR: 70 (29 Jul 2021 06:00) (70 - 85)  BP: 134/51 (29 Jul 2021 06:00) (114/54 - 158/53)  BP(mean): 75 (29 Jul 2021 06:00) (60 - 88)  RR: 19 (29 Jul 2021 06:00) (17 - 29)  SpO2: 96% (29 Jul 2021 06:00) (93% - 99%)    General: No acute distress  HEENT: EOM intact, visual fields full  Abdomen: Soft, nontender, nondistended   Extremities: No edema    NEUROLOGICAL EXAM:  Mental status: Awake, alert, does gesture facial expressions relatively in context to situation (smiles appropriately), not following commands. Can mimic physical actions. No verbal output   Cranial Nerves: R facial droop, no nystagmus, tongue midline. Blink to threat b/l.   Motor exam: right hemiplegia with trace flexion in arm and triple flexion in leg, left side moves well against gravity but inattentive and with drift   Sensation: decreased on right   Coordination/ Gait: gait not assessed       LABS:                        14.0   16.43 )-----------( 205      ( 29 Jul 2021 05:39 )             41.5    07-29    137  |  104  |  21  ----------------------------<  121<H>  3.6   |  18<L>  |  0.84    Ca    9.1      29 Jul 2021 05:39        IMAGING: Reviewed by me.   CT Head No Cont (07.26.21)   Acute left MCA territory infarct without evidence of hemorrhagic transformation which has progressed since 7/25/2021.    IR Neuro (07.27.21)  Diagnostic cerebral angiogram demonstrating complete occlusion of the distal M1 segment of the left MCA. Attempted mechanical thrombectomy was unsuccessful due to severe tortuosity of the aortic arch and left common carotid artery.     TTE with Doppler (w/Cont) (07.28.21)   1. Tethered mitral valve leaflets with normal opening.  Mild-moderate mitral regurgitation.  2. Calcified trileaflet aortic valve with normal opening.  Mild aortic regurgitation.  3. Endocardial visualization enhanced with intravenous  injection of Ultrasonic Enhancing Agent (Definity).  Severe  global left ventricular systolic dysfunction. No left  ventricular thrombus.  4. Normal right ventricular size and function. A device  wire is noted in the right heart.  5. Normal tricuspid valve. Mild tricuspid regurgitation.  6. Agitated saline injection and color flow Doppler  demonstrates no evidence of a patent foramen ovale.  7. Thickened pericardium with small pericardial effusion.  There is a 1cm organized pericardial effusion/pericardial  thrombus adherent to the right ventricle.   There is no  significant inflow variation measured across the mitral or  tricuspid valves.  No evidence of right atrial or right  ventricular collapse.

## 2021-07-29 NOTE — CONSULT NOTE ADULT - SUBJECTIVE AND OBJECTIVE BOX
HPI:  89 y/o F with PMH afib not on AC, PPM, HTN, ovarian and colon ca, GERD presents to the ED as a transfer from [facility] for stroke. Per son, pt woke up at 3:30am and went to the bathroom, no issues with gait or speech at that time, accompanied by son. At 7:30 am this morning, pt's son found her half off the bed and unable to speak or walk. Pt was found to have L M1 occlusion on CTA, CTP w/ no core infarct and penumbra 81cc, and transferred for IR thrombectomy. Exam at [facility] notable for severe dysarthria and R hemiparesis. Pt did not receive tpa. NIHSS initially 16 on arrival to [facility] ED, NIHSS 8 on repeat exam. At baseline, son reports that pt is able to do own ADLs (showers, reads) but has an aid to make sure she does not fall, walks with a walker without assistance, speech is fluent. (2021 18:03)    PERTINENT PM/SXH:   Hypertension    Cancer    GERD (gastroesophageal reflux disease)    Anxiety    Ovarian cancer    Colon cancer      H/O small bowel obstruction    H/O total hysterectomy    No significant past surgical history      FAMILY HISTORY:  No pertinent family history in first degree relatives    No pertinent family history in first degree relatives      ITEMS NOT CHECKED ARE NOT PRESENT    SOCIAL HISTORY:   Significant other/partner[ ]  Children[x ]  Jehovah's witness/Spirituality:  Substance hx:  [ ]   Tobacco hx:  [ ]   Alcohol hx: [ ]   Home Opioid hx:  [ ] I-Stop Reference No:  Living Situation: [ x]Home  [ ]Long term care  [ ]Rehab [ ]Other    ADVANCE DIRECTIVES:    DNR  MOLST  [ ]  Living Will  [ ]   DECISION MAKER(s):  [ ] Health Care Proxy(s)  [x ] Surrogate(s)  [ ] Guardian           Name(s): Phone Number(s):    BASELINE (I)ADL(s) (prior to admission):  [place]: [ ]Total  [ ] Moderate [ ]Dependent    Allergies    No Known Allergies    Intolerances    MEDICATIONS  (STANDING):  albuterol/ipratropium for Nebulization. 3 milliLiter(s) Nebulizer once  aspirin Suppository 300 milliGRAM(s) Rectal daily  ATENolol  Tablet 25 milliGRAM(s) Oral daily  atorvastatin 80 milliGRAM(s) Oral at bedtime  enoxaparin Injectable 40 milliGRAM(s) SubCutaneous <User Schedule>  glucagon  Injectable 1 milliGRAM(s) IntraMuscular once  morphine  - Injectable 2 milliGRAM(s) IV Push once  multivitamin 1 Tablet(s) Oral daily  pantoprazole  Injectable 40 milliGRAM(s) IV Push daily  polyethylene glycol 3350 17 Gram(s) Oral daily  senna 1 Tablet(s) Oral daily  sodium chloride 0.9%. 1000 milliLiter(s) (70 mL/Hr) IV Continuous <Continuous>    MEDICATIONS  (PRN):  acetaminophen   Tablet .. 650 milliGRAM(s) Oral every 6 hours PRN Temp greater or equal to 38C (100.4F), Mild Pain (1 - 3)    PRESENT SYMPTOMS: [ ]Unable to obtain due to poor mentation   Source if other than patient:  [ ]Family   [ ]Team     Pain: [ ]yes [ ]no  QOL impact -   Location -                    Aggravating factors -  Quality -  Radiation -  Timing-  Severity (0-10 scale):  Minimal acceptable level (0-10 scale):     PAIN AD Score:     [email or web address] (press ctrl +  left click to view)    Dyspnea:                           [ ]Mild [ ]Moderate [ ]Severe  Anxiety:                             [ ]Mild [ ]Moderate [ ]Severe  Fatigue:                             [ ]Mild [ ]Moderate [ ]Severe  Nausea:                             [ ]Mild [ ]Moderate [ ]Severe  Loss of appetite:              [ ]Mild [ ]Moderate [ ]Severe  Constipation:                    [ ]Mild [ ]Moderate [ ]Severe    Other Symptoms:  [ ]All other review of systems negative     Palliative Performance Status Version 2:      40   %    http://npcrc.org/files/news/palliative_performance_scale_ppsv2.pdf  PHYSICAL EXAM:  Vital Signs Last 24 Hrs  T(C): 36.9 (2021 07:55), Max: 37.4 (2021 04:00)  T(F): 98.4 (2021 07:55), Max: 99.3 (2021 04:00)  HR: 70 (2021 06:00) (70 - 74)  BP: 134/51 (2021 06:00) (114/54 - 158/53)  BP(mean): 75 (2021 06:00) (72 - 88)  RR: 19 (2021 06:00) (17 - 29)  SpO2: 96% (2021 06:00) (93% - 99%) I&O's Summary    2021 07:01  -  2021 07:00  --------------------------------------------------------  IN: 910 mL / OUT: 300 mL / NET: 610 mL      GENERAL:  [x ]Alert  [x ]Oriented x  3  [ ]Lethargic  [ ]Cachexia  [ ]Unarousable  [ ]Verbal  [ ]Non-Verbal  Behavioral:   [ ] Anxiety  [ ] Delirium [ ] Agitation [x ] Other Calm  HEENT:  [x ]Normal   [ ]Dry mouth   [ ]ET Tube/Trach  [ ]Oral lesions  PULMONARY:   [x ]Clear [ ]Tachypnea  [ ]Audible excessive secretions   [ ]Rhonchi        [ ]Right [ ]Left [ ]Bilateral  [ ]Crackles        [ ]Right [ ]Left [ ]Bilateral  [ ]Wheezing     [ ]Right [ ]Left [ ]Bilatera  [ ]Diminished breath sounds [ ]right [ ]left [ ]bilateral  CARDIOVASCULAR:    [ x]Regular [ ]Irregular [ ]Tachy  [ ]Cristi [ ]Murmur [ ]Other  GASTROINTESTINAL:  [ x]Soft  [ ]Distended   [ ]+BS  [x ]Non tender [ ]Tender  [ ]PEG [ ]OGT/ NGT  Last BM:   GENITOURINARY:  [ x]Normal [ ] Incontinent   [ ]Oliguria/Anuria   [ ]Kessler  MUSCULOSKELETAL:   [x ]Normal   [ ]Weakness  [ ]Bed/Wheelchair bound [ ]Edema  NEUROLOGIC:   [ x]No focal deficits  [ ]Cognitive impairment  [ ]Dysphagia [ ]Dysarthria [ ]Paresis [ ]Other   SKIN:   [x ]Normal    [ ]Rash  [ ]Pressure ulcer(s)       Present on admission [ ]y [ ]n    CRITICAL CARE:  [ ] Shock Present  [ ]Septic [ ]Cardiogenic [ ]Neurologic [ ]Hypovolemic  [ ]  Vasopressors [ ]  Inotropes   [ ]Respiratory failure present [ ]Mechanical ventilation [ ]Non-invasive ventilatory support [ ]High flow  [ ]Acute  [ ]Chronic [ ]Hypoxic  [ ]Hypercarbic [ ]Other  [ ]Other organ failure     LABS:                        14.0   16.43 )-----------( 205      ( 2021 05:39 )             41.5       137  |  104  |  21  ----------------------------<  121<H>  3.6   |  18<L>  |  0.84    Ca    9.1      2021 05:39        Urinalysis Basic - ( 2021 12:36 )    Color: Yellow / Appearance: Clear / S.018 / pH: x  Gluc: x / Ketone: Small  / Bili: Negative / Urobili: 2 mg/dL   Blood: x / Protein: 30 mg/dL / Nitrite: Negative   Leuk Esterase: Negative / RBC: 4 /hpf / WBC 9 /HPF   Sq Epi: x / Non Sq Epi: 1 /hpf / Bacteria: Negative      RADIOLOGY & ADDITIONAL STUDIES:    PROTEIN CALORIE MALNUTRITION PRESENT: [ ]mild [ ]moderate [ ]severe [ ]underweight [ ]morbid obesity  https://www.andeal.org/vault/2440/web/files/ONC/Table_Clinical%20Characteristics%20to%20Document%20Malnutrition-White%20JV%20et%20al%2020.pdf    Height (cm): 162.6 (21 @ 07:25)  Weight (kg): 83.1 (21 @ 10:17)  BMI (kg/m2): 31.4 (21 @ 07:25)    [ ]PPSV2 < or = to 30% [ ]significant weight loss  [ ]poor nutritional intake  [ ]anasarca      [ ]Artificial Nutrition      REFERRALS:   [ ]Chaplaincy  [ ]Hospice  [ ]Child Life  [ ]Social Work  [ ]Case management [ ]Holistic Therapy     Goals of Care Document:  HPI:  89 y/o F with PMH afib not on AC, PPM, HTN, ovarian and colon ca, GERD presents to the ED as a transfer from [facility] for stroke. Per son, pt woke up at 3:30am and went to the bathroom, no issues with gait or speech at that time, accompanied by son. At 7:30 am this morning, pt's son found her half off the bed and unable to speak or walk. Pt was found to have L M1 occlusion on CTA, CTP w/ no core infarct and penumbra 81cc, and transferred for IR thrombectomy. Exam at [facility] notable for severe dysarthria and R hemiparesis. Pt did not receive tpa. NIHSS initially 16 on arrival to [facility] ED, NIHSS 8 on repeat exam. At baseline, son reports that pt is able to do own ADLs (showers, reads) but has an aid to make sure she does not fall, walks with a walker without assistance, speech is fluent. (2021 18:03)    PERTINENT PM/SXH:   Hypertension    Cancer    GERD (gastroesophageal reflux disease)    Anxiety    Ovarian cancer    Colon cancer      H/O small bowel obstruction    H/O total hysterectomy    No significant past surgical history      FAMILY HISTORY:  No pertinent family history in first degree relatives    No pertinent family history in first degree relatives      ITEMS NOT CHECKED ARE NOT PRESENT    SOCIAL HISTORY:   Significant other/partner[ ]  Children[x ]  Religious/Spirituality:  Substance hx:  [ ]   Tobacco hx:  [ ]   Alcohol hx: [ ]   Home Opioid hx:  [ ] I-Stop Reference No:  Living Situation: [ x]Home  [ ]Long term care  [ ]Rehab [ ]Other    ADVANCE DIRECTIVES:    DNR  MOLST  [ ]  Living Will  [ ]   DECISION MAKER(s):  [ ] Health Care Proxy(s)  [x ] Surrogate(s)  [ ] Guardian           Name(s): Phone Number(s):    BASELINE (I)ADL(s) (prior to admission):  [place]: [ ]Total  [ ] Moderate [ ]Dependent    Allergies    No Known Allergies    Intolerances    MEDICATIONS  (STANDING):  albuterol/ipratropium for Nebulization. 3 milliLiter(s) Nebulizer once  aspirin Suppository 300 milliGRAM(s) Rectal daily  ATENolol  Tablet 25 milliGRAM(s) Oral daily  atorvastatin 80 milliGRAM(s) Oral at bedtime  enoxaparin Injectable 40 milliGRAM(s) SubCutaneous <User Schedule>  glucagon  Injectable 1 milliGRAM(s) IntraMuscular once  morphine  - Injectable 2 milliGRAM(s) IV Push once  multivitamin 1 Tablet(s) Oral daily  pantoprazole  Injectable 40 milliGRAM(s) IV Push daily  polyethylene glycol 3350 17 Gram(s) Oral daily  senna 1 Tablet(s) Oral daily  sodium chloride 0.9%. 1000 milliLiter(s) (70 mL/Hr) IV Continuous <Continuous>    MEDICATIONS  (PRN):  acetaminophen   Tablet .. 650 milliGRAM(s) Oral every 6 hours PRN Temp greater or equal to 38C (100.4F), Mild Pain (1 - 3)    PRESENT SYMPTOMS: [x ]Unable to obtain due to aphasia  Source if other than patient:  [ ]Family   [ ]Team     Pain: [ ]yes [ ]no  QOL impact -   Location -                    Aggravating factors -  Quality -  Radiation -  Timing-  Severity (0-10 scale):  Minimal acceptable level (0-10 scale):     PAIN AD Score: 0    [email or web address] (press ctrl +  left click to view)    Dyspnea:                           [ ]Mild [ ]Moderate [ ]Severe  Anxiety:                             [ ]Mild [ ]Moderate [ ]Severe  Fatigue:                             [ ]Mild [ ]Moderate [ ]Severe  Nausea:                             [ ]Mild [ ]Moderate [ ]Severe  Loss of appetite:              [ ]Mild [ ]Moderate [ ]Severe  Constipation:                    [ ]Mild [ ]Moderate [ ]Severe    Other Symptoms:  [x ]All other review of systems negative     Palliative Performance Status Version 2:      60  %    http://[facility].org/files/news/palliative_performance_scale_ppsv2.pdf  PHYSICAL EXAM:  Vital Signs Last 24 Hrs  T(C): 36.9 (2021 07:55), Max: 37.4 (2021 04:00)  T(F): 98.4 (2021 07:55), Max: 99.3 (2021 04:00)  HR: 70 (2021 06:00) (70 - 74)  BP: 134/51 (2021 06:00) (114/54 - 158/53)  BP(mean): 75 (2021 06:00) (72 - 88)  RR: 19 (2021 06:00) (17 - 29)  SpO2: 96% (2021 06:00) (93% - 99%) I&O's Summary    2021 07:01  -  2021 07:00  --------------------------------------------------------  IN: 910 mL / OUT: 300 mL / NET: 610 mL      GENERAL:  [x ]Alert  [  ]Oriented  [ ]Lethargic  [ ]Cachexia  [ ]Unarousable  [ ]Verbal  [ ]Non-Verbal  Behavioral:   [ ] Anxiety  [ ] Delirium [ ] Agitation [x ] Other Calm  HEENT:  [  ]Normal   [x]Dry mouth   [ ]ET Tube/Trach  [ ]Oral lesions  PULMONARY:   [x ]Clear [ ]Tachypnea  [ ]Audible excessive secretions   [ ]Rhonchi        [ ]Right [ ]Left [ ]Bilateral  [ ]Crackles        [ ]Right [ ]Left [ ]Bilateral  [ ]Wheezing     [ ]Right [ ]Left [ ]Bilatera  [ ]Diminished breath sounds [ ]right [ ]left [ ]bilateral  CARDIOVASCULAR:    [ x]Regular [ ]Irregular [ ]Tachy  [ ]Cristi [ ]Murmur [ ]Other  GASTROINTESTINAL:  [ x]Soft  [ ]Distended   [ ]+BS  [x ]Non tender [ ]Tender  [ ]PEG [ ]OGT/ NGT  Last BM:   GENITOURINARY:  [ x]Normal [ ] Incontinent   [ ]Oliguria/Anuria   [ ]Kessler  MUSCULOSKELETAL:   [  ]Normal   [ x]Weakness  [ ]Bed/Wheelchair bound [ ]Edema  NEUROLOGIC:   [  ]No focal deficits  [ x]Cognitive impairment  [ ]Dysphagia [x x]Dysarthria [ ]Paresis [ ]Other   SKIN:   [x ]Normal    [ ]Rash  [ ]Pressure ulcer(s)       Present on admission [ ]y [ ]n    CRITICAL CARE:  [ ] Shock Present  [ ]Septic [ ]Cardiogenic [ ]Neurologic [ ]Hypovolemic  [ ]  Vasopressors [ ]  Inotropes   [ ]Respiratory failure present [ ]Mechanical ventilation [ ]Non-invasive ventilatory support [ ]High flow  [ ]Acute  [ ]Chronic [ ]Hypoxic  [ ]Hypercarbic [ ]Other  [ ]Other organ failure     LABS:                        14.0   16.43 )-----------( 205      ( 2021 05:39 )             41.5       137  |  104  |  21  ----------------------------<  121<H>  3.6   |  18<L>  |  0.84    Ca    9.1      2021 05:39        Urinalysis Basic - ( 2021 12:36 )    Color: Yellow / Appearance: Clear / S.018 / pH: x  Gluc: x / Ketone: Small  / Bili: Negative / Urobili: 2 mg/dL   Blood: x / Protein: 30 mg/dL / Nitrite: Negative   Leuk Esterase: Negative / RBC: 4 /hpf / WBC 9 /HPF   Sq Epi: x / Non Sq Epi: 1 /hpf / Bacteria: Negative      RADIOLOGY & ADDITIONAL STUDIES:    PROTEIN CALORIE MALNUTRITION PRESENT: [ ]mild [ ]moderate [ ]severe [ ]underweight [ ]morbid obesity  https://www.andeal.org/vault/2440/web/files/ONC/Table_Clinical%20Characteristics%20to%20Document%20Malnutrition-White%20JV%20et%20al%2020.pdf    Height (cm): 162.6 (21 @ 07:25)  Weight (kg): 83.1 (21 @ 10:17)  BMI (kg/m2): 31.4 (21 @ 07:25)    [ ]PPSV2 < or = to 30% [ ]significant weight loss  [ ]poor nutritional intake  [ ]anasarca      [ ]Artificial Nutrition      REFERRALS:   [ ]Chaplaincy  [ ]Hospice  [ ]Child Life  [ ]Social Work  [ ]Case management [ ]Holistic Therapy     Goals of Care Document:

## 2021-07-29 NOTE — DISCHARGE NOTE PROVIDER - PROVIDER TOKENS
PROVIDER:[TOKEN:[[id number]],FOLLOWUP:[1 month]] PROVIDER:[TOKEN:[[id number]],FOLLOWUP:[1 month]],PROVIDER:[TOKEN:[[id number]],FOLLOWUP:[1 month]],PROVIDER:[TOKEN:[[id number]],FOLLOWUP:[1 month]]

## 2021-07-29 NOTE — PROGRESS NOTE ADULT - SUBJECTIVE AND OBJECTIVE BOX
Name of Patient : SHELBY LESTER  MRN: [id number]  DATE OF SERVICE: 21    Subjective: Patient seen and examined. No new events except as noted.   worsening leukocytosis  low grade fever     MEDICATIONS:  MEDICATIONS  (STANDING):  acetaminophen  IVPB .. 1000 milliGRAM(s) IV Intermittent once  aspirin Suppository 300 milliGRAM(s) Rectal daily  ATENolol  Tablet 25 milliGRAM(s) Oral daily  atorvastatin 80 milliGRAM(s) Oral at bedtime  glucagon  Injectable 1 milliGRAM(s) IntraMuscular once  morphine  - Injectable 2 milliGRAM(s) IV Push once  multivitamin 1 Tablet(s) Oral daily  pantoprazole  Injectable 40 milliGRAM(s) IV Push daily  piperacillin/tazobactam IVPB.. 3.375 Gram(s) IV Intermittent every 8 hours  polyethylene glycol 3350 17 Gram(s) Oral daily  senna 1 Tablet(s) Oral daily  sodium chloride 0.9%. 1000 milliLiter(s) (70 mL/Hr) IV Continuous <Continuous>      PHYSICAL EXAM:  T(C): 36.4 (21 @ 19:27), Max: 37.6 (21 @ 13:35)  HR: 77 (21 @ 22:00) (70 - 83)  BP: 135/76 (21 @ 22:00) (114/54 - 168/41)  RR: 16 (21 @ 22:00) (13 - 29)  SpO2: 97% (21 @ 22:00) (93% - 98%)  Wt(kg): --  I&O's Summary    2021 07:01  -  2021 07:00  --------------------------------------------------------  IN: 910 mL / OUT: 300 mL / NET: 610 mL          Appearance: awake  HEENT:  PERRLA   Lymphatic: No lymphadenopathy   Cardiovascular: Normal S1 S2  Respiratory: normal effort , clear  Gastrointestinal:  Soft, Non-tender  Skin: No rashes,  warm to touch  Psychiatry:  Mood & affect appropriate  Musculuskeletal: No edema      All labs, Imaging and EKGs personally reviewed       21 @ 07:01  -  21 @ 07:00  --------------------------------------------------------  IN: 910 mL / OUT: 300 mL / NET: 610 mL                          14.0   16.43 )-----------( 205      ( 2021 05:39 )             41.5                   137  |  104  |  21  ----------------------------<  121<H>  3.6   |  18<L>  |  0.84    Ca    9.1      2021 05:39                         Urinalysis Basic - ( 2021 12:36 )    Color: Yellow / Appearance: Clear / S.018 / pH: x  Gluc: x / Ketone: Small  / Bili: Negative / Urobili: 2 mg/dL   Blood: x / Protein: 30 mg/dL / Nitrite: Negative   Leuk Esterase: Negative / RBC: 4 /hpf / WBC 9 /HPF   Sq Epi: x / Non Sq Epi: 1 /hpf / Bacteria: Negative

## 2021-07-29 NOTE — DISCHARGE NOTE PROVIDER - NSDCCPCAREPLAN_GEN_ALL_CORE_FT
PRINCIPAL DISCHARGE DIAGNOSIS  Diagnosis: Stroke  Assessment and Plan of Treatment: Please follow up with neurologist after being discharged from rehab. Continue taking medications as prescribed. Monitor your blood pressure. Reduce fat, cholesterol and salt in your diet. Increase intake of fruits and vegetables. Limit alcohol to minimum and do not smoke. You may be at risk for falling, make changes to your home to help you walk easier. Keep up to date on vaccinations.  If you experience any symptoms of facial drooping, slurred speech, arm or leg weakness, severe headache, vision changes or any worsening symptoms, notify provider immediatley and return to ER.

## 2021-07-30 DIAGNOSIS — R93.1 ABNORMAL FINDINGS ON DIAGNOSTIC IMAGING OF HEART AND CORONARY CIRCULATION: ICD-10-CM

## 2021-07-30 LAB
ANION GAP SERPL CALC-SCNC: 13 MMOL/L — SIGNIFICANT CHANGE UP (ref 5–17)
ANION GAP SERPL CALC-SCNC: 14 MMOL/L — SIGNIFICANT CHANGE UP (ref 5–17)
ANION GAP SERPL CALC-SCNC: 15 MMOL/L — SIGNIFICANT CHANGE UP (ref 5–17)
ANION GAP SERPL CALC-SCNC: 15 MMOL/L — SIGNIFICANT CHANGE UP (ref 5–17)
APTT BLD: 19.2 SEC — LOW (ref 27.5–35.5)
BASOPHILS # BLD AUTO: 0.02 K/UL — SIGNIFICANT CHANGE UP (ref 0–0.2)
BASOPHILS NFR BLD AUTO: 0.1 % — SIGNIFICANT CHANGE UP (ref 0–2)
BLD GP AB SCN SERPL QL: NEGATIVE — SIGNIFICANT CHANGE UP
BUN SERPL-MCNC: 20 MG/DL — SIGNIFICANT CHANGE UP (ref 7–23)
BUN SERPL-MCNC: 22 MG/DL — SIGNIFICANT CHANGE UP (ref 7–23)
BUN SERPL-MCNC: 23 MG/DL — SIGNIFICANT CHANGE UP (ref 7–23)
BUN SERPL-MCNC: 23 MG/DL — SIGNIFICANT CHANGE UP (ref 7–23)
CALCIUM SERPL-MCNC: 8.1 MG/DL — LOW (ref 8.4–10.5)
CALCIUM SERPL-MCNC: 8.8 MG/DL — SIGNIFICANT CHANGE UP (ref 8.4–10.5)
CALCIUM SERPL-MCNC: 8.9 MG/DL — SIGNIFICANT CHANGE UP (ref 8.4–10.5)
CALCIUM SERPL-MCNC: 8.9 MG/DL — SIGNIFICANT CHANGE UP (ref 8.4–10.5)
CHLORIDE SERPL-SCNC: 106 MMOL/L — SIGNIFICANT CHANGE UP (ref 96–108)
CHLORIDE SERPL-SCNC: 107 MMOL/L — SIGNIFICANT CHANGE UP (ref 96–108)
CHLORIDE SERPL-SCNC: 108 MMOL/L — SIGNIFICANT CHANGE UP (ref 96–108)
CHLORIDE SERPL-SCNC: 111 MMOL/L — HIGH (ref 96–108)
CO2 SERPL-SCNC: 18 MMOL/L — LOW (ref 22–31)
CO2 SERPL-SCNC: 20 MMOL/L — LOW (ref 22–31)
CO2 SERPL-SCNC: 20 MMOL/L — LOW (ref 22–31)
CO2 SERPL-SCNC: 22 MMOL/L — SIGNIFICANT CHANGE UP (ref 22–31)
CREAT SERPL-MCNC: 0.72 MG/DL — SIGNIFICANT CHANGE UP (ref 0.5–1.3)
CREAT SERPL-MCNC: 0.75 MG/DL — SIGNIFICANT CHANGE UP (ref 0.5–1.3)
CREAT SERPL-MCNC: 0.78 MG/DL — SIGNIFICANT CHANGE UP (ref 0.5–1.3)
CREAT SERPL-MCNC: 0.8 MG/DL — SIGNIFICANT CHANGE UP (ref 0.5–1.3)
EOSINOPHIL # BLD AUTO: 0.03 K/UL — SIGNIFICANT CHANGE UP (ref 0–0.5)
EOSINOPHIL NFR BLD AUTO: 0.2 % — SIGNIFICANT CHANGE UP (ref 0–6)
GLUCOSE BLDC GLUCOMTR-MCNC: 125 MG/DL — HIGH (ref 70–99)
GLUCOSE SERPL-MCNC: 105 MG/DL — HIGH (ref 70–99)
GLUCOSE SERPL-MCNC: 121 MG/DL — HIGH (ref 70–99)
GLUCOSE SERPL-MCNC: 128 MG/DL — HIGH (ref 70–99)
GLUCOSE SERPL-MCNC: 137 MG/DL — HIGH (ref 70–99)
HCT VFR BLD CALC: 34.3 % — LOW (ref 34.5–45)
HGB BLD-MCNC: 11.5 G/DL — SIGNIFICANT CHANGE UP (ref 11.5–15.5)
IMM GRANULOCYTES NFR BLD AUTO: 0.7 % — SIGNIFICANT CHANGE UP (ref 0–1.5)
INR BLD: 1.2 RATIO — HIGH (ref 0.88–1.16)
LYMPHOCYTES # BLD AUTO: 1.97 K/UL — SIGNIFICANT CHANGE UP (ref 1–3.3)
LYMPHOCYTES # BLD AUTO: 12.6 % — LOW (ref 13–44)
MAGNESIUM SERPL-MCNC: 1.6 MG/DL — SIGNIFICANT CHANGE UP (ref 1.6–2.6)
MCHC RBC-ENTMCNC: 31.8 PG — SIGNIFICANT CHANGE UP (ref 27–34)
MCHC RBC-ENTMCNC: 33.5 GM/DL — SIGNIFICANT CHANGE UP (ref 32–36)
MCV RBC AUTO: 94.8 FL — SIGNIFICANT CHANGE UP (ref 80–100)
MONOCYTES # BLD AUTO: 1.02 K/UL — HIGH (ref 0–0.9)
MONOCYTES NFR BLD AUTO: 6.5 % — SIGNIFICANT CHANGE UP (ref 2–14)
NEUTROPHILS # BLD AUTO: 12.51 K/UL — HIGH (ref 1.8–7.4)
NEUTROPHILS NFR BLD AUTO: 79.9 % — HIGH (ref 43–77)
NRBC # BLD: 0 /100 WBCS — SIGNIFICANT CHANGE UP (ref 0–0)
PHOSPHATE SERPL-MCNC: 2.2 MG/DL — LOW (ref 2.5–4.5)
PLATELET # BLD AUTO: 175 K/UL — SIGNIFICANT CHANGE UP (ref 150–400)
POTASSIUM SERPL-MCNC: 2.6 MMOL/L — CRITICAL LOW (ref 3.5–5.3)
POTASSIUM SERPL-MCNC: 2.9 MMOL/L — CRITICAL LOW (ref 3.5–5.3)
POTASSIUM SERPL-MCNC: 2.9 MMOL/L — CRITICAL LOW (ref 3.5–5.3)
POTASSIUM SERPL-MCNC: 3.3 MMOL/L — LOW (ref 3.5–5.3)
POTASSIUM SERPL-SCNC: 2.6 MMOL/L — CRITICAL LOW (ref 3.5–5.3)
POTASSIUM SERPL-SCNC: 2.9 MMOL/L — CRITICAL LOW (ref 3.5–5.3)
POTASSIUM SERPL-SCNC: 2.9 MMOL/L — CRITICAL LOW (ref 3.5–5.3)
POTASSIUM SERPL-SCNC: 3.3 MMOL/L — LOW (ref 3.5–5.3)
PROTHROM AB SERPL-ACNC: 14.3 SEC — HIGH (ref 10.6–13.6)
RBC # BLD: 3.62 M/UL — LOW (ref 3.8–5.2)
RBC # FLD: 12.9 % — SIGNIFICANT CHANGE UP (ref 10.3–14.5)
RH IG SCN BLD-IMP: POSITIVE — SIGNIFICANT CHANGE UP
SODIUM SERPL-SCNC: 141 MMOL/L — SIGNIFICANT CHANGE UP (ref 135–145)
SODIUM SERPL-SCNC: 142 MMOL/L — SIGNIFICANT CHANGE UP (ref 135–145)
SODIUM SERPL-SCNC: 142 MMOL/L — SIGNIFICANT CHANGE UP (ref 135–145)
SODIUM SERPL-SCNC: 144 MMOL/L — SIGNIFICANT CHANGE UP (ref 135–145)
WBC # BLD: 15.66 K/UL — HIGH (ref 3.8–10.5)
WBC # FLD AUTO: 15.66 K/UL — HIGH (ref 3.8–10.5)

## 2021-07-30 PROCEDURE — 74176 CT ABD & PELVIS W/O CONTRAST: CPT | Mod: 26

## 2021-07-30 PROCEDURE — 99231 SBSQ HOSP IP/OBS SF/LOW 25: CPT

## 2021-07-30 PROCEDURE — 93280 PM DEVICE PROGR EVAL DUAL: CPT | Mod: 26

## 2021-07-30 PROCEDURE — 49440 PLACE GASTROSTOMY TUBE PERC: CPT

## 2021-07-30 RX ORDER — DEXTROSE MONOHYDRATE, SODIUM CHLORIDE, AND POTASSIUM CHLORIDE 50; .745; 4.5 G/1000ML; G/1000ML; G/1000ML
1000 INJECTION, SOLUTION INTRAVENOUS
Refills: 0 | Status: DISCONTINUED | OUTPATIENT
Start: 2021-07-30 | End: 2021-08-02

## 2021-07-30 RX ORDER — HYDROMORPHONE HYDROCHLORIDE 2 MG/ML
0.25 INJECTION INTRAMUSCULAR; INTRAVENOUS; SUBCUTANEOUS
Refills: 0 | Status: DISCONTINUED | OUTPATIENT
Start: 2021-07-30 | End: 2021-08-02

## 2021-07-30 RX ORDER — DEXAMETHASONE 0.5 MG/5ML
8 ELIXIR ORAL ONCE
Refills: 0 | Status: DISCONTINUED | OUTPATIENT
Start: 2021-07-30 | End: 2021-08-02

## 2021-07-30 RX ORDER — POTASSIUM CHLORIDE 20 MEQ
10 PACKET (EA) ORAL
Refills: 0 | Status: COMPLETED | OUTPATIENT
Start: 2021-07-30 | End: 2021-07-30

## 2021-07-30 RX ORDER — POTASSIUM PHOSPHATE, MONOBASIC POTASSIUM PHOSPHATE, DIBASIC 236; 224 MG/ML; MG/ML
15 INJECTION, SOLUTION INTRAVENOUS ONCE
Refills: 0 | Status: COMPLETED | OUTPATIENT
Start: 2021-07-30 | End: 2021-07-30

## 2021-07-30 RX ORDER — POTASSIUM CHLORIDE 20 MEQ
10 PACKET (EA) ORAL
Refills: 0 | Status: DISCONTINUED | OUTPATIENT
Start: 2021-07-30 | End: 2021-07-30

## 2021-07-30 RX ORDER — ONDANSETRON 8 MG/1
4 TABLET, FILM COATED ORAL ONCE
Refills: 0 | Status: COMPLETED | OUTPATIENT
Start: 2021-07-30 | End: 2021-08-01

## 2021-07-30 RX ADMIN — PIPERACILLIN AND TAZOBACTAM 25 GRAM(S): 4; .5 INJECTION, POWDER, LYOPHILIZED, FOR SOLUTION INTRAVENOUS at 05:45

## 2021-07-30 RX ADMIN — Medication 400 MILLIGRAM(S): at 13:43

## 2021-07-30 RX ADMIN — PANTOPRAZOLE SODIUM 40 MILLIGRAM(S): 20 TABLET, DELAYED RELEASE ORAL at 13:09

## 2021-07-30 RX ADMIN — Medication 1000 MILLIGRAM(S): at 14:15

## 2021-07-30 RX ADMIN — PIPERACILLIN AND TAZOBACTAM 25 GRAM(S): 4; .5 INJECTION, POWDER, LYOPHILIZED, FOR SOLUTION INTRAVENOUS at 22:16

## 2021-07-30 RX ADMIN — HYDROMORPHONE HYDROCHLORIDE 0.25 MILLIGRAM(S): 2 INJECTION INTRAMUSCULAR; INTRAVENOUS; SUBCUTANEOUS at 17:50

## 2021-07-30 RX ADMIN — POTASSIUM PHOSPHATE, MONOBASIC POTASSIUM PHOSPHATE, DIBASIC 62.5 MILLIMOLE(S): 236; 224 INJECTION, SOLUTION INTRAVENOUS at 08:17

## 2021-07-30 RX ADMIN — Medication 100 MILLIEQUIVALENT(S): at 23:29

## 2021-07-30 RX ADMIN — PIPERACILLIN AND TAZOBACTAM 25 GRAM(S): 4; .5 INJECTION, POWDER, LYOPHILIZED, FOR SOLUTION INTRAVENOUS at 13:40

## 2021-07-30 RX ADMIN — Medication 300 MILLIGRAM(S): at 13:09

## 2021-07-30 NOTE — CONSULT NOTE ADULT - SUBJECTIVE AND OBJECTIVE BOX
Interventional Radiology    Evaluate for Procedure:  peg placement by IR    HPI:  89 yo F PMH a fib not on AC, PPM, HTN, ovarian Ca, Colon Ca, SBO, GERD p/w aphasia, R weakness LSN 3:30 am found to have left MCA ischemic stroke with left M1 occlusion s/p thrombectomy with unsuccessful recanalization.   Failed S&S evaluation x 2 times, failed PEG placement by GI , now request for placement by IR .      Allergies:   Medications (Abx/Cardiac/Anticoagulation/Blood Products)    enoxaparin Injectable: 40 milliGRAM(s) SubCutaneous (07-28 @ 17:03)  piperacillin/tazobactam IVPB.: 200 mL/Hr IV Intermittent (07-29 @ 16:22)  piperacillin/tazobactam IVPB..: 25 mL/Hr IV Intermittent (07-30 @ 05:45)    Data:  162.6  83.1  T(C): 36.2  HR: 86  BP: 168/81  RR: 22  SpO2: 98%    -WBC 15.66 / HgB 11.5 / Hct 34.3 / Plt 175  -Na 141 / Cl 106 / BUN 23 / Glucose 128  -K 2.9 / CO2 20 / Cr 0.78  -ALT -- / Alk Phos -- / T.Bili --  -INR 1.18 / PTT --       Assessment/Plan:    89 yo F PMH a fib not on AC, PPM, HTN, ovarian Ca, Colon Ca, SBO, GERD p/w aphasia, R weakness LSN 3:30 am found to have left MCA ischemic stroke with left M1 occlusion s/p thrombectomy with unsuccessful recanalization.   Failed S&S evaluation x 2 times, failed PEG placement by GI , IR consulted on 7/30 for PEG.     Plan:   - Please obtain CT abdomen pelvis without contrast for best approach planning PER IR  - Planned PEG tentatively today 7/30 vs Monday 8/2  - Plan d/w with primary team  - Please place order for IR Procedure, approving attending Dr. Ryan Hung  - Keep patient NPO today 7/30 for possible PEG w/IR  - hold therapeutic and prophylactic anticoagulants  - Preop labs: Please have recent cbc , bmp, mg ,phos, t+s, coags

## 2021-07-30 NOTE — PRE PROCEDURE NOTE - PRE PROCEDURE EVALUATION
HPI:  89 yo F PMH a fib not on AC, PPM, HTN, ovarian Ca, Colon Ca, SBO, GERD p/w aphasia, R weakness LSN 3:30 am found to have left MCA ischemic stroke with left M1 occlusion s/p thrombectomy with unsuccessful recanalization.   Failed S&S evaluation x 2 times, failed PEG placement by GI , now request for placement by IR .    Allergies:   Medications (Abx/Cardiac/Anticoagulation/Blood Products)    enoxaparin Injectable: 40 milliGRAM(s) SubCutaneous (07-28 @ 17:03)  piperacillin/tazobactam IVPB.: 200 mL/Hr IV Intermittent (07-29 @ 16:22)  piperacillin/tazobactam IVPB..: 25 mL/Hr IV Intermittent (07-30 @ 13:40)    Data:  162.6  83.1  T(C): 36.2  HR: 75  BP: 115/86  RR: 14  SpO2: 97%    Exam  General: _  Chest: _  Abdomen: _  Extremities: _    -WBC 15.66 / HgB 11.5 / Hct 34.3 / Plt 175  -Na 142 / Cl 107 / BUN 23 / Glucose 137  -K 3.3 / CO2 20 / Cr 0.80  -ALT -- / Alk Phos -- / T.Bili --  -INR1.20    Imaging: Reviewed    Plan:   -89y Female presents for gastrostomy tube placement.  -Risks/Benefits/alternatives explained with the patients daughter Susu ([facility], [phone]) and witnessed informed consent obtained.

## 2021-07-30 NOTE — CONSULT NOTE ADULT - SUBJECTIVE AND OBJECTIVE BOX
HPI:   Patient is a 89y female with a past history of A Fib,PPM,HTN,hysterectomy and E lap for SBO/ERICH, history of ? colon cancer and ovarian cancer, who was admitted to [facility] on 7/26 and transferred to [facility] with acute Lt M1 CVA.  She was sent for possible thrombus extraction but procedure aborted for technical reasons.she has dysarthria, has failed swallow evaluation, and peg attempted by GI today was not successful, IR attempt planned for later today.No fever or documented infection, zosyn started yesterday for elevated wbc.She is s/p 2 dose Covid vaccine series.    REVIEW OF SYSTEMS:  All other review of systems negative (Comprehensive ROS): limited, sleepy post attempt at peg    PAST MEDICAL & SURGICAL HISTORY:  Hypertension    Cancer    GERD (gastroesophageal reflux disease)    Anxiety    Ovarian cancer    Colon cancer    H/O small bowel obstruction    H/O total hysterectomy  with BSO    No significant past surgical history        Allergies    No Known Allergies    Intolerances        Antimicrobials Day #  :day 2  piperacillin/tazobactam IVPB.. 3.375 Gram(s) IV Intermittent every 8 hours    Other Medications:  acetaminophen  IVPB .. 1000 milliGRAM(s) IV Intermittent once  aspirin Suppository 300 milliGRAM(s) Rectal daily  ATENolol  Tablet 25 milliGRAM(s) Oral daily  atorvastatin 80 milliGRAM(s) Oral at bedtime  glucagon  Injectable 1 milliGRAM(s) IntraMuscular once  morphine  - Injectable 2 milliGRAM(s) IV Push once  multivitamin 1 Tablet(s) Oral daily  pantoprazole  Injectable 40 milliGRAM(s) IV Push daily  polyethylene glycol 3350 17 Gram(s) Oral daily  senna 1 Tablet(s) Oral daily  sodium chloride 0.9%. 1000 milliLiter(s) IV Continuous <Continuous>      FAMILY HISTORY:  No pertinent family history in first degree relatives    No pertinent family history in first degree relatives        SOCIAL HISTORY:  Smoking: x    ETOH: x    Drug Use: x    Single     T(F): 97.1 (07-30-21 @ 10:02), Max: 99.6 (07-29-21 @ 13:35)  HR: 75 (07-30-21 @ 12:41)  BP: 115/86 (07-30-21 @ 12:41)  RR: 14 (07-30-21 @ 12:41)  SpO2: 97% (07-30-21 @ 12:41)  Wt(kg): --    PHYSICAL EXAM:  General: lethargic, no acute distress  Eyes:  anicteric, no conjunctival injection, no discharge  Oropharynx: no lesions or injection 	  Neck: supple, without adenopathy  Lungs: few ronchi  Heart: irregular rate and rhythm; no murmur,   Abdomen: soft, nondistended, nontender, without mass or organomegaly  Skin: no lesions  Extremities: no clubbing, cyanosis, or edema  Neurologic: lethargic and poorly interactive    LAB RESULTS:                        11.5   15.66 )-----------( 175      ( 30 Jul 2021 05:57 )             34.3     07-30    142  |  107  |  23  ----------------------------<  137<H>  3.3<L>   |  20<L>  |  0.80    Ca    8.9      30 Jul 2021 12:51  Phos  2.2     07-30  Mg     1.6     07-30            MICROBIOLOGY:  RECENT CULTURES:  07-28 @ 19:10 Catheterized Catheterized     No growth            RADIOLOGY REVIEWED:  < from: Xray Chest 1 View- PORTABLE-Urgent (Xray Chest 1 View- PORTABLE-Urgent .) (07.29.21 @ 15:26) >  IMPRESSION:  1.2 and 1.3 cm left perihilar nodules, of indeterminate nature.    Left basilar/retrocardiac opacity which could be due to a small left pleural effusion with passive atelectasis, atelectasis of other cause, and/or other pathology including, but not limited to, pneumonia.    Correlation with a CT scan of the chest could be performed for further evaluation, if felt to be clinically indicated.    --- End of Report ---    < end of copied text >  < from: CT Head No Cont (07.26.21 @ 08:09) >  IMPRESSION:    Acute left MCA territory infarct without evidence of hemorrhagic transformation which has progressed since 7/25/2021.      --- End of Report ---    < end of copied text >

## 2021-07-30 NOTE — CONSULT NOTE ADULT - ASSESSMENT
Patient is a 89y female with a past history of A Fib,PPM,HTN,hysterectomy and E lap for SBO/ERICH, history of ? colon cancer and ovarian cancer, who was admitted to [facility] on 7/26 and transferred to [facility] with acute Lt M1 CVA.  She was sent for possible thrombus extraction but procedure aborted for technical reasons.she has dysarthria, has failed swallow evaluation, and peg attempted by GI today was not successful, IR attempt planned for later today.No fever or documented infection, zosyn started 7/29  for elevated wbc.She is s/p 2 dose Covid vaccine series.  She appears hemodynamically stable, A recent urine culture is negative.  Her CXR shows left base density-atelectasis/infiltrate/or effusion.  Leukocytosis could simply be reactive to CVA, I am not convinced she has an acute infection.  As per daughter she was medically stable before CVA.  Suggest:  1.Await CT of A/P-will also get views of lungs to se if she has a basilar pneumonia  2.Low threshold to obtain blood cultures  3.I will leave on zosyn for now   4.Will reassess need for antibiotics in next 24-48 hours  5."reactive" leukocytosis is typically a diagnosis of exclusion.

## 2021-07-30 NOTE — CHART NOTE - NSCHARTNOTEFT_GEN_A_CORE
PRE-INTERVENTIONAL RADIOLOGY PROCEDURE NOTE      Patient Age: 89    Patient Gender: F    Procedure: PEG placement    Diagnosis/Indication: s/p stroke, dysphagia    Interventional Radiology Attending Physician: Dr. Ryan Hung    Ordering Attending Physician: Dr. Demetri Ybarra    Pertinent Medical History: s/p M1 CVA, afib not on AC, HTN, ovarian ca, colon ca    Pertinent labs:                      11.5   15.66 )-----------( 175      ( 30 Jul 2021 05:57 )             34.3       07-30    141  |  106  |  23  ----------------------------<  128<H>  2.9<LL>   |  20<L>  |  0.78    Ca    8.8      30 Jul 2021 07:08  Phos  2.2     07-30  Mg     1.6     07-30      Patient and Family Aware ? Yes

## 2021-07-30 NOTE — PROGRESS NOTE ADULT - ASSESSMENT
89 y/o F with PMH afib not on AC, PPM, HTN, ovarian and colon ca, GERD presents to the ED as a transfer from [facility] for dysarthria and R hemiparesis. NIHSS 8 on repeat exam. R hemiparesis, R facial droop, aphasia (not fluent, not intact to repetition), and dysarthria. 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     Impression: R hemiparesis, R facial droop, aphasia (not fluent, not intact to repetition), and dysarthria likely 2/2 L brain dysfunction 2/2 L M1 occlusion found on CTA likely 2/2 cardioembolic etiology (hx of afib)    NEURO: Patient is neurologically without acute change, overall improving in regards to degree of alertness and attempted interaction. Continue close monitoring for neurologic deterioration.   Goal for SBP of 100-180mmHg with overall goal of normotension. Patient on statin for LDL goal less than 70. MRI Brain would not change medical management as pt has hx of atrial fibrillation, hx ppm as well. Head CT as noted above. Physical therapy and OT recommend CHARLES.     ANTITHROMBOTIC THERAPY:  rectal. Will begin Eliquis 5mg BID in 2 weeks from stroke onset pending clinical and radiological stability after repeat CTH.     PULMONARY: CXR without acute findings, protecting airway    CARDIOVASCULAR: TTE from 7/28 shows mild-moderate mitral regurgitation, mild aortic regurgitation, severe LV systolic dysfunction, normal RV size and function, mild tricuspid regurgitation, thickened pericardium with small pericardial effusion. Continue cardiac monitoring. No events noted this far, hx PPM                               SBP goal: 100-180 mmhg     GASTROINTESTINAL:  dysphagia screen- failed 7/26. Re eval NPO, 7/27. Family deferred NGT at this time after discussing risks/benefits. PEG scheduled for 07/30/21, hypokalemic, s/p supplementation-will continue to monitor.      Diet: NPO     RENAL: BUN/Cr within normal limits, good urine output. maintain hydration via IVF as tolerated       Na Goal: Greater than 135     Kessler: no     HEMATOLOGY: H/H within normal limits, Platelets normal at 215, no active bleeding noted      DVT ppx: lovenox subq     ID: Leukocytosis downtrending, afebrile- started on zosyn, ID consulted for further recommendations.     OTHER: condition and plan of care d/w patient's daughter and son, discussed risks/benefits/alternatives to goals of care including but not limited to NGT vs. comfort feeds vs. PEG, at this time they wish to further discuss with palliative care and GI prior to making a decision. Palliative meeting held on 07/30/21 family all in agreement with PEG placement.    DISPOSITION: [facility] once stable and work up complete      CORE MEASURES:        Admission NIHSS: 16      TPA: [] YES [x] NO      LDL/HDL: 123/30     Depression Screen: o- unable to participate at this time      Statin Therapy: yes     Dysphagia Screen: [] PASS [x] FAIL     Smoking [] YES [x] NO      Afib [x] YES [] NO     Stroke Education [x] YES [] NO    Obtain screening lower extremity venous ultrasound in patients who meet 1 or more of the following criteria as patient is high risk for DVT/PE on admission:   [] History of DVT/PE  []Hypercoagulable states (Factor V Leiden, Cancer, OCP, etc. )  []Prolonged immobility (hemiplegia/hemiparesis/post operative or any other extended immobilization)  [] Transferred from outside facility (Rehab or Long term care)  [] Age </= to 50

## 2021-07-30 NOTE — PRE-ANESTHESIA EVALUATION ADULT - NSANTHOSAYNRD_GEN_A_CORE
No. ARMANDO screening performed.  STOP BANG Legend: 0-2 = LOW Risk; 3-4 = INTERMEDIATE Risk; 5-8 = HIGH Risk
No. ARMANDO screening performed.  STOP BANG Legend: 0-2 = LOW Risk; 3-4 = INTERMEDIATE Risk; 5-8 = HIGH Risk

## 2021-07-30 NOTE — PROGRESS NOTE ADULT - SUBJECTIVE AND OBJECTIVE BOX
Name of Patient : SHELBY LESTER  MRN: [id number]  DATE OF SERVICE: 07-30-21     Subjective: Patient seen and examined. No new events except as noted.         MEDICATIONS:  MEDICATIONS  (STANDING):  aspirin Suppository 300 milliGRAM(s) Rectal daily  ATENolol  Tablet 25 milliGRAM(s) Oral daily  atorvastatin 80 milliGRAM(s) Oral at bedtime  glucagon  Injectable 1 milliGRAM(s) IntraMuscular once  morphine  - Injectable 2 milliGRAM(s) IV Push once  multivitamin 1 Tablet(s) Oral daily  pantoprazole  Injectable 40 milliGRAM(s) IV Push daily  piperacillin/tazobactam IVPB.. 3.375 Gram(s) IV Intermittent every 8 hours  polyethylene glycol 3350 17 Gram(s) Oral daily  senna 1 Tablet(s) Oral daily  sodium chloride 0.9% with potassium chloride 20 mEq/L 1000 milliLiter(s) (70 mL/Hr) IV Continuous <Continuous>      PHYSICAL EXAM:  T(C): 36.8 (07-30-21 @ 17:30), Max: 37.4 (07-30-21 @ 00:00)  HR: 74 (07-30-21 @ 18:00) (70 - 90)  BP: 159/58 (07-30-21 @ 18:00) (113/92 - 173/70)  RR: 25 (07-30-21 @ 18:00) (14 - 25)  SpO2: 97% (07-30-21 @ 18:00) (97% - 100%)  Wt(kg): --  I&O's Summary    29 Jul 2021 07:01  -  30 Jul 2021 07:00  --------------------------------------------------------  IN: 910 mL / OUT: 4 mL / NET: 906 mL      Height (cm): 162.6 (07-30 @ 14:59)  Weight (kg): 83.1 (07-30 @ 14:59)  BMI (kg/m2): 31.4 (07-30 @ 14:59)  BSA (m2): 1.88 (07-30 @ 14:59)    Appearance: Normal	  HEENT:  PERRLA   Lymphatic: No lymphadenopathy   Cardiovascular: Normal S1 S2, no JVD  Respiratory: normal effort , clear  Gastrointestinal:  Soft, Non-tender  Skin: No rashes,  warm to touch  Psychiatry:  Mood & affect appropriate  Musculuskeletal: No edema      All labs, Imaging and EKGs personally reviewed       07-29-21 @ 07:01  -  07-30-21 @ 07:00  --------------------------------------------------------  IN: 910 mL / OUT: 4 mL / NET: 906 mL                          11.5   15.66 )-----------( 175      ( 30 Jul 2021 05:57 )             34.3               07-30    142  |  107  |  23  ----------------------------<  137<H>  3.3<L>   |  20<L>  |  0.80    Ca    8.9      30 Jul 2021 12:51  Phos  2.2     07-30  Mg     1.6     07-30      PT/INR - ( 30 Jul 2021 12:51 )   PT: 14.3 sec;   INR: 1.20 ratio         PTT - ( 30 Jul 2021 12:51 )  PTT:19.2 sec

## 2021-07-30 NOTE — CONSULT NOTE ADULT - ASSESSMENT
89 y/o F with PMH afib not on AC, PPM, HTN, ovarian and colon ca, GERD presents to the ED as a transfer from [facility] for dysarthria and R hemiparesis. NIHSS 8 on repeat exam. R hemiparesis, R facial droop, aphasia (not fluent, not intact to repetition), and dysarthria. 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

## 2021-07-30 NOTE — PROGRESS NOTE ADULT - SUBJECTIVE AND OBJECTIVE BOX
Chief Complaint:  Patient is a 89y old  Female who presents with a chief complaint of stroke transfer (2021 10:33)      Interval Events:   pt. s/p failed PEG placement  Allergies:  No Known Allergies        Home Medications:  aspirin 81 mg oral delayed release tablet: 1 tab(s) orally once a day (2020 11:10)  atenolol 25 mg oral tablet: 1 tab(s) orally once a day (2020 11:10)  gabapentin 100 mg oral capsule: 1 cap(s) orally 3 times a day (2020 11:10)  hydrocodocone chlopheniramine: 5 milliliter(s) orally once a day (at bedtime) (2020 11:01)  Multiple Vitamins oral tablet: 1 tab(s) orally once a day (2020 11:01)  omeprazole 20 mg oral delayed release capsule: 1 cap(s) orally once a day (2020 11:01)  Vitamin B12: 5 milliliter(s) orally once a day (2020 11:01)  zolpidem 5 mg oral tablet: 0.5 tab(s) orally once a day (at bedtime) (2020 11:01)        Hospital Medications:  acetaminophen  IVPB .. 1000 milliGRAM(s) IV Intermittent once  aspirin Suppository 300 milliGRAM(s) Rectal daily  ATENolol  Tablet 25 milliGRAM(s) Oral daily  atorvastatin 80 milliGRAM(s) Oral at bedtime  glucagon  Injectable 1 milliGRAM(s) IntraMuscular once  morphine  - Injectable 2 milliGRAM(s) IV Push once  multivitamin 1 Tablet(s) Oral daily  pantoprazole  Injectable 40 milliGRAM(s) IV Push daily  piperacillin/tazobactam IVPB.. 3.375 Gram(s) IV Intermittent every 8 hours  polyethylene glycol 3350 17 Gram(s) Oral daily  senna 1 Tablet(s) Oral daily  sodium chloride 0.9%. 1000 milliLiter(s) IV Continuous <Continuous>    MEDICATIONS  (PRN):    ___________  Active diet:  Diet, NPO  ___________________        ROS  GI: [- ] Nausea, [- ] vomiting, [ -]abdominal pain    PHYSICAL EXAM:   Vital Signs:  Vital Signs Last 24 Hrs  T(C): 36.2 (2021 10:02), Max: 37.6 (2021 13:35)  T(F): 97.1 (2021 10:02), Max: 99.6 (2021 13:35)  HR: 83 (2021 11:35) (70 - 88)  BP: 164/83 (2021 11:25) (113/92 - 168/81)  BP(mean): 85 (2021 09:54) (76 - 100)  RR: 23 (2021 11:35) (14 - 27)  SpO2: 99% (2021 11:35) (97% - 99%)  Daily Height in cm: 162.56 (2021 09:54)    Daily     GENERAL:  Appears stated age, well-groomed, well-nourished, no distress  HEENT:  NC/AT,  conjunctivae clear and pink, no thyromegaly, nodules, adenopathy, no JVD, sclera -anicteric  NECK: Supple, No masses  CHEST:  Full & symmetric excursion, no increased effort, breath sounds clear  HEART:  Regular rhythm, S1, S2, no murmur/rub/S3/S4, no abdominal bruit, no edema  ABDOMEN:  Soft, non-tender, non-distended, normoactive bowel sounds,  no masses ,no hepato-splenomegaly, no signs of chronic liver disease  EXTEREMITIES:  no cyanosis,clubbing or edema  SKIN:  No rash/erythema/ecchymoses/petechiae/wounds/abscess/warm/dry  LN: No lymphadenopathy, No masses  NEURO:  Alert, oriented, no asterixis, no tremor, no encephalopathy    LABS:                        11.5   15.66 )-----------( 175      ( 2021 05:57 )             34.3     07-30    141  |  106  |  23  ----------------------------<  128<H>  2.9<LL>   |  20<L>  |  0.78    Ca    8.8      2021 07:08  Phos  2.2     07-30  Mg     1.6     07-30          Urinalysis Basic - ( 2021 12:36 )    Color: Yellow / Appearance: Clear / S.018 / pH: x  Gluc: x / Ketone: Small  / Bili: Negative / Urobili: 2 mg/dL   Blood: x / Protein: 30 mg/dL / Nitrite: Negative   Leuk Esterase: Negative / RBC: 4 /hpf / WBC 9 /HPF   Sq Epi: x / Non Sq Epi: 1 /hpf / Bacteria: Negative          Imaging:

## 2021-07-30 NOTE — PRE PROCEDURE NOTE - ATTENDING COMMENTS
Plan:   -89y Female presents for gastrostomy tube placement.  -Risks/Benefits/alternatives explained with the patients daughter Susu (HCP, [phone]) and witnessed informed consent obtained.   -Consultation discussed with Dr Mcconnell of Gastroenterology

## 2021-07-30 NOTE — PRE-ANESTHESIA EVALUATION ADULT - NSRADCARDRESULTSFT_GEN_ALL_CORE
7/31/21: PPM Interrogation r/o Afib, Left MCA Stroke  1. Battery longevity 4.5 years  2. Lead impedances WNL  3. Sensing thresholds and RV pacing threshold WNL  4. Atrial pacing threshold not done 2/2 underlying Afib  5. Five PMT episodes stored on 5/2/21, available EGMs c/w pAFlutter  6. Twelve mode switches stored on 7/30/21, between 11:47am to 12:32am, duration 4 secs to 14 mins, available EGMs c/w Afib  7. AT/AF overview with 311 events since 10/31/2019, AF burden 39%, total time in AT/.2 days  8. Longest AT/AF was on 3/9/21 lasting >48hrs; Fastest VS rate on 5/2/21 at 126 bpm lasting >48hrs  9. Not PM dependent: Apaced 40%, Vpaced 98%; underlying Afib 60's  10. Normal pacemaker function 7/31/21: PPM Interrogation r/o Afib, Left MCA Stroke  1. Battery longevity 4.5 years  2. Lead impedances WNL  3. Sensing thresholds and RV pacing threshold WNL  4. Atrial pacing threshold not done 2/2 underlying Afib  5. Five PMT episodes stored on 5/2/21, available EGMs c/w pAFlutter  6. Twelve mode switches stored on 7/30/21, between 11:47am to 12:32am, duration 4 secs to 14 mins, available EGMs c/w Afib  7. AT/AF overview with 311 events since 10/31/2019, AF burden 39%, total time in AT/.2 days  8. Longest AT/AF was on 3/9/21 lasting >48hrs; Fastest VS rate on 5/2/21 at 126 bpm lasting >48hrs  9. Not PM dependent: Apaced 40%, Vpaced 98%; underlying Afib 60's  10. Normal pacemaker function    7/28/21 TTE:   EF (Visual Estimate): 30-35 %  Doppler Peak Velocity (m/sec): AoV=0.9  ------------------------------------------------------------------------  Observations:  Mitral Valve: Tethered mitral valve leaflets with normal opening. Mild-moderate mitral regurgitation.  Aortic Valve/Aorta: Calcified trileaflet aortic valve with normal opening. Peak transaortic valve gradient equals 3 mm Hg, estimated aortic valve area equals 2.4 sqcm. Mild aortic regurgitation.  Peak left ventricular outflow tract gradient equals 2 mm Hg.  Aortic Root: 2.5 cm.  LVOT diameter: 2 cm.  Left Atrium: Moderately dilated left atrium.  LA volume index = 46 cc/m2.  Left Ventricle: Endocardial visualization enhanced with intravenous injection of Ultrasonic Enhancing Agent (Definity).  Severe global left ventricular systolic dysfunction. No left ventricular thrombus. Normal left ventricular internal dimensions and wall thicknesses. Moderate diastolic dysfunction (Stage II).  Right Heart: Normal right atrium. Normal right ventricular size and function. A device wire is noted in the right heart. Normal tricuspid valve. Mild tricuspid regurgitation. Normal pulmonic valve. Minimal pulmonic regurgitation.  Pericardium/Pleura: Thickened pericardium with small pericardial effusion. 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   There is no significant inflow variation measured across the mitral or tricuspid valves.  No evidence of right atrial or right ventricular collapse.  Hemodynamic: Estimated right ventricular systolic pressure equals 37 mm Hg, assuming right atrial pressure equals 8 mm Hg, consistent with borderline pulmonary hypertension.  Agitated saline injection and color flow Doppler demonstrates no evidence of a patent foramen ovale.  ------------------------------------------------------------------------  Conclusions:  1. Tethered mitral valve leaflets with normal opening. Mild-moderate mitral regurgitation.  2. Calcified trileaflet aortic valve with normal opening. Mild aortic regurgitation.  3. Endocardial visualization enhanced with intravenous injection of Ultrasonic Enhancing Agent (Definity).  Severe global left ventricular systolic dysfunction. No left ventricular thrombus.  4. Normal right ventricular size and function. A device wire is noted in the right heart.  5. Normal tricuspid valve. Mild tricuspid regurgitation.  6. Agitated saline injection and color flow Doppler demonstrates no evidence of a patent foramen ovale.  7. Thickened pericardium with small pericardial effusion. 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   There is no significant inflow variation measured across the mitral or tricuspid valves.  No evidence of right atrial or right  ventricular collapse.  *** Compared with echocardiogram of 6/20/2019  unable to directly compare due to poor quality of prior study.

## 2021-07-30 NOTE — PROGRESS NOTE ADULT - ASSESSMENT
Patient is a 89 y/o Female with PMH afib not on AC, PPM, HTN, ovarian and colon ca, GERD presents to the ED as a transfer from [facility] for stroke.     Problem/Recommendation - 1:  Problem: Stroke. Recommendation: Care as per neurology   Angio noted   ASA/ Statin   protonix  BP control  speech and swallow   GI eval for PEG placement appreicated   palliative eval     worsening leukocytosis  mild low grade T  check procal level  UA Noted,   started on zosyn   CXR  high risk of aspiration  may need CT chest   Pan CT  ID eval called for further recs     Problem/Recommendation - 2:  ·  Problem: Atrial fibrillation.  Recommendation: rate control  Atenolol 25 oral daily.     Problem/Recommendation - 3:  ·  Problem: Hypertension.  Recommendation: resuem BP meds  monitor and adjust as tolerated.     Problem/Recommendation - 4:  ·  Problem: Colon cancer.     Problem/Recommendation - 5:  ·  Problem: GERD (gastroesophageal reflux disease).  Recommendation: PPI.     Problem/Recommendation - 6:  Problem: Prophylactic measure. Recommendation: DVT and gI PPX.

## 2021-07-30 NOTE — PROCEDURE NOTE - NSINFORMCONSENT_GEN_A_CORE
Family at bedside/Benefits, risks, and possible complications of procedure explained to patient/caregiver who verbalized understanding and gave verbal consent.
Benefits, risks, and possible complications of procedure explained to patient/caregiver who verbalized understanding and gave verbal consent.

## 2021-07-30 NOTE — PRE-ANESTHESIA EVALUATION ADULT - NSANTHPMHFT_GEN_ALL_CORE
PPM  CVA  Afib    PSH: Ex lap PPM  CVA  Afib  Mild to moderate MR  Borderline pulmonary HTN  Systolic CHF (EF 30-35%)    PSH: Ex lap

## 2021-07-30 NOTE — PROGRESS NOTE ADULT - SUBJECTIVE AND OBJECTIVE BOX
THE PATIENT WAS SEEN AND EXAMINED BY ME WITH THE HOUSESTAFF AND STROKE TEAM DURING MORNING ROUNDS.   HPI:  89 y/o F with PMH afib not on AC, PPM, HTN, ovarian and colon ca, GERD presents to the ED as a transfer from [facility] for stroke. Per son, pt woke up at 3:30am on 7/25 and went to the bathroom, no issues with gait or speech at that time, accompanied by son. At 7:30 am this morning, pt's son found her half off the bed and unable to speak or walk. Pt was found to have L M1 occlusion on CTA, CTP w/ no core infarct and penumbra 81cc, and transferred for IR thrombectomy. Exam at [facility] notable for severe dysarthria and R hemiparesis. Pt did not receive tpa. NIHSS initially 16 on arrival to [facility] ED, NIHSS 8 on repeat exam. At baseline, son reports that pt is able to do own ADLs (showers, reads) but has an aid to make sure she does not fall, walks with a walker without assistance, speech is fluent.    SUBJECTIVE: No events overnight.  No new neurologic complaints.      acetaminophen  IVPB .. 1000 milliGRAM(s) IV Intermittent once  aspirin Suppository 300 milliGRAM(s) Rectal daily  ATENolol  Tablet 25 milliGRAM(s) Oral daily  atorvastatin 80 milliGRAM(s) Oral at bedtime  glucagon  Injectable 1 milliGRAM(s) IntraMuscular once  morphine  - Injectable 2 milliGRAM(s) IV Push once  multivitamin 1 Tablet(s) Oral daily  pantoprazole  Injectable 40 milliGRAM(s) IV Push daily  piperacillin/tazobactam IVPB.. 3.375 Gram(s) IV Intermittent every 8 hours  polyethylene glycol 3350 17 Gram(s) Oral daily  senna 1 Tablet(s) Oral daily  sodium chloride 0.9%. 1000 milliLiter(s) IV Continuous <Continuous>      PHYSICAL EXAM:   Vital Signs Last 24 Hrs  T(C): 37.2 (30 Jul 2021 07:13), Max: 37.6 (29 Jul 2021 13:35)  T(F): 99 (30 Jul 2021 07:13), Max: 99.6 (29 Jul 2021 13:35)  HR: 71 (30 Jul 2021 08:00) (70 - 83)  BP: 165/54 (30 Jul 2021 08:00) (113/92 - 168/41)  BP(mean): 85 (30 Jul 2021 08:00) (76 - 100)  RR: 21 (30 Jul 2021 08:00) (13 - 27)  SpO2: 98% (30 Jul 2021 08:00) (97% - 99%)    General: No acute distress  HEENT: EOM intact, visual fields full  Abdomen: Soft, nontender, nondistended   Extremities: No edema    NEUROLOGICAL EXAM:  Mental status: Awake, alert, does gesture facial expressions relatively in context to situation (smiles appropriately), not following commands. Can mimic physical actions. No verbal output   Cranial Nerves: R facial droop, no nystagmus, tongue midline. Blink to threat b/l.   Motor exam: right hemiplegia with trace flexion in arm and triple flexion in leg, left side moves well against gravity but inattentive and with drift   Sensation: decreased on right   Coordination/ Gait: gait not assessed   bilaterally    LABS:                        11.5   15.66 )-----------( 175      ( 30 Jul 2021 05:57 )             34.3    07-30    141  |  106  |  23  ----------------------------<  128<H>  2.9<LL>   |  20<L>  |  0.78    Ca    8.8      30 Jul 2021 07:08  Phos  2.2     07-30  Mg     1.6     07-30          IMAGING: Reviewed by me.

## 2021-07-30 NOTE — PRE PROCEDURE NOTE - PRE PROCEDURE EVALUATION
Attending Physician:    Dr. Mcconnell                        Procedure:   EGD/PEG placement    Indication for Procedure:   Dysphagia  ________________________________________________________  PAST MEDICAL & SURGICAL HISTORY:    Hypertension  Cancer  GERD (gastroesophageal reflux disease)  Anxiety  Ovarian cancer  Colon cancer  H/O small bowel obstruction    H/O total hysterectomy with BSO      ALLERGIES:  No Known Allergies    HOME MEDICATIONS:  aspirin 81 mg oral delayed release tablet: 1 tab(s) orally once a day  atenolol 25 mg oral tablet: 1 tab(s) orally once a day  gabapentin 100 mg oral capsule: 1 cap(s) orally 3 times a day  hydrocodocone chlopheniramine: 5 milliliter(s) orally once a day (at bedtime)  Multiple Vitamins oral tablet: 1 tab(s) orally once a day  omeprazole 20 mg oral delayed release capsule: 1 cap(s) orally once a day  Vitamin B12: 5 milliliter(s) orally once a day  zolpidem 5 mg oral tablet: 0.5 tab(s) orally once a day (at bedtime)    AICD/PPM: [X ] yes   [ ] no    PERTINENT LAB DATA:                        11.5   15.66 )-----------( 175      ( 30 Jul 2021 05:57 )             34.3     07-30    141  |  106  |  23  ----------------------------<  128<H>  2.9<LL>   |  20<L>  |  0.78    Ca    8.8      30 Jul 2021 07:08  Phos  2.2     07-30  Mg     1.6     07-30    PHYSICAL EXAMINATION:    Height (cm): 162.6  Weight (kg): 83.1  BMI (kg/m2): 31.4  BSA (m2): 1.88T(C): 37.2  HR: 71  BP: 165/54  RR: 21  SpO2: 98%    Constitutional: NAD    Neck:  No JVD  Respiratory: CTAB/L  Cardiovascular: S1 and S2  Gastrointestinal: BS+, soft, NT/ND  Extremities: No peripheral edema  Neurological: A/O x 4      COMMENTS:    The patient is a suitable candidate for the planned procedure unless box checked [ ]  No, explain:     Attending Physician:    Dr. Mcconnell                        Procedure:   EGD/PEG placement    Indication for Procedure:   Dysphagia  ________________________________________________________  PAST MEDICAL & SURGICAL HISTORY:    Hypertension  Cancer  GERD (gastroesophageal reflux disease)  Anxiety  Ovarian cancer  Colon cancer  H/O small bowel obstruction    H/O total hysterectomy with BSO      ALLERGIES:  No Known Allergies    HOME MEDICATIONS:  aspirin 81 mg oral delayed release tablet: 1 tab(s) orally once a day  atenolol 25 mg oral tablet: 1 tab(s) orally once a day  gabapentin 100 mg oral capsule: 1 cap(s) orally 3 times a day  hydrocodocone chlopheniramine: 5 milliliter(s) orally once a day (at bedtime)  Multiple Vitamins oral tablet: 1 tab(s) orally once a day  omeprazole 20 mg oral delayed release capsule: 1 cap(s) orally once a day  Vitamin B12: 5 milliliter(s) orally once a day  zolpidem 5 mg oral tablet: 0.5 tab(s) orally once a day (at bedtime)    AICD/PPM: [X ] yes   [ ] no    PERTINENT LAB DATA:                        11.5   15.66 )-----------( 175      ( 30 Jul 2021 05:57 )             34.3     07-30    141  |  106  |  23  ----------------------------<  128<H>  2.9<LL>   |  20<L>  |  0.78    Ca    8.8      30 Jul 2021 07:08  Phos  2.2     07-30  Mg     1.6     07-30    PHYSICAL EXAMINATION:    Height (cm): 162.6  Weight (kg): 83.1  BMI (kg/m2): 31.4  BSA (m2): 1.88T(C): 37.2  HR: 71  BP: 165/54  RR: 21  SpO2: 98%    Constitutional: NAD    Neck:  No JVD  Respiratory: CTAB/L  Cardiovascular: S1 and S2  Gastrointestinal: BS+, soft, NT/ND  Extremities: No peripheral edema  Neurological: A/O x 0, patient is non-verbal, does not follow commands.      COMMENTS:    The patient is a suitable candidate for the planned procedure unless box checked [ ]  No, explain:

## 2021-07-30 NOTE — PROCEDURE NOTE - PLAN
- Gastrostomy tube capped for 24 hours (5:30 pm tomorrow)  - NPO for 24 hours (5:30 pm tomorrow)  - Case discussed with Dr. Mcconnell and Patients daughter, Susu.

## 2021-07-30 NOTE — PROGRESS NOTE ADULT - ASSESSMENT
89 y/o F with PMH afib not on AC, PPM, HTN, ovarian and colon ca, GERD presents to the ED as a transfer from [facility] for stroke. Per son, pt woke up at 3:30am and went to the bathroom, no issues with gait or speech at that time, accompanied by son. At 7:30 am this morning, pt's son found her half off the bed and unable to speak or walk. Pt was found to have L M1 occlusion on CTA, CTP w/ no core infarct and penumbra 81cc, and transferred for IR thrombectomy. Exam at [facility] notable for severe dysarthria and R hemiparesis. Pt did not receive tpa. NIHSS initially 16 on arrival to [facility] ED, NIHSS 8 on repeat exam. At baseline, son reports that pt is able to do own ADLs (showers, reads) but has an aid to make sure she does not fall, walks with a walker without assistance, speech is fluent.  Pt. s/p 2 failed S&S and family approached for PEG.    S/P EGD (07.30.21 @ 09:57) >  Impression:  - Failed PEG placement. The procedure was aborted due to poor endoscopic                        visualization and anatomy.                       - Normal esophagus.                     - Erythematous mucosa in the antrum.                       - Normal examined duodenum.                       - An endoscopically removable PEG placement was attempted but could not be                        successfully completed. The needle/trocar was not passed.                       - No specimens collected.  Recommendation:                             - Perform a radiologic gastrostomy today. Consult called.    Cont. to hold Lovenox till Gastrostomy placement and then may restart.  Cont. IVF  May cont. ASA  Non-contrast CT of Abdomen to define the anatomy.    Thank you.     89 y/o F with PMH afib not on AC, PPM, HTN, ovarian and colon ca, GERD presents to the ED as a transfer from [facility] for stroke. Per son, pt woke up at 3:30am and went to the bathroom, no issues with gait or speech at that time, accompanied by son. At 7:30 am this morning, pt's son found her half off the bed and unable to speak or walk. Pt was found to have L M1 occlusion on CTA, CTP w/ no core infarct and penumbra 81cc, and transferred for IR thrombectomy. Exam at [facility] notable for severe dysarthria and R hemiparesis. Pt did not receive tpa. NIHSS initially 16 on arrival to [facility] ED, NIHSS 8 on repeat exam. At baseline, son reports that pt is able to do own ADLs (showers, reads) but has an aid to make sure she does not fall, walks with a walker without assistance, speech is fluent.  Pt. s/p 2 failed S&S and family approached for PEG.    S/P EGD (07.30.21 @ 09:57) >  Impression:  - Failed PEG placement. The procedure was aborted due to poor endoscopic                        visualization and anatomy.                       - Normal esophagus.                     - Erythematous mucosa in the antrum.                       - Normal examined duodenum.                       - An endoscopically removable PEG placement was attempted but could not be                        successfully completed. The needle/trocar was not passed.                       - No specimens collected.  Recommendation:                             - Perform a radiologic gastrostomy today. Consult called.    Cont. to hold Lovenox till Gastrostomy placement and then may restart.  Cont. IVF  May cont. ASA  Non-contrast CT of Abdomen to define the anatomy.    I had a prolonged conversation with pt. and family re. likely diagnosis and plan who verbalizes clear understanding,    Thank you.

## 2021-07-31 LAB
ANION GAP SERPL CALC-SCNC: 13 MMOL/L — SIGNIFICANT CHANGE UP (ref 5–17)
ANION GAP SERPL CALC-SCNC: 16 MMOL/L — SIGNIFICANT CHANGE UP (ref 5–17)
BASOPHILS # BLD AUTO: 0.04 K/UL — SIGNIFICANT CHANGE UP (ref 0–0.2)
BASOPHILS NFR BLD AUTO: 0.3 % — SIGNIFICANT CHANGE UP (ref 0–2)
BUN SERPL-MCNC: 15 MG/DL — SIGNIFICANT CHANGE UP (ref 7–23)
BUN SERPL-MCNC: 16 MG/DL — SIGNIFICANT CHANGE UP (ref 7–23)
CALCIUM SERPL-MCNC: 8.3 MG/DL — LOW (ref 8.4–10.5)
CALCIUM SERPL-MCNC: 9.2 MG/DL — SIGNIFICANT CHANGE UP (ref 8.4–10.5)
CHLORIDE SERPL-SCNC: 103 MMOL/L — SIGNIFICANT CHANGE UP (ref 96–108)
CHLORIDE SERPL-SCNC: 105 MMOL/L — SIGNIFICANT CHANGE UP (ref 96–108)
CO2 SERPL-SCNC: 20 MMOL/L — LOW (ref 22–31)
CO2 SERPL-SCNC: 21 MMOL/L — LOW (ref 22–31)
CREAT SERPL-MCNC: 0.65 MG/DL — SIGNIFICANT CHANGE UP (ref 0.5–1.3)
CREAT SERPL-MCNC: 0.68 MG/DL — SIGNIFICANT CHANGE UP (ref 0.5–1.3)
EOSINOPHIL # BLD AUTO: 0.14 K/UL — SIGNIFICANT CHANGE UP (ref 0–0.5)
EOSINOPHIL NFR BLD AUTO: 1.1 % — SIGNIFICANT CHANGE UP (ref 0–6)
GLUCOSE SERPL-MCNC: 120 MG/DL — HIGH (ref 70–99)
GLUCOSE SERPL-MCNC: 122 MG/DL — HIGH (ref 70–99)
HCT VFR BLD CALC: 37.5 % — SIGNIFICANT CHANGE UP (ref 34.5–45)
HGB BLD-MCNC: 12.6 G/DL — SIGNIFICANT CHANGE UP (ref 11.5–15.5)
IMM GRANULOCYTES NFR BLD AUTO: 0.8 % — SIGNIFICANT CHANGE UP (ref 0–1.5)
LYMPHOCYTES # BLD AUTO: 1.89 K/UL — SIGNIFICANT CHANGE UP (ref 1–3.3)
LYMPHOCYTES # BLD AUTO: 15.3 % — SIGNIFICANT CHANGE UP (ref 13–44)
MCHC RBC-ENTMCNC: 31.8 PG — SIGNIFICANT CHANGE UP (ref 27–34)
MCHC RBC-ENTMCNC: 33.6 GM/DL — SIGNIFICANT CHANGE UP (ref 32–36)
MCV RBC AUTO: 94.7 FL — SIGNIFICANT CHANGE UP (ref 80–100)
MONOCYTES # BLD AUTO: 0.95 K/UL — HIGH (ref 0–0.9)
MONOCYTES NFR BLD AUTO: 7.7 % — SIGNIFICANT CHANGE UP (ref 2–14)
NEUTROPHILS # BLD AUTO: 9.22 K/UL — HIGH (ref 1.8–7.4)
NEUTROPHILS NFR BLD AUTO: 74.8 % — SIGNIFICANT CHANGE UP (ref 43–77)
NRBC # BLD: 0 /100 WBCS — SIGNIFICANT CHANGE UP (ref 0–0)
PLATELET # BLD AUTO: 197 K/UL — SIGNIFICANT CHANGE UP (ref 150–400)
POTASSIUM SERPL-MCNC: 3 MMOL/L — LOW (ref 3.5–5.3)
POTASSIUM SERPL-MCNC: 3 MMOL/L — LOW (ref 3.5–5.3)
POTASSIUM SERPL-SCNC: 3 MMOL/L — LOW (ref 3.5–5.3)
POTASSIUM SERPL-SCNC: 3 MMOL/L — LOW (ref 3.5–5.3)
RBC # BLD: 3.96 M/UL — SIGNIFICANT CHANGE UP (ref 3.8–5.2)
RBC # FLD: 13 % — SIGNIFICANT CHANGE UP (ref 10.3–14.5)
SODIUM SERPL-SCNC: 139 MMOL/L — SIGNIFICANT CHANGE UP (ref 135–145)
SODIUM SERPL-SCNC: 139 MMOL/L — SIGNIFICANT CHANGE UP (ref 135–145)
WBC # BLD: 12.34 K/UL — HIGH (ref 3.8–10.5)
WBC # FLD AUTO: 12.34 K/UL — HIGH (ref 3.8–10.5)

## 2021-07-31 PROCEDURE — 99233 SBSQ HOSP IP/OBS HIGH 50: CPT

## 2021-07-31 RX ORDER — ACETAMINOPHEN 500 MG
1000 TABLET ORAL ONCE
Refills: 0 | Status: COMPLETED | OUTPATIENT
Start: 2021-07-31 | End: 2021-07-31

## 2021-07-31 RX ORDER — ENOXAPARIN SODIUM 100 MG/ML
40 INJECTION SUBCUTANEOUS
Refills: 0 | Status: DISCONTINUED | OUTPATIENT
Start: 2021-07-31 | End: 2021-08-09

## 2021-07-31 RX ORDER — POTASSIUM CHLORIDE 20 MEQ
10 PACKET (EA) ORAL
Refills: 0 | Status: COMPLETED | OUTPATIENT
Start: 2021-07-31 | End: 2021-07-31

## 2021-07-31 RX ORDER — POTASSIUM CHLORIDE 20 MEQ
10 PACKET (EA) ORAL
Refills: 0 | Status: DISCONTINUED | OUTPATIENT
Start: 2021-07-31 | End: 2021-07-31

## 2021-07-31 RX ADMIN — Medication 1000 MILLIGRAM(S): at 09:00

## 2021-07-31 RX ADMIN — PIPERACILLIN AND TAZOBACTAM 25 GRAM(S): 4; .5 INJECTION, POWDER, LYOPHILIZED, FOR SOLUTION INTRAVENOUS at 04:43

## 2021-07-31 RX ADMIN — Medication 400 MILLIGRAM(S): at 18:00

## 2021-07-31 RX ADMIN — PIPERACILLIN AND TAZOBACTAM 25 GRAM(S): 4; .5 INJECTION, POWDER, LYOPHILIZED, FOR SOLUTION INTRAVENOUS at 21:09

## 2021-07-31 RX ADMIN — Medication 100 MILLIEQUIVALENT(S): at 11:26

## 2021-07-31 RX ADMIN — Medication 100 MILLIEQUIVALENT(S): at 12:00

## 2021-07-31 RX ADMIN — DEXTROSE MONOHYDRATE, SODIUM CHLORIDE, AND POTASSIUM CHLORIDE 70 MILLILITER(S): 50; .745; 4.5 INJECTION, SOLUTION INTRAVENOUS at 11:27

## 2021-07-31 RX ADMIN — Medication 1000 MILLIGRAM(S): at 03:58

## 2021-07-31 RX ADMIN — Medication 300 MILLIGRAM(S): at 19:00

## 2021-07-31 RX ADMIN — PIPERACILLIN AND TAZOBACTAM 25 GRAM(S): 4; .5 INJECTION, POWDER, LYOPHILIZED, FOR SOLUTION INTRAVENOUS at 11:27

## 2021-07-31 RX ADMIN — Medication 400 MILLIGRAM(S): at 03:28

## 2021-07-31 RX ADMIN — PANTOPRAZOLE SODIUM 40 MILLIGRAM(S): 20 TABLET, DELAYED RELEASE ORAL at 11:27

## 2021-07-31 RX ADMIN — Medication 1 TABLET(S): at 19:33

## 2021-07-31 RX ADMIN — ATORVASTATIN CALCIUM 80 MILLIGRAM(S): 80 TABLET, FILM COATED ORAL at 21:10

## 2021-07-31 RX ADMIN — Medication 100 MILLIEQUIVALENT(S): at 01:40

## 2021-07-31 RX ADMIN — Medication 1000 MILLIGRAM(S): at 18:30

## 2021-07-31 RX ADMIN — Medication 100 MILLIEQUIVALENT(S): at 00:38

## 2021-07-31 RX ADMIN — Medication 400 MILLIGRAM(S): at 08:35

## 2021-07-31 RX ADMIN — Medication 100 MILLIEQUIVALENT(S): at 14:04

## 2021-07-31 NOTE — PROGRESS NOTE ADULT - SUBJECTIVE AND OBJECTIVE BOX
CC: f/u for leukocytosis post CVA    Patient reports: she is sleepy, s/p IR peg placement yesterday    REVIEW OF SYSTEMS:  All other review of systems negative (Comprehensive ROS)    Antimicrobials Day #  :day 3  piperacillin/tazobactam IVPB.. 3.375 Gram(s) IV Intermittent every 8 hours    Other Medications Reviewed  MEDICATIONS  (STANDING):  aspirin Suppository 300 milliGRAM(s) Rectal daily  ATENolol  Tablet 25 milliGRAM(s) Oral daily  atorvastatin 80 milliGRAM(s) Oral at bedtime  glucagon  Injectable 1 milliGRAM(s) IntraMuscular once  morphine  - Injectable 2 milliGRAM(s) IV Push once  multivitamin 1 Tablet(s) Oral daily  pantoprazole  Injectable 40 milliGRAM(s) IV Push daily  piperacillin/tazobactam IVPB.. 3.375 Gram(s) IV Intermittent every 8 hours  polyethylene glycol 3350 17 Gram(s) Oral daily  senna 1 Tablet(s) Oral daily  sodium chloride 0.9% with potassium chloride 20 mEq/L 1000 milliLiter(s) (70 mL/Hr) IV Continuous <Continuous>    T(F): 97.4 (07-30-21 @ 20:00), Max: 99 (07-30-21 @ 07:13)  HR: 83 (07-31-21 @ 04:00)  BP: 150/80 (07-31-21 @ 04:00)  RR: 22 (07-31-21 @ 04:00)  SpO2: 97% (07-31-21 @ 04:00)  Wt(kg): --    PHYSICAL EXAM:  General: lethargic, no acute distress  Eyes:  anicteric, no conjunctival injection, no discharge  Oropharynx: no lesions or injection 	  Neck: supple, without adenopathy  Lungs: clear to auscultation  Heart: irregular rate and rhythm;  Abdomen: soft, nondistended, nontender, binder in place over peg  Skin: no lesions  Extremities: no clubbing, cyanosis, or edema  Neurologic: lethargic, rt sided weakness    LAB RESULTS:                        12.6   12.34 )-----------( 197      ( 31 Jul 2021 04:44 )             37.5     07-31    139  |  105  |  16  ----------------------------<  122<H>  3.0<L>   |  21<L>  |  0.68    Ca    8.3<L>      31 Jul 2021 04:44  Phos  2.2     07-30  Mg     1.6     07-30          MICROBIOLOGY:  RECENT CULTURES:  07-28 @ 19:10 Catheterized Catheterized     No growth          RADIOLOGY REVIEWED:  < from: CT Abdomen and Pelvis No Cont (07.30.21 @ 16:25) >  CONTRAST/COMPLICATIONS:  IV Contrast: None.  Oral Contrast: None.  Complications: No adverse reactions.    PROCEDURE:  CT of the Abdomen and Pelvis was performed.  Sagittal and coronal reformats were performed.    FINDINGS:  LOWER CHEST: Trace bilateral pleural effusions and smooth interlobular septal thickening. Mild bibasilar atelectasis. Multichamber cardiac enlargement. Trace pericardial fluid. Cardiac device leads in the right atrium and right ventricle. Coronary artery atherosclerotic calcifications. Calcifications of the aortic valve leaflets.    LIVER: Within normal limits.  BILE DUCTS: Normal caliber.  GALLBLADDER: Within normal limits.  SPLEEN: Within normal limits.  PANCREAS: Within normal limits.  ADRENALS: Within normal limits.  KIDNEYS/URETERS: No right or left hydroureteronephrosis or nephrolithiasis. Mild nonspecific bilateral perinephric stranding.    BLADDER: Air in the urinary bladder likely from recent instrumentation.  REPRODUCTIVE ORGANS: Uterus is not visualized. No pelvic mass or cyst.    BOWEL: Distal colonic anastomosis. No bowel obstruction. Thickening of the rectal walls with perirectal fatty infiltration. Appendix is not clearly identified.  PERITONEUM: Trace ascites. No pneumoperitoneum. Presacral edema. No mesenteric lymphadenopathy.  VESSELS: Atherosclerotic calcifications of the aortoiliac tree. Normal caliber abdominal aorta.  RETROPERITONEUM/LYMPH NODES: No lymphadenopathy.  ABDOMINAL WALL: Postsurgical changes. Numerous varices in the inguinal regions and mons pubis.  BONES: Interlocking screw with intramedullary lola in the left proximal femur. Degenerative changes of the spine. Chronic compression fracture of T11.    IMPRESSION:  CT findings consistent with proctitis.    Trace bilateral pleural effusions and mild interstitial edema.    --- End of Report ---    < end of copied text >

## 2021-07-31 NOTE — PROGRESS NOTE ADULT - SUBJECTIVE AND OBJECTIVE BOX
Subjective: Patient seen and examined. No new events except as noted.   remains in Stroke unit   s/p PEG     REVIEW OF SYSTEMS:    CONSTITUTIONAL: + weakness, fevers or chills  EYES/ENT: No visual changes;  No vertigo or throat pain   NECK: No pain or stiffness  RESPIRATORY: No cough, wheezing, hemoptysis; No shortness of breath  CARDIOVASCULAR: No chest pain or palpitations  GASTROINTESTINAL: No abdominal or epigastric pain. No nausea, vomiting, or hematemesis; No diarrhea or constipation. No melena or hematochezia.  GENITOURINARY: No dysuria, frequency or hematuria  NEUROLOGICAL: + numbness or weakness  SKIN: No itching, burning, rashes, or lesions   All other review of systems is negative unless indicated above.    MEDICATIONS:  MEDICATIONS  (STANDING):  aspirin Suppository 300 milliGRAM(s) Rectal daily  ATENolol  Tablet 25 milliGRAM(s) Oral daily  atorvastatin 80 milliGRAM(s) Oral at bedtime  glucagon  Injectable 1 milliGRAM(s) IntraMuscular once  morphine  - Injectable 2 milliGRAM(s) IV Push once  multivitamin 1 Tablet(s) Oral daily  pantoprazole  Injectable 40 milliGRAM(s) IV Push daily  piperacillin/tazobactam IVPB.. 3.375 Gram(s) IV Intermittent every 8 hours  polyethylene glycol 3350 17 Gram(s) Oral daily  senna 1 Tablet(s) Oral daily  sodium chloride 0.9% with potassium chloride 20 mEq/L 1000 milliLiter(s) (70 mL/Hr) IV Continuous <Continuous>      PHYSICAL EXAM:  T(C): 36.8 (21 @ 07:20), Max: 36.8 (21 @ 07:20)  HR: 78 (21 @ 20:00) (73 - 89)  BP: 150/60 (21 @ 20:00) (135/48 - 186/81)  RR: 21 (21 @ 20:00) (14 - 27)  SpO2: 97% (21 @ 20:00) (96% - 99%)  Wt(kg): --  I&O's Summary    2021 07:01  -  2021 22:33  --------------------------------------------------------  IN: 0 mL / OUT: 800 mL / NET: -800 mL          Appearance: NAD	  HEENT:   Dry  oral mucosa, PERRL, EOMI	  Lymphatic: No lymphadenopathy  Cardiovascular: irregular S1 S2, No JVD, No murmurs, No edema  Respiratory: Decreased bs  Psychiatry: A & O x 3, sleepy   Gastrointestinal:  Soft, Non-tender, + BS	  Skin: No rashes, No ecchymoses, No cyanosis	  Extremities: Normal range of motion, No clubbing, cyanosis or edema  Vascular: Peripheral pulses palpable 2+ bilaterally  NEUROLOGICAL EXAM:  Mental status: Awake, alert, does gesture facial expressions relatively in context to situation (smiles appropriately), not following commands. Can mimic physical actions. No verbal output   Cranial Nerves: R facial droop, no nystagmus, tongue midline. Blink to threat b/l.   Motor exam: right hemiplegia with trace flexion in arm and triple flexion in leg, left side moves well against gravity but inattentive and with drift   Sensation: decreased on right   Coordination/ Gait: gait not assessed       LABS:    CARDIAC MARKERS:                                12.6   12.34 )-----------( 197      ( 2021 04:44 )             37.5     07-31    139  |  103  |  15  ----------------------------<  120<H>  3.0<L>   |  20<L>  |  0.65    Ca    9.2      2021 07:27  Phos  2.2     07-30  Mg     1.6     07-30      proBNP:   Lipid Profile:   HgA1c:   TSH:     PROCEDURE:   · Procedure Name	PPM Interrogation Note  · TIME OUT	Patient's first and last name, , procedure, and correct site confirmed prior to the start of procedure.  · Practitioner performing the TIME OUT	Myself  · Procedure Date/Time	2021 12:59  · Informed Consent	Benefits, risks, and possible complications of procedure explained to patient/caregiver who verbalized understanding and gave verbal consent., Family at bedside  · Procedure Performed By	Myself  · Procedural Sedation Used	No  · 	Chunk Moto  · Model	Accolade MRI L311  · Mode	DDDR  · Rate	60 ppm  · Atrial Lead	Yes  · P-wave amplitude (mV)	2  · Impedance (ohms)	738  · Right Ventricular Lead	Yes  · R-wave amplitude (mV)	17.7  · RV lead Impedance (ohms)	670  · Threshold (V)	0.8  · Threshold at (ms)	0.5  · Battery	Good  · Underlying Rhythm	Atrial Fibrillation, occ Vpaced  · Comments	PPM Interrogation r/o Afib, Left MCA Stroke  · Additional Procedure Details	1. Battery longevity 4.5 years  2. Lead impedances WNL  3. Sensing thresholds and RV pacing threshold WNL  4. Atrial pacing threshold not done 2/2 underlying Afib  5. Five PMT episodes stored on 21, available EGMs c/w pAFlutter  6. Twelve mode switches stored on 21, between 11:47am to 12:32am, duration 4 secs to 14 mins, available EGMs c/w Afib  7. AT/AF overview with 311 events since 10/31/2019, AF burden 39%, total time in AT/.2 days  8. Longest AT/AF was on 3/9/21 lasting >48hrs; Fastest VS rate on 21 at 126 bpm lasting >48hrs  9. Not PM dependent: Apaced 40%, Vpaced 98%; underlying Afib 60's  10. Normal pacemaker function    NICOLE Sheehan  #[id number]          TELEMETRY: 	    ECG:  	  RADIOLOGY:   DIAGNOSTIC TESTING:  [ ] Echocardiogram:  [ ]  Catheterization:  [ ] Stress Test:    OTHER: 	  e< from: Replace Gastrostomy Tube (21 @ 09:57) >    [facility]  ____________________________________________________________________________________________________  Patient Name: Tari Germain                     MRN: [id number]  Account Number: [id number]                     YOB: 1924  Room: Endoscopy Room 2                           Gender: Female  Attending MD: Nicholas Mcconnell MD                Procedure Date No Time: 2021  ____________________________________________________________________________________________________     Procedure:           egd peg placement  Indications:         Dysphagia  Providers:           Nicholas Mcconnell MD  Medicines:           Monitored Anesthesia Care  Complications:       No immediate complications.  ____________________________________________________________________________________________________  Procedure:           Pre-Anesthesia Assessment:                       - Prior to the procedure, a History and Physical was performed, and patient            medications, allergies and sensitivities were reviewed. The patient's                        tolerance of previous anesthesia was reviewed.                       - The risks and benefits of the procedure and the sedation options and risks                       were discussed with the patient. All questions were answered and informed                        consent was obtained.                       After obtaining informed consent, the endoscope was passed under direct        vision. Throughout the procedure, the patient's blood pressure, pulse, and                        oxygen saturations were monitored continuously. The Endoscope was introduced                        through the mouth, and advanced to the second part of duodenum. The upper GI                        endoscopy was technically difficult and complex due to abnormal anatomy. The                        patient tolerated the procedure well. The procedure was aborted due to poor   endoscopic visualization. The upper GI endoscopy was accomplished without                        difficulty. The patient tolerated the procedure well.     Findings:       The examined esophagus was normal. Estimated blood loss: none.       Localized mildly erythematous mucosa without bleeding was found in the gastric antrum.        Estimated blood loss: none.       The examined duodenum was normal. Estimated blood loss: none.       Placement of an endoscopically removable PEG with no T-fasteners was attempted but could not        be successfully completed. The needle/trocar was not passed. Estimated blood loss: none.                                                                            Impression:          - Failed PEG placement. The procedure was aborted due to poor endoscopic                        visualization.                       - Normal esophagus.                     - Erythematous mucosa in the antrum.                       - Normal examined duodenum.                       - An endoscopically removable PEG placement was attempted but could not be                        successfully completed. The needle/trocar was not passed.                       - No specimens collected.  Recommendation:      - Perform a radiologic gastrostomy today.                                                                                                        Attending Participation:       I personally performed the entire procedure.                                                                                                          __________________  Nicholas Mcconnell MD  2021 11:36:55 AM  This report has been signed electronically.  Number of Addenda: 0    Note Initiated On: 2021 9:57 AM    < end of copied text >

## 2021-07-31 NOTE — PROGRESS NOTE ADULT - SUBJECTIVE AND OBJECTIVE BOX
STATUS POST:  percutaneous gastrostomy tube placement    POST OPERATIVE DAY #:1     SUBJECTIVE:   No acute events overnight.   No abdominal pain.  ---------------------------------------------------------------    Vital Signs Last 24 Hrs  T(C): 36.8 (31 Jul 2021 07:20), Max: 36.8 (30 Jul 2021 17:30)  T(F): 98.3 (31 Jul 2021 07:20), Max: 98.3 (31 Jul 2021 07:20)  HR: 80 (31 Jul 2021 10:03) (70 - 90)  BP: 135/48 (31 Jul 2021 10:03) (115/86 - 186/81)  BP(mean): 71 (31 Jul 2021 10:03) (71 - 118)  RR: 21 (31 Jul 2021 10:03) (14 - 25)  SpO2: 96% (31 Jul 2021 10:03) (95% - 100%)    I&O's Detail      ----------------------------------------------------------------    Physical Exam:  abdominal binder in place  no abdominal tenderness or distension  gastrostomy site c/d/i    -------------------------------------------------------------------    LABS:                        12.6   12.34 )-----------( 197      ( 31 Jul 2021 04:44 )             37.5     07-31    139  |  103  |  15  ----------------------------<  120<H>  3.0<L>   |  20<L>  |  0.65    Ca    9.2      31 Jul 2021 07:27  Phos  2.2     07-30  Mg     1.6     07-30      PT/INR - ( 30 Jul 2021 12:51 )   PT: 14.3 sec;   INR: 1.20 ratio         PTT - ( 30 Jul 2021 12:51 )  PTT:19.2 sec        RADIOLOGY & ADDITIONAL STUDIES:

## 2021-07-31 NOTE — PROGRESS NOTE ADULT - SUBJECTIVE AND OBJECTIVE BOX
Name of Patient : SHELBY LESTER  MRN: [id number]  DATE OF SERVICE: 07-31-21       Subjective: Patient seen and examined. No new events except as noted.   doing okay    MEDICATIONS:  MEDICATIONS  (STANDING):  aspirin Suppository 300 milliGRAM(s) Rectal daily  ATENolol  Tablet 25 milliGRAM(s) Oral daily  atorvastatin 80 milliGRAM(s) Oral at bedtime  enoxaparin Injectable 40 milliGRAM(s) SubCutaneous <User Schedule>  glucagon  Injectable 1 milliGRAM(s) IntraMuscular once  morphine  - Injectable 2 milliGRAM(s) IV Push once  multivitamin 1 Tablet(s) Oral daily  pantoprazole  Injectable 40 milliGRAM(s) IV Push daily  piperacillin/tazobactam IVPB.. 3.375 Gram(s) IV Intermittent every 8 hours  polyethylene glycol 3350 17 Gram(s) Oral daily  senna 1 Tablet(s) Oral daily  sodium chloride 0.9% with potassium chloride 20 mEq/L 1000 milliLiter(s) (70 mL/Hr) IV Continuous <Continuous>      PHYSICAL EXAM:  T(C): 36.8 (07-31-21 @ 07:20), Max: 36.8 (07-31-21 @ 07:20)  HR: 78 (07-31-21 @ 20:00) (73 - 89)  BP: 150/60 (07-31-21 @ 20:00) (135/48 - 186/81)  RR: 21 (07-31-21 @ 20:00) (14 - 27)  SpO2: 97% (07-31-21 @ 20:00) (96% - 99%)  Wt(kg): --  I&O's Summary    31 Jul 2021 07:01  -  31 Jul 2021 23:44  --------------------------------------------------------  IN: 0 mL / OUT: 800 mL / NET: -800 mL          Appearance: Normal	  HEENT:  PERRLA   Lymphatic: No lymphadenopathy   Cardiovascular: Normal S1 S2, no JVD  Respiratory: normal effort , clear  Gastrointestinal:  Soft, Non-tender  Skin: No rashes,  warm to touch  Psychiatry:  Mood & affect appropriate  Musculuskeletal: No edema      All labs, Imaging and EKGs personally reviewed       07-31-21 @ 07:01  -  07-31-21 @ 23:44  --------------------------------------------------------  IN: 0 mL / OUT: 800 mL / NET: -800 mL                          12.6   12.34 )-----------( 197      ( 31 Jul 2021 04:44 )             37.5               07-31    139  |  103  |  15  ----------------------------<  120<H>  3.0<L>   |  20<L>  |  0.65    Ca    9.2      31 Jul 2021 07:27  Phos  2.2     07-30  Mg     1.6     07-30      PT/INR - ( 30 Jul 2021 12:51 )   PT: 14.3 sec;   INR: 1.20 ratio         PTT - ( 30 Jul 2021 12:51 )  PTT:19.2 sec

## 2021-07-31 NOTE — PROGRESS NOTE ADULT - ASSESSMENT
89 yo F PMH a fib not on AC, PPM, HTN, ovarian Ca, Colon Ca, SBO, GERD p/w aphasia, R weakness, failed s/s evaluation and endoscopic gastrostomy placement, now s/p placement of percutaneous gastrostomy    labs/vitals stable, no abdominal pain/tenderness, site C/D/I  OK to use tube at 530PM tonight

## 2021-07-31 NOTE — PROGRESS NOTE ADULT - ASSESSMENT
89 y/o F with PMH afib not on AC, PPM, HTN, ovarian and colon ca, GERD presents to the ED as a transfer from [facility] for dysarthria and R hemiparesis. NIHSS 8 on repeat exam. R hemiparesis, R facial droop, aphasia (not fluent, not intact to repetition), and dysarthria. 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

## 2021-07-31 NOTE — PROGRESS NOTE ADULT - SUBJECTIVE AND OBJECTIVE BOX
Chief Complaint:  Patient is a 89y old  Female who presents with a chief complaint of stroke transfer (31 Jul 2021 22:33)      Interval Events:   pt. is tolerating GT feeding  Allergies:  No Known Allergies        Home Medications:  aspirin 81 mg oral delayed release tablet: 1 tab(s) orally once a day (27 Jan 2020 11:10)  atenolol 25 mg oral tablet: 1 tab(s) orally once a day (27 Jan 2020 11:10)  gabapentin 100 mg oral capsule: 1 cap(s) orally 3 times a day (27 Jan 2020 11:10)  hydrocodocone chlopheniramine: 5 milliliter(s) orally once a day (at bedtime) (24 Jan 2020 11:01)  Multiple Vitamins oral tablet: 1 tab(s) orally once a day (24 Jan 2020 11:01)  omeprazole 20 mg oral delayed release capsule: 1 cap(s) orally once a day (24 Jan 2020 11:01)  Vitamin B12: 5 milliliter(s) orally once a day (24 Jan 2020 11:01)  zolpidem 5 mg oral tablet: 0.5 tab(s) orally once a day (at bedtime) (24 Jan 2020 11:01)        Hospital Medications:  aspirin Suppository 300 milliGRAM(s) Rectal daily  ATENolol  Tablet 25 milliGRAM(s) Oral daily  atorvastatin 80 milliGRAM(s) Oral at bedtime  glucagon  Injectable 1 milliGRAM(s) IntraMuscular once  morphine  - Injectable 2 milliGRAM(s) IV Push once  multivitamin 1 Tablet(s) Oral daily  pantoprazole  Injectable 40 milliGRAM(s) IV Push daily  piperacillin/tazobactam IVPB.. 3.375 Gram(s) IV Intermittent every 8 hours  polyethylene glycol 3350 17 Gram(s) Oral daily  senna 1 Tablet(s) Oral daily  sodium chloride 0.9% with potassium chloride 20 mEq/L 1000 milliLiter(s) IV Continuous <Continuous>    MEDICATIONS  (PRN):  dexAMETHasone  IVPB 8 milliGRAM(s) IV Intermittent once PRN Nausea and/or Vomiting  HYDROmorphone  Injectable 0.25 milliGRAM(s) IV Push every 10 minutes PRN Moderate Pain (4 - 6)  ondansetron Injectable 4 milliGRAM(s) IV Push once PRN Nausea and/or Vomiting    ___________  Active diet:  Diet, NPO with Tube Feed:   Tube Feeding Modality: Gastrostomy  Jevity 1.2 Wei ([id number])  Continuous  Starting Tube Feed Rate mL per Hour: 10  Increase Tube Feed Rate by (mL): 10     Every 6 hours  Until Goal Tube Feed Rate (mL per Hour): 60  Tube Feed Duration (in Hours): 24  Tube Feed Start Time: 17:30  ___________________        ROS  GI: [- ] Nausea, [- ] vomiting, [ -]abdominal pain    PHYSICAL EXAM:   Vital Signs:  Vital Signs Last 24 Hrs  T(C): 36.8 (31 Jul 2021 07:20), Max: 36.8 (31 Jul 2021 07:20)  T(F): 98.3 (31 Jul 2021 07:20), Max: 98.3 (31 Jul 2021 07:20)  HR: 78 (31 Jul 2021 20:00) (73 - 89)  BP: 150/60 (31 Jul 2021 20:00) (135/48 - 186/81)  BP(mean): 86 (31 Jul 2021 20:00) (71 - 127)  RR: 21 (31 Jul 2021 20:00) (14 - 27)  SpO2: 97% (31 Jul 2021 20:00) (96% - 99%)  Daily     Daily     GENERAL:  Appears stated age, well-groomed, well-nourished, no distress  HEENT:  NC/AT,  conjunctivae clear and pink, no thyromegaly, nodules, adenopathy, no JVD, sclera -anicteric  NECK: Supple, No masses  CHEST:  Full & symmetric excursion, no increased effort, breath sounds clear  HEART:  Regular rhythm, S1, S2, no murmur/rub/S3/S4, no abdominal bruit, no edema  ABDOMEN:  Soft, non-tender, non-distended, normoactive bowel sounds,  no masses ,no hepato-splenomegaly, no signs of chronic liver disease  EXTEREMITIES:  no cyanosis,clubbing or edema  SKIN:  No rash/erythema/ecchymoses/petechiae/wounds/abscess/warm/dry  LN: No lymphadenopathy, No masses  NEURO:  Alert, oriented, no asterixis, no tremor, no encephalopathy    LABS:                        12.6   12.34 )-----------( 197      ( 31 Jul 2021 04:44 )             37.5     07-31    139  |  103  |  15  ----------------------------<  120<H>  3.0<L>   |  20<L>  |  0.65    Ca    9.2      31 Jul 2021 07:27  Phos  2.2     07-30  Mg     1.6     07-30        PT/INR - ( 30 Jul 2021 12:51 )   PT: 14.3 sec;   INR: 1.20 ratio         PTT - ( 30 Jul 2021 12:51 )  PTT:19.2 sec        Imaging:

## 2021-07-31 NOTE — PROGRESS NOTE ADULT - ASSESSMENT
Patient is a 89 y/o Female with PMH afib not on AC, PPM, HTN, ovarian and colon ca, GERD presents to the ED as a transfer from [facility] for stroke.     Problem/Recommendation - 1:  Problem: Stroke. Recommendation: Care as per neurology   Angio noted   ASA/ Statin   protonix  BP control  speech and swallow   GI eval for PEG placement appreicated   palliative eval     worsening leukocytosis  mild low grade T  check procal level  UA Noted,   started on zosyn   CXR  high risk of aspiration  may need CT chest   Pan CT  ID eval called for further recs     Problem/Recommendation - 2:  ·  Problem: Atrial fibrillation.  Recommendation: rate control  Atenolol 25 oral daily.     Problem/Recommendation - 3:  ·  Problem: Hypertension.  Recommendation: resuem BP meds  monitor and adjust as tolerated.     Problem/Recommendation - 4:  ·  Problem: Colon cancer.     Problem/Recommendation - 5:  ·  Problem: GERD (gastroesophageal reflux disease).  Recommendation: PPI.     Problem/Recommendation - 6:  Problem: Prophylactic measure. Recommendation: DVT and gI PPX.

## 2021-07-31 NOTE — PROGRESS NOTE ADULT - SUBJECTIVE AND OBJECTIVE BOX
THE PATIENT WAS SEEN AND EXAMINED BY ME WITH THE HOUSESTAFF AND STROKE TEAM DURING MORNING ROUNDS.   HPI:  89 y/o F with PMH afib not on AC, PPM, HTN, ovarian and colon ca, GERD presents to the ED as a transfer from [facility] for stroke. Per son, pt woke up at 3:30am on 7/25 and went to the bathroom, no issues with gait or speech at that time, accompanied by son. At 7:30 am this morning, pt's son found her half off the bed and unable to speak or walk. Pt was found to have L M1 occlusion on CTA, CTP w/ no core infarct and penumbra 81cc, and transferred for IR thrombectomy. Exam at [facility] notable for severe dysarthria and R hemiparesis. Pt did not receive tpa. NIHSS initially 16 on arrival to [facility] ED, NIHSS 8 on repeat exam. At baseline, son reports that pt is able to do own ADLs (showers, reads) but has an aid to make sure she does not fall, walks with a walker without assistance, speech is fluent.    SUBJECTIVE: No events overnight.  No new neurologic complaints.      MEDICATIONS  (STANDING):  aspirin Suppository 300 milliGRAM(s) Rectal daily  ATENolol  Tablet 25 milliGRAM(s) Oral daily  atorvastatin 80 milliGRAM(s) Oral at bedtime  glucagon  Injectable 1 milliGRAM(s) IntraMuscular once  morphine  - Injectable 2 milliGRAM(s) IV Push once  multivitamin 1 Tablet(s) Oral daily  pantoprazole  Injectable 40 milliGRAM(s) IV Push daily  piperacillin/tazobactam IVPB.. 3.375 Gram(s) IV Intermittent every 8 hours  polyethylene glycol 3350 17 Gram(s) Oral daily  senna 1 Tablet(s) Oral daily  sodium chloride 0.9% with potassium chloride 20 mEq/L 1000 milliLiter(s) (70 mL/Hr) IV Continuous <Continuous>    MEDICATIONS  (PRN):  dexAMETHasone  IVPB 8 milliGRAM(s) IV Intermittent once PRN Nausea and/or Vomiting  HYDROmorphone  Injectable 0.25 milliGRAM(s) IV Push every 10 minutes PRN Moderate Pain (4 - 6)  ondansetron Injectable 4 milliGRAM(s) IV Push once PRN Nausea and/or Vomiting    PHYSICAL EXAM:   Vital Signs Last 24 Hrs  T(C): 36.3 (30 Jul 2021 20:00), Max: 37.2 (30 Jul 2021 07:13)  T(F): 97.4 (30 Jul 2021 20:00), Max: 99 (30 Jul 2021 07:13)  HR: 83 (31 Jul 2021 04:00) (70 - 90)  BP: 150/80 (31 Jul 2021 04:00) (115/86 - 186/81)  BP(mean): 100 (31 Jul 2021 04:00) (78 - 118)  RR: 22 (31 Jul 2021 04:00) (14 - 25)  SpO2: 97% (31 Jul 2021 04:00) (95% - 100%)    General: No acute distress  HEENT: EOM intact, visual fields full  Abdomen: Soft, nontender, nondistended   Extremities: No edema    NEUROLOGICAL EXAM:  Mental status: Awake, alert, does gesture facial expressions relatively in context to situation (smiles appropriately), not following commands. Can mimic physical actions. No verbal output   Cranial Nerves: R facial droop, no nystagmus, tongue midline. Blink to threat b/l.   Motor exam: right hemiplegia with trace flexion in arm and triple flexion in leg, left side moves well against gravity but inattentive and with drift   Sensation: decreased on right   Coordination/ Gait: gait not assessed     LABS:                        12.6   12.34 )-----------( 197      ( 31 Jul 2021 04:44 )             37.5    07-31    139  |  105  |  16  ----------------------------<  122<H>  3.0<L>   |  21<L>  |  0.68    Ca    8.3<L>      31 Jul 2021 04:44  Phos  2.2     07-30  Mg     1.6     07-30    PT/INR - ( 30 Jul 2021 12:51 )   PT: 14.3 sec;   INR: 1.20 ratio         PTT - ( 30 Jul 2021 12:51 )  PTT:19.2 sec      IMAGING: Reviewed by me.       CT Head No Cont (07.26.21)   Acute left MCA territory infarct without evidence of hemorrhagic transformation which has progressed since 7/25/2021. THE PATIENT WAS SEEN AND EXAMINED BY ME WITH THE HOUSESTAFF AND STROKE TEAM DURING MORNING ROUNDS.   HPI:  89 y/o F with PMH afib not on AC, PPM, HTN, ovarian and colon ca, GERD presents to the ED as a transfer from [facility] for stroke. Per son, pt woke up at 3:30am on 7/25 and went to the bathroom, no issues with gait or speech at that time, accompanied by son. At 7:30 am this morning, pt's son found her half off the bed and unable to speak or walk. Pt was found to have L M1 occlusion on CTA, CTP w/ no core infarct and penumbra 81cc, and transferred for IR thrombectomy. Exam at [facility] notable for severe dysarthria and R hemiparesis. Pt did not receive tpa. NIHSS initially 16 on arrival to [facility] ED, NIHSS 8 on repeat exam. At baseline, son reports that pt is able to do own ADLs (showers, reads) but has an aid to make sure she does not fall, walks with a walker without assistance, speech is fluent.    SUBJECTIVE: No events overnight. S/p PEG placement by IR yesterday afternoon. No new neurologic complaints.      MEDICATIONS  (STANDING):  aspirin Suppository 300 milliGRAM(s) Rectal daily  ATENolol  Tablet 25 milliGRAM(s) Oral daily  atorvastatin 80 milliGRAM(s) Oral at bedtime  glucagon  Injectable 1 milliGRAM(s) IntraMuscular once  morphine  - Injectable 2 milliGRAM(s) IV Push once  multivitamin 1 Tablet(s) Oral daily  pantoprazole  Injectable 40 milliGRAM(s) IV Push daily  piperacillin/tazobactam IVPB.. 3.375 Gram(s) IV Intermittent every 8 hours  polyethylene glycol 3350 17 Gram(s) Oral daily  senna 1 Tablet(s) Oral daily  sodium chloride 0.9% with potassium chloride 20 mEq/L 1000 milliLiter(s) (70 mL/Hr) IV Continuous <Continuous>    MEDICATIONS  (PRN):  dexAMETHasone  IVPB 8 milliGRAM(s) IV Intermittent once PRN Nausea and/or Vomiting  HYDROmorphone  Injectable 0.25 milliGRAM(s) IV Push every 10 minutes PRN Moderate Pain (4 - 6)  ondansetron Injectable 4 milliGRAM(s) IV Push once PRN Nausea and/or Vomiting    PHYSICAL EXAM:   Vital Signs Last 24 Hrs  T(C): 36.3 (30 Jul 2021 20:00), Max: 37.2 (30 Jul 2021 07:13)  T(F): 97.4 (30 Jul 2021 20:00), Max: 99 (30 Jul 2021 07:13)  HR: 83 (31 Jul 2021 04:00) (70 - 90)  BP: 150/80 (31 Jul 2021 04:00) (115/86 - 186/81)  BP(mean): 100 (31 Jul 2021 04:00) (78 - 118)  RR: 22 (31 Jul 2021 04:00) (14 - 25)  SpO2: 97% (31 Jul 2021 04:00) (95% - 100%)    General: No acute distress  HEENT: EOM intact, visual fields full  Abdomen: Soft, nontender, nondistended   Extremities: No edema    NEUROLOGICAL EXAM:  Mental status: Awake, alert, does gesture facial expressions relatively in context to situation (smiles appropriately). Not following commands. Can mimic physical actions. No verbal output   Cranial Nerves: R facial droop, no nystagmus, tongue midline. Blink to threat b/l.   Motor exam: right hemiplegia with trace flexion in arm and triple flexion in leg, left side moves well against gravity but inattentive and with drift   Sensation: decreased on right   Coordination/ Gait: gait not assessed     LABS:                        12.6   12.34 )-----------( 197      ( 31 Jul 2021 04:44 )             37.5    07-31    139  |  105  |  16  ----------------------------<  122<H>  3.0<L>   |  21<L>  |  0.68    Ca    8.3<L>      31 Jul 2021 04:44  Phos  2.2     07-30  Mg     1.6     07-30    PT/INR - ( 30 Jul 2021 12:51 )   PT: 14.3 sec;   INR: 1.20 ratio         PTT - ( 30 Jul 2021 12:51 )  PTT:19.2 sec      IMAGING: Reviewed by me.       CT Head No Cont (07.26.21)   Acute left MCA territory infarct without evidence of hemorrhagic transformation which has progressed since 7/25/2021.    TTE with Doppler (w/Cont) (07.28.21)  1. Tethered mitral valve leaflets with normal opening.  Mild-moderate mitral regurgitation.  2. Calcified trileaflet aortic valve with normal opening.  Mild aortic regurgitation.  3. Endocardial visualization enhanced with intravenous  injection of Ultrasonic Enhancing Agent (Definity).  Severe  global left ventricular systolic dysfunction. No left  ventricular thrombus.  4. Normal right ventricular size and function. A device  wire is noted in the right heart.  5. Normal tricuspid valve. Mild tricuspid regurgitation.  6. Agitated saline injection and color flow Doppler  demonstrates no evidence of a patent foramen ovale.  7. Thickened pericardium with small pericardial effusion.  There is a 1cm organized pericardial effusion/pericardial  thrombus adherent to the right ventricle.   There is no  significant inflow variation measured across the mitral or  tricuspid valves.  No evidence of right atrial or right  ventricular collapse.

## 2021-07-31 NOTE — PROGRESS NOTE ADULT - ASSESSMENT
Patient is a 89y female with a past history of A Fib,PPM,HTN,hysterectomy and E lap for SBO/ERICH, history of ? colon cancer and ovarian cancer, who was admitted to [facility] on 7/26 and transferred to [facility] with acute Lt M1 CVA.  She was sent for possible thrombus extraction but procedure aborted for technical reasons.she has dysarthria, has failed swallow evaluation, and peg attempted by GI today was not successful, IR attempt planned for later today.No fever or documented infection, zosyn started 7/29  for elevated wbc.She is s/p 2 dose Covid vaccine series.  She appears hemodynamically stable, A recent urine culture is negative.  Her CXR shows left base density-atelectasis/infiltrate/or effusion.  Leukocytosis could simply be reactive to CVA, I am not convinced she has an acute infection.  As per daughter she was medically stable before CVA.  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  Hence, outside of proctitis, no clear indication for antibiotics  Suggest:  1.Would limit zosyn to 3-5 days, today is day 3  2.Low threshold to obtain blood cultures if she spikes a fever  3.I will leave on zosyn for now , anticipate stopping in next 1-2 days  4."reactive" leukocytosis is typically a diagnosis of exclusion.It may be the explanation here.

## 2021-07-31 NOTE — PROGRESS NOTE ADULT - ASSESSMENT
89 y/o F with PMH afib not on AC, PPM, HTN, ovarian and colon ca, GERD presents to the ED as a transfer from [facility] for stroke. Per son, pt woke up at 3:30am and went to the bathroom, no issues with gait or speech at that time, accompanied by son. At 7:30 am this morning, pt's son found her half off the bed and unable to speak or walk. Pt was found to have L M1 occlusion on CTA, CTP w/ no core infarct and penumbra 81cc, and transferred for IR thrombectomy. Exam at [facility] notable for severe dysarthria and R hemiparesis. Pt did not receive tpa. NIHSS initially 16 on arrival to [facility] ED, NIHSS 8 on repeat exam. At baseline, son reports that pt is able to do own ADLs (showers, reads) but has an aid to make sure she does not fall, walks with a walker without assistance, speech is fluent.  Pt. s/p 2 failed S&S and family approached for PEG.    S/P EGD (07.30.21 @ 09:57) >  Impression:  - Failed PEG placement. The procedure was aborted due to poor endoscopic                        visualization and anatomy.                       - Normal esophagus.                     - Erythematous mucosa in the antrum.                       - Normal examined duodenum.                       - An endoscopically removable PEG placement was attempted but could not be                        successfully completed. The needle/trocar was not passed.                       - No specimens collected.  Recommendation:                             - Perform a radiologic gastrostomy today. Consult called.    Pt. s/p GTube placement by IR  May restart Lovenox for AC  Pt. still with elevated WBC most likely reactive  Cont. IV Abx D#3/5  Nutritionist to see pt. for GTube feeding  Cont. IVF till nutritionist assessment to change over via GTube.  May cont. ASA    Thank you.

## 2021-08-01 LAB
ANION GAP SERPL CALC-SCNC: 13 MMOL/L — SIGNIFICANT CHANGE UP (ref 5–17)
APPEARANCE UR: ABNORMAL
BACTERIA # UR AUTO: ABNORMAL
BASOPHILS # BLD AUTO: 0.06 K/UL — SIGNIFICANT CHANGE UP (ref 0–0.2)
BASOPHILS NFR BLD AUTO: 0.5 % — SIGNIFICANT CHANGE UP (ref 0–2)
BILIRUB UR-MCNC: NEGATIVE — SIGNIFICANT CHANGE UP
BUN SERPL-MCNC: 12 MG/DL — SIGNIFICANT CHANGE UP (ref 7–23)
CALCIUM SERPL-MCNC: 8.7 MG/DL — SIGNIFICANT CHANGE UP (ref 8.4–10.5)
CHLORIDE SERPL-SCNC: 100 MMOL/L — SIGNIFICANT CHANGE UP (ref 96–108)
CO2 SERPL-SCNC: 21 MMOL/L — LOW (ref 22–31)
COLOR SPEC: YELLOW — SIGNIFICANT CHANGE UP
CREAT SERPL-MCNC: 0.63 MG/DL — SIGNIFICANT CHANGE UP (ref 0.5–1.3)
DIFF PNL FLD: ABNORMAL
EOSINOPHIL # BLD AUTO: 0.3 K/UL — SIGNIFICANT CHANGE UP (ref 0–0.5)
EOSINOPHIL NFR BLD AUTO: 2.3 % — SIGNIFICANT CHANGE UP (ref 0–6)
EPI CELLS # UR: 4 /HPF — SIGNIFICANT CHANGE UP
GLUCOSE SERPL-MCNC: 184 MG/DL — HIGH (ref 70–99)
GLUCOSE UR QL: NEGATIVE — SIGNIFICANT CHANGE UP
HCT VFR BLD CALC: 36.8 % — SIGNIFICANT CHANGE UP (ref 34.5–45)
HGB BLD-MCNC: 12.5 G/DL — SIGNIFICANT CHANGE UP (ref 11.5–15.5)
HYALINE CASTS # UR AUTO: 3 /LPF — HIGH (ref 0–2)
IMM GRANULOCYTES NFR BLD AUTO: 0.8 % — SIGNIFICANT CHANGE UP (ref 0–1.5)
KETONES UR-MCNC: ABNORMAL
LEUKOCYTE ESTERASE UR-ACNC: ABNORMAL
LYMPHOCYTES # BLD AUTO: 2.74 K/UL — SIGNIFICANT CHANGE UP (ref 1–3.3)
LYMPHOCYTES # BLD AUTO: 21.3 % — SIGNIFICANT CHANGE UP (ref 13–44)
MAGNESIUM SERPL-MCNC: 1.4 MG/DL — LOW (ref 1.6–2.6)
MCHC RBC-ENTMCNC: 31.5 PG — SIGNIFICANT CHANGE UP (ref 27–34)
MCHC RBC-ENTMCNC: 34 GM/DL — SIGNIFICANT CHANGE UP (ref 32–36)
MCV RBC AUTO: 92.7 FL — SIGNIFICANT CHANGE UP (ref 80–100)
MONOCYTES # BLD AUTO: 1.07 K/UL — HIGH (ref 0–0.9)
MONOCYTES NFR BLD AUTO: 8.3 % — SIGNIFICANT CHANGE UP (ref 2–14)
NEUTROPHILS # BLD AUTO: 8.6 K/UL — HIGH (ref 1.8–7.4)
NEUTROPHILS NFR BLD AUTO: 66.8 % — SIGNIFICANT CHANGE UP (ref 43–77)
NITRITE UR-MCNC: NEGATIVE — SIGNIFICANT CHANGE UP
NRBC # BLD: 0 /100 WBCS — SIGNIFICANT CHANGE UP (ref 0–0)
PH UR: 6 — SIGNIFICANT CHANGE UP (ref 5–8)
PLATELET # BLD AUTO: 224 K/UL — SIGNIFICANT CHANGE UP (ref 150–400)
POTASSIUM SERPL-MCNC: 2.8 MMOL/L — CRITICAL LOW (ref 3.5–5.3)
POTASSIUM SERPL-SCNC: 2.8 MMOL/L — CRITICAL LOW (ref 3.5–5.3)
PROT UR-MCNC: 100 — SIGNIFICANT CHANGE UP
RBC # BLD: 3.97 M/UL — SIGNIFICANT CHANGE UP (ref 3.8–5.2)
RBC # FLD: 13.1 % — SIGNIFICANT CHANGE UP (ref 10.3–14.5)
RBC CASTS # UR COMP ASSIST: 6 /HPF — HIGH (ref 0–4)
SODIUM SERPL-SCNC: 134 MMOL/L — LOW (ref 135–145)
SP GR SPEC: 1.02 — SIGNIFICANT CHANGE UP (ref 1.01–1.02)
UROBILINOGEN FLD QL: NEGATIVE — SIGNIFICANT CHANGE UP
WBC # BLD: 12.87 K/UL — HIGH (ref 3.8–10.5)
WBC # FLD AUTO: 12.87 K/UL — HIGH (ref 3.8–10.5)
WBC UR QL: 600 /HPF — HIGH (ref 0–5)

## 2021-08-01 PROCEDURE — 99233 SBSQ HOSP IP/OBS HIGH 50: CPT

## 2021-08-01 PROCEDURE — 71045 X-RAY EXAM CHEST 1 VIEW: CPT | Mod: 26

## 2021-08-01 PROCEDURE — 70450 CT HEAD/BRAIN W/O DYE: CPT | Mod: 26

## 2021-08-01 PROCEDURE — 93010 ELECTROCARDIOGRAM REPORT: CPT

## 2021-08-01 RX ORDER — ZALEPLON 10 MG
5 CAPSULE ORAL AT BEDTIME
Refills: 0 | Status: DISCONTINUED | OUTPATIENT
Start: 2021-08-01 | End: 2021-08-06

## 2021-08-01 RX ORDER — OLANZAPINE 15 MG/1
2.5 TABLET, FILM COATED ORAL DAILY
Refills: 0 | Status: DISCONTINUED | OUTPATIENT
Start: 2021-08-01 | End: 2021-08-06

## 2021-08-01 RX ORDER — IPRATROPIUM/ALBUTEROL SULFATE 18-103MCG
3 AEROSOL WITH ADAPTER (GRAM) INHALATION ONCE
Refills: 0 | Status: COMPLETED | OUTPATIENT
Start: 2021-08-01 | End: 2021-08-01

## 2021-08-01 RX ORDER — ASPIRIN/CALCIUM CARB/MAGNESIUM 324 MG
81 TABLET ORAL DAILY
Refills: 0 | Status: DISCONTINUED | OUTPATIENT
Start: 2021-08-01 | End: 2021-08-06

## 2021-08-01 RX ORDER — POTASSIUM CHLORIDE 20 MEQ
40 PACKET (EA) ORAL ONCE
Refills: 0 | Status: DISCONTINUED | OUTPATIENT
Start: 2021-08-01 | End: 2021-08-01

## 2021-08-01 RX ORDER — POTASSIUM CHLORIDE 20 MEQ
40 PACKET (EA) ORAL ONCE
Refills: 0 | Status: COMPLETED | OUTPATIENT
Start: 2021-08-01 | End: 2021-08-01

## 2021-08-01 RX ORDER — MAGNESIUM SULFATE 500 MG/ML
2 VIAL (ML) INJECTION ONCE
Refills: 0 | Status: COMPLETED | OUTPATIENT
Start: 2021-08-01 | End: 2021-08-01

## 2021-08-01 RX ORDER — ACETAMINOPHEN 500 MG
1000 TABLET ORAL ONCE
Refills: 0 | Status: DISCONTINUED | OUTPATIENT
Start: 2021-08-01 | End: 2021-08-01

## 2021-08-01 RX ORDER — ZOLPIDEM TARTRATE 10 MG/1
5 TABLET ORAL AT BEDTIME
Refills: 0 | Status: DISCONTINUED | OUTPATIENT
Start: 2021-08-01 | End: 2021-08-01

## 2021-08-01 RX ADMIN — OLANZAPINE 2.5 MILLIGRAM(S): 15 TABLET, FILM COATED ORAL at 16:36

## 2021-08-01 RX ADMIN — HYDROMORPHONE HYDROCHLORIDE 0.25 MILLIGRAM(S): 2 INJECTION INTRAMUSCULAR; INTRAVENOUS; SUBCUTANEOUS at 02:42

## 2021-08-01 RX ADMIN — PIPERACILLIN AND TAZOBACTAM 25 GRAM(S): 4; .5 INJECTION, POWDER, LYOPHILIZED, FOR SOLUTION INTRAVENOUS at 04:30

## 2021-08-01 RX ADMIN — ONDANSETRON 4 MILLIGRAM(S): 8 TABLET, FILM COATED ORAL at 08:45

## 2021-08-01 RX ADMIN — Medication 1 TABLET(S): at 07:58

## 2021-08-01 RX ADMIN — Medication 81 MILLIGRAM(S): at 12:41

## 2021-08-01 RX ADMIN — HYDROMORPHONE HYDROCHLORIDE 0.25 MILLIGRAM(S): 2 INJECTION INTRAMUSCULAR; INTRAVENOUS; SUBCUTANEOUS at 02:12

## 2021-08-01 RX ADMIN — ENOXAPARIN SODIUM 40 MILLIGRAM(S): 100 INJECTION SUBCUTANEOUS at 21:27

## 2021-08-01 RX ADMIN — PIPERACILLIN AND TAZOBACTAM 25 GRAM(S): 4; .5 INJECTION, POWDER, LYOPHILIZED, FOR SOLUTION INTRAVENOUS at 13:33

## 2021-08-01 RX ADMIN — PANTOPRAZOLE SODIUM 40 MILLIGRAM(S): 20 TABLET, DELAYED RELEASE ORAL at 13:36

## 2021-08-01 RX ADMIN — Medication 3 MILLILITER(S): at 22:38

## 2021-08-01 RX ADMIN — PIPERACILLIN AND TAZOBACTAM 25 GRAM(S): 4; .5 INJECTION, POWDER, LYOPHILIZED, FOR SOLUTION INTRAVENOUS at 21:27

## 2021-08-01 RX ADMIN — ATENOLOL 25 MILLIGRAM(S): 25 TABLET ORAL at 05:49

## 2021-08-01 RX ADMIN — ATORVASTATIN CALCIUM 80 MILLIGRAM(S): 80 TABLET, FILM COATED ORAL at 21:27

## 2021-08-01 RX ADMIN — Medication 50 GRAM(S): at 05:56

## 2021-08-01 RX ADMIN — Medication 40 MILLIEQUIVALENT(S): at 07:58

## 2021-08-01 RX ADMIN — Medication 5 MILLIGRAM(S): at 22:12

## 2021-08-01 RX ADMIN — SENNA PLUS 1 TABLET(S): 8.6 TABLET ORAL at 21:27

## 2021-08-01 NOTE — PROVIDER CONTACT NOTE (OTHER) - BACKGROUND
s/p L m2 occlusion; history of afib with pacemaker in place. Son visited yesterday and reported that pt experiences anxiety at baseline.
Patient s/p attempted thrombectomy for ischemic stroke

## 2021-08-01 NOTE — PROGRESS NOTE ADULT - ASSESSMENT
Patient is a 89y female with a past history of A Fib,PPM,HTN,hysterectomy and E lap for SBO/ERICH, history of ? colon cancer and ovarian cancer, who was admitted to [facility] on 7/26 and transferred to [facility] with acute Lt M1 CVA.  She was sent for possible thrombus extraction but procedure aborted for technical reasons.she has dysarthria, has failed swallow evaluation, and peg attempted by GI today was not successful, IR attempt planned for later today.No fever or documented infection, zosyn started 7/29  for elevated wbc.She is s/p 2 dose Covid vaccine series.  She appears hemodynamically stable, A recent urine culture is negative.  Her CXR shows left base density-atelectasis/infiltrate/or effusion.  Leukocytosis could simply be reactive to CVA, I am not convinced she has an acute infection.  As per daughter she was medically stable before CVA.  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  Hence, outside of proctitis, no clear indication for antibiotics.  Her leukocytosis may simply be reactive to stroke.  Suggest:  1.Would limit zosyn to 5 days, today is day 4  2.Low threshold to obtain blood cultures if she spikes a fever  3.I will leave on zosyn for now , anticipate stopping in 24 hours  4."reactive" leukocytosis is typically a diagnosis of exclusion.It may be the explanation here.

## 2021-08-01 NOTE — PROVIDER CONTACT NOTE (CRITICAL VALUE NOTIFICATION) - ASSESSMENT
Pt remains neurologically unchanged, vital signs within set parameters. Pt heart rhythm remains paced.
no s/s of pain or discomfort, VSS
Pt currently has no useable speech, no s/s of pain or discomfort, VSS

## 2021-08-01 NOTE — PROVIDER CONTACT NOTE (CRITICAL VALUE NOTIFICATION) - BACKGROUND
Pt is s/p L M1 occlusion, unsuccessful angio x2
Pt is 89 year old female who was found to have a L M1 occlusion
Pt is 89 year old female who was found to have a L M1 occlusion

## 2021-08-01 NOTE — PROGRESS NOTE ADULT - SUBJECTIVE AND OBJECTIVE BOX
CC: f/u for post stroke leukocytosis    Patient reports: she appears uncomfortable although not able to localize why    REVIEW OF SYSTEMS:  All other review of systems negative (Comprehensive ROS)    Antimicrobials Day #  :day 4  piperacillin/tazobactam IVPB.. 3.375 Gram(s) IV Intermittent every 8 hours    Other Medications Reviewed    T(F): 98.9 (07-31-21 @ 20:00), Max: 98.9 (07-31-21 @ 20:00)  HR: 84 (08-01-21 @ 04:00)  BP: 165/67 (08-01-21 @ 04:00)  RR: 13 (08-01-21 @ 04:00)  SpO2: 98% (08-01-21 @ 04:00)  Wt(kg): --    PHYSICAL EXAM:  General: alert, restless  Eyes:  anicteric, no conjunctival injection, no discharge  Oropharynx: no lesions or injection 	  Neck: supple, without adenopathy  Lungs: clear to auscultation  Heart: irregular rate and rhythm; no murmur,   Abdomen: soft, nondistended, nontender, peg in place  Extremities: no clubbing, cyanosis, or edema  Neurologic: attentive, rt sided weakness    LAB RESULTS:                        12.5   12.87 )-----------( 224      ( 01 Aug 2021 05:01 )             36.8     08-01    134<L>  |  100  |  12  ----------------------------<  184<H>  2.8<LL>   |  21<L>  |  0.63    Ca    8.7      01 Aug 2021 05:01  Mg     1.4     08-01          MICROBIOLOGY:  RECENT CULTURES:  07-28 @ 19:10 Catheterized Catheterized     No growth          RADIOLOGY REVIEWED:  < from: CT Abdomen and Pelvis No Cont (07.30.21 @ 16:25) >  IMPRESSION:  CT findings consistent with proctitis.    Trace bilateral pleural effusions and mild interstitial edema.    --- End of Report    < end of copied text >

## 2021-08-01 NOTE — PROVIDER CONTACT NOTE (CRITICAL VALUE NOTIFICATION) - SITUATION
Pt potassium level is low, her levels were low prior as well
Lab notified RN of critical potassium value of 2.6 from this AM's BMP draw.
Pt potassium level is low, her levels were low prior as well

## 2021-08-01 NOTE — PROGRESS NOTE ADULT - SUBJECTIVE AND OBJECTIVE BOX
Chief Complaint:  Patient is a 89y old  Female who presents with a chief complaint of stroke transfer (01 Aug 2021 09:10)      Interval Events:   pt. more awake, s/p n?v X 1 today  Allergies:  No Known Allergies        Home Medications:  aspirin 81 mg oral delayed release tablet: 1 tab(s) orally once a day (2020 11:10)  atenolol 25 mg oral tablet: 1 tab(s) orally once a day (2020 11:10)  gabapentin 100 mg oral capsule: 1 cap(s) orally 3 times a day (2020 11:10)  hydrocodocone chlopheniramine: 5 milliliter(s) orally once a day (at bedtime) (2020 11:01)  Multiple Vitamins oral tablet: 1 tab(s) orally once a day (2020 11:01)  omeprazole 20 mg oral delayed release capsule: 1 cap(s) orally once a day (2020 11:01)  Vitamin B12: 5 milliliter(s) orally once a day (2020 11:01)  zolpidem 5 mg oral tablet: 0.5 tab(s) orally once a day (at bedtime) (2020 11:01)        Hospital Medications:  aspirin  chewable 81 milliGRAM(s) Oral daily  ATENolol  Tablet 25 milliGRAM(s) Oral daily  atorvastatin 80 milliGRAM(s) Oral at bedtime  enoxaparin Injectable 40 milliGRAM(s) SubCutaneous <User Schedule>  glucagon  Injectable 1 milliGRAM(s) IntraMuscular once  morphine  - Injectable 2 milliGRAM(s) IV Push once  multivitamin 1 Tablet(s) Oral daily  pantoprazole  Injectable 40 milliGRAM(s) IV Push daily  piperacillin/tazobactam IVPB.. 3.375 Gram(s) IV Intermittent every 8 hours  polyethylene glycol 3350 17 Gram(s) Oral daily  senna 1 Tablet(s) Oral daily  sodium chloride 0.9% with potassium chloride 20 mEq/L 1000 milliLiter(s) IV Continuous <Continuous>    MEDICATIONS  (PRN):  dexAMETHasone  IVPB 8 milliGRAM(s) IV Intermittent once PRN Nausea and/or Vomiting  HYDROmorphone  Injectable 0.25 milliGRAM(s) IV Push every 10 minutes PRN Moderate Pain (4 - 6)  OLANZapine 2.5 milliGRAM(s) Oral daily PRN anxiety  zaleplon 5 milliGRAM(s) Oral at bedtime PRN Insomnia    ___________  Active diet:  Diet, NPO with Tube Feed:   Tube Feeding Modality: Gastrostomy  Jevity 1.2 Wei ([id number])  Continuous  Starting Tube Feed Rate mL per Hour: 10  Increase Tube Feed Rate by (mL): 5     Every 4 hours  Until Goal Tube Feed Rate (mL per Hour): 60  Tube Feed Duration (in Hours): 24  Tube Feed Start Time: 13:00  ___________________        ROS  GI: [- ] Nausea, [- ] vomiting, [ -]abdominal pain    PHYSICAL EXAM:   Vital Signs:  Vital Signs Last 24 Hrs  T(C): 36.8 (01 Aug 2021 20:00), Max: 36.8 (01 Aug 2021 08:00)  T(F): 98.2 (01 Aug 2021 20:00), Max: 98.3 (01 Aug 2021 08:00)  HR: 71 (01 Aug 2021 20:00) (71 - 88)  BP: 143/80 (01 Aug 2021 20:00) (140/64 - 169/59)  BP(mean): 101 (01 Aug 2021 20:00) (87 - 158)  RR: 23 (01 Aug 2021 20:00) (13 - 26)  SpO2: 98% (01 Aug 2021 20:00) (93% - 99%)  Daily     Daily     GENERAL:  Appears stated age, well-groomed, well-nourished, no distress  HEENT:  NC/AT,  conjunctivae clear and pink, no thyromegaly, nodules, adenopathy, no JVD, sclera -anicteric  NECK: Supple, No masses  CHEST:  Full & symmetric excursion, no increased effort, breath sounds clear  HEART:  Regular rhythm, S1, S2, no murmur/rub/S3/S4, no abdominal bruit, no edema  ABDOMEN:  Soft, non-tender, GTube in place, C/D, non-distended, normoactive bowel sounds,  no masses ,no hepato-splenomegaly, no signs of chronic liver disease  EXTEREMITIES:  no cyanosis,clubbing or edema  SKIN:  No rash/erythema/ecchymoses/petechiae/wounds/abscess/warm/dry  LN: No lymphadenopathy, No masses  NEURO:  Alert, oriented, no asterixis, no tremor, no encephalopathy    LABS:                        12.5   12.87 )-----------( 224      ( 01 Aug 2021 05:01 )             36.8     08-01    134<L>  |  100  |  12  ----------------------------<  184<H>  2.8<LL>   |  21<L>  |  0.63    Ca    8.7      01 Aug 2021 05:01  Mg     1.4     08-          Urinalysis Basic - ( 01 Aug 2021 18:48 )    Color: Yellow / Appearance: Slightly Turbid / S.023 / pH: x  Gluc: x / Ketone: Small  / Bili: Negative / Urobili: Negative   Blood: x / Protein: 100 / Nitrite: Negative   Leuk Esterase: Large / RBC: 6 /hpf /  /HPF   Sq Epi: x / Non Sq Epi: 4 /hpf / Bacteria: Moderate          Imaging:

## 2021-08-01 NOTE — PROVIDER CONTACT NOTE (CRITICAL VALUE NOTIFICATION) - ACTION/TREATMENT ORDERED:
Potassium supplement IV ordered.
Supplement magnesium first then wait one hour and give magnesium
3 doses of 10mEq in 100ml IVPB

## 2021-08-01 NOTE — PROGRESS NOTE ADULT - ASSESSMENT
89 y/o F with PMH afib not on AC, PPM, HTN, ovarian and colon ca, GERD presents to the ED as a transfer from [facility] for dysarthria and R hemiparesis. NIHSS 8 on repeat exam. R hemiparesis, R facial droop, aphasia (not fluent, not intact to repetition), and dysarthria. 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

## 2021-08-01 NOTE — PROGRESS NOTE ADULT - ASSESSMENT
89 y/o F with PMH afib not on AC, PPM, HTN, ovarian and colon ca, GERD presents to the ED as a transfer from [facility] for stroke. Per son, pt woke up at 3:30am and went to the bathroom, no issues with gait or speech at that time, accompanied by son. At 7:30 am this morning, pt's son found her half off the bed and unable to speak or walk. Pt was found to have L M1 occlusion on CTA, CTP w/ no core infarct and penumbra 81cc, and transferred for IR thrombectomy. Exam at [facility] notable for severe dysarthria and R hemiparesis. Pt did not receive tpa. NIHSS initially 16 on arrival to [facility] ED, NIHSS 8 on repeat exam. At baseline, son reports that pt is able to do own ADLs (showers, reads) but has an aid to make sure she does not fall, walks with a walker without assistance, speech is fluent.  Pt. s/p 2 failed S&S and family approached for PEG.    S/P EGD (07.30.21 @ 09:57) >  Impression:  - Failed PEG placement. The procedure was aborted due to poor endoscopic                        visualization and anatomy.                       - Normal esophagus.                       - Erythematous mucosa in the antrum.                       - Normal examined duodenum.                       - An endoscopically removable PEG placement was attempted but could not be                        successfully completed. The needle/trocar was not passed.                       - No specimens collected.  Recommendation:                             - Pt. will need a radiologic gastrostomy today. Consult called.    S/P Failed PEG placement  Pt. s/p GTube placement by IR   S/P one episode of vomiting today. Agree with decreasing rate and gradually   increase as tolerated till reach goal.  Please keep HOB elevated at 45 degrees.  Aspiration precautions.  Cont. Lovenox for AC  Pt. still with elevated WBC most likely reactive  Cont. IV Abx D#4/5  Nutritionist to see pt. for GTube feeding  Cont. IVF till nutritionist assessment to change over to free water via GTube.  Prefer Bolus feeds once feeding goal reached to help with PT.   May cont. ASA  Cont. Protonix IV for now and may change to Nexium Elixir 40 mg via GTube QD 1/2 hr.  before feeds upon D/C to rehab.    Thank you.

## 2021-08-01 NOTE — PROGRESS NOTE ADULT - ASSESSMENT
Patient is a 89 y/o Female with PMH afib not on AC, PPM, HTN, ovarian and colon ca, GERD presents to the ED as a transfer from [facility] for stroke.     Problem/Recommendation - 1:  Problem: Stroke. Recommendation: Care as per neurology   Angio noted   ASA/ Statin   protonix  BP control  speech and swallow   GI eval for PEG placement appreicated   palliative eval     worsening leukocytosis  mild low grade T  check procal level  UA Noted,   started on zosyn   CXR  high risk of aspiration  may need CT chest   Pan CT  ID eval called for further recs     Problem/Recommendation - 2:  ·  Problem: Atrial fibrillation.  Recommendation: rate control  Atenolol 25 oral daily.     Problem/Recommendation - 3:  ·  Problem: Hypertension.  Recommendation: resuem BP meds  monitor and adjust as tolerated.     Problem/Recommendation - 4:  ·  Problem: Colon cancer.     Problem/Recommendation - 5:  ·  Problem: GERD (gastroesophageal reflux disease).  Recommendation: PPI.     Problem/Recommendation - 6:  Problem: Prophylactic measure. Recommendation: DVT and gI PPX.

## 2021-08-01 NOTE — PROVIDER CONTACT NOTE (OTHER) - SITUATION
R femoral vascular access site bleeding
Given in report that patient was up most of the night and reported pain at times. PT is mildly cooperative but then is immediately restless and agitated upon RN leaving bedside.

## 2021-08-01 NOTE — PROGRESS NOTE ADULT - SUBJECTIVE AND OBJECTIVE BOX
Name of Patient : SHELBY LESTER  MRN: [id number]  DATE OF SERVICE: 21     Subjective: Patient seen and examined. No new events except as noted.         MEDICATIONS:  MEDICATIONS  (STANDING):  albuterol/ipratropium for Nebulization. 3 milliLiter(s) Nebulizer once  aspirin  chewable 81 milliGRAM(s) Oral daily  ATENolol  Tablet 25 milliGRAM(s) Oral daily  atorvastatin 80 milliGRAM(s) Oral at bedtime  enoxaparin Injectable 40 milliGRAM(s) SubCutaneous <User Schedule>  glucagon  Injectable 1 milliGRAM(s) IntraMuscular once  morphine  - Injectable 2 milliGRAM(s) IV Push once  multivitamin 1 Tablet(s) Oral daily  pantoprazole  Injectable 40 milliGRAM(s) IV Push daily  piperacillin/tazobactam IVPB.. 3.375 Gram(s) IV Intermittent every 8 hours  polyethylene glycol 3350 17 Gram(s) Oral daily  senna 1 Tablet(s) Oral daily  sodium chloride 0.9% with potassium chloride 20 mEq/L 1000 milliLiter(s) (70 mL/Hr) IV Continuous <Continuous>      PHYSICAL EXAM:  T(C): 36.8 (21 @ 20:00), Max: 36.8 (21 @ 08:00)  HR: 71 (21 @ 20:00) (71 - 88)  BP: 143/80 (21 @ 20:00) (140/64 - 169/59)  RR: 23 (21 @ 20:00) (13 - 26)  SpO2: 98% (21 @ 20:00) (93% - 99%)  Wt(kg): --  I&O's Summary    2021 07:01  -  01 Aug 2021 07:00  --------------------------------------------------------  IN: 0 mL / OUT: 1500 mL / NET: -1500 mL          Appearance: awake, moaning   HEENT:  PERRLA   Lymphatic: No lymphadenopathy   Cardiovascular: Normal S1 S2  Respiratory: normal effort , clear  Gastrointestinal:  Soft, Non-tender  Skin: No rashes,  warm to touch  Musculuskeletal: No edema      All labs, Imaging and EKGs personally reviewed       21 @ 07:01  -  -21 @ 07:00  --------------------------------------------------------  IN: 0 mL / OUT: 1500 mL / NET: -1500 mL                            12.5   12.87 )-----------( 224      ( 01 Aug 2021 05:01 )             36.8               08-    134<L>  |  100  |  12  ----------------------------<  184<H>  2.8<LL>   |  21<L>  |  0.63    Ca    8.7      01 Aug 2021 05:01  Mg     1.4     08-                         Urinalysis Basic - ( 01 Aug 2021 18:48 )    Color: Yellow / Appearance: Slightly Turbid / S.023 / pH: x  Gluc: x / Ketone: Small  / Bili: Negative / Urobili: Negative   Blood: x / Protein: 100 / Nitrite: Negative   Leuk Esterase: Large / RBC: 6 /hpf /  /HPF   Sq Epi: x / Non Sq Epi: 4 /hpf / Bacteria: Moderate

## 2021-08-01 NOTE — PROVIDER CONTACT NOTE (OTHER) - RECOMMENDATIONS
evaluate patient home regiment and see what we can give her in the meantime.
Team to assess patient.

## 2021-08-01 NOTE — PROGRESS NOTE ADULT - ASSESSMENT
ASSESSMENT: 89 y/o F with PMH afib not on AC, PPM, HTN, ovarian and colon ca, GERD presents to the ED as a transfer from [facility] for dysarthria and R hemiparesis. NIHSS 8 on repeat exam. R hemiparesis, R facial droop, aphasia (not fluent, not intact to repetition), and dysarthria. 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     Impression: R hemiparesis, R facial droop, aphasia (not fluent, not intact to repetition), and dysarthria likely 2/2 L brain dysfunction 2/2 L M1 occlusion found on CTA likely 2/2 cardioembolic etiology (hx of afib not on AC).     NEURO: Patient is neurologically stable. Continue close monitoring for neurologic deterioration. Mechanical thrombectomy was unsuccessful at time of admission. Nsx noted severe tortuosity preventing access to left ICA intracranially. Hemicrani deferred due to advanced age. Goal for SBP of 100-180. Patient on statin for LDL goal less than 70. MRI Brain would not change medical management as pt has hx of afib and was not on AC. Head CT as noted above. Physical therapy and OT recommend CHARLES.     ANTITHROMBOTIC THERAPY:  rectal. Will begin Eliquis 5mg BID in 2 weeks after repeat stable head CT.     PULMONARY: CXR clear, protecting airway, saturating well on room air     CARDIOVASCULAR: TTE shows mild-moderate mitral regurgitation, mild aortic regurgitation, severe global LV systolic dysfunction, normal RV function, mild tricuspid regurgitation. Thickened pericardium with small pericardial effusion.  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 Patient has PPM placed in 2019. EP interrogated and found multiple episodes of atrial fibrillation and flutter. Will continue cardiac monitoring.                             SBP goal: 100-180 mmhg     GASTROINTESTINAL:  dysphagia screen- failed 7/26. Re eval, 7/27 failed. PEG placed by IR 7/30 afternoon. Will begin tube feeds today at 5:30 pm      Diet: PEG placed, will begin TF at 5:30 pm     RENAL: BUN/Cr within normal limits, good urine output. Consistently has had hypokalemia despite repletion, on NS with KCl. Checking BMP q 12 hr.       Na Goal: Greater than 135     Kessler: no     HEMATOLOGY: H/H within normal limits, Platelets normal at 197      DVT ppx: lovenox subq     ID: afebrile, slight leukocytosis- down trending. UA -, concern for aspiration PNA on CXR. ID following, will be on zosyn 3-5 days. Will continue to monitor for s/sx of infection.      OTHER: Condition and plan of care d/w patient's daughter and son, discussed risks/benefits/alternatives to goals of care including but not limited to NGT vs. comfort feeds vs. PEG, at this time they wish to further discuss with palliative care and GI prior to making a decision. Palliative meeting held on 07/30/21 family all in agreement with PEG placement.    DISPOSITION: [facility] once stable and workup is complete    CORE MEASURES:        Admission NIHSS: 16      TPA: [] YES [x] NO      LDL/HDL: 123/30     Depression Screen: p     Statin Therapy: yes     Dysphagia Screen: [] PASS [x] FAIL     Smoking [] YES [x] NO      Afib [x] YES [] NO     Stroke Education [x] YES [] NO    Obtain screening lower extremity venous ultrasound in patients who meet 1 or more of the following criteria as patient is high risk for DVT/PE on admission:   [] History of DVT/PE  []Hypercoagulable states (Factor V Leiden, Cancer, OCP, etc. )  []Prolonged immobility (hemiplegia/hemiparesis/post operative or any other extended immobilization)  [] Transferred from outside facility (Rehab or Long term care)  [] Age </= to 50   ASSESSMENT: 89 y/o F with PMH afib not on AC, PPM, HTN, ovarian and colon ca, GERD presents to the ED as a transfer from [facility] for dysarthria and R hemiparesis. NIHSS 8 on repeat exam. R hemiparesis, R facial droop, aphasia (not fluent, not intact to repetition), and dysarthria. 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     Impression: R hemiparesis, R facial droop, aphasia (not fluent, not intact to repetition), and dysarthria likely 2/2 L brain dysfunction 2/2 L M1 occlusion found on CTA likely 2/2 cardioembolic etiology (hx of afib not on AC).     NEURO: Patient is neurologically stable. Continue close monitoring for neurologic deterioration. Mechanical thrombectomy was unsuccessful at time of admission. Nsx noted severe tortuosity preventing access to left ICA intracranially. Hemicrani deferred due to advanced age. Goal for SBP of 100-180. Patient on statin for LDL goal less than 70. MRI Brain would not change medical management as pt has hx of afib and was not on AC. Head CT as noted above. Physical therapy and OT recommend CHARLES. Zaleplon restarted for insomnia.     ANTITHROMBOTIC THERAPY:  rectal. May begin Eliquis 5mg BID in 2 weeks after repeat stable head CT pending C discussion.     PULMONARY: CXR clear, protecting airway, saturating well on room air     CARDIOVASCULAR: TTE shows mild-moderate mitral regurgitation, mild aortic regurgitation, severe global LV systolic dysfunction, normal RV function, mild tricuspid regurgitation. Thickened pericardium with small pericardial effusion.  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 Patient has PPM placed in 2019. EP interrogated and found multiple episodes of atrial fibrillation and flutter. Will continue cardiac monitoring.                             SBP goal: 100-180 mmhg     GASTROINTESTINAL:  dysphagia screen- failed 7/26. Re eval, 7/27 failed. PEG placed by IR 7/30 afternoon. Tube feeds started 7/31 at 5:30 pm. Will decrease rate due to patient being NPO for several days and vomiting once today.      Diet: PEG with TF    RENAL: BUN/Cr within normal limits, good urine output. Consistently has had hypokalemia despite repletion, on NS with KCl. Checking BMP q 12 hr.       Na Goal: Greater than 135     Kessler: no     HEMATOLOGY: H/H within normal limits, Platelets normal at 197      DVT ppx: lovenox subq     ID: afebrile, slight leukocytosis- down trending. UA -, concern for aspiration PNA on CXR. ID following, will be on zosyn 5 days. Will continue to monitor for s/sx of infection.      OTHER: Condition and plan of care d/w patient's daughter and son, discussed risks/benefits/alternatives to goals of care including but not limited to NGT vs. comfort feeds vs. PEG, at this time they wish to further discuss with palliative care and GI prior to making a decision. Palliative meeting held on 07/30/21 family all in agreement with PEG placement. Will need follow up with palliative regarding DNR/DNI and if wish to pursue anticoagulation with risk of bleeding.     DISPOSITION: pending GOC discussion. PT recommends CHARLES     CORE MEASURES:        Admission NIHSS: 16      TPA: [] YES [x] NO      LDL/HDL: 123/30     Depression Screen: p     Statin Therapy: yes     Dysphagia Screen: [] PASS [x] FAIL     Smoking [] YES [x] NO      Afib [x] YES [] NO     Stroke Education [x] YES [] NO    Obtain screening lower extremity venous ultrasound in patients who meet 1 or more of the following criteria as patient is high risk for DVT/PE on admission:   [] History of DVT/PE  []Hypercoagulable states (Factor V Leiden, Cancer, OCP, etc. )  []Prolonged immobility (hemiplegia/hemiparesis/post operative or any other extended immobilization)  [] Transferred from outside facility (Rehab or Long term care)  [] Age </= to 50   ASSESSMENT: 89 y/o F with PMH afib not on AC, PPM, HTN, ovarian and colon ca, GERD presents to the ED as a transfer from [facility] for dysarthria and R hemiparesis. NIHSS 8 on repeat exam. R hemiparesis, R facial droop, aphasia (not fluent, not intact to repetition), and dysarthria. 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     Impression: R hemiparesis, R facial droop, aphasia (not fluent, not intact to repetition), and dysarthria likely 2/2 L brain dysfunction 2/2 L M1 occlusion found on CTA likely 2/2 cardioembolic etiology (hx of afib not on AC).     NEURO: Patient is neurologically stable. Continue close monitoring for neurologic deterioration. Mechanical thrombectomy was unsuccessful at time of admission. Nsx noted severe tortuosity preventing access to left ICA intracranially. Hemicrani deferred due to advanced age. Goal for SBP of 100-180. Patient on statin for LDL goal less than 70. MRI Brain would not change medical management as pt has hx of afib and was not on AC. Head CT as noted above. Physical therapy and OT recommend CHARLES. Zaleplon restarted for insomnia.     ANTITHROMBOTIC THERAPY:  rectal. May begin Eliquis 5mg BID in 2 weeks after repeat stable head CT pending C discussion.     PULMONARY: CXR clear, protecting airway, saturating well on room air     CARDIOVASCULAR: TTE shows mild-moderate mitral regurgitation, mild aortic regurgitation, severe global LV systolic dysfunction, normal RV function, mild tricuspid regurgitation. Thickened pericardium with small pericardial effusion.  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 Patient has PPM placed in 2019. EP interrogated and found multiple episodes of atrial fibrillation and flutter. Will continue cardiac monitoring.                             SBP goal: 100-180 mmhg     GASTROINTESTINAL:  dysphagia screen- failed 7/26. Re eval, 7/27 failed. PEG placed by IR 7/30 afternoon. Tube feeds started 7/31 at 5:30 pm. Will decrease rate due to patient being NPO for several days and vomiting once today.      Diet: PEG with TF    RENAL: BUN/Cr within normal limits, good urine output. Consistently has had hypokalemia despite repletion, on NS with KCl. Checking BMP q 12 hr.       Na Goal: Greater than 135     Kessler: no     HEMATOLOGY: H/H within normal limits, Platelets normal at 224     DVT ppx: lovenox subq     ID: afebrile, slight leukocytosis- down trending. UA -, concern for aspiration PNA on CXR. ID following, will be on zosyn 5 days. Will continue to monitor for s/sx of infection.      OTHER: Condition and plan of care d/w patient's daughter and son, discussed risks/benefits/alternatives to goals of care including but not limited to NGT vs. comfort feeds vs. PEG, at this time they wish to further discuss with palliative care and GI prior to making a decision. Palliative meeting held on 07/30/21 family all in agreement with PEG placement. Will need follow up with palliative regarding DNR/DNI and if wish to pursue anticoagulation with risk of bleeding.     DISPOSITION: pending GOC discussion. PT recommends [facility]     CORE MEASURES:        Admission NIHSS: 16      TPA: [] YES [x] NO      LDL/HDL: 123/30     Depression Screen: p     Statin Therapy: yes     Dysphagia Screen: [] PASS [x] FAIL     Smoking [] YES [x] NO      Afib [x] YES [] NO     Stroke Education [x] YES [] NO    Obtain screening lower extremity venous ultrasound in patients who meet 1 or more of the following criteria as patient is high risk for DVT/PE on admission:   [] History of DVT/PE  []Hypercoagulable states (Factor V Leiden, Cancer, OCP, etc. )  []Prolonged immobility (hemiplegia/hemiparesis/post operative or any other extended immobilization)  [] Transferred from outside facility (Rehab or Long term care)  [] Age </= to 50

## 2021-08-01 NOTE — PROGRESS NOTE ADULT - SUBJECTIVE AND OBJECTIVE BOX
THE PATIENT WAS SEEN AND EXAMINED BY ME WITH THE HOUSESTAFF AND STROKE TEAM DURING MORNING ROUNDS.   HPI:  89 y/o F with PMH afib not on AC, PPM, HTN, ovarian and colon ca, GERD presents to the ED as a transfer from [facility] for stroke. Per son, pt woke up at 3:30am on 7/25 and went to the bathroom, no issues with gait or speech at that time, accompanied by son. At 7:30 am this morning, pt's son found her half off the bed and unable to speak or walk. Pt was found to have L M1 occlusion on CTA, CTP w/ no core infarct and penumbra 81cc, and transferred for IR thrombectomy. Exam at [facility] notable for severe dysarthria and R hemiparesis. Pt did not receive tpa. NIHSS initially 16 on arrival to [facility] ED, NIHSS 8 on repeat exam. At baseline, son reports that pt is able to do own ADLs (showers, reads) but has an aid to make sure she does not fall, walks with a walker without assistance, speech is fluent.    SUBJECTIVE: No events overnight. S/p PEG placement by IR yesterday afternoon. No new neurologic complaints.      MEDICATIONS  (STANDING):  aspirin  chewable 81 milliGRAM(s) Oral daily  ATENolol  Tablet 25 milliGRAM(s) Oral daily  atorvastatin 80 milliGRAM(s) Oral at bedtime  enoxaparin Injectable 40 milliGRAM(s) SubCutaneous <User Schedule>  glucagon  Injectable 1 milliGRAM(s) IntraMuscular once  morphine  - Injectable 2 milliGRAM(s) IV Push once  multivitamin 1 Tablet(s) Oral daily  pantoprazole  Injectable 40 milliGRAM(s) IV Push daily  piperacillin/tazobactam IVPB.. 3.375 Gram(s) IV Intermittent every 8 hours  polyethylene glycol 3350 17 Gram(s) Oral daily  potassium chloride   Powder 40 milliEquivalent(s) Oral once  senna 1 Tablet(s) Oral daily  sodium chloride 0.9% with potassium chloride 20 mEq/L 1000 milliLiter(s) (70 mL/Hr) IV Continuous <Continuous>    MEDICATIONS  (PRN):  dexAMETHasone  IVPB 8 milliGRAM(s) IV Intermittent once PRN Nausea and/or Vomiting  HYDROmorphone  Injectable 0.25 milliGRAM(s) IV Push every 10 minutes PRN Moderate Pain (4 - 6)  ondansetron Injectable 4 milliGRAM(s) IV Push once PRN Nausea and/or Vomiting      PHYSICAL EXAM:   Vital Signs Last 24 Hrs  T(C): 37.2 (31 Jul 2021 20:00), Max: 37.2 (31 Jul 2021 20:00)  T(F): 98.9 (31 Jul 2021 20:00), Max: 98.9 (31 Jul 2021 20:00)  HR: 79 (01 Aug 2021 06:00) (73 - 89)  BP: 154/72 (01 Aug 2021 06:00) (135/48 - 169/59)  BP(mean): 91 (01 Aug 2021 06:00) (71 - 127)  RR: 25 (01 Aug 2021 06:00) (13 - 27)  SpO2: 98% (01 Aug 2021 06:00) (96% - 99%)    General: No acute distress  HEENT: EOM intact, visual fields full  Abdomen: Soft, nontender, nondistended   Extremities: No edema    NEUROLOGICAL EXAM:  Mental status: Awake, alert, does gesture facial expressions relatively in context to situation (smiles appropriately). Not following commands. Can mimic physical actions. No verbal output   Cranial Nerves: R facial droop, no nystagmus, tongue midline. Blink to threat b/l.   Motor exam: right hemiplegia with trace flexion in arm and triple flexion in leg, left side moves well against gravity but inattentive and with drift   Sensation: decreased on right   Coordination/ Gait: gait not assessed       LABS:                        12.5   12.87 )-----------( 224      ( 01 Aug 2021 05:01 )             36.8    08-01    134<L>  |  100  |  12  ----------------------------<  184<H>  2.8<LL>   |  21<L>  |  0.63    Ca    8.7      01 Aug 2021 05:01  Mg     1.4     08-01    PT/INR - ( 30 Jul 2021 12:51 )   PT: 14.3 sec;   INR: 1.20 ratio         PTT - ( 30 Jul 2021 12:51 )  PTT:19.2 sec      IMAGING: Reviewed by me.   CT Head No Cont (07.26.21)   Acute left MCA territory infarct without evidence of hemorrhagic transformation which has progressed since 7/25/2021.    TTE with Doppler (w/Cont) (07.28.21)  1. Tethered mitral valve leaflets with normal opening.  Mild-moderate mitral regurgitation.  2. Calcified trileaflet aortic valve with normal opening.  Mild aortic regurgitation.  3. Endocardial visualization enhanced with intravenous  injection of Ultrasonic Enhancing Agent (Definity).  Severe  global left ventricular systolic dysfunction. No left  ventricular thrombus.  4. Normal right ventricular size and function. A device  wire is noted in the right heart.  5. Normal tricuspid valve. Mild tricuspid regurgitation.  6. Agitated saline injection and color flow Doppler  demonstrates no evidence of a patent foramen ovale.  7. Thickened pericardium with small pericardial effusion.  There is a 1cm organized pericardial effusion/pericardial  thrombus adherent to the right ventricle.   There is no  significant inflow variation measured across the mitral or  tricuspid valves.  No evidence of right atrial or right  ventricular collapse.     THE PATIENT WAS SEEN AND EXAMINED BY ME WITH THE HOUSESTAFF AND STROKE TEAM DURING MORNING ROUNDS.   HPI:  89 y/o F with PMH afib not on AC, PPM, HTN, ovarian and colon ca, GERD presents to the ED as a transfer from [facility] for stroke. Per son, pt woke up at 3:30am on 7/25 and went to the bathroom, no issues with gait or speech at that time, accompanied by son. At 7:30 am this morning, pt's son found her half off the bed and unable to speak or walk. Pt was found to have L M1 occlusion on CTA, CTP w/ no core infarct and penumbra 81cc, and transferred for IR thrombectomy. Exam at [facility] notable for severe dysarthria and R hemiparesis. Pt did not receive tpa. NIHSS initially 16 on arrival to [facility] ED, NIHSS 8 on repeat exam. At baseline, son reports that pt is able to do own ADLs (showers, reads) but has an aid to make sure she does not fall, walks with a walker without assistance, speech is fluent.    SUBJECTIVE: No events overnight. Pt less anxious this morning than last night. No new neurologic complaints.      MEDICATIONS  (STANDING):  aspirin  chewable 81 milliGRAM(s) Oral daily  ATENolol  Tablet 25 milliGRAM(s) Oral daily  atorvastatin 80 milliGRAM(s) Oral at bedtime  enoxaparin Injectable 40 milliGRAM(s) SubCutaneous <User Schedule>  glucagon  Injectable 1 milliGRAM(s) IntraMuscular once  morphine  - Injectable 2 milliGRAM(s) IV Push once  multivitamin 1 Tablet(s) Oral daily  pantoprazole  Injectable 40 milliGRAM(s) IV Push daily  piperacillin/tazobactam IVPB.. 3.375 Gram(s) IV Intermittent every 8 hours  polyethylene glycol 3350 17 Gram(s) Oral daily  potassium chloride   Powder 40 milliEquivalent(s) Oral once  senna 1 Tablet(s) Oral daily  sodium chloride 0.9% with potassium chloride 20 mEq/L 1000 milliLiter(s) (70 mL/Hr) IV Continuous <Continuous>    MEDICATIONS  (PRN):  dexAMETHasone  IVPB 8 milliGRAM(s) IV Intermittent once PRN Nausea and/or Vomiting  HYDROmorphone  Injectable 0.25 milliGRAM(s) IV Push every 10 minutes PRN Moderate Pain (4 - 6)  ondansetron Injectable 4 milliGRAM(s) IV Push once PRN Nausea and/or Vomiting    PHYSICAL EXAM:   Vital Signs Last 24 Hrs  T(C): 37.2 (31 Jul 2021 20:00), Max: 37.2 (31 Jul 2021 20:00)  T(F): 98.9 (31 Jul 2021 20:00), Max: 98.9 (31 Jul 2021 20:00)  HR: 79 (01 Aug 2021 06:00) (73 - 89)  BP: 154/72 (01 Aug 2021 06:00) (135/48 - 169/59)  BP(mean): 91 (01 Aug 2021 06:00) (71 - 127)  RR: 25 (01 Aug 2021 06:00) (13 - 27)  SpO2: 98% (01 Aug 2021 06:00) (96% - 99%)    General: No acute distress  HEENT: EOM intact, visual fields full  Abdomen: Soft, nontender, nondistended   Extremities: No edema    NEUROLOGICAL EXAM:  Mental status: Awake, alert, does gesture facial expressions relatively in context to situation (smiles appropriately). Not following commands, perseverates on one action. Can mimic physical actions. Minimal verbal output   Cranial Nerves: R facial droop, no nystagmus, tongue midline. Blink to threat b/l.   Motor exam: right hemiplegia with trace flexion in arm. RLE 3-4/5. Left side moves well against gravity but inattentive and with drift   Sensation: intact to noxious stimuli b/l. Unable to assess light touch due to minimal verbal output   Coordination/ Gait: gait not assessed     LABS:                        12.5   12.87 )-----------( 224      ( 01 Aug 2021 05:01 )             36.8    08-01    134<L>  |  100  |  12  ----------------------------<  184<H>  2.8<LL>   |  21<L>  |  0.63    Ca    8.7      01 Aug 2021 05:01  Mg     1.4     08-01    PT/INR - ( 30 Jul 2021 12:51 )   PT: 14.3 sec;   INR: 1.20 ratio      PTT - ( 30 Jul 2021 12:51 )  PTT:19.2 sec      IMAGING: Reviewed by me.   CT Head No Cont (07.26.21)   Acute left MCA territory infarct without evidence of hemorrhagic transformation which has progressed since 7/25/2021.    TTE with Doppler (w/Cont) (07.28.21)  1. Tethered mitral valve leaflets with normal opening.  Mild-moderate mitral regurgitation.  2. Calcified trileaflet aortic valve with normal opening.  Mild aortic regurgitation.  3. Endocardial visualization enhanced with intravenous  injection of Ultrasonic Enhancing Agent (Definity).  Severe  global left ventricular systolic dysfunction. No left  ventricular thrombus.  4. Normal right ventricular size and function. A device  wire is noted in the right heart.  5. Normal tricuspid valve. Mild tricuspid regurgitation.  6. Agitated saline injection and color flow Doppler  demonstrates no evidence of a patent foramen ovale.  7. Thickened pericardium with small pericardial effusion.  There is a 1cm organized pericardial effusion/pericardial  thrombus adherent to the right ventricle.   There is no  significant inflow variation measured across the mitral or  tricuspid valves.  No evidence of right atrial or right  ventricular collapse.    CT Head No Cont (08.01.21)  Continued evolution of left insular and posterior frontotemporal stroke. No areas of intraparenchymal hemorrhage.

## 2021-08-01 NOTE — PROGRESS NOTE ADULT - SUBJECTIVE AND OBJECTIVE BOX
Subjective: Patient seen and examined. No new events except as noted.   remains in Stroke unit   +abd pain   moaning     REVIEW OF SYSTEMS:    CONSTITUTIONAL:+ weakness, fevers or chills  EYES/ENT: No visual changes;  No vertigo or throat pain   NECK: No pain or stiffness  RESPIRATORY: No cough, wheezing, hemoptysis; No shortness of breath  CARDIOVASCULAR: No chest pain or palpitations  GASTROINTESTINAL: No abdominal or epigastric pain. No nausea, vomiting, or hematemesis; No diarrhea or constipation. No melena or hematochezia.  GENITOURINARY: No dysuria, frequency or hematuria  NEUROLOGICAL: No numbness or weakness  SKIN: No itching, burning, rashes, or lesions   All other review of systems is negative unless indicated above.    MEDICATIONS:  MEDICATIONS  (STANDING):  aspirin  chewable 81 milliGRAM(s) Oral daily  ATENolol  Tablet 25 milliGRAM(s) Oral daily  atorvastatin 80 milliGRAM(s) Oral at bedtime  enoxaparin Injectable 40 milliGRAM(s) SubCutaneous <User Schedule>  glucagon  Injectable 1 milliGRAM(s) IntraMuscular once  morphine  - Injectable 2 milliGRAM(s) IV Push once  multivitamin 1 Tablet(s) Oral daily  pantoprazole  Injectable 40 milliGRAM(s) IV Push daily  piperacillin/tazobactam IVPB.. 3.375 Gram(s) IV Intermittent every 8 hours  polyethylene glycol 3350 17 Gram(s) Oral daily  senna 1 Tablet(s) Oral daily  sodium chloride 0.9% with potassium chloride 20 mEq/L 1000 milliLiter(s) (70 mL/Hr) IV Continuous <Continuous>      PHYSICAL EXAM:  T(C): 37.2 (07-31-21 @ 20:00), Max: 37.2 (07-31-21 @ 20:00)  HR: 71 (08-01-21 @ 08:00) (71 - 89)  BP: 147/75 (08-01-21 @ 08:00) (135/48 - 169/59)  RR: 26 (08-01-21 @ 08:00) (13 - 27)  SpO2: 93% (08-01-21 @ 08:00) (93% - 99%)  Wt(kg): --  I&O's Summary    31 Jul 2021 07:01  -  01 Aug 2021 07:00  --------------------------------------------------------  IN: 0 mL / OUT: 1500 mL / NET: -1500 mL          Appearance: NAD	  HEENT:   Dry  oral mucosa, PERRL, EOMI	  Lymphatic: No lymphadenopathy  Cardiovascular: irregular S1 S2, No JVD, No murmurs, No edema  Respiratory: Decreased bs  Psychiatry: A & O x 3, sleepy   Gastrointestinal:  Soft, Non-tender, + BS	  Skin: No rashes, No ecchymoses, No cyanosis	  Extremities: Normal range of motion, No clubbing, cyanosis or edema  Vascular: Peripheral pulses palpable 2+ bilaterally  NEUROLOGICAL EXAM:  Mental status: Awake, alert, does gesture facial expressions relatively in context to situation (smiles appropriately), not following commands. Can mimic physical actions. No verbal output   Cranial Nerves: R facial droop, no nystagmus, tongue midline. Blink to threat b/l.   Motor exam: right hemiplegia with trace flexion in arm and triple flexion in leg, left side moves well against gravity but inattentive and with drift   Sensation: decreased on right   Coordination/ Gait: gait not assessed           LABS:    CARDIAC MARKERS:                                12.5   12.87 )-----------( 224      ( 01 Aug 2021 05:01 )             36.8     08-01    134<L>  |  100  |  12  ----------------------------<  184<H>  2.8<LL>   |  21<L>  |  0.63    Ca    8.7      01 Aug 2021 05:01  Mg     1.4     08-01      proBNP:   Lipid Profile:   HgA1c:   TSH:             TELEMETRY: 	AF     ECG:  	  RADIOLOGY:   DIAGNOSTIC TESTING:  [ ] Echocardiogram:  [ ]  Catheterization:  [ ] Stress Test:    OTHER:

## 2021-08-02 DIAGNOSIS — R13.10 DYSPHAGIA, UNSPECIFIED: ICD-10-CM

## 2021-08-02 DIAGNOSIS — I63.9 CEREBRAL INFARCTION, UNSPECIFIED: ICD-10-CM

## 2021-08-02 DIAGNOSIS — D72.829 ELEVATED WHITE BLOOD CELL COUNT, UNSPECIFIED: ICD-10-CM

## 2021-08-02 DIAGNOSIS — J90 PLEURAL EFFUSION, NOT ELSEWHERE CLASSIFIED: ICD-10-CM

## 2021-08-02 DIAGNOSIS — R06.89 OTHER ABNORMALITIES OF BREATHING: ICD-10-CM

## 2021-08-02 LAB
ANION GAP SERPL CALC-SCNC: 12 MMOL/L — SIGNIFICANT CHANGE UP (ref 5–17)
ANION GAP SERPL CALC-SCNC: 7 MMOL/L — SIGNIFICANT CHANGE UP (ref 5–17)
BASE EXCESS BLDV CALC-SCNC: 2 MMOL/L — SIGNIFICANT CHANGE UP (ref -2–2)
BASOPHILS # BLD AUTO: 0.04 K/UL — SIGNIFICANT CHANGE UP (ref 0–0.2)
BASOPHILS NFR BLD AUTO: 0.3 % — SIGNIFICANT CHANGE UP (ref 0–2)
BUN SERPL-MCNC: 13 MG/DL — SIGNIFICANT CHANGE UP (ref 7–23)
BUN SERPL-MCNC: 15 MG/DL — SIGNIFICANT CHANGE UP (ref 7–23)
CA-I SERPL-SCNC: 1.06 MMOL/L — LOW (ref 1.12–1.3)
CALCIUM SERPL-MCNC: 7.1 MG/DL — LOW (ref 8.4–10.5)
CALCIUM SERPL-MCNC: 8.7 MG/DL — SIGNIFICANT CHANGE UP (ref 8.4–10.5)
CHLORIDE BLDV-SCNC: 107 MMOL/L — SIGNIFICANT CHANGE UP (ref 96–108)
CHLORIDE SERPL-SCNC: 101 MMOL/L — SIGNIFICANT CHANGE UP (ref 96–108)
CHLORIDE SERPL-SCNC: 111 MMOL/L — HIGH (ref 96–108)
CO2 BLDV-SCNC: 28 MMOL/L — SIGNIFICANT CHANGE UP (ref 22–30)
CO2 SERPL-SCNC: 21 MMOL/L — LOW (ref 22–31)
CO2 SERPL-SCNC: 21 MMOL/L — LOW (ref 22–31)
CREAT SERPL-MCNC: 0.66 MG/DL — SIGNIFICANT CHANGE UP (ref 0.5–1.3)
CREAT SERPL-MCNC: 0.78 MG/DL — SIGNIFICANT CHANGE UP (ref 0.5–1.3)
CULTURE RESULTS: NO GROWTH — SIGNIFICANT CHANGE UP
EOSINOPHIL # BLD AUTO: 0.27 K/UL — SIGNIFICANT CHANGE UP (ref 0–0.5)
EOSINOPHIL NFR BLD AUTO: 2.1 % — SIGNIFICANT CHANGE UP (ref 0–6)
GAS PNL BLDV: 133 MMOL/L — LOW (ref 135–145)
GAS PNL BLDV: SIGNIFICANT CHANGE UP
GAS PNL BLDV: SIGNIFICANT CHANGE UP
GLUCOSE BLDV-MCNC: 128 MG/DL — HIGH (ref 70–99)
GLUCOSE SERPL-MCNC: 112 MG/DL — HIGH (ref 70–99)
GLUCOSE SERPL-MCNC: 133 MG/DL — HIGH (ref 70–99)
HCO3 BLDV-SCNC: 27 MMOL/L — SIGNIFICANT CHANGE UP (ref 21–29)
HCT VFR BLD CALC: 39.7 % — SIGNIFICANT CHANGE UP (ref 34.5–45)
HCT VFR BLDA CALC: 40 % — SIGNIFICANT CHANGE UP (ref 39–50)
HGB BLD CALC-MCNC: 13.1 G/DL — SIGNIFICANT CHANGE UP (ref 11.5–15.5)
HGB BLD-MCNC: 13.2 G/DL — SIGNIFICANT CHANGE UP (ref 11.5–15.5)
IMM GRANULOCYTES NFR BLD AUTO: 1 % — SIGNIFICANT CHANGE UP (ref 0–1.5)
LACTATE BLDV-MCNC: 2.1 MMOL/L — HIGH (ref 0.7–2)
LYMPHOCYTES # BLD AUTO: 27.5 % — SIGNIFICANT CHANGE UP (ref 13–44)
LYMPHOCYTES # BLD AUTO: 3.46 K/UL — HIGH (ref 1–3.3)
MAGNESIUM SERPL-MCNC: 1.6 MG/DL — SIGNIFICANT CHANGE UP (ref 1.6–2.6)
MCHC RBC-ENTMCNC: 31.3 PG — SIGNIFICANT CHANGE UP (ref 27–34)
MCHC RBC-ENTMCNC: 33.2 GM/DL — SIGNIFICANT CHANGE UP (ref 32–36)
MCV RBC AUTO: 94.1 FL — SIGNIFICANT CHANGE UP (ref 80–100)
MONOCYTES # BLD AUTO: 1.01 K/UL — HIGH (ref 0–0.9)
MONOCYTES NFR BLD AUTO: 8 % — SIGNIFICANT CHANGE UP (ref 2–14)
NEUTROPHILS # BLD AUTO: 7.7 K/UL — HIGH (ref 1.8–7.4)
NEUTROPHILS NFR BLD AUTO: 61.1 % — SIGNIFICANT CHANGE UP (ref 43–77)
NRBC # BLD: 0 /100 WBCS — SIGNIFICANT CHANGE UP (ref 0–0)
NT-PROBNP SERPL-SCNC: HIGH PG/ML (ref 0–300)
OTHER CELLS CSF MANUAL: 7 ML/DL — LOW (ref 18–22)
PCO2 BLDV: 46 MMHG — SIGNIFICANT CHANGE UP (ref 35–50)
PH BLDV: 7.39 — SIGNIFICANT CHANGE UP (ref 7.35–7.45)
PLATELET # BLD AUTO: 248 K/UL — SIGNIFICANT CHANGE UP (ref 150–400)
PO2 BLDV: 26 MMHG — SIGNIFICANT CHANGE UP (ref 25–45)
POTASSIUM BLDV-SCNC: 3.3 MMOL/L — LOW (ref 3.5–5.3)
POTASSIUM SERPL-MCNC: 4 MMOL/L — SIGNIFICANT CHANGE UP (ref 3.5–5.3)
POTASSIUM SERPL-MCNC: 6.7 MMOL/L — CRITICAL HIGH (ref 3.5–5.3)
POTASSIUM SERPL-SCNC: 4 MMOL/L — SIGNIFICANT CHANGE UP (ref 3.5–5.3)
POTASSIUM SERPL-SCNC: 6.7 MMOL/L — CRITICAL HIGH (ref 3.5–5.3)
PROCALCITONIN SERPL-MCNC: 0.08 NG/ML — SIGNIFICANT CHANGE UP (ref 0.02–0.1)
RBC # BLD: 4.22 M/UL — SIGNIFICANT CHANGE UP (ref 3.8–5.2)
RBC # FLD: 13.5 % — SIGNIFICANT CHANGE UP (ref 10.3–14.5)
SAO2 % BLDV: 37 % — LOW (ref 67–88)
SODIUM SERPL-SCNC: 134 MMOL/L — LOW (ref 135–145)
SODIUM SERPL-SCNC: 139 MMOL/L — SIGNIFICANT CHANGE UP (ref 135–145)
SPECIMEN SOURCE: SIGNIFICANT CHANGE UP
WBC # BLD: 12.6 K/UL — HIGH (ref 3.8–10.5)
WBC # FLD AUTO: 12.6 K/UL — HIGH (ref 3.8–10.5)

## 2021-08-02 PROCEDURE — 71250 CT THORAX DX C-: CPT | Mod: 26

## 2021-08-02 RX ORDER — FUROSEMIDE 40 MG
40 TABLET ORAL ONCE
Refills: 0 | Status: COMPLETED | OUTPATIENT
Start: 2021-08-02 | End: 2021-08-02

## 2021-08-02 RX ORDER — IPRATROPIUM/ALBUTEROL SULFATE 18-103MCG
3 AEROSOL WITH ADAPTER (GRAM) INHALATION EVERY 6 HOURS
Refills: 0 | Status: DISCONTINUED | OUTPATIENT
Start: 2021-08-02 | End: 2021-08-09

## 2021-08-02 RX ORDER — BUDESONIDE, MICRONIZED 100 %
0.5 POWDER (GRAM) MISCELLANEOUS EVERY 12 HOURS
Refills: 0 | Status: DISCONTINUED | OUTPATIENT
Start: 2021-08-02 | End: 2021-08-09

## 2021-08-02 RX ORDER — IPRATROPIUM/ALBUTEROL SULFATE 18-103MCG
3 AEROSOL WITH ADAPTER (GRAM) INHALATION ONCE
Refills: 0 | Status: COMPLETED | OUTPATIENT
Start: 2021-08-02 | End: 2021-08-02

## 2021-08-02 RX ORDER — ACETAMINOPHEN 500 MG
1000 TABLET ORAL ONCE
Refills: 0 | Status: COMPLETED | OUTPATIENT
Start: 2021-08-02 | End: 2021-08-02

## 2021-08-02 RX ORDER — PANTOPRAZOLE SODIUM 20 MG/1
40 TABLET, DELAYED RELEASE ORAL DAILY
Refills: 0 | Status: DISCONTINUED | OUTPATIENT
Start: 2021-08-02 | End: 2021-08-05

## 2021-08-02 RX ADMIN — Medication 3 MILLILITER(S): at 23:16

## 2021-08-02 RX ADMIN — PIPERACILLIN AND TAZOBACTAM 25 GRAM(S): 4; .5 INJECTION, POWDER, LYOPHILIZED, FOR SOLUTION INTRAVENOUS at 20:41

## 2021-08-02 RX ADMIN — ATENOLOL 25 MILLIGRAM(S): 25 TABLET ORAL at 05:41

## 2021-08-02 RX ADMIN — HYDROMORPHONE HYDROCHLORIDE 0.25 MILLIGRAM(S): 2 INJECTION INTRAMUSCULAR; INTRAVENOUS; SUBCUTANEOUS at 11:48

## 2021-08-02 RX ADMIN — Medication 3 MILLILITER(S): at 17:35

## 2021-08-02 RX ADMIN — Medication 1 TABLET(S): at 11:33

## 2021-08-02 RX ADMIN — Medication 3 MILLILITER(S): at 13:14

## 2021-08-02 RX ADMIN — HYDROMORPHONE HYDROCHLORIDE 0.25 MILLIGRAM(S): 2 INJECTION INTRAMUSCULAR; INTRAVENOUS; SUBCUTANEOUS at 02:56

## 2021-08-02 RX ADMIN — Medication 1000 MILLIGRAM(S): at 06:11

## 2021-08-02 RX ADMIN — HYDROMORPHONE HYDROCHLORIDE 0.25 MILLIGRAM(S): 2 INJECTION INTRAMUSCULAR; INTRAVENOUS; SUBCUTANEOUS at 03:26

## 2021-08-02 RX ADMIN — POLYETHYLENE GLYCOL 3350 17 GRAM(S): 17 POWDER, FOR SOLUTION ORAL at 11:33

## 2021-08-02 RX ADMIN — PIPERACILLIN AND TAZOBACTAM 25 GRAM(S): 4; .5 INJECTION, POWDER, LYOPHILIZED, FOR SOLUTION INTRAVENOUS at 11:33

## 2021-08-02 RX ADMIN — Medication 40 MILLIGRAM(S): at 12:45

## 2021-08-02 RX ADMIN — Medication 81 MILLIGRAM(S): at 11:33

## 2021-08-02 RX ADMIN — PIPERACILLIN AND TAZOBACTAM 25 GRAM(S): 4; .5 INJECTION, POWDER, LYOPHILIZED, FOR SOLUTION INTRAVENOUS at 04:40

## 2021-08-02 RX ADMIN — ATORVASTATIN CALCIUM 80 MILLIGRAM(S): 80 TABLET, FILM COATED ORAL at 23:09

## 2021-08-02 RX ADMIN — Medication 3 MILLILITER(S): at 04:40

## 2021-08-02 RX ADMIN — PANTOPRAZOLE SODIUM 40 MILLIGRAM(S): 20 TABLET, DELAYED RELEASE ORAL at 11:33

## 2021-08-02 RX ADMIN — SENNA PLUS 1 TABLET(S): 8.6 TABLET ORAL at 23:09

## 2021-08-02 RX ADMIN — Medication 0.5 MILLIGRAM(S): at 17:35

## 2021-08-02 RX ADMIN — HYDROMORPHONE HYDROCHLORIDE 0.25 MILLIGRAM(S): 2 INJECTION INTRAMUSCULAR; INTRAVENOUS; SUBCUTANEOUS at 14:13

## 2021-08-02 RX ADMIN — ENOXAPARIN SODIUM 40 MILLIGRAM(S): 100 INJECTION SUBCUTANEOUS at 17:37

## 2021-08-02 RX ADMIN — Medication 400 MILLIGRAM(S): at 05:41

## 2021-08-02 NOTE — CONSULT NOTE ADULT - PROBLEM SELECTOR RECOMMENDATION 3
Daughter states the plan is for rehab  Daughter would like to return to the hospital to address reversible causes  Provided anticipatory guidance on hospice in the event of clinical decline  ACP > 16 min
PAF   Needs AC
resuem BP meds  monitor and adjust as tolerated
started on empiric Zosyn by primary team for concern of aspiration PNA   -ID following, today day 5/5   -CT A/P (lower chest) from 7/30 with no PNA. CXR with no clear infiltrate.  -WBC downtrending

## 2021-08-02 NOTE — CONSULT NOTE ADULT - PROBLEM SELECTOR RECOMMENDATION 2
rate control  Atenolol 25 oral daily
For PEG   Acceptable (low to intermediate) cardiac risk  to proceed
PPSv2 30
CT A/P (lower chest) and CXR with small b/l pl effusions  -TTE with EF 30/35%, mild/mod MR  -Bibasilar crackles on exam   -ProBNP added to AM labs (28K)  -Keep O>I as tolerated

## 2021-08-02 NOTE — CONSULT NOTE ADULT - CONSULT REQUESTED DATE/TIME
30-Jul-2021 13:31
25-Jul-2021 12:10
26-Jul-2021 13:23
02-Aug-2021 16:53
30-Jul-2021 11:35
27-Jul-2021 11:31
29-Jul-2021
30-Jul-2021 10:33
02-Aug-2021 11:05
27-Jul-2021 15:43

## 2021-08-02 NOTE — CONSULT NOTE ADULT - ASSESSMENT
89 y/o F with PMH afib not on AC, PPM, HTN, ovarian and colon ca, GERD, PNA x2 as a child  Presents to the ED as a transfer from [facility] for stroke. Pt was found to have L M1 occlusion on CTA, S/p thrombectomy with unsuccessful recanalization. Course c/b leukocytosis - started on empiric Zosyn for concern of aspiration. Called to consult for wheezing, congested cough this AM, which seems to have improved with Duoneb. Sees pulmonologist as an outpt for chronic cough and wheezing.

## 2021-08-02 NOTE — CHART NOTE - NSCHARTNOTEFT_GEN_A_CORE
Nutrition Follow Up Note   Patient seen for: consult for tube feeds and nutrition follow up on stroke unit    Source: medical record, communication with team.     Chart reviewed, events noted. Adm dx: CVA.    Diet Order: Diet, NPO with Tube Feed:   Tube Feeding Modality: Gastrostomy  Jevity 1.2 Wei (JEVITY1.2RTH)  Continuous  Starting Tube Feed Rate {mL per Hour}: 10  Increase Tube Feed Rate by (mL): 5     Every 4 hours  Until Goal Tube Feed Rate (mL per Hour): 60  Tube Feed Duration (in Hours): 24  Tube Feed Start Time: 13:00 (08-01-21 @ 13:15)    CURRENT EN ORDER PROVIDES:  1728 kcals, 80 gm protein, 1162cc free water     Nutrition Events:   - S/P GT by IR 8/1    GI: no N/V/abd distention  fecal incontinence 8/1    Anthropometric Measurements:   Height (cm): 162.6 (07-30-21 @ 14:59)  Weight (kg): 83.1 (07-30-21 @ 14:59)  BMI (kg/m2): 31.4 (07-30-21 @ 14:59)  no recent wt    Medications: MEDICATIONS  (STANDING):  aspirin  chewable 81 milliGRAM(s) Oral daily  ATENolol  Tablet 25 milliGRAM(s) Oral daily  atorvastatin 80 milliGRAM(s) Oral at bedtime  enoxaparin Injectable 40 milliGRAM(s) SubCutaneous <User Schedule>  glucagon  Injectable 1 milliGRAM(s) IntraMuscular once  morphine  - Injectable 2 milliGRAM(s) IV Push once  multivitamin 1 Tablet(s) Oral daily  pantoprazole  Injectable 40 milliGRAM(s) IV Push daily  piperacillin/tazobactam IVPB.. 3.375 Gram(s) IV Intermittent every 8 hours  polyethylene glycol 3350 17 Gram(s) Oral daily  senna 1 Tablet(s) Oral daily  sodium chloride 0.9% with potassium chloride 20 mEq/L 1000 milliLiter(s) (70 mL/Hr) IV Continuous <Continuous>    MEDICATIONS  (PRN):  dexAMETHasone  IVPB 8 milliGRAM(s) IV Intermittent once PRN Nausea and/or Vomiting  HYDROmorphone  Injectable 0.25 milliGRAM(s) IV Push every 10 minutes PRN Moderate Pain (4 - 6)  OLANZapine 2.5 milliGRAM(s) Oral daily PRN anxiety  zaleplon 5 milliGRAM(s) Oral at bedtime PRN Insomnia    Labs: 08-02 @ 05:19: Sodium 139, Potassium 6.7<HH>, Calcium 7.1<L>, Magnesium 1.6, Phosphorus --, BUN 13, Creatinine 0.66, Glucose 112<H>, Alk Phos --, ALT/SGPT --, AST/SGOT --, Albumin --, Prealbumin --, Total Bilirubin --, Hemoglobin 13.2, Hematocrit 39.7, Ferritin --, C-Reactive Protein --, Creatine Kinase <<27>    Triglycerides, Serum: 303 mg/dL (07-25-21 @ 20:34)    Skin: no pressure injuries documented   Edema: none    Estimated Needs:   Energy: based on dosing wt 83.1 kg 9008-7358 kcals ([place]iff. St Jeor equation)  Protein: based on IBW 54.5 kg  71-82 gm protein (1.3-1.5 gm/kg)    Previous Nutrition Diagnosis: swallowing difficulty  Nutrition Diagnosis is: ongoing, addressed w/ GT    Recommended Interventions:   1. recommend Jevity 1.2 at 50cc/hr x 24 hrs to provide 1440 kcals, 67gm protein, 968cc free water  meets 17 Kcal/Kg dosing wt 83.1 kg, 1.2 Gm protein/kg IBW 54.4      Monitoring and Evaluation:   Continue to monitor nutrition provision and tolerance, weights, labs, skin integrity.   RD remains available upon request and will follow up per protocol.

## 2021-08-02 NOTE — CONSULT NOTE ADULT - CONSULT REASON
Medical management
leukocytosis
Evaluate Rehabilitation Needs
stroke code transfer
Dysphagia
failed peg placement
Cardiac Management  Cardiac Clearance
wheezing
Upper airway evaluation
Care plan delineation

## 2021-08-02 NOTE — PROGRESS NOTE ADULT - ASSESSMENT
Patient is a 89 y/o Female with PMH afib not on AC, PPM, HTN, ovarian and colon ca, GERD presents to the ED as a transfer from [facility] for stroke.     Problem/Recommendation - 1:  Problem: Stroke. Recommendation: Care as per neurology   Angio noted   ASA/ Statin   protonix  BP control  speech and swallow   GI eval for PEG placement appreicated   palliative eval     worsening leukocytosis  mild low grade T  procal level  UA Noted,   started on zosyn , 5 days course per ID   CXR  high risk of aspiration  may need CT chest   Pan CT  ID eval called for further recs     Problem/Recommendation - 2:  ·  Problem: Atrial fibrillation.  Recommendation: rate control  Atenolol 25 oral daily.     Problem/Recommendation - 3:  ·  Problem: Hypertension.  Recommendation: resuem BP meds  monitor and adjust as tolerated.     Problem/Recommendation - 4:  ·  Problem: Colon cancer.     Problem/Recommendation - 5:  ·  Problem: GERD (gastroesophageal reflux disease).  Recommendation: PPI.     Problem/Recommendation - 6:  Problem: Prophylactic measure. Recommendation: DVT and gI PPX.

## 2021-08-02 NOTE — CONSULT NOTE ADULT - SUBJECTIVE AND OBJECTIVE BOX
CC: Noisy expiratory breathing    HPI: 89 y/o F with PMH afib not on AC, PPM, HTN, ovarian and colon ca, GERD presents to the ED as a transfer from [facility] for stroke. Pt was found to have L M1 occlusion on CTA, CTP w/ no core infarct and penumbra 81cc, and transferred for IR thrombectomy. Exam at [facility] notable for severe dysarthria and R hemiparesis. Pt did not receive tpa. left MCA ischemic stroke with left M1 occlusion s/p thrombectomy with unsuccessful recanalization. Pt never intubated. ENT consulted as pt w noisy expiratory breathing. Pt has been diuresed, seen by pulm, requesting upper airway eval. Per son, pt never smoker. Pt mouth breather at baseline.  Pt on nasal cannula.        PAST MEDICAL & SURGICAL HISTORY:  Hypertension    Cancer    GERD (gastroesophageal reflux disease)    Anxiety    Ovarian cancer    Colon cancer    H/O small bowel obstruction    H/O total hysterectomy  with BSO    No significant past surgical history      Allergies    No Known Allergies    Intolerances      MEDICATIONS  (STANDING):  albuterol/ipratropium for Nebulization 3 milliLiter(s) Nebulizer every 6 hours  aspirin  chewable 81 milliGRAM(s) Oral daily  ATENolol  Tablet 25 milliGRAM(s) Oral daily  atorvastatin 80 milliGRAM(s) Oral at bedtime  buDESOnide    Inhalation Suspension 0.5 milliGRAM(s) Inhalation every 12 hours  enoxaparin Injectable 40 milliGRAM(s) SubCutaneous <User Schedule>  glucagon  Injectable 1 milliGRAM(s) IntraMuscular once  multivitamin 1 Tablet(s) Oral daily  pantoprazole  Injectable 40 milliGRAM(s) IV Push daily  piperacillin/tazobactam IVPB.. 3.375 Gram(s) IV Intermittent every 8 hours  polyethylene glycol 3350 17 Gram(s) Oral daily  senna 1 Tablet(s) Oral daily    MEDICATIONS  (PRN):  OLANZapine 2.5 milliGRAM(s) Oral daily PRN anxiety  zaleplon 5 milliGRAM(s) Oral at bedtime PRN Insomnia      Social History: Never smoker    Family history:  No pertinent family history in first degree relatives        ROS:   ENT: all negative except as noted in HPI   Skin: No itching, dryness, rash, changes to hair, or skin masses  CV: denies palpitations  Pulm: denies SOB, cough, hemoptysis  GI: denies change in appetite, indigestion, n/v  : denies pertinent urinary symptoms, urgency  Neuro: denies numbness/tingling, loss of sensation  Psych: denies anxiety  MS: denies muscle weakness, instability  Heme: denies easy bruising or bleeding  Endo: denies heat/cold intolerance, excessive sweating  Vascular: denies LE edema    Vital Signs Last 24 Hrs  T(C): 36.2 (02 Aug 2021 16:00), Max: 36.8 (01 Aug 2021 20:00)  T(F): 97.1 (02 Aug 2021 16:00), Max: 98.2 (01 Aug 2021 20:00)  HR: 72 (02 Aug 2021 14:00) (70 - 82)  BP: 142/51 (02 Aug 2021 14:00) (124/93 - 159/65)  BP(mean): 76 (02 Aug 2021 14:00) (76 - 117)  RR: 0 (02 Aug 2021 14:00) (0 - 34)  SpO2: 96% (02 Aug 2021 14:00) (96% - 99%)                          13.2   12.60 )-----------( 248      ( 02 Aug 2021 05:19 )             39.7    08-02    134<L>  |  101  |  15  ----------------------------<  133<H>  4.0   |  21<L>  |  0.78    Ca    8.7      02 Aug 2021 07:15  Mg     1.6     08-02         PHYSICAL EXAM:  Gen: NAD  Skin: No rashes, bruises, or lesions  Head: Normocephalic, Atraumatic  Face: no edema, erythema, or fluctuance. Parotid glands soft without mass  Eyes: no scleral injection  Nose: Nares bilaterally patent, no discharge  Mouth: No Stridor / Drooling / Trismus.  Mucosa dry, tongue/uvula midline  Neck: Flat, supple, no lymphadenopathy, trachea midline, no masses  Lymphatic: No lymphadenopathy  Resp: breathing easily, no stridor, +expiratory wheezing with deep breaths  CV: no peripheral edema/cyanosis  GI: nondistended   Peripheral vascular: no JVD or edema  Neuro: facial nerve intact, no facial droop    Fiberoptic Indirect laryngoscopy:  (Scope #2 used)  Reason for Laryngoscopy: Expiratory wheezing    Patient was unable to cooperate with mirror.  Nasopharynx, oropharynx, and hypopharynx clear, no bleeding. Tongue base, posterior pharyngeal wall, vallecula, epiglottis, and subglottis appear normal. Mild post cricoid erythema and pachydermia consistent with LPR. No edema, pooling of secretions, masses or lesions. Airway patent, no foreign body visualized. No glottic/supraglottic edema. True vocal cords, arytenoids, vestibular folds, ventricles, pyriform sinuses, and aryepiglottic folds appear normal bilaterally. Vocal cords mobile with good contact b/l.

## 2021-08-02 NOTE — CONSULT NOTE ADULT - CONSULT REQUESTED BY NAME
Dr. Hart
ED
Stroke Unit
Dr Ybarra/Hailey
Hailey
Stroke service
Hailey
Dr. Mcconnell
Dr nichols
Dr. Hart

## 2021-08-02 NOTE — PROGRESS NOTE ADULT - PROBLEM SELECTOR PLAN 4
Chronic   outpt follow up with Dr. Watts Cardiomyopathy   ? acute systolic CHF   lasix 40 mg IV x 1 now   check BNP and CXR  outpt follow up with Dr. Watts

## 2021-08-02 NOTE — PROGRESS NOTE ADULT - ASSESSMENT
89 y/o F with PMH afib not on AC, PPM, HTN, ovarian and colon ca, GERD presents to the ED as a transfer from [facility] for stroke. Per son, pt woke up at 3:30am and went to the bathroom, no issues with gait or speech at that time, accompanied by son. At 7:30 am this morning, pt's son found her half off the bed and unable to speak or walk. Pt was found to have L M1 occlusion on CTA, CTP w/ no core infarct and penumbra 81cc, and transferred for IR thrombectomy. Exam at [facility] notable for severe dysarthria and R hemiparesis. Pt did not receive tpa. NIHSS initially 16 on arrival to [facility] ED, NIHSS 8 on repeat exam. At baseline, son reports that pt is able to do own ADLs (showers, reads) but has an aid to make sure she does not fall, walks with a walker without assistance, speech is fluent.  Pt. s/p 2 failed S&S and family approached for PEG.    S/P EGD (07.30.21 @ 09:57) >  Impression:  - Failed PEG placement. The procedure was aborted due to poor endoscopic                        visualization and anatomy.                       - Normal esophagus.                       - Erythematous mucosa in the antrum.                       - Normal examined duodenum.                       - An endoscopically removable PEG placement was attempted but could not be                        successfully completed. The needle/trocar was not passed.                       - No specimens collected.  Recommendation:                             - Pt. will need a radiologic gastrostomy today. Consult called.    S/P Failed PEG placement  Pt. s/p GTube placement by IR   S/P one episode of vomiting now resolved.   Currently tolerating feeds till reach goal.  Please keep HOB elevated at 45 degrees.  Aspiration precautions.  Cont. Lovenox for AC  Pt. still with elevated WBC most likely reactive  Cont. IV Abx D#5/5  Nutritionist to see pt. for GTube feeding  would D/C IVF as pt. may be fluid overloaded   Prefer Bolus feeds once feeding goal reached to help with PT.   May cont. ASA  Cont. Protonix IV for now.  May change to Nexium Elixir 40 mg via GTube QD 1/2 hr. before feeding upon D/C to rehab.    Thank you.

## 2021-08-02 NOTE — PROGRESS NOTE ADULT - SUBJECTIVE AND OBJECTIVE BOX
Subjective: Patient seen and examined. No new events except as noted.   remains in Stroke unit   sleepy     REVIEW OF SYSTEMS:  Unable  to obtain       MEDICATIONS:  MEDICATIONS  (STANDING):  albuterol/ipratropium for Nebulization 3 milliLiter(s) Nebulizer every 6 hours  aspirin  chewable 81 milliGRAM(s) Oral daily  ATENolol  Tablet 25 milliGRAM(s) Oral daily  atorvastatin 80 milliGRAM(s) Oral at bedtime  enoxaparin Injectable 40 milliGRAM(s) SubCutaneous <User Schedule>  glucagon  Injectable 1 milliGRAM(s) IntraMuscular once  morphine  - Injectable 2 milliGRAM(s) IV Push once  multivitamin 1 Tablet(s) Oral daily  pantoprazole  Injectable 40 milliGRAM(s) IV Push daily  piperacillin/tazobactam IVPB.. 3.375 Gram(s) IV Intermittent every 8 hours  polyethylene glycol 3350 17 Gram(s) Oral daily  senna 1 Tablet(s) Oral daily      PHYSICAL EXAM:  T(C): 36.5 (08-02-21 @ 07:11), Max: 36.8 (08-01-21 @ 20:00)  HR: 71 (08-02-21 @ 10:35) (70 - 82)  BP: 143/72 (08-02-21 @ 10:35) (124/93 - 159/65)  RR: 18 (08-02-21 @ 10:35) (14 - 34)  SpO2: 96% (08-02-21 @ 10:35) (96% - 99%)  Wt(kg): --  I&O's Summary    01 Aug 2021 07:01  -  02 Aug 2021 07:00  --------------------------------------------------------  IN: 840 mL / OUT: 360 mL / NET: 480 mL    02 Aug 2021 07:01  -  02 Aug 2021 11:35  --------------------------------------------------------  IN: 30 mL / OUT: 0 mL / NET: 30 mL            Appearance: NAD	  HEENT:   Dry  oral mucosa, PERRL, EOMI	  Lymphatic: No lymphadenopathy  Cardiovascular: irregular S1 S2, No JVD, No murmurs, No edema  Respiratory: Decreased bs  Psychiatry: A & O x 3, sleepy   Gastrointestinal:  Soft, Non-tender, + BS	  Skin: No rashes, No ecchymoses, No cyanosis	  Extremities: Normal range of motion, No clubbing, cyanosis or edema  Vascular: Peripheral pulses palpable 2+ bilaterally  NEUROLOGICAL EXAM:  Mental status: Awake, alert, does gesture facial expressions relatively in context to situation (smiles appropriately), not following commands. Can mimic physical actions. No verbal output   Cranial Nerves: R facial droop, no nystagmus, tongue midline. Blink to threat b/l.   Motor exam: right hemiplegia with trace flexion in arm and triple flexion in leg, left side moves well against gravity but inattentive and with drift   Sensation: decreased on right   Coordination/ Gait: gait not assessed     LABS:    CARDIAC MARKERS:                                13.2   12.60 )-----------( 248      ( 02 Aug 2021 05:19 )             39.7     08-02    134<L>  |  101  |  15  ----------------------------<  133<H>  4.0   |  21<L>  |  0.78    Ca    8.7      02 Aug 2021 07:15  Mg     1.6     08-02      proBNP: Serum Pro-Brain Natriuretic Peptide: 28682 pg/mL (08-02 @ 07:15)    Lipid Profile:   HgA1c:   TSH:         TELEMETRY: 	 AF   ECG:  	  RADIOLOGY:   DIAGNOSTIC TESTING:  [ ] Echocardiogram:  [ ]  Catheterization:  [ ] Stress Test:    OTHER: 	           Subjective: Patient seen and examined. No new events except as noted.   remains in Stroke unit   sleepy       REVIEW OF SYSTEMS:  Unable  to obtain       MEDICATIONS:  MEDICATIONS  (STANDING):  albuterol/ipratropium for Nebulization 3 milliLiter(s) Nebulizer every 6 hours  aspirin  chewable 81 milliGRAM(s) Oral daily  ATENolol  Tablet 25 milliGRAM(s) Oral daily  atorvastatin 80 milliGRAM(s) Oral at bedtime  enoxaparin Injectable 40 milliGRAM(s) SubCutaneous <User Schedule>  glucagon  Injectable 1 milliGRAM(s) IntraMuscular once  morphine  - Injectable 2 milliGRAM(s) IV Push once  multivitamin 1 Tablet(s) Oral daily  pantoprazole  Injectable 40 milliGRAM(s) IV Push daily  piperacillin/tazobactam IVPB.. 3.375 Gram(s) IV Intermittent every 8 hours  polyethylene glycol 3350 17 Gram(s) Oral daily  senna 1 Tablet(s) Oral daily      PHYSICAL EXAM:  T(C): 36.5 (08-02-21 @ 07:11), Max: 36.8 (08-01-21 @ 20:00)  HR: 71 (08-02-21 @ 10:35) (70 - 82)  BP: 143/72 (08-02-21 @ 10:35) (124/93 - 159/65)  RR: 18 (08-02-21 @ 10:35) (14 - 34)  SpO2: 96% (08-02-21 @ 10:35) (96% - 99%)  Wt(kg): --  I&O's Summary    01 Aug 2021 07:01  -  02 Aug 2021 07:00  --------------------------------------------------------  IN: 840 mL / OUT: 360 mL / NET: 480 mL    02 Aug 2021 07:01  -  02 Aug 2021 11:35  --------------------------------------------------------  IN: 30 mL / OUT: 0 mL / NET: 30 mL            Appearance: NAD	  HEENT:   Dry  oral mucosa, PERRL, EOMI	  Lymphatic: No lymphadenopathy  Cardiovascular: irregular S1 S2, No JVD, No murmurs, No edema  Respiratory: Decreased bs  Psychiatry: A & O x 3, sleepy   Gastrointestinal:  Soft, Non-tender, + BS	  Skin: No rashes, No ecchymoses, No cyanosis	  Extremities: Normal range of motion, No clubbing, cyanosis or edema  Vascular: Peripheral pulses palpable 2+ bilaterally  NEUROLOGICAL EXAM:  Mental status: Awake, alert, does gesture facial expressions relatively in context to situation (smiles appropriately), not following commands. Can mimic physical actions. No verbal output   Cranial Nerves: R facial droop, no nystagmus, tongue midline. Blink to threat b/l.   Motor exam: right hemiplegia with trace flexion in arm and triple flexion in leg, left side moves well against gravity but inattentive and with drift   Sensation: decreased on right   Coordination/ Gait: gait not assessed     LABS:    CARDIAC MARKERS:                                13.2   12.60 )-----------( 248      ( 02 Aug 2021 05:19 )             39.7     08-02    134<L>  |  101  |  15  ----------------------------<  133<H>  4.0   |  21<L>  |  0.78    Ca    8.7      02 Aug 2021 07:15  Mg     1.6     08-02      proBNP: Serum Pro-Brain Natriuretic Peptide: 10901 pg/mL (08-02 @ 07:15)    Lipid Profile:   HgA1c:   TSH:         TELEMETRY: 	 AF   ECG:  	  RADIOLOGY: < from: Xray Chest 1 View- PORTABLE-Urgent (Xray Chest 1 View- PORTABLE-Urgent .) (08.01.21 @ 18:31) >    EXAM:  XR CHEST PORTABLE URGENT 1V                            PROCEDURE DATE:  08/01/2021            INTERPRETATION:  Chest one view    HISTORY: Stroke    COMPARISON STUDY: 7/29/2021    Frontal expiratory view of the chest shows the heart to be similarly enlarged in size. Left cardiac pacemaker is again noted.    The lungs show no focal infiltrate and there is no evidence of pneumothorax nor pleural effusion. Old left rib fractures are again noted.    IMPRESSION:  No active pulmonary disease.    Thank you for the courtesy of this referral.    --- End of Report ---    < end of copied text >    DIAGNOSTIC TESTING:  [ ] Echocardiogram:  [ ]  Catheterization:  [ ] Stress Test:    OTHER:

## 2021-08-02 NOTE — PROGRESS NOTE ADULT - SUBJECTIVE AND OBJECTIVE BOX
THE PATIENT WAS SEEN AND EXAMINED BY ME WITH THE HOUSESTAFF AND STROKE TEAM DURING MORNING ROUNDS.   HPI:  89 y/o F with PMH afib not on AC, PPM, HTN, ovarian and colon ca, GERD presents to the ED as a transfer from [facility] for stroke. Per son, pt woke up at 3:30am on 7/25 and went to the bathroom, no issues with gait or speech at that time, accompanied by son. At 7:30 am this morning, pt's son found her half off the bed and unable to speak or walk. Pt was found to have L M1 occlusion on CTA, CTP w/ no core infarct and penumbra 81cc, and transferred for IR thrombectomy. Exam at [facility] notable for severe dysarthria and R hemiparesis. Pt did not receive tpa. NIHSS initially 16 on arrival to [facility] ED, NIHSS 8 on repeat exam. At baseline, son reports that pt is able to do own ADLs (showers, reads) but has an aid to make sure she does not fall, walks with a walker without assistance, speech is fluent.      SUBJECTIVE: No events overnight.  No new neurologic complaints.  ROS reported negative unless otherwise noted.    aspirin  chewable 81 milliGRAM(s) Oral daily  ATENolol  Tablet 25 milliGRAM(s) Oral daily  atorvastatin 80 milliGRAM(s) Oral at bedtime  dexAMETHasone  IVPB 8 milliGRAM(s) IV Intermittent once PRN  enoxaparin Injectable 40 milliGRAM(s) SubCutaneous <User Schedule>  glucagon  Injectable 1 milliGRAM(s) IntraMuscular once  HYDROmorphone  Injectable 0.25 milliGRAM(s) IV Push every 10 minutes PRN  morphine  - Injectable 2 milliGRAM(s) IV Push once  multivitamin 1 Tablet(s) Oral daily  OLANZapine 2.5 milliGRAM(s) Oral daily PRN  pantoprazole  Injectable 40 milliGRAM(s) IV Push daily  piperacillin/tazobactam IVPB.. 3.375 Gram(s) IV Intermittent every 8 hours  polyethylene glycol 3350 17 Gram(s) Oral daily  senna 1 Tablet(s) Oral daily  sodium chloride 0.9% with potassium chloride 20 mEq/L 1000 milliLiter(s) IV Continuous <Continuous>  zaleplon 5 milliGRAM(s) Oral at bedtime PRN      PHYSICAL EXAM:   Vital Signs Last 24 Hrs  T(C): 36.5 (02 Aug 2021 07:11), Max: 36.8 (01 Aug 2021 08:00)  T(F): 97.7 (02 Aug 2021 07:11), Max: 98.3 (01 Aug 2021 08:00)  HR: 82 (02 Aug 2021 06:00) (70 - 82)  BP: 148/75 (02 Aug 2021 06:00) (124/93 - 159/65)  BP(mean): 97 (02 Aug 2021 06:00) (88 - 158)  RR: 15 (02 Aug 2021 06:00) (14 - 34)  SpO2: 97% (02 Aug 2021 06:00) (93% - 99%)    General: No acute distress  HEENT: EOM intact, visual fields full  Abdomen: Soft, nontender, nondistended   Extremities: No edema    NEUROLOGICAL EXAM:  Mental status: Awake, alert, does gesture facial expressions relatively in context to situation (smiles appropriately). Not following commands, perseverates on one action. Can mimic physical actions. Minimal verbal output   Cranial Nerves: R facial droop, no nystagmus, tongue midline. Blink to threat b/l.   Motor exam: right hemiplegia with trace flexion in arm. RLE 3-4/5. Left side moves well against gravity but inattentive and with drift   Sensation: intact to noxious stimuli b/l. Unable to assess light touch due to minimal verbal output   Coordination/ Gait: gait not assessed     LABS:                        13.2   12.60 )-----------( 248      ( 02 Aug 2021 05:19 )             39.7    08-02    139  |  111<H>  |  13  ----------------------------<  112<H>  6.7<HH>   |  21<L>  |  0.66    Ca    7.1<L>      02 Aug 2021 05:19  Mg     1.6     08-02          IMAGING: Reviewed by me.     CT Head No Cont (08.01.21)  Continued evolution of left insular and posterior frontotemporal stroke. No areas of intraparenchymal hemorrhage.    CT Head No Cont (07.26.21)   Acute left MCA territory infarct without evidence of hemorrhagic transformation which has progressed since 7/25/2021.    TTE with Doppler (w/Cont) (07.28.21)  1. Tethered mitral valve leaflets with normal opening.  Mild-moderate mitral regurgitation.  2. Calcified trileaflet aortic valve with normal opening.  Mild aortic regurgitation.  3. Endocardial visualization enhanced with intravenous  injection of Ultrasonic Enhancing Agent (Definity).  Severe  global left ventricular systolic dysfunction. No left  ventricular thrombus.  4. Normal right ventricular size and function. A device  wire is noted in the right heart.  5. Normal tricuspid valve. Mild tricuspid regurgitation.  6. Agitated saline injection and color flow Doppler  demonstrates no evidence of a patent foramen ovale.  7. Thickened pericardium with small pericardial effusion.  There is a 1cm organized pericardial effusion/pericardial  thrombus adherent to the right ventricle.   There is no  significant inflow variation measured across the mitral or  tricuspid valves.  No evidence of right atrial or right  ventricular collapse.    CT Head No Cont (08.01.21)  Continued evolution of left insular and posterior frontotemporal stroke. No areas of intraparenchymal hemorrhage.   THE PATIENT WAS SEEN AND EXAMINED BY ME WITH THE HOUSESTAFF AND STROKE TEAM DURING MORNING ROUNDS.   HPI:  89 y/o F with PMH afib not on AC, PPM, HTN, ovarian and colon ca, GERD presents to the ED as a transfer from [facility] for stroke. Per son, pt woke up at 3:30am on 7/25 and went to the bathroom, no issues with gait or speech at that time, accompanied by son. At 7:30 am that morning of admit , pt's son found her half off the bed and unable to speak or walk. Pt was found to have L M1 occlusion on CTA, CTP w/ no core infarct and penumbra 81cc, and transferred for IR thrombectomy. Exam at [facility] notable for severe dysarthria and R hemiparesis. Pt did not receive tpa. NIHSS initially 16 on arrival to [facility] ED, NIHSS 8 on repeat exam. At baseline, son reports that pt is able to do own ADLs (showers, reads) but has an aid to make sure she does not fall, walks with a walker without assistance .    SUBJECTIVE: Noted not tolerating TF.  No new neurologic complaints.  ROS reported negative unless otherwise noted.    aspirin  chewable 81 milliGRAM(s) Oral daily  ATENolol  Tablet 25 milliGRAM(s) Oral daily  atorvastatin 80 milliGRAM(s) Oral at bedtime  dexAMETHasone  IVPB 8 milliGRAM(s) IV Intermittent once PRN  enoxaparin Injectable 40 milliGRAM(s) SubCutaneous <User Schedule>  glucagon  Injectable 1 milliGRAM(s) IntraMuscular once  HYDROmorphone  Injectable 0.25 milliGRAM(s) IV Push every 10 minutes PRN  morphine  - Injectable 2 milliGRAM(s) IV Push once  multivitamin 1 Tablet(s) Oral daily  OLANZapine 2.5 milliGRAM(s) Oral daily PRN  pantoprazole  Injectable 40 milliGRAM(s) IV Push daily  piperacillin/tazobactam IVPB.. 3.375 Gram(s) IV Intermittent every 8 hours  polyethylene glycol 3350 17 Gram(s) Oral daily  senna 1 Tablet(s) Oral daily  sodium chloride 0.9% with potassium chloride 20 mEq/L 1000 milliLiter(s) IV Continuous <Continuous>  zaleplon 5 milliGRAM(s) Oral at bedtime PRN      PHYSICAL EXAM:   Vital Signs Last 24 Hrs  T(C): 36.5 (02 Aug 2021 07:11), Max: 36.8 (01 Aug 2021 08:00)  T(F): 97.7 (02 Aug 2021 07:11), Max: 98.3 (01 Aug 2021 08:00)  HR: 82 (02 Aug 2021 06:00) (70 - 82)  BP: 148/75 (02 Aug 2021 06:00) (124/93 - 159/65)  BP(mean): 97 (02 Aug 2021 06:00) (88 - 158)  RR: 15 (02 Aug 2021 06:00) (14 - 34)  SpO2: 97% (02 Aug 2021 06:00) (93% - 99%)    General: No acute distress  HEENT: decreased btt on right   Abdomen: Soft, nontender, nondistended   Extremities: No edema    NEUROLOGICAL EXAM:  Mental status: Awake, alert, does gesture facial expressions relatively in context to situation (smiles appropriately). Not following commands, perseverates on one action. Can mimic physical actions. Generates sounds.  Cranial Nerves: R facial droop, no nystagmus, tongue midline. Blink to threat on right .   Motor exam: right hemiplegia with trace flexion in arm. RLE 3-4/5. Left side moves well against gravity but inattentive and with drift   Sensation: intact to noxious stimuli b/l.    Coordination/ Gait: gait not assessed     LABS:                        13.2   12.60 )-----------( 248      ( 02 Aug 2021 05:19 )             39.7    08-02    139  |  111<H>  |  13  ----------------------------<  112<H>  6.7<HH>   |  21<L>  |  0.66    Ca    7.1<L>      02 Aug 2021 05:19  Mg     1.6     08-02          IMAGING: Reviewed by me.     CT Head No Cont (08.01.21)  Continued evolution of left insular and posterior frontotemporal stroke. No areas of intraparenchymal hemorrhage.    CT Head No Cont (07.26.21)   Acute left MCA territory infarct without evidence of hemorrhagic transformation which has progressed since 7/25/2021.    TTE with Doppler (w/Cont) (07.28.21)  1. Tethered mitral valve leaflets with normal opening.  Mild-moderate mitral regurgitation.  2. Calcified trileaflet aortic valve with normal opening.  Mild aortic regurgitation.  3. Endocardial visualization enhanced with intravenous  injection of Ultrasonic Enhancing Agent (Definity).  Severe  global left ventricular systolic dysfunction. No left  ventricular thrombus.  4. Normal right ventricular size and function. A device  wire is noted in the right heart.  5. Normal tricuspid valve. Mild tricuspid regurgitation.  6. Agitated saline injection and color flow Doppler  demonstrates no evidence of a patent foramen ovale.  7. Thickened pericardium with small pericardial effusion.  There is a 1cm organized pericardial effusion/pericardial  thrombus adherent to the right ventricle.   There is no  significant inflow variation measured across the mitral or  tricuspid valves.  No evidence of right atrial or right  ventricular collapse.    CT Head No Cont (08.01.21)  Continued evolution of left insular and posterior frontotemporal stroke. No areas of intraparenchymal hemorrhage.

## 2021-08-02 NOTE — CONSULT NOTE ADULT - SUBJECTIVE AND OBJECTIVE BOX
PULMONARY CONSULT    HPI: 89 y/o F with PMH afib not on AC, PPM, HTN, ovarian and colon ca, GERD, PNA x2 as a child  Presents to the ED as a transfer from [facility] for stroke. Per son, pt woke up and went to the bathroom, no issues with gait or speech at that time. Later that morning, pt's son found her half off the bed and unable to speak or walk. Pt was found to have L M1 occlusion on CTA, CTP w/ no core infarct and penumbra 81cc, and transferred for IR thrombectomy. Exam at [facility] notable for severe dysarthria and R hemiparesis. Pt did not receive tPA. At baseline, son reports that pt is able to do own ADLs (showers, reads) but has an aid to make sure she does not fall, walks with a walker without assistance, speech is fluent. S/p thrombectomy with unsuccessful recanalization. Course c/b dysphagia - s/p PEG placement, and leukocytosis - started on empiric Zosyn for concern of aspiration. Called to consult for wheezing, congested cough this AM, which seems to have improved with Duoneb. Spoke with pts daughter via phone - pt has been seeing Dr. Woodward (pulmonologist) for the past few years for a chronic cough with intermittent wheezing. Never formally diagnosed with COPD or emphysema, but was started on Breo with improvement in symptoms. Also put on PPI for reflux. Her pulmonologist has also been sending her for yearly CT chest to f/u scarring on lungs which was thought to be from previous PNA. ROS unable to be fully obtained, pt mostly aphasic.     PAST MEDICAL & SURGICAL HISTORY:  Hypertension  GERD (gastroesophageal reflux disease)  Anxiety  Ovarian cancer  Colon cancer  H/O small bowel obstruction  H/O total hysterectomy  with BSO    Allergies  No Known Allergies    FAMILY HISTORY:  No pertinent family history in first degree relatives    No pertinent family history in first degree relatives      Social history: never a smoker  +exposure to second hand smoke     Review of Systems: unable to obtain fully, pt mostly aphasic     Medications:  MEDICATIONS  (STANDING):  aspirin  chewable 81 milliGRAM(s) Oral daily  ATENolol  Tablet 25 milliGRAM(s) Oral daily  atorvastatin 80 milliGRAM(s) Oral at bedtime  enoxaparin Injectable 40 milliGRAM(s) SubCutaneous <User Schedule>  glucagon  Injectable 1 milliGRAM(s) IntraMuscular once  morphine  - Injectable 2 milliGRAM(s) IV Push once  multivitamin 1 Tablet(s) Oral daily  pantoprazole  Injectable 40 milliGRAM(s) IV Push daily  piperacillin/tazobactam IVPB.. 3.375 Gram(s) IV Intermittent every 8 hours  polyethylene glycol 3350 17 Gram(s) Oral daily  senna 1 Tablet(s) Oral daily    MEDICATIONS  (PRN):  dexAMETHasone  IVPB 8 milliGRAM(s) IV Intermittent once PRN Nausea and/or Vomiting  HYDROmorphone  Injectable 0.25 milliGRAM(s) IV Push every 10 minutes PRN Moderate Pain (4 - 6)  OLANZapine 2.5 milliGRAM(s) Oral daily PRN anxiety  zaleplon 5 milliGRAM(s) Oral at bedtime PRN Insomnia            Vital Signs Last 24 Hrs  T(C): 36.5 (02 Aug 2021 07:11), Max: 36.8 (01 Aug 2021 20:00)  T(F): 97.7 (02 Aug 2021 07:11), Max: 98.2 (01 Aug 2021 20:00)  HR: 71 (02 Aug 2021 10:35) (70 - 82)  BP: 143/72 (02 Aug 2021 10:35) (124/93 - 159/65)  BP(mean): 97 (02 Aug 2021 06:00) (89 - 103)  RR: 18 (02 Aug 2021 10:35) (14 - 34)  SpO2: 96% (02 Aug 2021 10:35) (96% - 99%)            08-01 @ 07:01  -  08-02 @ 07:00  --------------------------------------------------------  IN: 840 mL / OUT: 360 mL / NET: 480 mL          LABS:                        13.2   12.60 )-----------( 248      ( 02 Aug 2021 05:19 )             39.7     08-    134<L>  |  101  |  15  ----------------------------<  133<H>  4.0   |  21<L>  |  0.78    Ca    8.7      02 Aug 2021 07:15  Mg     1.6     08-02            Urinalysis Basic - ( 01 Aug 2021 18:48 )    Color: Yellow / Appearance: Slightly Turbid / S.023 / pH: x  Gluc: x / Ketone: Small  / Bili: Negative / Urobili: Negative   Blood: x / Protein: 100 / Nitrite: Negative   Leuk Esterase: Large / RBC: 6 /hpf /  /HPF   Sq Epi: x / Non Sq Epi: 4 /hpf / Bacteria: Moderate              CULTURES:       Culture - Urine (collected 21 @ 19:10)  Source: Catheterized Catheterized  Final Report (21 @ 22:56):    No growth            Physical Examination:    General: No acute distress.      HEENT: Pupils equal, reactive to light.  Symmetric.    PULM: trace bibasilar crackles, no wheezing     CVS: S1, S2    ABD: Soft, nondistended, nontender, normoactive bowel sounds, no masses    EXT: No edema, nontender    SKIN: Warm and well perfused, no rashes noted.    NEURO: Alert, oriented, interactive, nonfocal      RADIOLOGY REVIEWED  CXR : small b/l pl effusions   mild congestion     CT A/P (lower chest): < from: CT Abdomen and Pelvis No Cont (21 @ 16:25) >  LOWER CHEST: Trace bilateral pleural effusions and smooth interlobular septal thickening. Mild bibasilar atelectasis. Multichamber cardiac enlargement. Trace pericardial fluid. Cardiac device leads in the right atrium and right ventricle. Coronary artery atherosclerotic calcifications. Calcifications of the aortic valve leaflets.    < end of copied text >      TTE: < from: TTE with Doppler (w/Cont) (21 @ 09:42) >  Dimensions:    Normal Values:  LA:     3.2    2.0 - 4.0 cm  Ao:     2.5    2.0 - 3.8 cm  SEPTUM: 0.6    0.6 - 1.2 cm  PWT:    1.0    0.6 - 1.1 cm  LVIDd:  4.8    3.0 - 5.6 cm  LVIDs:  4.2    1.8 - 4.0 cm  Derived variables:  LVMI: 67 g/m2  RWT: 0.41  Fractional short: 13 %  EF (Visual Estimate): 30-35 %  Doppler Peak Velocity (m/sec): AoV=0.9  ------------------------------------------------------------------------  Observations:  Mitral Valve: Tethered mitral valve leaflets with normal  opening. Mild-moderate mitral regurgitation.  Aortic Valve/Aorta: Calcified trileaflet aortic valve with  normal opening. Peak transaortic valve gradient equals 3 mm  Hg, estimated aortic valve area equals 2.4 sqcm. Mild  aortic regurgitation.  Peak left ventricular outflow tract  gradient equals 2 mm Hg.  Aortic Root: 2.5 cm.  LVOT diameter: 2 cm.  Left Atrium: Moderately dilated left atrium.  LA volume  index = 46 cc/m2.  Left Ventricle: Endocardial visualization enhanced with  intravenous injection of Ultrasonic Enhancing Agent  (Definity).  Severe global left ventricular systolic  dysfunction. No left ventricular thrombus. Normal left  ventricular internal dimensions and wall thicknesses.  Moderate diastolic dysfunction (Stage II).  Right Heart: Normal right atrium. Normalright ventricular  size and function. A device wire is noted in the right  heart. Normal tricuspid valve. Mild tricuspid  regurgitation. Normal pulmonic valve. Minimal pulmonic  regurgitation.  Pericardium/Pleura: Thickened pericardium with small  pericardial effusion. There is a 1cm organized pericardial  effusion/pericardial thrombus adherent to the right  ventricle.   There is no significant inflow variation  measured across the mitral or tricuspid valves.  No  evidence of right atrial or right ventricular collapse.  Hemodynamic: Estimated right ventricular systolic pressure  equals 37 mm Hg, assuming right atrial pressure equals 8 mm  Hg, consistent with borderline pulmonary hypertension.  Agitated saline injection and color flow Doppler  demonstrates no evidence of a patent foramen ovale.  ------------------------------------------------------------------------  Conclusions:  1. Tethered mitral valve leaflets with normal opening.  Mild-moderate mitral regurgitation.  2. Calcified trileaflet aortic valve with normal opening.  Mild aortic regurgitation.  3. Endocardial visualization enhanced with intravenous  injection of Ultrasonic Enhancing Agent (Definity).  Severe  global left ventricular systolic dysfunction. No left  ventricular thrombus.  4. Normal right ventricular size and function. A device  wire is noted in the right heart.  5. Normal tricuspid valve. Mild tricuspid regurgitation.  6. Agitated saline injection and color flow Doppler  demonstrates no evidence of a patent foramen ovale.  7. Thickened pericardium with small pericardial effusion.  There is a 1cm organized pericardial effusion/pericardial  thrombus adherent to the right ventricle.   There is no  significant inflow variation measured across the mitral or  tricuspid valves.  No evidence of right atrial or right  ventricular collapse.  *** Compared with echocardiogram of 2019  unable to  directly compare due to poor quality of prior study.    < end of copied text >

## 2021-08-02 NOTE — CONSULT NOTE ADULT - PROVIDER SPECIALTY LIST ADULT
ENT
Palliative Care
Gastroenterology
Infectious Disease
Neurology
Pulmonology
Rehab Medicine
Intervent Radiology
Cardiology
Internal Medicine

## 2021-08-02 NOTE — PROVIDER CONTACT NOTE (OTHER) - REASON
Pt w/ urinary retention
R femoral vascular access site bleeding
Pt appears to be highly anxious; minimal sleep overnight

## 2021-08-02 NOTE — PROGRESS NOTE ADULT - SUBJECTIVE AND OBJECTIVE BOX
Chief Complaint:  Patient is a 89y old  Female who presents with a chief complaint of stroke transfer (02 Aug 2021 16:52)      Interval Events:   pt. more awake, tolerating GTube feeding  Allergies:  No Known Allergies        Home Medications:  aspirin 81 mg oral delayed release tablet: 1 tab(s) orally once a day (2020 11:10)  atenolol 25 mg oral tablet: 1 tab(s) orally once a day (2020 11:10)  gabapentin 100 mg oral capsule: 1 cap(s) orally 3 times a day (2020 11:10)  hydrocodocone chlopheniramine: 5 milliliter(s) orally once a day (at bedtime) (2020 11:01)  Multiple Vitamins oral tablet: 1 tab(s) orally once a day (2020 11:01)  omeprazole 20 mg oral delayed release capsule: 1 cap(s) orally once a day (2020 11:01)  Vitamin B12: 5 milliliter(s) orally once a day (2020 11:01)  zolpidem 5 mg oral tablet: 0.5 tab(s) orally once a day (at bedtime) (2020 11:01)        Hospital Medications:  albuterol/ipratropium for Nebulization 3 milliLiter(s) Nebulizer every 6 hours  aspirin  chewable 81 milliGRAM(s) Oral daily  ATENolol  Tablet 25 milliGRAM(s) Oral daily  atorvastatin 80 milliGRAM(s) Oral at bedtime  buDESOnide    Inhalation Suspension 0.5 milliGRAM(s) Inhalation every 12 hours  enoxaparin Injectable 40 milliGRAM(s) SubCutaneous <User Schedule>  glucagon  Injectable 1 milliGRAM(s) IntraMuscular once  multivitamin 1 Tablet(s) Oral daily  pantoprazole  Injectable 40 milliGRAM(s) IV Push daily  piperacillin/tazobactam IVPB.. 3.375 Gram(s) IV Intermittent every 8 hours  polyethylene glycol 3350 17 Gram(s) Oral daily  senna 1 Tablet(s) Oral daily    MEDICATIONS  (PRN):  OLANZapine 2.5 milliGRAM(s) Oral daily PRN anxiety  zaleplon 5 milliGRAM(s) Oral at bedtime PRN Insomnia    ___________  Active diet:  Diet, NPO with Tube Feed:   Tube Feeding Modality: Gastrostomy  Jevity 1.2 Wei (JEVITY1.2RTH)  Total Volume for 24 Hours (mL): 1200  Continuous  Starting Tube Feed Rate mL per Hour: 10  Increase Tube Feed Rate by (mL): 5     Every 4 hours  Until Goal Tube Feed Rate (mL per Hour): 50  Tube Feed Duration (in Hours): 24  Tube Feed Start Time: 13:00  ___________________        ROS  GI: [- ] Nausea, [- ] vomiting, [ -]abdominal pain    PHYSICAL EXAM:   Vital Signs:  Vital Signs Last 24 Hrs  T(C): 36.2 (02 Aug 2021 16:00), Max: 36.8 (01 Aug 2021 20:00)  T(F): 97.1 (02 Aug 2021 16:00), Max: 98.2 (01 Aug 2021 20:00)  HR: 72 (02 Aug 2021 14:00) (70 - 82)  BP: 142/51 (02 Aug 2021 14:00) (124/93 - 159/65)  BP(mean): 76 (02 Aug 2021 14:00) (76 - 117)  RR: 0 (02 Aug 2021 14:00) (0 - 34)  SpO2: 96% (02 Aug 2021 14:00) (96% - 99%)  Daily     Daily     GENERAL:  Appears stated age, well-groomed, well-nourished, no distress  HEENT:  NC/AT,  conjunctivae clear and pink, no thyromegaly, nodules, adenopathy, no JVD, sclera -anicteric  NECK: Supple, No masses  CHEST:  B/L rakes at bases 1/2 way UP  HEART:  Regular rhythm, S1, S2, no murmur/rub/S3/S4, no abdominal bruit, no edema  ABDOMEN:  Soft, non-tender, non-distended, normoactive bowel sounds,  no masses ,no hepato-splenomegaly, no signs of chronic liver disease  EXTEREMITIES:  no cyanosis,clubbing or edema  SKIN:  No rash/erythema/ecchymoses/petechiae/wounds/abscess/warm/dry  LN: No lymphadenopathy, No masses  NEURO:  Alert, oriented, no asterixis, no tremor, no encephalopathy    LABS:                        13.2   12.60 )-----------( 248      ( 02 Aug 2021 05:19 )             39.7     08-02    134<L>  |  101  |  15  ----------------------------<  133<H>  4.0   |  21<L>  |  0.78    Ca    8.7      02 Aug 2021 07:15  Mg     1.6     08-02          Urinalysis Basic - ( 01 Aug 2021 18:48 )    Color: Yellow / Appearance: Slightly Turbid / S.023 / pH: x  Gluc: x / Ketone: Small  / Bili: Negative / Urobili: Negative   Blood: x / Protein: 100 / Nitrite: Negative   Leuk Esterase: Large / RBC: 6 /hpf /  /HPF   Sq Epi: x / Non Sq Epi: 4 /hpf / Bacteria: Moderate          Imaging:

## 2021-08-02 NOTE — CONSULT NOTE ADULT - ASSESSMENT
89 yo F s/p CVA with intermittent upper respiratory wheezing with deep exhalation. laryngoscopy reveals intact upper airway. VCs are appropriately mobile, no edema. Mild to moderate reflux given post cricoid erythema and pachydermia

## 2021-08-02 NOTE — CONSULT NOTE ADULT - PROBLEM SELECTOR RECOMMENDATION 9
never a smoker, +exposure to second hand smoke   -Reported hx of chronic cough with wheezing, uses Breo as an outpatient (sees Dr. Woodward)  -Wheezing this AM, improved with Duoneb  -Start Duoneb q6h (doubt pt will be able to properly use MDI at this time)  -Will add Pulmicort if continues to wheeze  -Per daughter, pt has scarring on her lungs which has been followed with yearly CT chest non contrast (last one over 1 year ago). Check CT chest non contrast.
Likely cardioembolic in setting PAF   Aspiration precautions   ? AC
-Upper airway clear  -Continue w PPI for GERD/LPR  -Further workup per pulm, stroke team  -Reconsult ENT as needed
Family had decided upon PEG previously
Care as per neurology   Angio noted   ASA/ Statin   protonix  BP control  speech and swallow   GI eval for PEG placement

## 2021-08-02 NOTE — PROGRESS NOTE ADULT - ASSESSMENT
89 y/o F with PMH afib not on AC, PPM, HTN, ovarian and colon ca, GERD presents to the ED as a transfer from [facility] for dysarthria and R hemiparesis. NIHSS 8 on repeat exam. R hemiparesis, R facial droop, aphasia (not fluent, not intact to repetition), and dysarthria. 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

## 2021-08-02 NOTE — CONSULT NOTE ADULT - ATTENDING COMMENTS
noted to have ?stridor vs audible wheezing.  Normal laryngoscopy, both cords mobile, mild reflux.  No stridor heard on our exam, c/w current care (already on PPI) pt seen and examined by me on 08/03/21, see my note from that day for my findings.

## 2021-08-02 NOTE — CONSULT NOTE ADULT - PROBLEM SELECTOR PROBLEM 1
Stroke
Cerebrovascular accident (CVA), unspecified mechanism
Noisy breathing
R/O COPD (chronic obstructive pulmonary disease)
Dysphagia, unspecified type

## 2021-08-02 NOTE — CONSULT NOTE ADULT - REASON FOR ADMISSION
stroke transfer

## 2021-08-02 NOTE — PROGRESS NOTE ADULT - SUBJECTIVE AND OBJECTIVE BOX
CC: f/u for post stroke leukocytosis    Patient reports nothing. Does not communicate    REVIEW OF SYSTEMS:  Non communicative - in NAD     Antimicrobials Day #  :   piperacillin/tazobactam IVPB.. 3.375 Gram(s) IV Intermittent every 8 hours    Other Medications Reviewed    T(F): 97.7 (21 @ 07:11), Max: 98.2 (21 @ 20:00)  HR: 82 (21 @ 06:00)  BP: 148/75 (21 @ 06:00)  RR: 15 (21 @ 06:00)  SpO2: 97% (21 @ 06:00)  Wt(kg): --    PHYSICAL EXAM:  General: alert, restless  Eyes:  anicteric, no conjunctival injection, no discharge  Neck: supple  Lungs: clear to auscultation  Heart: irregular rate and rhythm; no murmur,   Abdomen: soft, nondistended, nontender, peg in place  Extremities: no clubbing, cyanosis, or edema  Neurologic: awake, rt sided weakness    LAB RESULTS:                        13.2   12.60 )-----------( 248      ( 02 Aug 2021 05:19 )             39.7     08-    134<L>  |  101  |  15  ----------------------------<  133<H>  4.0   |  21<L>  |  0.78    Ca    8.7      02 Aug 2021 07:15  Mg     1.6     08-        Urinalysis Basic - ( 01 Aug 2021 18:48 )    Color: Yellow / Appearance: Slightly Turbid / S.023 / pH: x  Gluc: x / Ketone: Small  / Bili: Negative / Urobili: Negative   Blood: x / Protein: 100 / Nitrite: Negative   Leuk Esterase: Large / RBC: 6 /hpf /  /HPF   Sq Epi: x / Non Sq Epi: 4 /hpf / Bacteria: Moderate      MICROBIOLOGY:  RECENT CULTURES:   @ 19:10 Catheterized      No growth        RADIOLOGY REVIEWED:

## 2021-08-02 NOTE — PROVIDER CONTACT NOTE (OTHER) - ACTION/TREATMENT ORDERED:
PA aware and at bedside. Site assessed and pressure held by MD. New safeguard applied with sandbag.
Straight cath as per order.
Will put ambien back on for sleep if possible, evaluating what we can give her now to help with the anxiety.

## 2021-08-02 NOTE — PROVIDER CONTACT NOTE (OTHER) - ASSESSMENT
Bladder scanner reveals 486ml of urine in bladder. Bladder appears distended and pt appears uncomfortable.
Pt is able to follow some commands; endorses pain to RN intermittently, but consistently yells out when someone is not at bedside. Still hypophonic but voice is getting clearer. strength increasing on both sides compared to yesterday.
Patient with large clots in bed sheets and some active bleeding. R femoral access site ecchymotic with some induration.

## 2021-08-02 NOTE — PROGRESS NOTE ADULT - ASSESSMENT
89y female with a past history of A Fib, s/p PPM, HTN, hysterectomy and E lap for SBO/ERICH, history of ? colon cancer and ovarian cancer, who was admitted to [facility] on 7/26 and transferred to [facility] with acute Lt M1 CVA.  She was sent for possible thrombus extraction but procedure aborted for technical reasons.   She has dysarthria/ aphasia, has failed swallow evaluation  No fever or documented infection, zosyn started 7/29  for elevated wbc. She is s/p 2 dose Covid vaccine series.  S/p peg placement  She appears hemodynamically stable, A recent urine culture is negative.  Her CXR shows left base density-atelectasis/infiltrate/or effusion.  Leukocytosis could simply be reactive to CVA, I am not convinced she has an acute infection.  CT of A/P does not show any evidence of basilar pneumonia, she has proctitis with rectal wall thickening and fat infiltration.  Hence, outside of proctitis, no clear indication for antibiotics.  Her leukocytosis may simply be reactive to stroke & stable at 12K.    Suggest:  1.Would limit zosyn to 5 days, today is the last day  2.Low threshold to obtain blood cultures if she spikes a fever

## 2021-08-02 NOTE — PROGRESS NOTE ADULT - ASSESSMENT
ASSESSMENT: 89 y/o F with PMH afib not on AC, PPM, HTN, ovarian and colon ca, GERD presents to the ED as a transfer from [facility] for dysarthria and R hemiparesis. NIHSS 8 on repeat exam. R hemiparesis, R facial droop, aphasia (not fluent, not intact to repetition), and dysarthria. 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     Impression: Left MCA infarction, Left M1 occlusion found on CTA likely secondary to cardioembolic etiology (hx of afib), post unsuccessful recanalization.     NEURO: Patient  neurologically without acute change . Continue close monitoring for neurologic deterioration.  Nsx noted severe tortuosity preventing access to left ICA intracranially. Hemicrani deferred due to advanced age. Goal for SBP of 100-180mmHg with overall normotension. Patient on statin for LDL goal less than 70. MRI Brain would not change medical management as pt has hx of afib and was not on AC. Head CT as noted above. Physical therapy and OT recommend CHARLES. Zaleplon restarted for insomnia.     ANTITHROMBOTIC THERAPY:  rectal. Consider beginning Eliquis 5mg BID in 2 weeks from onset after repeat stable head CT pending C discussion.     PULMONARY: CXR no active pulmonary disease, protecting airway, saturating well on room air     CARDIOVASCULAR: TTE shows mild-moderate mitral regurgitation, mild aortic regurgitation, severe global LV systolic dysfunction, normal RV function, mild tricuspid regurgitation. Thickened pericardium with small pericardial effusion.  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 Patient has PPM placed in 2019. EP interrogated and found multiple episodes of atrial fibrillation and flutter. Rate controlled at this time Cardiology follow up appreciated: acute systolic CHF- lasix,x bnp, cxr.                             SBP goal: 100-180 mmhg     GASTROINTESTINAL:  dysphagia screen- failed 7/26. Re eval, 7/27 failed. PEG placed by IR 7/30 afternoon. Tube feeds started 7/31 at 5:30 pm. Noted intolerance TF held, nutrition reconsulted with lower recommendations for TF rate.      Diet: PEG with TF    RENAL: BUN/Cr within normal limits, good urine output.  IVF d/c at this time due to concern for fluid overload. Monitor for further hyponatremia.      Na Goal: Greater than 135     Kessler: no     HEMATOLOGY: H/H within normal limits, Platelets normal at 248, no active bleeding noted      DVT ppx: lovenox subq     ID: afebrile, leukocytosis- down trending. UA -, concern for aspiration PNA on CXR. ID following, will be on zosyn 5 days. Will continue to monitor for s/sx of infection.      OTHER:   Palliative meeting held on 07/30/21 family all in agreement with PEG placement. Will need follow up with palliative regarding DNR/DNI and if wish to pursue anticoagulation with risk of bleeding.     DISPOSITION: [facility]     CORE MEASURES:        Admission NIHSS: 16      TPA: [] YES [x] NO      LDL/HDL: 123/30     Depression Screen: p     Statin Therapy: yes     Dysphagia Screen: [] PASS [x] FAIL     Smoking [] YES [x] NO      Afib [x] YES [] NO     Stroke Education [x] YES [] NO    Obtain screening lower extremity venous ultrasound in patients who meet 1 or more of the following criteria as patient is high risk for DVT/PE on admission:   [] History of DVT/PE  []Hypercoagulable states (Factor V Leiden, Cancer, OCP, etc. )  []Prolonged immobility (hemiplegia/hemiparesis/post operative or any other extended immobilization)  [] Transferred from outside facility (Rehab or Long term care)  [] Age </= to 50

## 2021-08-02 NOTE — PROGRESS NOTE ADULT - SUBJECTIVE AND OBJECTIVE BOX
Name of Patient : SHELBY LESTER  MRN: [id number]  DATE OF SERVICE: 21 @ 15:32    Subjective: Patient seen and examined. No new events except as noted.   doing okay     MEDICATIONS:  MEDICATIONS  (STANDING):  albuterol/ipratropium for Nebulization 3 milliLiter(s) Nebulizer every 6 hours  aspirin  chewable 81 milliGRAM(s) Oral daily  ATENolol  Tablet 25 milliGRAM(s) Oral daily  atorvastatin 80 milliGRAM(s) Oral at bedtime  enoxaparin Injectable 40 milliGRAM(s) SubCutaneous <User Schedule>  glucagon  Injectable 1 milliGRAM(s) IntraMuscular once  morphine  - Injectable 2 milliGRAM(s) IV Push once  multivitamin 1 Tablet(s) Oral daily  pantoprazole  Injectable 40 milliGRAM(s) IV Push daily  piperacillin/tazobactam IVPB.. 3.375 Gram(s) IV Intermittent every 8 hours  polyethylene glycol 3350 17 Gram(s) Oral daily  senna 1 Tablet(s) Oral daily      PHYSICAL EXAM:  T(C): 36.5 (21 @ 07:11), Max: 36.8 (21 @ 20:00)  HR: 72 (21 @ 14:00) (70 - 82)  BP: 142/51 (21 @ 14:00) (124/93 - 159/65)  RR: 0 (21 @ 14:00) (0 - 34)  SpO2: 96% (21 @ 14:00) (96% - 99%)  Wt(kg): --  I&O's Summary    01 Aug 2021 07:  -  02 Aug 2021 07:00  --------------------------------------------------------  IN: 840 mL / OUT: 360 mL / NET: 480 mL    02 Aug 2021 07:  -  02 Aug 2021 15:32  --------------------------------------------------------  IN: 30 mL / OUT: 550 mL / NET: -520 mL          Appearance: Normal	  HEENT:  PERRLA   Lymphatic: No lymphadenopathy   Cardiovascular: Normal S1 S2, no JVD  Respiratory: normal effort , clear  Gastrointestinal:  Soft, Non-tender  Skin: No rashes,  warm to touch  Psychiatry:  Mood & affect appropriate  Musculuskeletal: No edema      All labs, Imaging and EKGs personally reviewed         21 @ 07:01  -  21 @ 07:00  --------------------------------------------------------  IN: 840 mL / OUT: 360 mL / NET: 480 mL    21 @ 07:  -  21 @ 15:32  --------------------------------------------------------  IN: 30 mL / OUT: 550 mL / NET: -520 mL                          13.2   12.60 )-----------( 248      ( 02 Aug 2021 05:19 )             39.7               08-    134<L>  |  101  |  15  ----------------------------<  133<H>  4.0   |  21<L>  |  0.78    Ca    8.7      02 Aug 2021 07:15  Mg     1.6     08                         Urinalysis Basic - ( 01 Aug 2021 18:48 )    Color: Yellow / Appearance: Slightly Turbid / S.023 / pH: x  Gluc: x / Ketone: Small  / Bili: Negative / Urobili: Negative   Blood: x / Protein: 100 / Nitrite: Negative   Leuk Esterase: Large / RBC: 6 /hpf /  /HPF   Sq Epi: x / Non Sq Epi: 4 /hpf / Bacteria: Moderate

## 2021-08-03 DIAGNOSIS — R06.3 PERIODIC BREATHING: ICD-10-CM

## 2021-08-03 LAB
ANION GAP SERPL CALC-SCNC: 12 MMOL/L — SIGNIFICANT CHANGE UP (ref 5–17)
BASOPHILS # BLD AUTO: 0.07 K/UL — SIGNIFICANT CHANGE UP (ref 0–0.2)
BASOPHILS NFR BLD AUTO: 0.7 % — SIGNIFICANT CHANGE UP (ref 0–2)
BUN SERPL-MCNC: 15 MG/DL — SIGNIFICANT CHANGE UP (ref 7–23)
CALCIUM SERPL-MCNC: 8.3 MG/DL — LOW (ref 8.4–10.5)
CHLORIDE SERPL-SCNC: 101 MMOL/L — SIGNIFICANT CHANGE UP (ref 96–108)
CO2 SERPL-SCNC: 26 MMOL/L — SIGNIFICANT CHANGE UP (ref 22–31)
CREAT SERPL-MCNC: 0.78 MG/DL — SIGNIFICANT CHANGE UP (ref 0.5–1.3)
EOSINOPHIL # BLD AUTO: 0.4 K/UL — SIGNIFICANT CHANGE UP (ref 0–0.5)
EOSINOPHIL NFR BLD AUTO: 3.9 % — SIGNIFICANT CHANGE UP (ref 0–6)
GLUCOSE SERPL-MCNC: 107 MG/DL — HIGH (ref 70–99)
HCT VFR BLD CALC: 36.9 % — SIGNIFICANT CHANGE UP (ref 34.5–45)
HGB BLD-MCNC: 12.3 G/DL — SIGNIFICANT CHANGE UP (ref 11.5–15.5)
IMM GRANULOCYTES NFR BLD AUTO: 0.8 % — SIGNIFICANT CHANGE UP (ref 0–1.5)
LYMPHOCYTES # BLD AUTO: 2.59 K/UL — SIGNIFICANT CHANGE UP (ref 1–3.3)
LYMPHOCYTES # BLD AUTO: 25.4 % — SIGNIFICANT CHANGE UP (ref 13–44)
MCHC RBC-ENTMCNC: 31.6 PG — SIGNIFICANT CHANGE UP (ref 27–34)
MCHC RBC-ENTMCNC: 33.3 GM/DL — SIGNIFICANT CHANGE UP (ref 32–36)
MCV RBC AUTO: 94.9 FL — SIGNIFICANT CHANGE UP (ref 80–100)
MONOCYTES # BLD AUTO: 0.89 K/UL — SIGNIFICANT CHANGE UP (ref 0–0.9)
MONOCYTES NFR BLD AUTO: 8.7 % — SIGNIFICANT CHANGE UP (ref 2–14)
NEUTROPHILS # BLD AUTO: 6.16 K/UL — SIGNIFICANT CHANGE UP (ref 1.8–7.4)
NEUTROPHILS NFR BLD AUTO: 60.5 % — SIGNIFICANT CHANGE UP (ref 43–77)
NRBC # BLD: 0 /100 WBCS — SIGNIFICANT CHANGE UP (ref 0–0)
PLATELET # BLD AUTO: 204 K/UL — SIGNIFICANT CHANGE UP (ref 150–400)
POTASSIUM SERPL-MCNC: 3.1 MMOL/L — LOW (ref 3.5–5.3)
POTASSIUM SERPL-SCNC: 3.1 MMOL/L — LOW (ref 3.5–5.3)
RAPID RVP RESULT: SIGNIFICANT CHANGE UP
RBC # BLD: 3.89 M/UL — SIGNIFICANT CHANGE UP (ref 3.8–5.2)
RBC # FLD: 13.6 % — SIGNIFICANT CHANGE UP (ref 10.3–14.5)
SARS-COV-2 RNA SPEC QL NAA+PROBE: SIGNIFICANT CHANGE UP
SODIUM SERPL-SCNC: 139 MMOL/L — SIGNIFICANT CHANGE UP (ref 135–145)
WBC # BLD: 10.19 K/UL — SIGNIFICANT CHANGE UP (ref 3.8–10.5)
WBC # FLD AUTO: 10.19 K/UL — SIGNIFICANT CHANGE UP (ref 3.8–10.5)

## 2021-08-03 PROCEDURE — 31575 DIAGNOSTIC LARYNGOSCOPY: CPT

## 2021-08-03 PROCEDURE — 99222 1ST HOSP IP/OBS MODERATE 55: CPT | Mod: 25

## 2021-08-03 RX ORDER — POTASSIUM CHLORIDE 20 MEQ
10 PACKET (EA) ORAL
Refills: 0 | Status: COMPLETED | OUTPATIENT
Start: 2021-08-03 | End: 2021-08-03

## 2021-08-03 RX ORDER — ACETAMINOPHEN 500 MG
1000 TABLET ORAL ONCE
Refills: 0 | Status: COMPLETED | OUTPATIENT
Start: 2021-08-03 | End: 2021-08-03

## 2021-08-03 RX ORDER — ACETAMINOPHEN 500 MG
650 TABLET ORAL EVERY 6 HOURS
Refills: 0 | Status: DISCONTINUED | OUTPATIENT
Start: 2021-08-03 | End: 2021-08-06

## 2021-08-03 RX ORDER — FUROSEMIDE 40 MG
20 TABLET ORAL ONCE
Refills: 0 | Status: COMPLETED | OUTPATIENT
Start: 2021-08-03 | End: 2021-08-03

## 2021-08-03 RX ADMIN — Medication 400 MILLIGRAM(S): at 22:43

## 2021-08-03 RX ADMIN — SENNA PLUS 1 TABLET(S): 8.6 TABLET ORAL at 22:43

## 2021-08-03 RX ADMIN — Medication 100 MILLIEQUIVALENT(S): at 10:17

## 2021-08-03 RX ADMIN — Medication 3 MILLILITER(S): at 18:33

## 2021-08-03 RX ADMIN — Medication 20 MILLIGRAM(S): at 08:30

## 2021-08-03 RX ADMIN — PIPERACILLIN AND TAZOBACTAM 25 GRAM(S): 4; .5 INJECTION, POWDER, LYOPHILIZED, FOR SOLUTION INTRAVENOUS at 05:34

## 2021-08-03 RX ADMIN — Medication 0.5 MILLIGRAM(S): at 06:50

## 2021-08-03 RX ADMIN — Medication 100 MILLIEQUIVALENT(S): at 09:08

## 2021-08-03 RX ADMIN — ATENOLOL 25 MILLIGRAM(S): 25 TABLET ORAL at 05:34

## 2021-08-03 RX ADMIN — Medication 0.5 MILLIGRAM(S): at 18:33

## 2021-08-03 RX ADMIN — POLYETHYLENE GLYCOL 3350 17 GRAM(S): 17 POWDER, FOR SOLUTION ORAL at 11:34

## 2021-08-03 RX ADMIN — Medication 1000 MILLIGRAM(S): at 23:13

## 2021-08-03 RX ADMIN — Medication 100 MILLIEQUIVALENT(S): at 11:31

## 2021-08-03 RX ADMIN — Medication 3 MILLILITER(S): at 11:33

## 2021-08-03 RX ADMIN — Medication 3 MILLILITER(S): at 05:34

## 2021-08-03 RX ADMIN — PANTOPRAZOLE SODIUM 40 MILLIGRAM(S): 20 TABLET, DELAYED RELEASE ORAL at 11:33

## 2021-08-03 RX ADMIN — Medication 81 MILLIGRAM(S): at 11:34

## 2021-08-03 RX ADMIN — ENOXAPARIN SODIUM 40 MILLIGRAM(S): 100 INJECTION SUBCUTANEOUS at 18:33

## 2021-08-03 RX ADMIN — OLANZAPINE 2.5 MILLIGRAM(S): 15 TABLET, FILM COATED ORAL at 14:36

## 2021-08-03 RX ADMIN — ATORVASTATIN CALCIUM 80 MILLIGRAM(S): 80 TABLET, FILM COATED ORAL at 22:43

## 2021-08-03 RX ADMIN — Medication 400 MILLIGRAM(S): at 06:51

## 2021-08-03 RX ADMIN — Medication 1 TABLET(S): at 11:34

## 2021-08-03 RX ADMIN — Medication 1000 MILLIGRAM(S): at 07:21

## 2021-08-03 NOTE — PROGRESS NOTE ADULT - ASSESSMENT
89 y/o F with PMH afib not on AC, PPM, HTN, ovarian and colon ca, GERD presents to the ED as a transfer from [facility] for dysarthria and R hemiparesis. NIHSS 8 on repeat exam. R hemiparesis, R facial droop, aphasia (not fluent, not intact to repetition), and dysarthria. 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

## 2021-08-03 NOTE — PROGRESS NOTE ADULT - ASSESSMENT
89 y/o F with PMH afib not on AC, PPM, HTN, ovarian and colon ca, GERD presents to the ED as a transfer from [facility] for stroke. Per son, pt woke up at 3:30am and went to the bathroom, no issues with gait or speech at that time, accompanied by son. At 7:30 am this morning, pt's son found her half off the bed and unable to speak or walk. Pt was found to have L M1 occlusion on CTA, CTP w/ no core infarct and penumbra 81cc, and transferred for IR thrombectomy. Exam at [facility] notable for severe dysarthria and R hemiparesis. Pt did not receive tpa. NIHSS initially 16 on arrival to [facility] ED, NIHSS 8 on repeat exam. At baseline, son reports that pt is able to do own ADLs (showers, reads) but has an aid to make sure she does not fall, walks with a walker without assistance, speech is fluent.  Pt. s/p 2 failed S&S and family approached for PEG.    S/P EGD (07.30.21 @ 09:57) >  Impression:  - Failed PEG placement. The procedure was aborted due to poor endoscopic                        visualization and anatomy.                       - Normal esophagus.                       - Erythematous mucosa in the antrum.                       - Normal examined duodenum.                       - An endoscopically removable PEG placement was attempted but could not be                        successfully completed. The needle/trocar was not passed.                       - No specimens collected.    S/P Failed PEG placement  Pt. s/p GTube placement by IR   S/P one episode of vomiting which resolved.   Currently feeds on hold.  I believe her tachypnia  is cardia related and her severe LV Dysfn.  Would restart her feeding at 20 CC/hr and increase to 50 CC Q 6 hrs till reach goal.  Please keep HOB elevated at 45 degrees.  Aspiration precautions.  Cont. Lovenox for AC  Pt. with resolving leukocytosis and most likely reactive  Would D/C Abx. , S/P IV Abx D#5/5 yesterday  Appreciate Nutritionist f/u  would HOLD OFF on any IVF as pt. is fluid overloaded   Prefer Bolus feeds once feeding goal reached to help with PT.   May cont. ASA  Cont. Protonix IV for now.  May change to Nexium Elixir 40 mg via GTube QD 1/2 hr. before feeding upon D/C to rehab.    Thank you.

## 2021-08-03 NOTE — PROGRESS NOTE ADULT - SUBJECTIVE AND OBJECTIVE BOX
Follow-up Pulm Progress Note    Alert, aphasic  Sats 100% 2LNC      Medications:  MEDICATIONS  (STANDING):  albuterol/ipratropium for Nebulization 3 milliLiter(s) Nebulizer every 6 hours  aspirin  chewable 81 milliGRAM(s) Oral daily  ATENolol  Tablet 25 milliGRAM(s) Oral daily  atorvastatin 80 milliGRAM(s) Oral at bedtime  buDESOnide    Inhalation Suspension 0.5 milliGRAM(s) Inhalation every 12 hours  enoxaparin Injectable 40 milliGRAM(s) SubCutaneous <User Schedule>  glucagon  Injectable 1 milliGRAM(s) IntraMuscular once  multivitamin 1 Tablet(s) Oral daily  pantoprazole  Injectable 40 milliGRAM(s) IV Push daily  polyethylene glycol 3350 17 Gram(s) Oral daily  potassium chloride  10 mEq/100 mL IVPB 10 milliEquivalent(s) IV Intermittent every 1 hour  senna 1 Tablet(s) Oral daily    MEDICATIONS  (PRN):  OLANZapine 2.5 milliGRAM(s) Oral daily PRN anxiety  zaleplon 5 milliGRAM(s) Oral at bedtime PRN Insomnia          Vital Signs Last 24 Hrs  T(C): 36.3 (03 Aug 2021 07:43), Max: 36.6 (02 Aug 2021 19:30)  T(F): 97.3 (03 Aug 2021 07:43), Max: 97.8 (02 Aug 2021 19:30)  HR: 73 (03 Aug 2021 10:00) (70 - 78)  BP: 140/58 (03 Aug 2021 10:00) (101/56 - 145/72)  BP(mean): 84 (03 Aug 2021 10:00) (63 - 117)  RR: 23 (03 Aug 2021 10:00) (0 - 27)  SpO2: 100% (03 Aug 2021 10:00) (93% - 100%)      VBG pH 7.39 08- @ 16:07    VBG pCO2 46 - @ 16:07    VBG O2 sat 37 - @ 16:07    VBG lactate 2.1 08- @ 16:07      08- @ 07:01  -  08-03 @ 07:00  --------------------------------------------------------  IN: 30 mL / OUT: 950 mL / NET: -920 mL          LABS:                        12.3   10.19 )-----------( 204      ( 03 Aug 2021 05:38 )             36.9         139  |  101  |  15  ----------------------------<  107<H>  3.1<L>   |  26  |  0.78    Ca    8.3<L>      03 Aug 2021 05:38  Mg     1.6                 Urinalysis Basic - ( 01 Aug 2021 18:48 )    Color: Yellow / Appearance: Slightly Turbid / S.023 / pH: x  Gluc: x / Ketone: Small  / Bili: Negative / Urobili: Negative   Blood: x / Protein: 100 / Nitrite: Negative   Leuk Esterase: Large / RBC: 6 /hpf /  /HPF   Sq Epi: x / Non Sq Epi: 4 /hpf / Bacteria: Moderate      Procalcitonin, Serum: 0.08 ng/mL (21 @ 07:15)    Serum Pro-Brain Natriuretic Peptide: 16126 pg/mL (21 @ 07:15)                CULTURES: (if applicable)      Culture - Urine (collected 21 @ 21:53)  Source: Clean Catch Clean Catch (Midstream)  Final Report (21 @ 21:39):    No growth    Culture - Urine (collected 21 @ 19:10)  Source: Catheterized Catheterized  Final Report (21 @ 22:56):    No growth          Physical Examination:  PULM: minimal bibasilar crackles, no wheezing   CVS: S1, S2 heard    RADIOLOGY REVIEWED  CT chest: < from: CT Chest No Cont (21 @ 16:14) >  FINDINGS:    LUNGS AND AIRWAYS: Anterior bowing of the posterior wall of the trachea, could suggest tracheomalacia. Interlobular septal thickening bilaterally. Bilateral lower lower lung areas of subsegmental, linear or compressive atelectasis  PLEURA: Small bilateral pleural effusions.  MEDIASTINUM AND ELIEL: A few mediastinal lymph nodes largest is about 1.1 cm a precarinal location are likely reactive.  VESSELS: Atherosclerotic changes of the aorta and coronary arteries.  HEART: Cardiomegaly. No pericardial effusion. Left chest wall pacemaker with leads in the right atrium and right ventricle.  CHEST WALL AND LOWER NECK: Within normal limits.  VISUALIZED UPPER ABDOMEN: Small amount of pneumoperitoneum, likely from recent PEG tube placement.  BONES: Unchanged T11 mild compression deformity since abdominal CT of 2020. Degenerative changes.    IMPRESSION:    Small bilateral pleural effusions with adjacent areas of atelectasis.    Cardiomegaly.    Interlobular septal thickening is suggestive of pulmonary edema given the other findings.    Pneumoperitoneum, likely from recent PEG tube placement.    --- End of Report ---              < end of copied text >

## 2021-08-03 NOTE — PROGRESS NOTE ADULT - ASSESSMENT
89 yo F s/p CVA with intermittent upper respiratory wheezing with deep exhalation. Laryngoscopy reveals intact upper airway. VCs are appropriately mobile, no edema. Mild to moderate reflux given post cricoid erythema and pachydermia.

## 2021-08-03 NOTE — PROGRESS NOTE ADULT - ASSESSMENT
Patient is a 89 y/o Female with PMH afib not on AC, PPM, HTN, ovarian and colon ca, GERD presents to the ED as a transfer from [facility] for stroke.     Problem/Recommendation - 1:  Problem: Stroke. Recommendation: Care as per neurology   Angio noted   ASA/ Statin   protonix  BP control  speech and swallow   GI eval for PEG placement appreicated   palliative eval     worsening leukocytosis  mild low grade T  procal level  UA Noted,   completed zosyn , 5 days course per ID   CXR  high risk of aspiration  may need CT chest   Pan CT  ID eval called for further recs appreicated  pulm eval     Problem/Recommendation - 2:  ·  Problem: Atrial fibrillation.  Recommendation: rate control  Atenolol 25 oral daily.     Problem/Recommendation - 3:  ·  Problem: Hypertension.  Recommendation: resuem BP meds  monitor and adjust as tolerated.     Problem/Recommendation - 4:  ·  Problem: Colon cancer.     Problem/Recommendation - 5:  ·  Problem: GERD (gastroesophageal reflux disease).  Recommendation: PPI.     Problem/Recommendation - 6:  Problem: Prophylactic measure. Recommendation: DVT and gI PPX.

## 2021-08-03 NOTE — PROGRESS NOTE ADULT - ASSESSMENT
89y female with a past history of A Fib, s/p PPM, HTN, hysterectomy and E lap for SBO/ERICH, history of ? colon cancer and ovarian cancer, who was admitted to [facility] on 7/26 and transferred to [facility] with acute Lt M1 CVA.  She was sent for possible thrombus extraction but procedure aborted for technical reasons.   She has dysarthria/ aphasia, has failed swallow evaluation  No fever or documented infection, zosyn started 7/29  for elevated wbc. She is s/p 2 dose Covid vaccine series.  S/p peg placement  She appears hemodynamically stable, A recent urine culture is negative.  Her CXR shows left base density-atelectasis/infiltrate/or effusion.  Leukocytosis could simply be reactive to CVA, I am not convinced she has an acute infection.  CT of A/P does not show any evidence of basilar pneumonia, she has proctitis with rectal wall thickening and fat infiltration.  Hence, outside of proctitis, no clear indication for antibiotics.  Her leukocytosis was likely reactive to stroke & resolved.  Completed a 5 days course of empiric Zosyn on 8/02/21    Suggest:  Please obtain blood cultures if fevers recur  Stable off of antibiotics, at present  Will no longer follow actively, please reconsult with questions, or if the status changes, thanks

## 2021-08-03 NOTE — PROGRESS NOTE ADULT - PROBLEM SELECTOR PLAN 2
CT chest with small b/l pl effusions and pulm edema   -TTE with EF 30/35%, mild/mod MR  -Bibasilar crackles on exam (improving today)  -Elevated ProBNP, continue to trend  -Diuresis per cards, keep O>I as tolerated  -Wean O2 as tolerated, keep sats >90% (currently 2LNC)

## 2021-08-03 NOTE — PROGRESS NOTE ADULT - SUBJECTIVE AND OBJECTIVE BOX
Chief Complaint:  Patient is a 89y old  Female who presents with a chief complaint of stroke transfer (03 Aug 2021 09:07)      Interval Events:   Feedings on hold, pt. was tachypneic last night s/p Chest CT   Allergies:  No Known Allergies        Home Medications:  aspirin 81 mg oral delayed release tablet: 1 tab(s) orally once a day (2020 11:10)  atenolol 25 mg oral tablet: 1 tab(s) orally once a day (2020 11:10)  gabapentin 100 mg oral capsule: 1 cap(s) orally 3 times a day (2020 11:10)  hydrocodocone chlopheniramine: 5 milliliter(s) orally once a day (at bedtime) (2020 11:01)  Multiple Vitamins oral tablet: 1 tab(s) orally once a day (2020 11:01)  omeprazole 20 mg oral delayed release capsule: 1 cap(s) orally once a day (2020 11:01)  Vitamin B12: 5 milliliter(s) orally once a day (2020 11:01)  zolpidem 5 mg oral tablet: 0.5 tab(s) orally once a day (at bedtime) (2020 11:01)        Hospital Medications:  albuterol/ipratropium for Nebulization 3 milliLiter(s) Nebulizer every 6 hours  aspirin  chewable 81 milliGRAM(s) Oral daily  ATENolol  Tablet 25 milliGRAM(s) Oral daily  atorvastatin 80 milliGRAM(s) Oral at bedtime  buDESOnide    Inhalation Suspension 0.5 milliGRAM(s) Inhalation every 12 hours  enoxaparin Injectable 40 milliGRAM(s) SubCutaneous <User Schedule>  glucagon  Injectable 1 milliGRAM(s) IntraMuscular once  multivitamin 1 Tablet(s) Oral daily  pantoprazole  Injectable 40 milliGRAM(s) IV Push daily  piperacillin/tazobactam IVPB.. 3.375 Gram(s) IV Intermittent every 8 hours  polyethylene glycol 3350 17 Gram(s) Oral daily  potassium chloride  10 mEq/100 mL IVPB 10 milliEquivalent(s) IV Intermittent every 1 hour  senna 1 Tablet(s) Oral daily    MEDICATIONS  (PRN):  OLANZapine 2.5 milliGRAM(s) Oral daily PRN anxiety  zaleplon 5 milliGRAM(s) Oral at bedtime PRN Insomnia    ___________  Active diet:  Diet, NPO with Tube Feed:   Tube Feeding Modality: Gastrostomy  Jevity 1.2 Wei (JEVITY1.2RTH)  Total Volume for 24 Hours (mL): 1200  Continuous  Starting Tube Feed Rate mL per Hour: 10  Increase Tube Feed Rate by (mL): 5     Every 4 hours  Until Goal Tube Feed Rate (mL per Hour): 50  Tube Feed Duration (in Hours): 24  Tube Feed Start Time: 13:00  ___________________        ROS  GI: [- ] Nausea, [- ] vomiting, [ -]abdominal pain    PHYSICAL EXAM:   Vital Signs:  Vital Signs Last 24 Hrs  T(C): 36.3 (03 Aug 2021 07:43), Max: 36.6 (02 Aug 2021 19:30)  T(F): 97.3 (03 Aug 2021 07:43), Max: 97.8 (02 Aug 2021 19:30)  HR: 70 (03 Aug 2021 08:00) (70 - 78)  BP: 115/42 (03 Aug 2021 08:00) (101/56 - 145/72)  BP(mean): 63 (03 Aug 2021 08:00) (63 - 117)  RR: 21 (03 Aug 2021 08:00) (0 - 27)  SpO2: 96% (03 Aug 2021 08:00) (93% - 100%)  Daily     Daily     GENERAL:  Appears stated age, well-groomed, well-nourished, no distress  HEENT:  NC/AT,  conjunctivae clear and pink, no thyromegaly, nodules, adenopathy, no JVD, sclera -anicteric  NECK: Supple, No masses  CHEST:  B/L Basilar crackles  HEART:  Regular rhythm, S1, S2, no murmur/rub/S3/S4, no abdominal bruit, no edema  ABDOMEN:  Soft, non-tender, non-distended, normoactive bowel sounds,  no masses ,no hepato-splenomegaly, no signs of chronic liver disease  EXTEREMITIES:  no cyanosis,clubbing or edema  SKIN:  No rash/erythema/ecchymoses/petechiae/wounds/abscess/warm/dry  LN: No lymphadenopathy, No masses  NEURO:  Alert, oriented, no asterixis, no tremor, no encephalopathy    LABS:                        12.3   10.19 )-----------( 204      ( 03 Aug 2021 05:38 )             36.9     08-    139  |  101  |  15  ----------------------------<  107<H>  3.1<L>   |  26  |  0.78    Ca    8.3<L>      03 Aug 2021 05:38  Mg     1.6               Urinalysis Basic - ( 01 Aug 2021 18:48 )    Color: Yellow / Appearance: Slightly Turbid / S.023 / pH: x  Gluc: x / Ketone: Small  / Bili: Negative / Urobili: Negative   Blood: x / Protein: 100 / Nitrite: Negative   Leuk Esterase: Large / RBC: 6 /hpf /  /HPF   Sq Epi: x / Non Sq Epi: 4 /hpf / Bacteria: Moderate          Imaging:  I reviewed the  CT Chest No Cont (21 @ 16:14) >  IMPRESSION:    Small bilateral pleural effusions with adjacent areas of atelectasis.    Cardiomegaly.    Interlobular septal thickening is suggestive of pulmonary edema given the other findings.    Pneumoperitoneum, likely from recent PEG tube placement.      < end of copied text >

## 2021-08-03 NOTE — PROGRESS NOTE ADULT - PROBLEM SELECTOR PLAN 1
never a smoker, +exposure to second hand smoke   -Reported hx of chronic cough with wheezing, uses Breo as an outpatient (sees Dr. Woodward)  -Intermittent wheezes, mostly upper airway. ENT recs noted - Laryngoscopy with intact upper airway, VCs are appropriately mobile, no edema, mild to moderate reflux  -C/w PPI  -Duoneb q6h/Pulmicort 0.5mg BID

## 2021-08-03 NOTE — PROGRESS NOTE ADULT - SUBJECTIVE AND OBJECTIVE BOX
CC: f/u for post stroke leukocytosis    Patient reports nothing. Does not communicate    REVIEW OF SYSTEMS:  aphasic     Antimicrobials Day #  : off of antibiotics    Other Medications Reviewed    T(F): 97.3 (21 @ 07:43), Max: 97.8 (21 @ 19:30)  HR: 72 (21 @ 12:00)  BP: 142/66 (21 @ 12:00)  RR: 10 (21 @ 12:00)  SpO2: 100% (21 @ 12:00)  Wt(kg): --    PHYSICAL EXAM:  General: alert, restless  Eyes:  anicteric, no conjunctival injection, no discharge  Neck: supple  Lungs: clear to auscultation  Heart: irregular rate and rhythm; no murmur,   Abdomen: soft, nondistended, nontender, peg in place  Extremities: no clubbing, cyanosis, or edema  Neurologic: awake, rt sided weakness    LAB RESULTS:                        12.3   10.19 )-----------( 204      ( 03 Aug 2021 05:38 )             36.9         139  |  101  |  15  ----------------------------<  107<H>  3.1<L>   |  26  |  0.78    Ca    8.3<L>      03 Aug 2021 05:38  Mg     1.6           Urinalysis Basic - ( 01 Aug 2021 18:48 )    Color: Yellow / Appearance: Slightly Turbid / S.023 / pH: x  Gluc: x / Ketone: Small  / Bili: Negative / Urobili: Negative   Blood: x / Protein: 100 / Nitrite: Negative   Leuk Esterase: Large / RBC: 6 /hpf /  /HPF   Sq Epi: x / Non Sq Epi: 4 /hpf / Bacteria: Moderate      MICROBIOLOGY:  RECENT CULTURES:   @ 21:53 Clean Catch Clean Catch (Midstream)     No growth      RADIOLOGY REVIEWED:

## 2021-08-03 NOTE — PROGRESS NOTE ADULT - SUBJECTIVE AND OBJECTIVE BOX
Subjective: Patient seen and examined. No new events except as noted.   remains in Stroke unit   s/p lasix 40 mg IV x 1 yesterday   remains on 3 liters     REVIEW OF SYSTEMS:  Unable to obtain        MEDICATIONS:  MEDICATIONS  (STANDING):  albuterol/ipratropium for Nebulization 3 milliLiter(s) Nebulizer every 6 hours  aspirin  chewable 81 milliGRAM(s) Oral daily  ATENolol  Tablet 25 milliGRAM(s) Oral daily  atorvastatin 80 milliGRAM(s) Oral at bedtime  buDESOnide    Inhalation Suspension 0.5 milliGRAM(s) Inhalation every 12 hours  enoxaparin Injectable 40 milliGRAM(s) SubCutaneous <User Schedule>  glucagon  Injectable 1 milliGRAM(s) IntraMuscular once  multivitamin 1 Tablet(s) Oral daily  pantoprazole  Injectable 40 milliGRAM(s) IV Push daily  piperacillin/tazobactam IVPB.. 3.375 Gram(s) IV Intermittent every 8 hours  polyethylene glycol 3350 17 Gram(s) Oral daily  potassium chloride  10 mEq/100 mL IVPB 10 milliEquivalent(s) IV Intermittent every 1 hour  senna 1 Tablet(s) Oral daily      PHYSICAL EXAM:  T(C): 36.3 (08-03-21 @ 07:43), Max: 36.6 (08-02-21 @ 19:30)  HR: 74 (08-03-21 @ 06:00) (70 - 78)  BP: 121/57 (08-03-21 @ 06:00) (101/56 - 145/72)  RR: 20 (08-03-21 @ 06:00) (0 - 27)  SpO2: 100% (08-03-21 @ 06:00) (93% - 100%)  Wt(kg): --  I&O's Summary    02 Aug 2021 07:01  -  03 Aug 2021 07:00  --------------------------------------------------------  IN: 30 mL / OUT: 950 mL / NET: -920 mL            Appearance: NAD	  HEENT:   Dry  oral mucosa, PERRL, EOMI	  Lymphatic: No lymphadenopathy  Cardiovascular: irregular S1 S2, No JVD, No murmurs, No edema  Respiratory: Decreased bs  Psychiatry: A & O x 3, sleepy   Gastrointestinal:  Soft, Non-tender, + BS	  Skin: No rashes, No ecchymoses, No cyanosis	  Extremities: Normal range of motion, No clubbing, cyanosis or edema  Vascular: Peripheral pulses palpable 2+ bilaterally  NEUROLOGICAL EXAM:  Mental status: Awake, alert, does gesture facial expressions relatively in context to situation (smiles appropriately), not following commands. Can mimic physical actions. No verbal output   Cranial Nerves: R facial droop, no nystagmus, tongue midline. Blink to threat b/l.   Motor exam: right hemiplegia with trace flexion in arm and triple flexion in leg, left side moves well against gravity but inattentive and with drift   Sensation: decreased on right   Coordination/ Gait: gait not assessed     LABS:    CARDIAC MARKERS:                                12.3   10.19 )-----------( 204      ( 03 Aug 2021 05:38 )             36.9     08-03    139  |  101  |  15  ----------------------------<  107<H>  3.1<L>   |  26  |  0.78    Ca    8.3<L>      03 Aug 2021 05:38  Mg     1.6     08-02      proBNP:   Lipid Profile:   HgA1c:   TSH:       TELEMETRY: AF	    ECG:  	  RADIOLOGY: < from: Xray Chest 1 View- PORTABLE-Urgent (Xray Chest 1 View- PORTABLE-Urgent .) (08.01.21 @ 18:31) >    EXAM:  XR CHEST PORTABLE URGENT 1V                            PROCEDURE DATE:  08/01/2021            INTERPRETATION:  Chest one view    HISTORY: Stroke    COMPARISON STUDY: 7/29/2021    Frontal expiratory view of the chest shows the heart to be similarly enlarged in size. Left cardiac pacemaker is again noted.    The lungs show no focal infiltrate and there is no evidence of pneumothorax nor pleural effusion. Old left rib fractures are again noted.    IMPRESSION:  No active pulmonary disease.    Thank you for the courtesy of this referral.    --- End of Report ---                DELFINO PONCE MD; Attending Interventional Radiologist  This document has been electronically signed. Aug  2 2021  8:46AM    < end of copied text >    DIAGNOSTIC TESTING:  [ ] Echocardiogram:  [ ]  Catheterization:  [ ] Stress Test:    OTHER:

## 2021-08-03 NOTE — PROGRESS NOTE ADULT - PROBLEM SELECTOR PLAN 3
started on empiric Zosyn by primary team for concern of aspiration PNA   -S/p 5 days empiric Zosyn   -CT A/P (lower chest) from 7/30 with no PNA. CXR with no clear infiltrate.  -WBC downtrending.  -ID f/u

## 2021-08-03 NOTE — PROGRESS NOTE ADULT - PROBLEM SELECTOR PLAN 4
Cardiomyopathy   ? acute systolic CHF   repeat lasix 20 mg IV x 1 now   check BNP and CXR  outpt follow up with Dr. Watts

## 2021-08-03 NOTE — PROGRESS NOTE ADULT - SUBJECTIVE AND OBJECTIVE BOX
ENT ISSUE/POD: Noisy expiratory breathing    HPI: 89 y/o F with PMH afib not on AC, PPM, HTN, ovarian and colon ca, GERD presents to the ED as a transfer from [facility] for stroke. Pt was found to have L M1 occlusion on CTA, CTP w/ no core infarct and penumbra 81cc, and transferred for IR thrombectomy. Exam at [facility] notable for severe dysarthria and R hemiparesis. Pt did not receive tpa. left MCA ischemic stroke with left M1 occlusion s/p thrombectomy with unsuccessful recanalization. Pt never intubated. ENT consulted as pt w noisy expiratory breathing. Pt has been diuresed, seen by pulm, requesting upper airway eval. Bedside indirect laryngoscopy revealed laryngopharyngeal reflux. Pt is also C/O burping this AM. Pt mouth breather at baseline.  Pt on nasal cannula.          PAST MEDICAL & SURGICAL HISTORY:  Hypertension    Cancer    GERD (gastroesophageal reflux disease)    Anxiety    Ovarian cancer    Colon cancer    H/O small bowel obstruction    H/O total hysterectomy  with BSO    No significant past surgical history      Allergies    No Known Allergies    Intolerances      MEDICATIONS  (STANDING):  albuterol/ipratropium for Nebulization 3 milliLiter(s) Nebulizer every 6 hours  aspirin  chewable 81 milliGRAM(s) Oral daily  ATENolol  Tablet 25 milliGRAM(s) Oral daily  atorvastatin 80 milliGRAM(s) Oral at bedtime  buDESOnide    Inhalation Suspension 0.5 milliGRAM(s) Inhalation every 12 hours  enoxaparin Injectable 40 milliGRAM(s) SubCutaneous <User Schedule>  glucagon  Injectable 1 milliGRAM(s) IntraMuscular once  multivitamin 1 Tablet(s) Oral daily  pantoprazole  Injectable 40 milliGRAM(s) IV Push daily  piperacillin/tazobactam IVPB.. 3.375 Gram(s) IV Intermittent every 8 hours  polyethylene glycol 3350 17 Gram(s) Oral daily  potassium chloride  10 mEq/100 mL IVPB 10 milliEquivalent(s) IV Intermittent every 1 hour  senna 1 Tablet(s) Oral daily    MEDICATIONS  (PRN):  OLANZapine 2.5 milliGRAM(s) Oral daily PRN anxiety  zaleplon 5 milliGRAM(s) Oral at bedtime PRN Insomnia      Social History: see consult    Family history: see consult    ROS:   ENT: all negative except as noted in HPI   Pulm: denies SOB, cough, hemoptysis  Neuro: denies numbness/tingling, loss of sensation  Endo: denies heat/cold intolerance, excessive sweating      Vital Signs Last 24 Hrs  T(C): 36.3 (03 Aug 2021 07:43), Max: 36.6 (02 Aug 2021 19:30)  T(F): 97.3 (03 Aug 2021 07:43), Max: 97.8 (02 Aug 2021 19:30)  HR: 74 (03 Aug 2021 06:00) (70 - 78)  BP: 121/57 (03 Aug 2021 06:00) (101/56 - 145/72)  BP(mean): 78 (03 Aug 2021 06:00) (65 - 117)  RR: 20 (03 Aug 2021 06:00) (0 - 27)  SpO2: 100% (03 Aug 2021 06:00) (93% - 100%)                          12.3   10.19 )-----------( 204      ( 03 Aug 2021 05:38 )             36.9    08-03    139  |  101  |  15  ----------------------------<  107<H>  3.1<L>   |  26  |  0.78    Ca    8.3<L>      03 Aug 2021 05:38  Mg     1.6     08-02         PHYSICAL EXAM:  Gen: NAD  Skin: No rashes, bruises, or lesions  Head: Normocephalic, Atraumatic  Face: no edema, erythema, or fluctuance. Parotid glands soft without mass  Eyes: no scleral injection  Nose: Nares bilaterally patent, no discharge  Mouth: No Stridor / Drooling / Trismus.  Mucosa dry, tongue/uvula midline  Neck: Flat, supple, no lymphadenopathy, trachea midline, no masses  Lymphatic: No lymphadenopathy  Resp: no stridor, +expiratory wheezing with deep breaths  CV: no peripheral edema/cyanosis  GI: nondistended   Peripheral vascular: no JVD or edema  Neuro: facial nerve intact, no facial droop

## 2021-08-03 NOTE — PROGRESS NOTE ADULT - ASSESSMENT
ASSESSMENT: 89 y/o F with PMH afib not on AC, PPM, HTN, ovarian and colon ca, GERD presents to the ED as a transfer from [facility] for dysarthria and R hemiparesis. NIHSS 8 on repeat exam. R hemiparesis, R facial droop, aphasia (not fluent, not intact to repetition), and dysarthria. 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     Impression: Left MCA infarction, Left M1 occlusion found on CTA likely secondary to cardioembolic etiology (hx of afib), post unsuccessful recanalization.     NEURO: Patient  neurologically without acute change . Continue close monitoring for neurologic deterioration.  Nsx noted severe tortuosity preventing access to left ICA intracranially. Hemicrani deferred due to advanced age. Goal for SBP of 100-180mmHg with overall normotension. Patient on statin for LDL goal less than 70. MRI Brain would not change medical management as pt has hx of afib and was not on AC. Head CT as noted above. Physical therapy and OT recommend CHARLES. Zaleplon restarted for insomnia.     ANTITHROMBOTIC THERAPY:  rectal. Consider beginning Eliquis 5mg BID in 2 weeks from onset after repeat stable head CT pending C discussion.     PULMONARY: CXR no active pulmonary disease, protecting airway, saturating well on room air     CARDIOVASCULAR: TTE shows mild-moderate mitral regurgitation, mild aortic regurgitation, severe global LV systolic dysfunction, normal RV function, mild tricuspid regurgitation. Thickened pericardium with small pericardial effusion.  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 Patient has PPM placed in 2019. EP interrogated and found multiple episodes of atrial fibrillation and flutter. Rate controlled at this time Cardiology follow up appreciated: acute systolic CHF- lasix,x bnp, cxr.                             SBP goal: 100-180 mmhg     GASTROINTESTINAL:  dysphagia screen- failed 7/26. Re eval, 7/27 failed. PEG placed by IR 7/30 afternoon. Tube feeds started 7/31 at 5:30 pm. Noted intolerance TF held, nutrition reconsulted with lower recommendations for TF rate.      Diet: PEG with TF    RENAL: BUN/Cr within normal limits, good urine output.  IVF d/c at this time due to concern for fluid overload. Monitor for further hyponatremia.      Na Goal: Greater than 135     Kessler: no     HEMATOLOGY: H/H within normal limits, Platelets normal at 248, no active bleeding noted      DVT ppx: lovenox subq     ID: afebrile, leukocytosis- down trending. UA -, concern for aspiration PNA on CXR. ID following, will be on zosyn 5 days. Will continue to monitor for s/sx of infection.      OTHER:   Palliative meeting held on 07/30/21 family all in agreement with PEG placement. Will need follow up with palliative regarding DNR/DNI and if wish to pursue anticoagulation with risk of bleeding.     DISPOSITION: [facility]     CORE MEASURES:        Admission NIHSS: 16      TPA: [] YES [x] NO      LDL/HDL: 123/30     Depression Screen: p     Statin Therapy: yes     Dysphagia Screen: [] PASS [x] FAIL     Smoking [] YES [x] NO      Afib [x] YES [] NO     Stroke Education [x] YES [] NO    Obtain screening lower extremity venous ultrasound in patients who meet 1 or more of the following criteria as patient is high risk for DVT/PE on admission:   [] History of DVT/PE  []Hypercoagulable states (Factor V Leiden, Cancer, OCP, etc. )  []Prolonged immobility (hemiplegia/hemiparesis/post operative or any other extended immobilization)  [] Transferred from outside facility (Rehab or Long term care)  [] Age </= to 50   ASSESSMENT: 89 y/o F with PMH afib not on AC, PPM, HTN, ovarian and colon ca, GERD presents to the ED as a transfer from [facility] for dysarthria and R hemiparesis. NIHSS 8 on repeat exam. R hemiparesis, R facial droop, aphasia (not fluent, not intact to repetition), and dysarthria. 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     Impression: Left MCA infarction, Left M1 occlusion found on CTA likely secondary to cardioembolic etiology (hx of afib), post unsuccessful recanalization.     NEURO: Patient neurologically without acute change . Continue monitoring for neurologic deterioration.  Nsx noted severe tortuosity preventing access to left ICA intracranially. Hemicrani deferred due to advanced age. Goal for SBP of 100-180mmHg with overall normotension. Patient on statin for LDL goal less than 70. MRI Brain would not change medical management as pt has hx of afib and was not on AC. Head CT as noted above. Physical therapy and OT recommend CHARLES. Zaleplon restarted for insomnia.     ANTITHROMBOTIC THERAPY: ASA 81 PO. Consider beginning Eliquis 5mg BID in 2 weeks from onset after repeat stable head CT pending GOC discussion.     PULMONARY: CXR no active pulmonary disease, protecting airway, saturating well on room air     CARDIOVASCULAR: TTE shows mild-moderate mitral regurgitation, mild aortic regurgitation, severe global LV systolic dysfunction, normal RV function, mild tricuspid regurgitation. Thickened pericardium with small pericardial effusion.  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 Patient has PPM placed in 2019. EP interrogated and found multiple episodes of atrial fibrillation and flutter. Rate controlled at this time Cardiology follow up appreciated: acute systolic CHF- s/p lasix. BNP ~ 28,000. CT chest shows pulmonary edema.                         SBP goal: 100-180 mmhg     GASTROINTESTINAL:  dysphagia screen- failed 7/26. Re eval, 7/27 failed. PEG placed by IR 7/30 afternoon. Tube feeds started 7/31 at 5:30 pm. Noted intolerance TF held, nutrition reconsulted with lower recommendations for TF rate. Starting at 10 cc/hr on 8/3.      Diet: PEG with TF    RENAL: BUN/Cr within normal limits, good urine output.  IVF d/c at this time due to concern for fluid overload. Monitor for further hyponatremia. K+ repleted.      Na Goal: Greater than 135     Kessler: no     HEMATOLOGY: H/H within normal limits, Platelets normal at 204,no  active bleeding noted      DVT ppx: lovenox subq     ID: afebrile, leukocytosis -, down trending. UA -. ID following, finished zosyn 5/5 days. Will continue to monitor for s/sx of infection.      OTHER:   Palliative meeting held on 07/30/21 family all in agreement with PEG placement. Will need follow up with palliative regarding DNR/DNI and if wish to pursue anticoagulation with risk of bleeding.     DISPOSITION: [facility]     CORE MEASURES:        Admission NIHSS: 16      TPA: [] YES [x] NO      LDL/HDL: 123/30     Depression Screen: p     Statin Therapy: yes     Dysphagia Screen: [] PASS [x] FAIL     Smoking [] YES [x] NO      Afib [x] YES [] NO     Stroke Education [x] YES [] NO    Obtain screening lower extremity venous ultrasound in patients who meet 1 or more of the following criteria as patient is high risk for DVT/PE on admission:   [] History of DVT/PE  []Hypercoagulable states (Factor V Leiden, Cancer, OCP, etc. )  []Prolonged immobility (hemiplegia/hemiparesis/post operative or any other extended immobilization)  [] Transferred from outside facility (Rehab or Long term care)  [] Age </= to 50

## 2021-08-03 NOTE — PROGRESS NOTE ADULT - SUBJECTIVE AND OBJECTIVE BOX
Name of Patient : SHELBY LESTER  MRN: [id number]  DATE OF SERVICE: 21     Subjective: Patient seen and examined. No new events except as noted.         MEDICATIONS:  MEDICATIONS  (STANDING):  albuterol/ipratropium for Nebulization 3 milliLiter(s) Nebulizer every 6 hours  aspirin  chewable 81 milliGRAM(s) Oral daily  ATENolol  Tablet 25 milliGRAM(s) Oral daily  atorvastatin 80 milliGRAM(s) Oral at bedtime  buDESOnide    Inhalation Suspension 0.5 milliGRAM(s) Inhalation every 12 hours  enoxaparin Injectable 40 milliGRAM(s) SubCutaneous <User Schedule>  glucagon  Injectable 1 milliGRAM(s) IntraMuscular once  multivitamin 1 Tablet(s) Oral daily  pantoprazole  Injectable 40 milliGRAM(s) IV Push daily  polyethylene glycol 3350 17 Gram(s) Oral daily  senna 1 Tablet(s) Oral daily      PHYSICAL EXAM:  T(C): 36.4 (21 @ 15:52), Max: 36.6 (21 @ 19:30)  HR: 70 (21 @ 14:00) (70 - 78)  BP: 119/56 (21 @ 14:00) (101/56 - 145/72)  RR: 17 (21 @ 14:00) (10 - 26)  SpO2: 97% (21 @ 14:00) (93% - 100%)  Wt(kg): --  I&O's Summary    02 Aug 2021 07:  -  03 Aug 2021 07:00  --------------------------------------------------------  IN: 30 mL / OUT: 950 mL / NET: -920 mL    03 Aug 2021 07:  -  03 Aug 2021 17:00  --------------------------------------------------------  IN: 100 mL / OUT: 900 mL / NET: -800 mL          Appearance: awake  HEENT:  PERRLA   Lymphatic: No lymphadenopathy   Cardiovascular: Normal S1 S2  Respiratory: normal effort , clear  Gastrointestinal:  Soft, Non-tender  Skin: No rashes,  warm to touch  Psychiatry:  Mood & affect appropriate  Musculuskeletal: No edema      All labs, Imaging and EKGs personally reviewed       21 @ 07:01  -  21 @ 07:00  --------------------------------------------------------  IN: 30 mL / OUT: 950 mL / NET: -920 mL    21 @ 07:01  -  21 @ 17:00  --------------------------------------------------------  IN: 100 mL / OUT: 900 mL / NET: -800 mL                          12.3   10.19 )-----------( 204      ( 03 Aug 2021 05:38 )             36.9               08-03    139  |  101  |  15  ----------------------------<  107<H>  3.1<L>   |  26  |  0.78    Ca    8.3<L>      03 Aug 2021 05:38  Mg     1.6     08-02                         Urinalysis Basic - ( 01 Aug 2021 18:48 )    Color: Yellow / Appearance: Slightly Turbid / S.023 / pH: x  Gluc: x / Ketone: Small  / Bili: Negative / Urobili: Negative   Blood: x / Protein: 100 / Nitrite: Negative   Leuk Esterase: Large / RBC: 6 /hpf /  /HPF   Sq Epi: x / Non Sq Epi: 4 /hpf / Bacteria: Moderate

## 2021-08-03 NOTE — PROGRESS NOTE ADULT - ASSESSMENT
89 y/o F with PMH afib not on AC, PPM, HTN, ovarian and colon ca, GERD, PNA x2 as a child  Presents to the ED as a transfer from [facility] for stroke. Pt was found to have L M1 occlusion on CTA, S/p thrombectomy with unsuccessful recanalization. Course c/b leukocytosis - started on empiric Zosyn for concern of aspiration. Called to consult for wheezing, congested cough this AM, which seems to have improved with Duoneb. Sees pulmonologist as an outpt for chronic cough and wheezing.

## 2021-08-04 LAB
ANION GAP SERPL CALC-SCNC: 14 MMOL/L — SIGNIFICANT CHANGE UP (ref 5–17)
ANION GAP SERPL CALC-SCNC: 15 MMOL/L — SIGNIFICANT CHANGE UP (ref 5–17)
BASOPHILS # BLD AUTO: 0.05 K/UL — SIGNIFICANT CHANGE UP (ref 0–0.2)
BASOPHILS NFR BLD AUTO: 0.5 % — SIGNIFICANT CHANGE UP (ref 0–2)
BUN SERPL-MCNC: 13 MG/DL — SIGNIFICANT CHANGE UP (ref 7–23)
BUN SERPL-MCNC: 13 MG/DL — SIGNIFICANT CHANGE UP (ref 7–23)
CALCIUM SERPL-MCNC: 8.8 MG/DL — SIGNIFICANT CHANGE UP (ref 8.4–10.5)
CALCIUM SERPL-MCNC: 9.1 MG/DL — SIGNIFICANT CHANGE UP (ref 8.4–10.5)
CHLORIDE SERPL-SCNC: 99 MMOL/L — SIGNIFICANT CHANGE UP (ref 96–108)
CHLORIDE SERPL-SCNC: 99 MMOL/L — SIGNIFICANT CHANGE UP (ref 96–108)
CO2 SERPL-SCNC: 22 MMOL/L — SIGNIFICANT CHANGE UP (ref 22–31)
CO2 SERPL-SCNC: 25 MMOL/L — SIGNIFICANT CHANGE UP (ref 22–31)
CREAT SERPL-MCNC: 0.73 MG/DL — SIGNIFICANT CHANGE UP (ref 0.5–1.3)
CREAT SERPL-MCNC: 0.77 MG/DL — SIGNIFICANT CHANGE UP (ref 0.5–1.3)
EOSINOPHIL # BLD AUTO: 0.28 K/UL — SIGNIFICANT CHANGE UP (ref 0–0.5)
EOSINOPHIL NFR BLD AUTO: 3 % — SIGNIFICANT CHANGE UP (ref 0–6)
GLUCOSE SERPL-MCNC: 109 MG/DL — HIGH (ref 70–99)
GLUCOSE SERPL-MCNC: 123 MG/DL — HIGH (ref 70–99)
HCT VFR BLD CALC: 38 % — SIGNIFICANT CHANGE UP (ref 34.5–45)
HGB BLD-MCNC: 12.5 G/DL — SIGNIFICANT CHANGE UP (ref 11.5–15.5)
IMM GRANULOCYTES NFR BLD AUTO: 0.8 % — SIGNIFICANT CHANGE UP (ref 0–1.5)
LYMPHOCYTES # BLD AUTO: 2.54 K/UL — SIGNIFICANT CHANGE UP (ref 1–3.3)
LYMPHOCYTES # BLD AUTO: 27.4 % — SIGNIFICANT CHANGE UP (ref 13–44)
MCHC RBC-ENTMCNC: 31.6 PG — SIGNIFICANT CHANGE UP (ref 27–34)
MCHC RBC-ENTMCNC: 32.9 GM/DL — SIGNIFICANT CHANGE UP (ref 32–36)
MCV RBC AUTO: 96 FL — SIGNIFICANT CHANGE UP (ref 80–100)
MONOCYTES # BLD AUTO: 0.93 K/UL — HIGH (ref 0–0.9)
MONOCYTES NFR BLD AUTO: 10 % — SIGNIFICANT CHANGE UP (ref 2–14)
NEUTROPHILS # BLD AUTO: 5.4 K/UL — SIGNIFICANT CHANGE UP (ref 1.8–7.4)
NEUTROPHILS NFR BLD AUTO: 58.3 % — SIGNIFICANT CHANGE UP (ref 43–77)
NRBC # BLD: 0 /100 WBCS — SIGNIFICANT CHANGE UP (ref 0–0)
NT-PROBNP SERPL-SCNC: HIGH PG/ML (ref 0–300)
PLATELET # BLD AUTO: 239 K/UL — SIGNIFICANT CHANGE UP (ref 150–400)
POTASSIUM SERPL-MCNC: 3 MMOL/L — LOW (ref 3.5–5.3)
POTASSIUM SERPL-MCNC: 4.4 MMOL/L — SIGNIFICANT CHANGE UP (ref 3.5–5.3)
POTASSIUM SERPL-SCNC: 3 MMOL/L — LOW (ref 3.5–5.3)
POTASSIUM SERPL-SCNC: 4.4 MMOL/L — SIGNIFICANT CHANGE UP (ref 3.5–5.3)
RBC # BLD: 3.96 M/UL — SIGNIFICANT CHANGE UP (ref 3.8–5.2)
RBC # FLD: 13.6 % — SIGNIFICANT CHANGE UP (ref 10.3–14.5)
SODIUM SERPL-SCNC: 136 MMOL/L — SIGNIFICANT CHANGE UP (ref 135–145)
SODIUM SERPL-SCNC: 138 MMOL/L — SIGNIFICANT CHANGE UP (ref 135–145)
WBC # BLD: 9.27 K/UL — SIGNIFICANT CHANGE UP (ref 3.8–10.5)
WBC # FLD AUTO: 9.27 K/UL — SIGNIFICANT CHANGE UP (ref 3.8–10.5)

## 2021-08-04 PROCEDURE — 99233 SBSQ HOSP IP/OBS HIGH 50: CPT

## 2021-08-04 RX ORDER — LANOLIN ALCOHOL/MO/W.PET/CERES
3 CREAM (GRAM) TOPICAL AT BEDTIME
Refills: 0 | Status: DISCONTINUED | OUTPATIENT
Start: 2021-08-04 | End: 2021-08-09

## 2021-08-04 RX ORDER — FUROSEMIDE 40 MG
40 TABLET ORAL DAILY
Refills: 0 | Status: DISCONTINUED | OUTPATIENT
Start: 2021-08-04 | End: 2021-08-09

## 2021-08-04 RX ORDER — GABAPENTIN 400 MG/1
100 CAPSULE ORAL THREE TIMES A DAY
Refills: 0 | Status: DISCONTINUED | OUTPATIENT
Start: 2021-08-04 | End: 2021-08-05

## 2021-08-04 RX ORDER — LANOLIN ALCOHOL/MO/W.PET/CERES
3 CREAM (GRAM) TOPICAL AT BEDTIME
Refills: 0 | Status: DISCONTINUED | OUTPATIENT
Start: 2021-08-04 | End: 2021-08-04

## 2021-08-04 RX ORDER — POTASSIUM CHLORIDE 20 MEQ
40 PACKET (EA) ORAL ONCE
Refills: 0 | Status: COMPLETED | OUTPATIENT
Start: 2021-08-04 | End: 2021-08-04

## 2021-08-04 RX ADMIN — SENNA PLUS 1 TABLET(S): 8.6 TABLET ORAL at 21:39

## 2021-08-04 RX ADMIN — ATORVASTATIN CALCIUM 80 MILLIGRAM(S): 80 TABLET, FILM COATED ORAL at 21:39

## 2021-08-04 RX ADMIN — Medication 0.5 MILLIGRAM(S): at 05:39

## 2021-08-04 RX ADMIN — Medication 3 MILLILITER(S): at 00:21

## 2021-08-04 RX ADMIN — Medication 0.5 MILLIGRAM(S): at 18:02

## 2021-08-04 RX ADMIN — Medication 40 MILLIEQUIVALENT(S): at 14:47

## 2021-08-04 RX ADMIN — Medication 650 MILLIGRAM(S): at 11:13

## 2021-08-04 RX ADMIN — ATENOLOL 25 MILLIGRAM(S): 25 TABLET ORAL at 05:39

## 2021-08-04 RX ADMIN — Medication 650 MILLIGRAM(S): at 11:43

## 2021-08-04 RX ADMIN — Medication 3 MILLIGRAM(S): at 21:39

## 2021-08-04 RX ADMIN — Medication 1 TABLET(S): at 13:02

## 2021-08-04 RX ADMIN — PANTOPRAZOLE SODIUM 40 MILLIGRAM(S): 20 TABLET, DELAYED RELEASE ORAL at 13:01

## 2021-08-04 RX ADMIN — GABAPENTIN 100 MILLIGRAM(S): 400 CAPSULE ORAL at 21:40

## 2021-08-04 RX ADMIN — Medication 3 MILLILITER(S): at 13:01

## 2021-08-04 RX ADMIN — Medication 40 MILLIEQUIVALENT(S): at 09:57

## 2021-08-04 RX ADMIN — Medication 40 MILLIGRAM(S): at 11:03

## 2021-08-04 RX ADMIN — Medication 81 MILLIGRAM(S): at 13:01

## 2021-08-04 RX ADMIN — POLYETHYLENE GLYCOL 3350 17 GRAM(S): 17 POWDER, FOR SOLUTION ORAL at 13:02

## 2021-08-04 RX ADMIN — ENOXAPARIN SODIUM 40 MILLIGRAM(S): 100 INJECTION SUBCUTANEOUS at 18:03

## 2021-08-04 RX ADMIN — Medication 3 MILLILITER(S): at 18:02

## 2021-08-04 RX ADMIN — OLANZAPINE 2.5 MILLIGRAM(S): 15 TABLET, FILM COATED ORAL at 13:08

## 2021-08-04 RX ADMIN — Medication 3 MILLILITER(S): at 05:38

## 2021-08-04 NOTE — PROGRESS NOTE ADULT - PROBLEM SELECTOR PLAN 3
started on empiric Zosyn by primary team for concern of aspiration PNA   -S/p 5 days empiric Zosyn   -CT A/P (lower chest) from 7/30 with no PNA. CXR with no clear infiltrate.  -WBC normalized   -ID f/u

## 2021-08-04 NOTE — PROGRESS NOTE ADULT - ASSESSMENT
ASSESSMENT: 89 y/o F with PMH afib not on AC, PPM, HTN, ovarian and colon ca, GERD presents to the ED as a transfer from [facility] for dysarthria and R hemiparesis. NIHSS 8 on repeat exam. R hemiparesis, R facial droop, aphasia (not fluent, not intact to repetition), and dysarthria. 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     Impression: Left MCA infarction, Left M1 occlusion found on CTA likely secondary to cardioembolic etiology (hx of afib), post unsuccessful recanalization.     NEURO: Patient neurologically without acute change . Continue monitoring for neurologic deterioration.  Nsx noted severe tortuosity preventing access to left ICA intracranially. Hemicrani deferred due to advanced age. Goal for SBP of 100-180mmHg with overall normotension. Patient on statin for LDL goal less than 70. MRI Brain would not change medical management as pt has hx of afib and was not on AC. Head CT as noted above. Physical therapy and OT recommend CHARLES. Zaleplon restarted for insomnia.     ANTITHROMBOTIC THERAPY: ASA 81 PO. Consider beginning Eliquis 5mg BID in 2 weeks from onset after repeat stable head CT pending GOC discussion.     PULMONARY: CXR (08/01/21) Increased basilar congestion compared with prior study, protecting airway, saturating well on room air     CARDIOVASCULAR: TTE shows mild-moderate mitral regurgitation, mild aortic regurgitation, severe global LV systolic dysfunction, normal RV function, mild tricuspid regurgitation. Thickened pericardium with small pericardial effusion.  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 cardio made aware of TTE findings. Patient has PPM placed in 2019. EP interrogated and found multiple episodes of atrial fibrillation and flutter. Rate controlled at this time Dr Stone (Cardiology) follow up appreciated: acute systolic CHF- s/p lasix. BNP ~ 28,000. CT chest shows pulmonary edema. BNP (08/04/21) : 12,929                    SBP goal: 100-180 mmhg     GASTROINTESTINAL:  dysphagia screen- failed 7/26. Re eval, 7/27 failed. PEG placed by IR 7/30  Tube feeds started 7/31 at 5:30 pm. Noted intolerance TF held, nutrition reconsulted with lower recommendations for TF rate. Starting at 10 cc/hr on 8/3 tolerating well.      Diet: PEG with TF    RENAL: BUN/Cr within normal limits, good urine output,  will continue with Lasix 40mg daily as per Cardio recommendations, VF on hold due to concern for fluid overload. Monitor for further hyponatremia. hypokalemia: K+ repleted.      Na Goal: Greater than 135     Kessler: no     HEMATOLOGY: H/H within normal limits, Platelets normal at 204,no  active bleeding noted      DVT ppx: lovenox subq     ID: afebrile, leukocytosis -, down trending. UA -. ID following, finished zosyn 5/5 days. Will continue to monitor for s/sx of infection.      OTHER:   Palliative meeting held on 07/30/21 family all in agreement with PEG placement. Palliative contacted again: Will need follow up regarding DNR/DNI and if wish to pursue anticoagulation for afib oncve cleared by neuro after repeat neuroimaging in 14 days, due to  risk of bleeding.     DISPOSITION: [facility] as per PT/OT eval once stable     CORE MEASURES:        Admission NIHSS: 16      TPA: [] YES [x] NO      LDL/HDL: 123/30     Depression Screen: p     Statin Therapy: yes     Dysphagia Screen: [] PASS [x] FAIL     Smoking [] YES [x] NO      Afib [x] YES [] NO     Stroke Education [x] YES [] NO    Obtain screening lower extremity venous ultrasound in patients who meet 1 or more of the following criteria as patient is high risk for DVT/PE on admission:   [] History of DVT/PE  []Hypercoagulable states (Factor V Leiden, Cancer, OCP, etc. )  []Prolonged immobility (hemiplegia/hemiparesis/post operative or any other extended immobilization)  [] Transferred from outside facility (Rehab or Long term care)  [] Age </= to 50

## 2021-08-04 NOTE — PROGRESS NOTE ADULT - SUBJECTIVE AND OBJECTIVE BOX
Subjective: Patient seen and examined. No new events except as noted.   remains in Stroke unit   off oxygen   breathing appears more stable/comfortable     REVIEW OF SYSTEMS:  Unable to obtain     MEDICATIONS:  MEDICATIONS  (STANDING):  albuterol/ipratropium for Nebulization 3 milliLiter(s) Nebulizer every 6 hours  aspirin  chewable 81 milliGRAM(s) Oral daily  ATENolol  Tablet 25 milliGRAM(s) Oral daily  atorvastatin 80 milliGRAM(s) Oral at bedtime  buDESOnide    Inhalation Suspension 0.5 milliGRAM(s) Inhalation every 12 hours  enoxaparin Injectable 40 milliGRAM(s) SubCutaneous <User Schedule>  glucagon  Injectable 1 milliGRAM(s) IntraMuscular once  multivitamin 1 Tablet(s) Oral daily  pantoprazole  Injectable 40 milliGRAM(s) IV Push daily  polyethylene glycol 3350 17 Gram(s) Oral daily  senna 1 Tablet(s) Oral daily      PHYSICAL EXAM:  T(C): 36.6 (08-04-21 @ 07:37), Max: 36.6 (08-03-21 @ 20:00)  HR: 69 (08-04-21 @ 08:00) (69 - 76)  BP: 144/53 (08-04-21 @ 08:00) (119/56 - 162/61)  RR: 11 (08-04-21 @ 08:00) (10 - 23)  SpO2: 99% (08-04-21 @ 08:00) (97% - 100%)  Wt(kg): --  I&O's Summary    03 Aug 2021 07:01  -  04 Aug 2021 07:00  --------------------------------------------------------  IN: 150 mL / OUT: 3350 mL / NET: -3200 mL          Appearance: NAD	  HEENT:   Dry  oral mucosa, PERRL, EOMI	  Lymphatic: No lymphadenopathy  Cardiovascular: irregular S1 S2, No JVD, No murmurs, No edema  Respiratory: Decreased bs  Psychiatry: A & O x 3, sleepy   Gastrointestinal:  Soft, Non-tender, + BS	  Skin: No rashes, No ecchymoses, No cyanosis	  Extremities: Normal range of motion, No clubbing, cyanosis or edema  Vascular: Peripheral pulses palpable 2+ bilaterally  NEUROLOGICAL EXAM:  Mental status: Awake, alert, does gesture facial expressions relatively in context to situation (smiles appropriately), not following commands. Can mimic physical actions. No verbal output   Cranial Nerves: R facial droop, no nystagmus, tongue midline. Blink to threat b/l.   Motor exam: right hemiplegia with trace flexion in arm and triple flexion in leg, left side moves well against gravity but inattentive and with drift   Sensation: decreased on right   Coordination/ Gait: gait not assessed         LABS:    CARDIAC MARKERS:                                12.5   9.27  )-----------( 239      ( 04 Aug 2021 05:58 )             38.0     08-04    138  |  99  |  13  ----------------------------<  109<H>  3.0<L>   |  25  |  0.77    Ca    8.8      04 Aug 2021 05:59      proBNP: Serum Pro-Brain Natriuretic Peptide: 13998 pg/mL (08-04 @ 05:59)    Lipid Profile:   HgA1c:   TSH:     3          TELEMETRY: 	  AF  ECG:  	  RADIOLOGY: < from: CT Chest No Cont (08.02.21 @ 16:14) >    EXAM:  CT CHEST                            PROCEDURE DATE:  08/02/2021            INTERPRETATION:  CLINICAL INFORMATION: History of COPD. Chronic cough. CVA.    COMPARISON: None.    CONTRAST/COMPLICATIONS:  IV Contrast: None  Oral Contrast: None  Complications: None reported at time of study    PROCEDURE:  CT of the Chest was performed.  Sagittal and coronal reformats were performed.    FINDINGS:    LUNGS AND AIRWAYS: Anterior bowing of the posterior wall of the trachea, could suggest tracheomalacia. Interlobular septal thickening bilaterally. Bilateral lower lower lung areas of subsegmental, linear or compressive atelectasis  PLEURA: Small bilateral pleural effusions.  MEDIASTINUM AND ELIEL: A few mediastinal lymph nodes largest is about 1.1 cm a precarinal location are likely reactive.  VESSELS: Atherosclerotic changes of the aorta and coronary arteries.  HEART: Cardiomegaly. No pericardial effusion. Left chest wall pacemaker with leads in the right atrium and right ventricle.  CHEST WALL AND LOWER NECK: Within normal limits.  VISUALIZED UPPER ABDOMEN: Small amount of pneumoperitoneum, likely from recent PEG tube placement.  BONES: Unchanged T11 mild compression deformity since abdominal CT of January 23, 2020. Degenerative changes.    IMPRESSION:    Small bilateral pleural effusions with adjacent areas of atelectasis.    Cardiomegaly.    Interlobular septal thickening is suggestive of pulmonary edema given the other findings.    Pneumoperitoneum, likely from recent PEG tube placement.    --- End of Report ---              NORMAN WEINBERG MD; Resident Radiology  This document has been electronically signed.  NATALIE VINSON MD; Attending Radiologist  This document has been electronically signed. Aug  2 2021  5:13PM    < end of copied text >    DIAGNOSTIC TESTING:  [ ] Echocardiogram:  [ ]  Catheterization:  [ ] Stress Test:    OTHER:

## 2021-08-04 NOTE — PROGRESS NOTE ADULT - PROBLEM SELECTOR PLAN 1
Predominant symptom:  Likely due to  Degree of control:  Relative to previous day:  Current treatment regimen:  Recommendations:  Risk mitigation:  Adverse events noted: Predominant symptom: dysphagia  Likely due to recent CVA  Degree of control: recent issue with tube feeds, at 30 cc/hr  Current treatment regimen: On PEG feeds  Risk mitigation: monitor for aspiration  Adverse events noted: none

## 2021-08-04 NOTE — PROGRESS NOTE ADULT - PROBLEM SELECTOR PLAN 4
Cardiomyopathy   ? acute systolic CHF   much better compensated at present time   Start Lasix 40 mg PO daily   outpt follow up with Dr. Watts

## 2021-08-04 NOTE — PROGRESS NOTE ADULT - SUBJECTIVE AND OBJECTIVE BOX
Follow-up Pulm Progress Note    Lethargic, aphasic   Placed on 30% face tent yesterday, pt reportedly mouth breathing and was desaturating on 2LNC  Sats currently 98% on 30% face tent     Medications:  MEDICATIONS  (STANDING):  albuterol/ipratropium for Nebulization 3 milliLiter(s) Nebulizer every 6 hours  aspirin  chewable 81 milliGRAM(s) Oral daily  ATENolol  Tablet 25 milliGRAM(s) Oral daily  atorvastatin 80 milliGRAM(s) Oral at bedtime  buDESOnide    Inhalation Suspension 0.5 milliGRAM(s) Inhalation every 12 hours  enoxaparin Injectable 40 milliGRAM(s) SubCutaneous <User Schedule>  furosemide Solution 40 milliGRAM(s) Enteral Tube daily  glucagon  Injectable 1 milliGRAM(s) IntraMuscular once  multivitamin 1 Tablet(s) Oral daily  pantoprazole  Injectable 40 milliGRAM(s) IV Push daily  polyethylene glycol 3350 17 Gram(s) Oral daily  potassium chloride   Powder 40 milliEquivalent(s) Enteral Tube once  senna 1 Tablet(s) Oral daily    MEDICATIONS  (PRN):  acetaminophen   Tablet .. 650 milliGRAM(s) Oral every 6 hours PRN Mild Pain (1 - 3)  OLANZapine 2.5 milliGRAM(s) Oral daily PRN anxiety  zaleplon 5 milliGRAM(s) Oral at bedtime PRN Insomnia          Vital Signs Last 24 Hrs  T(C): 36.6 (04 Aug 2021 07:37), Max: 36.6 (03 Aug 2021 20:00)  T(F): 97.8 (04 Aug 2021 07:37), Max: 97.9 (03 Aug 2021 20:00)  HR: 69 (04 Aug 2021 08:00) (69 - 76)  BP: 144/53 (04 Aug 2021 08:00) (119/56 - 162/61)  BP(mean): 79 (04 Aug 2021 08:00) (71 - 116)  RR: 11 (04 Aug 2021 08:00) (11 - 22)  SpO2: 99% (04 Aug 2021 08:00) (97% - 100%)      VBG pH 7.39 08-02 @ 16:07    VBG pCO2 46 08-02 @ 16:07    VBG O2 sat 37 08-02 @ 16:07    VBG lactate 2.1 08-02 @ 16:07      08-03 @ 07:01  -  08-04 @ 07:00  --------------------------------------------------------  IN: 150 mL / OUT: 3350 mL / NET: -3200 mL          LABS:                        12.5   9.27  )-----------( 239      ( 04 Aug 2021 05:58 )             38.0     08-04    138  |  99  |  13  ----------------------------<  109<H>  3.0<L>   |  25  |  0.77    Ca    8.8      04 Aug 2021 05:59            Procalcitonin, Serum: 0.08 ng/mL (08-02-21 @ 07:15)    Serum Pro-Brain Natriuretic Peptide: 14403 pg/mL (08-04-21 @ 05:59)  Serum Pro-Brain Natriuretic Peptide: 15855 pg/mL (08-02-21 @ 07:15)                CULTURES:      Culture - Urine (collected 08-01-21 @ 21:53)  Source: Clean Catch Clean Catch (Midstream)  Final Report (08-02-21 @ 21:39):    No growth    Culture - Urine (collected 07-28-21 @ 19:10)  Source: Catheterized Catheterized  Final Report (07-29-21 @ 22:56):    No growth                Physical Examination:  PULM: bibasilar crackles, no wheezing   CVS: S1, S2 heard    RADIOLOGY REVIEWED  CT chest: < from: CT Chest No Cont (08.02.21 @ 16:14) >  FINDINGS:    LUNGS AND AIRWAYS: Anterior bowing of the posterior wall of the trachea, could suggest tracheomalacia. Interlobular septal thickening bilaterally. Bilateral lower lower lung areas of subsegmental, linear or compressive atelectasis  PLEURA: Small bilateral pleural effusions.  MEDIASTINUM AND ELIEL: A few mediastinal lymph nodes largest is about 1.1 cm a precarinal location are likely reactive.  VESSELS: Atherosclerotic changes of the aorta and coronary arteries.  HEART: Cardiomegaly. No pericardial effusion. Left chest wall pacemaker with leads in the right atrium and right ventricle.  CHEST WALL AND LOWER NECK: Within normal limits.  VISUALIZED UPPER ABDOMEN: Small amount of pneumoperitoneum, likely from recent PEG tube placement.  BONES: Unchanged T11 mild compression deformity since abdominal CT of January 23, 2020. Degenerative changes.    IMPRESSION:    Small bilateral pleural effusions with adjacent areas of atelectasis.    Cardiomegaly.    Interlobular septal thickening is suggestive of pulmonary edema given the other findings.    Pneumoperitoneum, likely from recent PEG tube placement.    --- End of Report ---              < end of copied text >

## 2021-08-04 NOTE — PROGRESS NOTE ADULT - SUBJECTIVE AND OBJECTIVE BOX
HPI:  89 y/o F with PMH afib not on AC, PPM, HTN, ovarian and colon ca, GERD presents to the ED as a transfer from [facility] for stroke. Per son, pt woke up at 3:30am and went to the bathroom, no issues with gait or speech at that time, accompanied by son. At 7:30 am this morning, pt's son found her half off the bed and unable to speak or walk. Pt was found to have L M1 occlusion on CTA, CTP w/ no core infarct and penumbra 81cc, and transferred for IR thrombectomy. Exam at [facility] notable for severe dysarthria and R hemiparesis. Pt did not receive tpa. NIHSS initially 16 on arrival to [facility] ED, NIHSS 8 on repeat exam. At baseline, son reports that pt is able to do own ADLs (showers, reads) but has an aid to make sure she does not fall, walks with a walker without assistance, speech is fluent. (25 Jul 2021 18:03)    PERTINENT PM/SXH:   Hypertension    Cancer    GERD (gastroesophageal reflux disease)    Anxiety    Ovarian cancer    Colon cancer      H/O small bowel obstruction    H/O total hysterectomy    No significant past surgical history      FAMILY HISTORY:  No pertinent family history in first degree relatives    No pertinent family history in first degree relatives      ITEMS NOT CHECKED ARE NOT PRESENT    SOCIAL HISTORY:   Significant other/partner[ ]  Children[ ]  Yazidism/Spirituality:  Substance hx:  [ ]   Tobacco hx:  [ ]   Alcohol hx: [ ]   Home Opioid hx:  [ ] I-Stop Reference No:  Living Situation: [ ]Home  [ ]Long term care  [ ]Rehab [ ]Other    ADVANCE DIRECTIVES:    DNR  MOLST  [ ]  Living Will  [ ]   DECISION MAKER(s):  [ ] Health Care Proxy(s)  [ ] Surrogate(s)  [ ] Guardian           Name(s): Phone Number(s):    BASELINE (I)ADL(s) (prior to admission):  [place]: [ ]Total  [ ] Moderate [ ]Dependent    Allergies    No Known Allergies    Intolerances    MEDICATIONS  (STANDING):  albuterol/ipratropium for Nebulization 3 milliLiter(s) Nebulizer every 6 hours  aspirin  chewable 81 milliGRAM(s) Oral daily  ATENolol  Tablet 25 milliGRAM(s) Oral daily  atorvastatin 80 milliGRAM(s) Oral at bedtime  buDESOnide    Inhalation Suspension 0.5 milliGRAM(s) Inhalation every 12 hours  enoxaparin Injectable 40 milliGRAM(s) SubCutaneous <User Schedule>  furosemide Solution 40 milliGRAM(s) Enteral Tube daily  glucagon  Injectable 1 milliGRAM(s) IntraMuscular once  multivitamin 1 Tablet(s) Oral daily  pantoprazole  Injectable 40 milliGRAM(s) IV Push daily  polyethylene glycol 3350 17 Gram(s) Oral daily  potassium chloride   Powder 40 milliEquivalent(s) Enteral Tube once  senna 1 Tablet(s) Oral daily    MEDICATIONS  (PRN):  acetaminophen   Tablet .. 650 milliGRAM(s) Oral every 6 hours PRN Mild Pain (1 - 3)  OLANZapine 2.5 milliGRAM(s) Oral daily PRN anxiety  zaleplon 5 milliGRAM(s) Oral at bedtime PRN Insomnia    PRESENT SYMPTOMS: [ ]Unable to obtain due to poor mentation   Source if other than patient:  [ ]Family   [ ]Team     Pain: [ ]yes [ ]no  QOL impact -   Location -                    Aggravating factors -  Quality -  Radiation -  Timing-  Severity (0-10 scale):  Minimal acceptable level (0-10 scale):     PAIN AD Score:     http://[email or web address]/painad.pdf (press ctrl +  left click to view)    Dyspnea:                           [ ]Mild [ ]Moderate [ ]Severe  Anxiety:                             [ ]Mild [ ]Moderate [ ]Severe  Fatigue:                             [ ]Mild [ ]Moderate [ ]Severe  Nausea:                             [ ]Mild [ ]Moderate [ ]Severe  Loss of appetite:              [ ]Mild [ ]Moderate [ ]Severe  Constipation:                    [ ]Mild [ ]Moderate [ ]Severe    Other Symptoms:  [ ]All other review of systems negative     Palliative Performance Status Version 2:         %    http://npcrc.org/files/news/palliative_performance_scale_ppsv2.pdf  PHYSICAL EXAM:  Vital Signs Last 24 Hrs  T(C): 36.6 (04 Aug 2021 07:37), Max: 36.6 (03 Aug 2021 20:00)  T(F): 97.8 (04 Aug 2021 07:37), Max: 97.9 (03 Aug 2021 20:00)  HR: 69 (04 Aug 2021 08:00) (69 - 76)  BP: 144/53 (04 Aug 2021 08:00) (119/56 - 162/61)  BP(mean): 79 (04 Aug 2021 08:00) (71 - 116)  RR: 11 (04 Aug 2021 08:00) (10 - 22)  SpO2: 99% (04 Aug 2021 08:00) (97% - 100%) I&O's Summary    03 Aug 2021 07:01  -  04 Aug 2021 07:00  --------------------------------------------------------  IN: 150 mL / OUT: 3350 mL / NET: -3200 mL      GENERAL:  [ ]Alert  [ ]Oriented x   [ ]Lethargic  [ ]Cachexia  [ ]Unarousable  [ ]Verbal  [ ]Non-Verbal  Behavioral:   [ ] Anxiety  [ ] Delirium [ ] Agitation [ ] Other  HEENT:  [ ]Normal   [ ]Dry mouth   [ ]ET Tube/Trach  [ ]Oral lesions  PULMONARY:   [ ]Clear [ ]Tachypnea  [ ]Audible excessive secretions   [ ]Rhonchi        [ ]Right [ ]Left [ ]Bilateral  [ ]Crackles        [ ]Right [ ]Left [ ]Bilateral  [ ]Wheezing     [ ]Right [ ]Left [ ]Bilatera  [ ]Diminished breath sounds [ ]right [ ]left [ ]bilateral  CARDIOVASCULAR:    [ ]Regular [ ]Irregular [ ]Tachy  [ ]Cristi [ ]Murmur [ ]Other  GASTROINTESTINAL:  [ ]Soft  [ ]Distended   [ ]+BS  [ ]Non tender [ ]Tender  [ ]PEG [ ]OGT/ NGT  Last BM:   GENITOURINARY:  [ ]Normal [ ] Incontinent   [ ]Oliguria/Anuria   [ ]Kessler  MUSCULOSKELETAL:   [ ]Normal   [ ]Weakness  [ ]Bed/Wheelchair bound [ ]Edema  NEUROLOGIC:   [ ]No focal deficits  [ ]Cognitive impairment  [ ]Dysphagia [ ]Dysarthria [ ]Paresis [ ]Other   SKIN:   [ ]Normal    [ ]Rash  [ ]Pressure ulcer(s)       Present on admission [ ]y [ ]n    CRITICAL CARE:  [ ] Shock Present  [ ]Septic [ ]Cardiogenic [ ]Neurologic [ ]Hypovolemic  [ ]  Vasopressors [ ]  Inotropes   [ ]Respiratory failure present [ ]Mechanical ventilation [ ]Non-invasive ventilatory support [ ]High flow  [ ]Acute  [ ]Chronic [ ]Hypoxic  [ ]Hypercarbic [ ]Other  [ ]Other organ failure     LABS:                        12.5   9.27  )-----------( 239      ( 04 Aug 2021 05:58 )             38.0   08-04    138  |  99  |  13  ----------------------------<  109<H>  3.0<L>   |  25  |  0.77    Ca    8.8      04 Aug 2021 05:59          RADIOLOGY & ADDITIONAL STUDIES:    PROTEIN CALORIE MALNUTRITION PRESENT: [ ]mild [ ]moderate [ ]severe [ ]underweight [ ]morbid obesity  https://www.andeal.org/vault/2440/web/files/ONC/Table_Clinical%20Characteristics%20to%20Document%20Malnutrition-White%20JV%20et%20al%202012.pdf    Height (cm): 162.6 (07-30-21 @ 14:59)  Weight (kg): 83.1 (07-30-21 @ 14:59)  BMI (kg/m2): 31.4 (07-30-21 @ 14:59)    [ ]PPSV2 < or = to 30% [ ]significant weight loss  [ ]poor nutritional intake  [ ]anasarca      [ ]Artificial Nutrition      REFERRALS:   [ ]Chaplaincy  [ ]Hospice  [ ]Child Life  [ ]Social Work  [ ]Case management [ ]Holistic Therapy     Goals of Care Document:  HPI:  89 y/o F with PMH afib not on AC, PPM, HTN, ovarian and colon ca, GERD presents to the ED as a transfer from [facility] for stroke. Per son, pt woke up at 3:30am and went to the bathroom, no issues with gait or speech at that time, accompanied by son. At 7:30 am this morning, pt's son found her half off the bed and unable to speak or walk. Pt was found to have L M1 occlusion on CTA, CTP w/ no core infarct and penumbra 81cc, and transferred for IR thrombectomy. Exam at [facility] notable for severe dysarthria and R hemiparesis. Pt did not receive tpa. NIHSS initially 16 on arrival to [facility] ED, NIHSS 8 on repeat exam. At baseline, son reports that pt is able to do own ADLs (showers, reads) but has an aid to make sure she does not fall, walks with a walker without assistance, speech is fluent. (25 Jul 2021 18:03)      No distress apparent.  See narrative below for further information    RECENT VITALS/LABS/MEDICATIONS/ASSAYS     08-04-21 @ 15:07    T(C): 36.6 (08-04-21 @ 07:37), Max: 36.6 (08-03-21 @ 20:00)  HR: 71 (08-04-21 @ 14:00) (69 - 76)  BP: 129/91 (08-04-21 @ 14:00) (125/55 - 162/61)  RR: 17 (08-04-21 @ 14:00) (11 - 22)  SpO2: 98% (08-04-21 @ 14:00) (97% - 100%)  Wt(kg): --      03 Aug 2021 07:01  -  04 Aug 2021 07:00  --------------------------------------------------------  IN:    Enteral Tube Flush: 40 mL    Jevity 1.2: 110 mL  Total IN: 150 mL    OUT:    Incontinent per Collection Bag (mL): 1900 mL    Intermittent Catheterization - Urethral (mL): 1450 mL  Total OUT: 3350 mL    Total NET: -3200 mL      04 Aug 2021 07:01  -  04 Aug 2021 15:07  --------------------------------------------------------  IN:    Jevity 1.2: 200 mL  Total IN: 200 mL    OUT:    Intermittent Catheterization - Urethral (mL): 500 mL  Total OUT: 500 mL    Total NET: -300 mL          08-03 @ 07:01 - 08-04 @ 07:00  --------------------------------------------------------  IN: 150 mL / OUT: 3350 mL / NET: -3200 mL    08-04 @ 07:01 - 08-04 @ 15:07  --------------------------------------------------------  IN: 200 mL / OUT: 500 mL / NET: -300 mL      CAPILLARY BLOOD GLUCOSE            acetaminophen   Tablet .. 650 milliGRAM(s) Oral every 6 hours PRN  albuterol/ipratropium for Nebulization 3 milliLiter(s) Nebulizer every 6 hours  aspirin  chewable 81 milliGRAM(s) Oral daily  ATENolol  Tablet 25 milliGRAM(s) Oral daily  atorvastatin 80 milliGRAM(s) Oral at bedtime  buDESOnide    Inhalation Suspension 0.5 milliGRAM(s) Inhalation every 12 hours  enoxaparin Injectable 40 milliGRAM(s) SubCutaneous <User Schedule>  furosemide Solution 40 milliGRAM(s) Enteral Tube daily  gabapentin   Solution 100 milliGRAM(s) Enteral Tube three times a day  glucagon  Injectable 1 milliGRAM(s) IntraMuscular once  multivitamin 1 Tablet(s) Oral daily  OLANZapine 2.5 milliGRAM(s) Oral daily PRN  pantoprazole  Injectable 40 milliGRAM(s) IV Push daily  polyethylene glycol 3350 17 Gram(s) Oral daily  senna 1 Tablet(s) Oral daily  zaleplon 5 milliGRAM(s) Oral at bedtime PRN          08-04    138  |  99  |  13  ----------------------------<  109<H>  3.0<L>   |  25  |  0.77    Ca    8.8      04 Aug 2021 05:59        ProcalcProcalcitonin, Serum: 0.08 ng/mL (08-02-21 @ 07:15)    BNPSerum Pro-Brain Natriuretic Peptide: 06510 pg/mL (08-04-21 @ 05:59)  Serum Pro-Brain Natriuretic Peptide: 32240 pg/mL (08-02-21 @ 07:15)    ABG                          12.5   9.27  )-----------( 239      ( 04 Aug 2021 05:58 )             38.0           blood and urine cultures        PERTINENT PM/SXH:   Hypertension    Cancer    GERD (gastroesophageal reflux disease)    Anxiety    Ovarian cancer    Colon cancer      H/O small bowel obstruction    H/O total hysterectomy    No significant past surgical history      FAMILY HISTORY:  No pertinent family history in first degree relatives    No pertinent family history in first degree relatives      ITEMS NOT CHECKED ARE NOT PRESENT    SOCIAL HISTORY:   Significant other/partner[ ]  Children[ ]  Yazidi/Spirituality:  Substance hx:  [ ]   Tobacco hx:  [ ]   Alcohol hx: [ ]   Home Opioid hx:  [ ] I-Stop Reference No:  Living Situation: [ ]Home  [ ]Long term care  [ ]Rehab [ ]Other    ADVANCE DIRECTIVES:    DNR  MOLST  [ ]  Living Will  [ ]   DECISION MAKER(s):  [ ] Health Care Proxy(s)  [ ] Surrogate(s)  [ ] Guardian           Name(s): Phone Number(s):    BASELINE (I)ADL(s) (prior to admission):  [place]: [ ]Total  [ ] Moderate [ ]Dependent    Allergies    No Known Allergies    Intolerances       PRESENT SYMPTOMS: [ ]Unable to obtain due to poor mentation   Source if other than patient:  [ ]Family   [ ]Team     Pain: [ ]yes [ ]no  QOL impact -   Location -                    Aggravating factors -  Quality -  Radiation -  Timing-  Severity (0-10 scale):  Minimal acceptable level (0-10 scale):     PAIN AD Score:     [email or web address].[email or web address]/painad.pdf (press ctrl +  left click to view)    Dyspnea:                           [ ]Mild [ ]Moderate [ ]Severe  Anxiety:                             [ ]Mild [ ]Moderate [ ]Severe  Fatigue:                             [ ]Mild [ ]Moderate [ ]Severe  Nausea:                             [ ]Mild [ ]Moderate [ ]Severe  Loss of appetite:              [ ]Mild [ ]Moderate [ ]Severe  Constipation:                    [ ]Mild [ ]Moderate [ ]Severe    Other Symptoms:  [ ]All other review of systems negative     Palliative Performance Status Version 2:         %    http://[facility].org/files/news/palliative_performance_scale_ppsv2.pdf  PHYSICAL EXAM:  Vital Signs Last 24 Hrs  T(C): 36.6 (04 Aug 2021 07:37), Max: 36.6 (03 Aug 2021 20:00)  T(F): 97.8 (04 Aug 2021 07:37), Max: 97.9 (03 Aug 2021 20:00)  HR: 69 (04 Aug 2021 08:00) (69 - 76)  BP: 144/53 (04 Aug 2021 08:00) (119/56 - 162/61)  BP(mean): 79 (04 Aug 2021 08:00) (71 - 116)  RR: 11 (04 Aug 2021 08:00) (10 - 22)  SpO2: 99% (04 Aug 2021 08:00) (97% - 100%) I&O's Summary    03 Aug 2021 07:01  -  04 Aug 2021 07:00  --------------------------------------------------------  IN: 150 mL / OUT: 3350 mL / NET: -3200 mL      GENERAL:  [ ]Alert  [ ]Oriented x   [ ]Lethargic  [ ]Cachexia  [ ]Unarousable  [ ]Verbal  [ ]Non-Verbal  Behavioral:   [ ] Anxiety  [ ] Delirium [ ] Agitation [ ] Other  HEENT:  [ ]Normal   [ ]Dry mouth   [ ]ET Tube/Trach  [ ]Oral lesions  PULMONARY:   [ ]Clear [ ]Tachypnea  [ ]Audible excessive secretions   [ ]Rhonchi        [ ]Right [ ]Left [ ]Bilateral  [ ]Crackles        [ ]Right [ ]Left [ ]Bilateral  [ ]Wheezing     [ ]Right [ ]Left [ ]Bilatera  [ ]Diminished breath sounds [ ]right [ ]left [ ]bilateral  CARDIOVASCULAR:    [ ]Regular [ ]Irregular [ ]Tachy  [ ]Cristi [ ]Murmur [ ]Other  GASTROINTESTINAL:  [ ]Soft  [ ]Distended   [ ]+BS  [ ]Non tender [ ]Tender  [ ]PEG [ ]OGT/ NGT  Last BM:   GENITOURINARY:  [ ]Normal [ ] Incontinent   [ ]Oliguria/Anuria   [ ]Kessler  MUSCULOSKELETAL:   [ ]Normal   [ ]Weakness  [ ]Bed/Wheelchair bound [ ]Edema  NEUROLOGIC:   [ ]No focal deficits  [ ]Cognitive impairment  [ ]Dysphagia [ ]Dysarthria [ ]Paresis [ ]Other   SKIN:   [ ]Normal    [ ]Rash  [ ]Pressure ulcer(s)       Present on admission [ ]y [ ]n    CRITICAL CARE:  [ ] Shock Present  [ ]Septic [ ]Cardiogenic [ ]Neurologic [ ]Hypovolemic  [ ]  Vasopressors [ ]  Inotropes   [ ]Respiratory failure present [ ]Mechanical ventilation [ ]Non-invasive ventilatory support [ ]High flow  [ ]Acute  [ ]Chronic [ ]Hypoxic  [ ]Hypercarbic [ ]Other  [ ]Other organ failure     LABS:                        12.5   9.27  )-----------( 239      ( 04 Aug 2021 05:58 )             38.0   08-04    138  |  99  |  13  ----------------------------<  109<H>  3.0<L>   |  25  |  0.77    Ca    8.8      04 Aug 2021 05:59          RADIOLOGY & ADDITIONAL STUDIES:    PROTEIN CALORIE MALNUTRITION PRESENT: [ ]mild [ ]moderate [ ]severe [ ]underweight [ ]morbid obesity  https://www.andeal.org/vault/2440/web/files/ONC/Table_Clinical%20Characteristics%20to%20Document%20Malnutrition-White%20JV%20et%20al%202012.pdf    Height (cm): 162.6 (07-30-21 @ 14:59)  Weight (kg): 83.1 (07-30-21 @ 14:59)  BMI (kg/m2): 31.4 (07-30-21 @ 14:59)    [ ]PPSV2 < or = to 30% [ ]significant weight loss  [ ]poor nutritional intake  [ ]anasarca      [ ]Artificial Nutrition      REFERRALS:   [ ]Chaplaincy  [ ]Hospice  [ ]Child Life  [ ]Social Work  [ ]Case management [ ]Holistic Therapy     Goals of Care Document:

## 2021-08-04 NOTE — PROGRESS NOTE ADULT - SUBJECTIVE AND OBJECTIVE BOX
Name of Patient : SHELBY LESTER  MRN: [id number]  DATE OF SERVICE: 08-04-21     Subjective: Patient seen and examined. No new events except as noted.   comfortable        MEDICATIONS:  MEDICATIONS  (STANDING):  albuterol/ipratropium for Nebulization 3 milliLiter(s) Nebulizer every 6 hours  aspirin  chewable 81 milliGRAM(s) Oral daily  ATENolol  Tablet 25 milliGRAM(s) Oral daily  atorvastatin 80 milliGRAM(s) Oral at bedtime  buDESOnide    Inhalation Suspension 0.5 milliGRAM(s) Inhalation every 12 hours  enoxaparin Injectable 40 milliGRAM(s) SubCutaneous <User Schedule>  furosemide Solution 40 milliGRAM(s) Enteral Tube daily  gabapentin   Solution 100 milliGRAM(s) Enteral Tube three times a day  glucagon  Injectable 1 milliGRAM(s) IntraMuscular once  melatonin 3 milliGRAM(s) Oral at bedtime  multivitamin 1 Tablet(s) Oral daily  pantoprazole  Injectable 40 milliGRAM(s) IV Push daily  polyethylene glycol 3350 17 Gram(s) Oral daily  senna 1 Tablet(s) Oral daily      PHYSICAL EXAM:  T(C): 36.6 (08-04-21 @ 15:14), Max: 36.6 (08-04-21 @ 07:37)  HR: 73 (08-04-21 @ 20:00) (69 - 75)  BP: 133/64 (08-04-21 @ 20:00) (125/55 - 148/86)  RR: 30 (08-04-21 @ 20:00) (11 - 30)  SpO2: 96% (08-04-21 @ 20:00) (93% - 100%)  Wt(kg): --  I&O's Summary    03 Aug 2021 07:01  -  04 Aug 2021 07:00  --------------------------------------------------------  IN: 150 mL / OUT: 3350 mL / NET: -3200 mL    04 Aug 2021 07:01  -  04 Aug 2021 22:26  --------------------------------------------------------  IN: 440 mL / OUT: 550 mL / NET: -110 mL          Appearance: Normal	  HEENT:  PERRLA   Lymphatic: No lymphadenopathy   Cardiovascular: Normal S1 S2, no JVD  Respiratory: normal effort , clear  Gastrointestinal:  Soft, Non-tender  Skin: No rashes,  warm to touch  Psychiatry:  Mood & affect appropriate  Musculuskeletal: No edema      All labs, Imaging and EKGs personally reviewed     08-03-21 @ 07:01  -  08-04-21 @ 07:00  --------------------------------------------------------  IN: 150 mL / OUT: 3350 mL / NET: -3200 mL    08-04-21 @ 07:01  -  08-04-21 @ 22:26  --------------------------------------------------------  IN: 440 mL / OUT: 550 mL / NET: -110 mL                          12.5   9.27  )-----------( 239      ( 04 Aug 2021 05:58 )             38.0               08-04    136  |  99  |  13  ----------------------------<  123<H>  4.4   |  22  |  0.73    Ca    9.1      04 Aug 2021 19:29

## 2021-08-04 NOTE — PROGRESS NOTE ADULT - ASSESSMENT
89 y/o F with PMH afib not on AC, PPM, HTN, ovarian and colon ca, GERD presents to the ED as a transfer from [facility] for stroke. Per son, pt woke up at 3:30am and went to the bathroom, no issues with gait or speech at that time, accompanied by son. At 7:30 am this morning, pt's son found her half off the bed and unable to speak or walk. Pt was found to have L M1 occlusion on CTA, CTP w/ no core infarct and penumbra 81cc, and transferred for IR thrombectomy. Exam at [facility] notable for severe dysarthria and R hemiparesis. Pt did not receive tpa. NIHSS initially 16 on arrival to [facility] ED, NIHSS 8 on repeat exam. At baseline, son reports that pt is able to do own ADLs (showers, reads) but has an aid to make sure she does not fall, walks with a walker without assistance, speech is fluent.  Pt. s/p 2 failed S&S and family approached for PEG.    S/P EGD (07.30.21 @ 09:57) >  Impression: - Failed PEG placement. The procedure was aborted due to poor endoscopic visualization and anatomy.                       - Normal esophagus.                       - Erythematous mucosa in the antrum.                       - Normal examined duodenum.                       - An endoscopically removable PEG placement was attempted but could not be                        successfully completed. The needle/trocar was not passed.                       - No specimens collected.    S/P Failed PEG placement  Pt. s/p GTube placement by IR   S/P one episode of vomiting which resolved.   Currently tolerating feeds.  I believe her tachypnea  is 2/2 pulm edema and cardiac related and due to her severe LV Dysfn.  Would hold off on any IVF or free water now  Would progressively increase feedings Q 6 hrs till reach goal at 50 cc/hr.  Please keep HOB elevated at 45 degrees.  Aspiration precautions.  Cont. Lovenox for AC  Pt. with resolving leukocytosis and most likely reactive.  S/P IV Abx. X 5 days.  Appreciate Nutritionist f/u  would HOLD OFF on any free water via GTube as pt. is still fluid overloaded   Prefer Bolus feeds once feeding goal reached to help with her Rehab.   May cont. ASA  Cont. Protonix IV for now, May change to Nexium Elixir 40 mg via GTube QD 1/2 hr. before feedings upon D/C to rehab.    Thank you.

## 2021-08-04 NOTE — PROGRESS NOTE ADULT - PROBLEM SELECTOR PLAN 4
Actions:  [] Rapport building     [] Symptom assessment    [] Eliciting preferences of goals   [] Prognostic understanding    [] Emotional Support  [] Coping skill developement  []  Other  Interdisciplinary Referrals:  Communication:  Documentation Review: [] Primary Team [] Consultants [] Interdisciplinary team  Content: Actions:  [] Rapport building     [x] Symptom assessment    [] Eliciting preferences of goals   [] Prognostic understanding    [] Emotional Support  [] Coping skill development  []  Other  Interdisciplinary Referrals: None at this time  Communication: Discussed with the primary team. Pt with ? reported psychosocial distress by family  Unclear assessment given dysarthria and limited communication. PAINAD 0. RDOS 1. Recommend management of overload. Unclear expectations from the family. Meeting for Thursday at 2pm with he patient's son.  No role for opioids at this time.  Documentation Review: [x] Primary Team [] Consultants [] Interdisciplinary team  Case discussed with Neuro

## 2021-08-04 NOTE — PROGRESS NOTE ADULT - PROBLEM SELECTOR PLAN 2
CT chest with small b/l pl effusions and pulm edema   -TTE with EF 30/35%, mild/mod MR  -Elevated ProBNP, continue to trend  -Diuresis per cards, keep O>I as tolerated  -Wean O2 as tolerated, keep sats >90% (currently 30% face tent, reportedly desaturated on 2LNC - mostly mouth breather)

## 2021-08-04 NOTE — PROGRESS NOTE ADULT - PROBLEM SELECTOR PLAN 1
never a smoker, +exposure to second hand smoke   -Reported hx of chronic cough with wheezing, uses [facility] as an outpatient (sees Dr. Woodward)  -Intermittent wheezes, mostly upper airway. ENT recs noted - Laryngoscopy with intact upper airway, VCs are appropriately mobile, no edema, mild to moderate reflux  -C/w PPI  -Duoneb q6h/Pulmicort 0.5mg BID  -No wheezing at this time

## 2021-08-04 NOTE — PROGRESS NOTE ADULT - ASSESSMENT
89 y/o F with PMH afib not on AC, PPM, HTN, ovarian and colon ca, GERD, PNA x2 as a child  Presents to the ED as a transfer from [facility] for stroke. Pt was found to have L M1 occlusion on CTA, S/p thrombectomy with unsuccessful recanalization. Course c/b leukocytosis - started on empiric Zosyn for concern of aspiration. Called to consult for wheezing, congested cough this AM, which seems to have improved with Duoneb. Sees pulmonologist as an outpt for chronic cough and wheezing.

## 2021-08-04 NOTE — PROGRESS NOTE ADULT - PROBLEM SELECTOR PLAN 2
PPSv2 prior to admission:  Current functional PPSv2:  Nursing care required:  Code status documented:  A review of the paper chart has been conducted  HCP form present:               Yes and valid []               Yes but invalid []                No []   MOLST present:                   Yes and valid []               Yes but invalid []                No []  Incapacity form present:   Yes and valid []                Yes but invalid []                No []                 N/A []  Living Will:                            Yes and valid []                Yes but invalid []                No []      Family Health Care Decision Act (FHCDA) Surrogate Decision Maker Hierarchy in the absence of a health care agent  [facility] Article 81 Guardian-->Spouse or domestic partner--> Adult child-->Parent-->Sibling--> Close friend Current functional PPSv2: 40  Nursing care required: Assistance with ADLs  Code status documented: Full code  A review of the paper chart has been conducted  HCP form present:               Yes and valid [x]               Yes but invalid []                No []   MOLST present:                   Yes and valid []               Yes but invalid []                No [x]  Incapacity form present:   Yes and valid []                Yes but invalid []                No []                 N/A [x]  Living Will:                            Yes and valid []                Yes but invalid []                No [x]      Family Health Care Decision Act (FHCDA) Surrogate Decision Maker Hierarchy in the absence of a health care agent  [facility] Article 81 Guardian-->Spouse or domestic partner--> Adult child-->Parent-->Sibling--> Close friend

## 2021-08-04 NOTE — PROGRESS NOTE ADULT - SUBJECTIVE AND OBJECTIVE BOX
THE PATIENT WAS SEEN AND EXAMINED BY ME WITH THE HOUSESTAFF AND STROKE TEAM DURING MORNING ROUNDS.   HPI:  89 y/o F with PMH afib not on AC, PPM, HTN, ovarian and colon ca, GERD presents to the ED as a transfer from [facility] for stroke. Per son, pt woke up at 3:30am on 7/25 and went to the bathroom, no issues with gait or speech at that time, accompanied by son. At 7:30 am that morning of admit , pt's son found her half off the bed and unable to speak or walk. Pt was found to have L M1 occlusion on CTA, CTP w/ no core infarct and penumbra 81cc, and transferred for IR thrombectomy. Exam at [facility] notable for severe dysarthria and R hemiparesis. Pt did not receive tpa. NIHSS initially 16 on arrival to [facility] ED, NIHSS 8 on repeat exam. At baseline, son reports that pt is able to do own ADLs (showers, reads) but has an aid to make sure she does not fall, walks with a walker without assistance    SUBJECTIVE: No events overnight.  No new neurologic complaints, ROS reported negative unless otherwise noted.      acetaminophen   Tablet .. 650 milliGRAM(s) Oral every 6 hours PRN  albuterol/ipratropium for Nebulization 3 milliLiter(s) Nebulizer every 6 hours  aspirin  chewable 81 milliGRAM(s) Oral daily  ATENolol  Tablet 25 milliGRAM(s) Oral daily  atorvastatin 80 milliGRAM(s) Oral at bedtime  buDESOnide    Inhalation Suspension 0.5 milliGRAM(s) Inhalation every 12 hours  enoxaparin Injectable 40 milliGRAM(s) SubCutaneous <User Schedule>  furosemide Solution 40 milliGRAM(s) Enteral Tube daily  glucagon  Injectable 1 milliGRAM(s) IntraMuscular once  multivitamin 1 Tablet(s) Oral daily  OLANZapine 2.5 milliGRAM(s) Oral daily PRN  pantoprazole  Injectable 40 milliGRAM(s) IV Push daily  polyethylene glycol 3350 17 Gram(s) Oral daily  potassium chloride   Powder 40 milliEquivalent(s) Enteral Tube once  senna 1 Tablet(s) Oral daily  zaleplon 5 milliGRAM(s) Oral at bedtime PRN      PHYSICAL EXAM:   Vital Signs Last 24 Hrs  T(C): 36.6 (04 Aug 2021 07:37), Max: 36.6 (03 Aug 2021 20:00)  T(F): 97.8 (04 Aug 2021 07:37), Max: 97.9 (03 Aug 2021 20:00)  HR: 69 (04 Aug 2021 08:00) (69 - 76)  BP: 144/53 (04 Aug 2021 08:00) (119/56 - 162/61)  BP(mean): 79 (04 Aug 2021 08:00) (71 - 116)  RR: 11 (04 Aug 2021 08:00) (10 - 22)  SpO2: 99% (04 Aug 2021 08:00) (97% - 100%)    General: No acute distress  HEENT: EOM intact, visual fields full  Abdomen: Soft, nontender, nondistended   Extremities: No edema    NEUROLOGICAL EXAM:  Mental status: Awake, alert, does gesture facial expressions relatively in context to situation (smiles appropriately). Not following commands, perseverates on one action. Can mimic physical actions. Generates sounds.  Cranial Nerves: R facial droop, no nystagmus, tongue midline. Blink to threat on right .   Motor exam: right hemiplegia with trace flexion in arm. RLE 3-4/5. Left side moves well against gravity but inattentive and with drift   Sensation: intact to noxious stimuli b/l.    Coordination/ Gait: gait not assessed    LABS:                        12.5   9.27  )-----------( 239      ( 04 Aug 2021 05:58 )             38.0    08-04    138  |  99  |  13  ----------------------------<  109<H>  3.0<L>   |  25  |  0.77    Ca    8.8      04 Aug 2021 05:59          IMAGING: Reviewed by me.     CT chest (08/02/21) Small bilateral pleural effusions with adjacent areas of atelectasis.Cardiomegaly. Interlobular septal thickening is suggestive of pulmonary edema given the other findings.Pneumoperitoneum, likely from recent PEG tube placement.    CT Head No Cont (08.01.21) Continued evolution of left insular and posterior frontotemporal stroke. No areas of intraparenchymal hemorrhage.    CT Head No Cont (07.26.21) Acute left MCA territory infarct without evidence of hemorrhagic transformation which has progressed since 7/25/2021.    TTE with Doppler (w/Cont) (07.28.21) Severe global left ventricular systolic dysfunction. No left ventricular thrombus. 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      CT Head No Cont (08.01.21) Continued evolution of left insular and posterior frontotemporal stroke. No areas of intraparenchymal hemorrhage.

## 2021-08-04 NOTE — PROGRESS NOTE ADULT - SUBJECTIVE AND OBJECTIVE BOX
Chief Complaint:  Patient is a 89y old  Female who presents with a chief complaint of stroke transfer (03 Aug 2021 13:59)      Interval Events:   pt. awake, tolerated feedings last night, no N/V  Allergies:  No Known Allergies        Home Medications:  aspirin 81 mg oral delayed release tablet: 1 tab(s) orally once a day (27 Jan 2020 11:10)  atenolol 25 mg oral tablet: 1 tab(s) orally once a day (27 Jan 2020 11:10)  gabapentin 100 mg oral capsule: 1 cap(s) orally 3 times a day (27 Jan 2020 11:10)  hydrocodocone chlopheniramine: 5 milliliter(s) orally once a day (at bedtime) (24 Jan 2020 11:01)  Multiple Vitamins oral tablet: 1 tab(s) orally once a day (24 Jan 2020 11:01)  omeprazole 20 mg oral delayed release capsule: 1 cap(s) orally once a day (24 Jan 2020 11:01)  Vitamin B12: 5 milliliter(s) orally once a day (24 Jan 2020 11:01)  zolpidem 5 mg oral tablet: 0.5 tab(s) orally once a day (at bedtime) (24 Jan 2020 11:01)        Hospital Medications:  albuterol/ipratropium for Nebulization 3 milliLiter(s) Nebulizer every 6 hours  aspirin  chewable 81 milliGRAM(s) Oral daily  ATENolol  Tablet 25 milliGRAM(s) Oral daily  atorvastatin 80 milliGRAM(s) Oral at bedtime  buDESOnide    Inhalation Suspension 0.5 milliGRAM(s) Inhalation every 12 hours  enoxaparin Injectable 40 milliGRAM(s) SubCutaneous <User Schedule>  glucagon  Injectable 1 milliGRAM(s) IntraMuscular once  multivitamin 1 Tablet(s) Oral daily  pantoprazole  Injectable 40 milliGRAM(s) IV Push daily  polyethylene glycol 3350 17 Gram(s) Oral daily  senna 1 Tablet(s) Oral daily    MEDICATIONS  (PRN):  acetaminophen   Tablet .. 650 milliGRAM(s) Oral every 6 hours PRN Mild Pain (1 - 3)  OLANZapine 2.5 milliGRAM(s) Oral daily PRN anxiety  zaleplon 5 milliGRAM(s) Oral at bedtime PRN Insomnia    ___________  Active diet:  Diet, NPO with Tube Feed:   Tube Feeding Modality: Gastrostomy  Jevity 1.2 Wei (JEVITY1.2RTH)  Total Volume for 24 Hours (mL): 800  Intermittent  Starting Tube Feed Rate mL per Hour: 10  Increase Tube Feed Rate by (mL): 5    Every 6 hours  Until Goal Tube Feed Rate (mL per Hour): 50  Tube Feeding Hours ON: 16  Tube Feeding OFF (Hours): 8  Tube Feed Start Time: 06:00  ___________________        ROS  GI: [- ] Nausea, [- ] vomiting, [ -]abdominal pain    PHYSICAL EXAM:   Vital Signs:  Vital Signs Last 24 Hrs  T(C): 36.6 (04 Aug 2021 07:37), Max: 36.6 (03 Aug 2021 20:00)  T(F): 97.8 (04 Aug 2021 07:37), Max: 97.9 (03 Aug 2021 20:00)  HR: 69 (04 Aug 2021 08:00) (69 - 76)  BP: 144/53 (04 Aug 2021 08:00) (119/56 - 162/61)  BP(mean): 79 (04 Aug 2021 08:00) (71 - 116)  RR: 11 (04 Aug 2021 08:00) (10 - 23)  SpO2: 99% (04 Aug 2021 08:00) (97% - 100%)  Daily     Daily     GENERAL:  Appears stated age, well-groomed, well-nourished, no distress  HEENT:  NC/AT,  conjunctivae clear and pink, no thyromegaly, nodules, adenopathy, no JVD, sclera -anicteric  NECK: Supple, No masses  CHEST:  Full & symmetric excursion, no increased effort, breath sounds clear  HEART:  B/L Rales 1/2 way up  ABDOMEN:  Soft, GTube  C/L, non-tender, non-distended, normoactive bowel sounds,  no masses ,no hepato-splenomegaly, no signs of chronic liver disease  EXTEREMITIES:  no cyanosis,clubbing or edema  SKIN:  No rash/erythema/ecchymoses/petechiae/wounds/abscess/warm/dry  LN: No lymphadenopathy, No masses  NEURO:  Alert, oriented, no asterixis, no tremor, no encephalopathy    LABS:                        12.5   9.27  )-----------( 239      ( 04 Aug 2021 05:58 )             38.0     08-04    138  |  99  |  13  ----------------------------<  109<H>  3.0<L>   |  25  |  0.77    Ca    8.8      04 Aug 2021 05:59                Imaging:

## 2021-08-04 NOTE — PROGRESS NOTE ADULT - PROBLEM SELECTOR PLAN 3
Condition:  Degree:  Active treatment:  Clinical impact on complexity: Condition: CVA  Degree: dysarthria   Active treatment: recent PEG. medical management  Clinical impact on complexity: Significant  PAINAD score 0  RDOS score 1-recent overload in the setting of known HF.   BNP 28K-->12K

## 2021-08-04 NOTE — PROGRESS NOTE ADULT - ASSESSMENT
89 y/o F with PMH afib not on AC, PPM, HTN, ovarian and colon ca, GERD presents to the ED as a transfer from [facility] for dysarthria and R hemiparesis. NIHSS 8 on repeat exam. R hemiparesis, R facial droop, aphasia (not fluent, not intact to repetition), and dysarthria. 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

## 2021-08-04 NOTE — PROGRESS NOTE ADULT - ASSESSMENT
Patient is a 89 y/o Female with PMH afib not on AC, PPM, HTN, ovarian and colon ca, GERD presents to the ED as a transfer from [facility] for stroke.     Problem/Recommendation - 1:  Problem: Stroke. Recommendation: Care as per neurology   Angio noted   ASA/ Statin   protonix  BP control  speech and swallow   GI eval for PEG placement appreicated   palliative eval     worsening leukocytosis  mild low grade T  procal level  UA Noted,   completed zosyn , 5 days course per ID   CXR  high risk of aspiration  may need CT chest   Pan CT  ID eval called for further recs appreicated  pulm eval appreciated     Problem/Recommendation - 2:  ·  Problem: Atrial fibrillation.  Recommendation: rate control  Atenolol 25 oral daily.     Problem/Recommendation - 3:  ·  Problem: Hypertension.  Recommendation: resuem BP meds  monitor and adjust as tolerated.     Problem/Recommendation - 4:  ·  Problem: Colon cancer.     Problem/Recommendation - 5:  ·  Problem: GERD (gastroesophageal reflux disease).  Recommendation: PPI.     Problem/Recommendation - 6:  Problem: Prophylactic measure. Recommendation: DVT and gI PPX.

## 2021-08-05 LAB
ANION GAP SERPL CALC-SCNC: 14 MMOL/L — SIGNIFICANT CHANGE UP (ref 5–17)
BASOPHILS # BLD AUTO: 0.03 K/UL — SIGNIFICANT CHANGE UP (ref 0–0.2)
BASOPHILS NFR BLD AUTO: 0.3 % — SIGNIFICANT CHANGE UP (ref 0–2)
BUN SERPL-MCNC: 14 MG/DL — SIGNIFICANT CHANGE UP (ref 7–23)
CALCIUM SERPL-MCNC: 9.1 MG/DL — SIGNIFICANT CHANGE UP (ref 8.4–10.5)
CHLORIDE SERPL-SCNC: 99 MMOL/L — SIGNIFICANT CHANGE UP (ref 96–108)
CO2 SERPL-SCNC: 24 MMOL/L — SIGNIFICANT CHANGE UP (ref 22–31)
CREAT SERPL-MCNC: 0.7 MG/DL — SIGNIFICANT CHANGE UP (ref 0.5–1.3)
EOSINOPHIL # BLD AUTO: 0.12 K/UL — SIGNIFICANT CHANGE UP (ref 0–0.5)
EOSINOPHIL NFR BLD AUTO: 1.2 % — SIGNIFICANT CHANGE UP (ref 0–6)
GLUCOSE SERPL-MCNC: 126 MG/DL — HIGH (ref 70–99)
HCT VFR BLD CALC: 39.6 % — SIGNIFICANT CHANGE UP (ref 34.5–45)
HGB BLD-MCNC: 13 G/DL — SIGNIFICANT CHANGE UP (ref 11.5–15.5)
IMM GRANULOCYTES NFR BLD AUTO: 0.6 % — SIGNIFICANT CHANGE UP (ref 0–1.5)
LYMPHOCYTES # BLD AUTO: 2.55 K/UL — SIGNIFICANT CHANGE UP (ref 1–3.3)
LYMPHOCYTES # BLD AUTO: 26.5 % — SIGNIFICANT CHANGE UP (ref 13–44)
MCHC RBC-ENTMCNC: 31.2 PG — SIGNIFICANT CHANGE UP (ref 27–34)
MCHC RBC-ENTMCNC: 32.8 GM/DL — SIGNIFICANT CHANGE UP (ref 32–36)
MCV RBC AUTO: 95 FL — SIGNIFICANT CHANGE UP (ref 80–100)
MONOCYTES # BLD AUTO: 0.9 K/UL — SIGNIFICANT CHANGE UP (ref 0–0.9)
MONOCYTES NFR BLD AUTO: 9.4 % — SIGNIFICANT CHANGE UP (ref 2–14)
NEUTROPHILS # BLD AUTO: 5.95 K/UL — SIGNIFICANT CHANGE UP (ref 1.8–7.4)
NEUTROPHILS NFR BLD AUTO: 62 % — SIGNIFICANT CHANGE UP (ref 43–77)
NRBC # BLD: 0 /100 WBCS — SIGNIFICANT CHANGE UP (ref 0–0)
PLATELET # BLD AUTO: 271 K/UL — SIGNIFICANT CHANGE UP (ref 150–400)
POTASSIUM SERPL-MCNC: 4 MMOL/L — SIGNIFICANT CHANGE UP (ref 3.5–5.3)
POTASSIUM SERPL-SCNC: 4 MMOL/L — SIGNIFICANT CHANGE UP (ref 3.5–5.3)
RBC # BLD: 4.17 M/UL — SIGNIFICANT CHANGE UP (ref 3.8–5.2)
RBC # FLD: 14.2 % — SIGNIFICANT CHANGE UP (ref 10.3–14.5)
SODIUM SERPL-SCNC: 137 MMOL/L — SIGNIFICANT CHANGE UP (ref 135–145)
WBC # BLD: 9.61 K/UL — SIGNIFICANT CHANGE UP (ref 3.8–10.5)
WBC # FLD AUTO: 9.61 K/UL — SIGNIFICANT CHANGE UP (ref 3.8–10.5)

## 2021-08-05 PROCEDURE — 99232 SBSQ HOSP IP/OBS MODERATE 35: CPT

## 2021-08-05 RX ORDER — PANTOPRAZOLE SODIUM 20 MG/1
40 TABLET, DELAYED RELEASE ORAL DAILY
Refills: 0 | Status: DISCONTINUED | OUTPATIENT
Start: 2021-08-05 | End: 2021-08-09

## 2021-08-05 RX ORDER — GABAPENTIN 400 MG/1
100 CAPSULE ORAL AT BEDTIME
Refills: 0 | Status: DISCONTINUED | OUTPATIENT
Start: 2021-08-05 | End: 2021-08-09

## 2021-08-05 RX ADMIN — Medication 3 MILLILITER(S): at 23:29

## 2021-08-05 RX ADMIN — ATENOLOL 25 MILLIGRAM(S): 25 TABLET ORAL at 06:23

## 2021-08-05 RX ADMIN — SENNA PLUS 1 TABLET(S): 8.6 TABLET ORAL at 23:30

## 2021-08-05 RX ADMIN — Medication 3 MILLILITER(S): at 12:10

## 2021-08-05 RX ADMIN — GABAPENTIN 100 MILLIGRAM(S): 400 CAPSULE ORAL at 07:36

## 2021-08-05 RX ADMIN — POLYETHYLENE GLYCOL 3350 17 GRAM(S): 17 POWDER, FOR SOLUTION ORAL at 12:09

## 2021-08-05 RX ADMIN — Medication 81 MILLIGRAM(S): at 12:10

## 2021-08-05 RX ADMIN — Medication 0.5 MILLIGRAM(S): at 06:23

## 2021-08-05 RX ADMIN — Medication 3 MILLILITER(S): at 00:39

## 2021-08-05 RX ADMIN — PANTOPRAZOLE SODIUM 40 MILLIGRAM(S): 20 TABLET, DELAYED RELEASE ORAL at 12:10

## 2021-08-05 RX ADMIN — Medication 1 TABLET(S): at 12:10

## 2021-08-05 RX ADMIN — ATORVASTATIN CALCIUM 80 MILLIGRAM(S): 80 TABLET, FILM COATED ORAL at 23:29

## 2021-08-05 RX ADMIN — Medication 40 MILLIGRAM(S): at 06:24

## 2021-08-05 RX ADMIN — GABAPENTIN 100 MILLIGRAM(S): 400 CAPSULE ORAL at 13:43

## 2021-08-05 RX ADMIN — GABAPENTIN 100 MILLIGRAM(S): 400 CAPSULE ORAL at 23:28

## 2021-08-05 RX ADMIN — ENOXAPARIN SODIUM 40 MILLIGRAM(S): 100 INJECTION SUBCUTANEOUS at 17:49

## 2021-08-05 RX ADMIN — Medication 3 MILLIGRAM(S): at 23:29

## 2021-08-05 RX ADMIN — Medication 3 MILLILITER(S): at 17:49

## 2021-08-05 RX ADMIN — Medication 0.5 MILLIGRAM(S): at 18:17

## 2021-08-05 RX ADMIN — Medication 3 MILLILITER(S): at 06:25

## 2021-08-05 NOTE — PROGRESS NOTE ADULT - SUBJECTIVE AND OBJECTIVE BOX
Subjective: Patient seen and examined. No new events except as noted.   moved out of Stroke unit   resting comfortably   getting nebulizer treatment     REVIEW OF SYSTEMS:  Unable to obtain        MEDICATIONS:  MEDICATIONS  (STANDING):  albuterol/ipratropium for Nebulization 3 milliLiter(s) Nebulizer every 6 hours  aspirin  chewable 81 milliGRAM(s) Oral daily  ATENolol  Tablet 25 milliGRAM(s) Oral daily  atorvastatin 80 milliGRAM(s) Oral at bedtime  buDESOnide    Inhalation Suspension 0.5 milliGRAM(s) Inhalation every 12 hours  enoxaparin Injectable 40 milliGRAM(s) SubCutaneous <User Schedule>  furosemide Solution 40 milliGRAM(s) Enteral Tube daily  gabapentin   Solution 100 milliGRAM(s) Enteral Tube three times a day  glucagon  Injectable 1 milliGRAM(s) IntraMuscular once  melatonin 3 milliGRAM(s) Oral at bedtime  multivitamin 1 Tablet(s) Oral daily  pantoprazole  Injectable 40 milliGRAM(s) IV Push daily  polyethylene glycol 3350 17 Gram(s) Oral daily  senna 1 Tablet(s) Oral daily      PHYSICAL EXAM:  T(C): 36.7 (08-05-21 @ 04:25), Max: 36.7 (08-04-21 @ 20:00)  HR: 77 (08-05-21 @ 04:25) (70 - 77)  BP: 159/69 (08-05-21 @ 04:25) (117/50 - 159/69)  RR: 20 (08-05-21 @ 04:25) (14 - 30)  SpO2: 96% (08-05-21 @ 04:25) (92% - 98%)  Wt(kg): --  I&O's Summary    04 Aug 2021 07:01  -  05 Aug 2021 07:00  --------------------------------------------------------  IN: 680 mL / OUT: 900 mL / NET: -220 mL          Appearance: NAD	  HEENT:   Dry  oral mucosa, PERRL, EOMI	  Lymphatic: No lymphadenopathy  Cardiovascular: irregular S1 S2, No JVD, No murmurs, No edema  Respiratory: Decreased bs  Psychiatry: A & O x 3, sleepy   Gastrointestinal:  Soft, Non-tender, + BS	  Skin: No rashes, No ecchymoses, No cyanosis	  Extremities: Normal range of motion, No clubbing, cyanosis or edema  Vascular: Peripheral pulses palpable 2+ bilaterally  NEUROLOGICAL EXAM:  Mental status: Awake, alert, does gesture facial expressions relatively in context to situation (smiles appropriately), not following commands. Can mimic physical actions. No verbal output   Cranial Nerves: R facial droop, no nystagmus, tongue midline. Blink to threat b/l.   Motor exam: right hemiplegia with trace flexion in arm and triple flexion in leg, left side moves well against gravity but inattentive and with drift   Sensation: decreased on right   Coordination/ Gait: gait not assessed       LABS:    CARDIAC MARKERS:                                13.0   9.61  )-----------( 271      ( 05 Aug 2021 05:58 )             39.6     08-05    137  |  99  |  14  ----------------------------<  126<H>  4.0   |  24  |  0.70    Ca    9.1      05 Aug 2021 05:57      proBNP:   Lipid Profile:   HgA1c:   TSH:             TELEMETRY: 	 AF   ECG:  	  RADIOLOGY:   DIAGNOSTIC TESTING:  [ ] Echocardiogram:  [ ]  Catheterization:  [ ] Stress Test:    OTHER:

## 2021-08-05 NOTE — PROGRESS NOTE ADULT - SUBJECTIVE AND OBJECTIVE BOX
Name of Patient : SHELBY LESTER  MRN: [id number]  DATE OF SERVICE: 08-05-21    Subjective: Patient seen and examined. No new events except as noted.   doing okay       MEDICATIONS:  MEDICATIONS  (STANDING):  albuterol/ipratropium for Nebulization 3 milliLiter(s) Nebulizer every 6 hours  aspirin  chewable 81 milliGRAM(s) Oral daily  ATENolol  Tablet 25 milliGRAM(s) Oral daily  atorvastatin 80 milliGRAM(s) Oral at bedtime  buDESOnide    Inhalation Suspension 0.5 milliGRAM(s) Inhalation every 12 hours  enoxaparin Injectable 40 milliGRAM(s) SubCutaneous <User Schedule>  furosemide Solution 40 milliGRAM(s) Enteral Tube daily  gabapentin   Solution 100 milliGRAM(s) Enteral Tube at bedtime  glucagon  Injectable 1 milliGRAM(s) IntraMuscular once  melatonin 3 milliGRAM(s) Oral at bedtime  multivitamin 1 Tablet(s) Oral daily  pantoprazole  Injectable 40 milliGRAM(s) IV Push daily  polyethylene glycol 3350 17 Gram(s) Oral daily  senna 1 Tablet(s) Oral daily      PHYSICAL EXAM:  T(C): 36.7 (08-05-21 @ 15:33), Max: 36.8 (08-05-21 @ 12:17)  HR: 86 (08-05-21 @ 15:33) (70 - 86)  BP: 127/68 (08-05-21 @ 15:33) (117/50 - 159/69)  RR: 20 (08-05-21 @ 15:33) (14 - 30)  SpO2: 99% (08-05-21 @ 15:33) (92% - 99%)  Wt(kg): --  I&O's Summary    04 Aug 2021 07:01  -  05 Aug 2021 07:00  --------------------------------------------------------  IN: 680 mL / OUT: 900 mL / NET: -220 mL    05 Aug 2021 07:01  -  05 Aug 2021 17:59  --------------------------------------------------------  IN: 520 mL / OUT: 700 mL / NET: -180 mL          Appearance: Normal	  HEENT:  PERRLA   Lymphatic: No lymphadenopathy   Cardiovascular: Normal S1 S2, no JVD  Respiratory: normal effort , clear  Gastrointestinal:  Soft, Non-tender  Skin: No rashes,  warm to touch  Psychiatry:  Mood & affect appropriate  Musculuskeletal: No edema      All labs, Imaging and EKGs personally reviewed       08-04-21 @ 07:01  -  08-05-21 @ 07:00  --------------------------------------------------------  IN: 680 mL / OUT: 900 mL / NET: -220 mL    08-05-21 @ 07:01  -  08-05-21 @ 17:59  --------------------------------------------------------  IN: 520 mL / OUT: 700 mL / NET: -180 mL                          13.0   9.61  )-----------( 271      ( 05 Aug 2021 05:58 )             39.6               08-05    137  |  99  |  14  ----------------------------<  126<H>  4.0   |  24  |  0.70    Ca    9.1      05 Aug 2021 05:57

## 2021-08-05 NOTE — PROGRESS NOTE ADULT - PROBLEM SELECTOR PLAN 1
Predominant symptom: dysphagia  Likely due to recent CVA  Degree of control:  tube feeds at 50 cc/hr  Current treatment regimen: On PEG feeds  Risk mitigation: monitor for aspiration  Adverse events noted: none

## 2021-08-05 NOTE — PROGRESS NOTE ADULT - SUBJECTIVE AND OBJECTIVE BOX
Chief Complaint:  Patient is a 89y old  Female who presents with a chief complaint of stroke transfer (04 Aug 2021 13:25)      Interval Events:   pt. awake, no N/V, tolerating feeds at 50 CC/hr  Allergies:  No Known Allergies        Home Medications:  aspirin 81 mg oral delayed release tablet: 1 tab(s) orally once a day (27 Jan 2020 11:10)  atenolol 25 mg oral tablet: 1 tab(s) orally once a day (27 Jan 2020 11:10)  gabapentin 100 mg oral capsule: 1 cap(s) orally 3 times a day (27 Jan 2020 11:10)  hydrocodocone chlopheniramine: 5 milliliter(s) orally once a day (at bedtime) (24 Jan 2020 11:01)  Multiple Vitamins oral tablet: 1 tab(s) orally once a day (24 Jan 2020 11:01)  omeprazole 20 mg oral delayed release capsule: 1 cap(s) orally once a day (24 Jan 2020 11:01)  Vitamin B12: 5 milliliter(s) orally once a day (24 Jan 2020 11:01)  zolpidem 5 mg oral tablet: 0.5 tab(s) orally once a day (at bedtime) (24 Jan 2020 11:01)        Hospital Medications:  albuterol/ipratropium for Nebulization 3 milliLiter(s) Nebulizer every 6 hours  aspirin  chewable 81 milliGRAM(s) Oral daily  ATENolol  Tablet 25 milliGRAM(s) Oral daily  atorvastatin 80 milliGRAM(s) Oral at bedtime  buDESOnide    Inhalation Suspension 0.5 milliGRAM(s) Inhalation every 12 hours  enoxaparin Injectable 40 milliGRAM(s) SubCutaneous <User Schedule>  furosemide Solution 40 milliGRAM(s) Enteral Tube daily  gabapentin   Solution 100 milliGRAM(s) Enteral Tube three times a day  glucagon  Injectable 1 milliGRAM(s) IntraMuscular once  melatonin 3 milliGRAM(s) Oral at bedtime  multivitamin 1 Tablet(s) Oral daily  pantoprazole  Injectable 40 milliGRAM(s) IV Push daily  polyethylene glycol 3350 17 Gram(s) Oral daily  senna 1 Tablet(s) Oral daily    MEDICATIONS  (PRN):  acetaminophen   Tablet .. 650 milliGRAM(s) Oral every 6 hours PRN Mild Pain (1 - 3)  OLANZapine 2.5 milliGRAM(s) Oral daily PRN anxiety  zaleplon 5 milliGRAM(s) Oral at bedtime PRN Insomnia    ___________  Active diet:  Diet, NPO with Tube Feed:   Tube Feeding Modality: Gastrostomy  Jevity 1.2 Wei (JEVITY1.2RTH)  Total Volume for 24 Hours (mL): 800  Intermittent  Starting Tube Feed Rate mL per Hour: 10  Increase Tube Feed Rate by (mL): 5    Every 6 hours  Until Goal Tube Feed Rate (mL per Hour): 50  Tube Feeding Hours ON: 16  Tube Feeding OFF (Hours): 8  Tube Feed Start Time: 06:00  ___________________        ROS  GI: [- ] Nausea, [- ] vomiting, [ -]abdominal pain    PHYSICAL EXAM:   Vital Signs:  Vital Signs Last 24 Hrs  T(C): 36.7 (05 Aug 2021 04:25), Max: 36.7 (04 Aug 2021 20:00)  T(F): 98 (05 Aug 2021 04:25), Max: 98.1 (04 Aug 2021 20:00)  HR: 77 (05 Aug 2021 04:25) (70 - 77)  BP: 159/69 (05 Aug 2021 04:25) (117/50 - 159/69)  BP(mean): 78 (05 Aug 2021 03:40) (67 - 107)  RR: 20 (05 Aug 2021 04:25) (12 - 30)  SpO2: 96% (05 Aug 2021 04:25) (92% - 98%)  Daily     Daily     GENERAL:  Appears stated age, well-groomed, well-nourished, no distress  HEENT:  NC/AT,  conjunctivae clear and pink, no thyromegaly, nodules, adenopathy, no JVD, sclera -anicteric  NECK: Supple, No masses  CHEST:  B/L rales at bases  HEART:  Regular rhythm, S1, S2, no murmur/rub/S3/S4, no abdominal bruit, no edema  ABDOMEN:  Soft, non-tender, non-distended, normoactive bowel sounds,  no masses ,no hepato-splenomegaly, no signs of chronic liver disease  EXTEREMITIES:  no cyanosis,clubbing or edema  SKIN:  No rash/erythema/ecchymoses/petechiae/wounds/abscess/warm/dry  LN: No lymphadenopathy, No masses  NEURO:  Alert, oriented, no asterixis, no tremor, no encephalopathy    LABS:                        13.0   9.61  )-----------( 271      ( 05 Aug 2021 05:58 )             39.6     08-05    137  |  99  |  14  ----------------------------<  126<H>  4.0   |  24  |  0.70    Ca    9.1      05 Aug 2021 05:57                Imaging:

## 2021-08-05 NOTE — PROGRESS NOTE ADULT - ASSESSMENT
89 y/o F with PMH afib not on AC, PPM, HTN, ovarian and colon ca, GERD presents to the ED as a transfer from [facility] for stroke. Per son, pt woke up at 3:30am and went to the bathroom, no issues with gait or speech at that time, accompanied by son. At 7:30 am this morning, pt's son found her half off the bed and unable to speak or walk. Pt was found to have L M1 occlusion on CTA, CTP w/ no core infarct and penumbra 81cc, and transferred for IR thrombectomy. Exam at [facility] notable for severe dysarthria and R hemiparesis. Pt did not receive tpa. NIHSS initially 16 on arrival to [facility] ED, NIHSS 8 on repeat exam. At baseline, son reports that pt is able to do own ADLs (showers, reads) but has an aid to make sure she does not fall, walks with a walker without assistance, speech is fluent.  Pt. s/p 2 failed S&S and family approached for PEG.    S/P EGD (07.30.21 @ 09:57) >  Impression:  - Failed PEG placement. The procedure was aborted due to poor endoscopic                        visualization and anatomy.                       - Normal esophagus.                       - Erythematous mucosa in the antrum.                       - Normal examined duodenum.                       - An endoscopically removable PEG placement was attempted but could not be                        successfully completed. The needle/trocar was not passed.                       - No specimens collected.    S/P Failed PEG placement  Pt. s/p GTube placement by IR   S/P one episode of vomiting which resolved.   Currently feeds on hold.  S/P tachypnia  which is cardia related and her severe LV Dysfn.  Please keep HOB elevated at 45 degrees.  Aspiration precautions.  Cont. Lovenox for AC  Pt. with resolving leukocytosis and most likely reactive  S/P IV Abx. X 5 days  May cont. ASA  Cont. GTube feeding at 50 CC/hr and consider cahnging to Bolus feeds to help with PT.   would HOLD OFF on any free water temporarily for now as pt. is fluid overloaded     Cont. Protonix IV for now till feeding is switched to Bolus feeds.  May change to Nexium Elixir 40 mg via GTube QD 1/2 hr. before feeding in AM.  Nutritionist f/u  Thank you.

## 2021-08-05 NOTE — PROGRESS NOTE ADULT - SUBJECTIVE AND OBJECTIVE BOX
Follow-up Pulm Progress Note    Alert, no c/o dyspnea     Medications:  MEDICATIONS  (STANDING):  albuterol/ipratropium for Nebulization 3 milliLiter(s) Nebulizer every 6 hours  aspirin  chewable 81 milliGRAM(s) Oral daily  ATENolol  Tablet 25 milliGRAM(s) Oral daily  atorvastatin 80 milliGRAM(s) Oral at bedtime  buDESOnide    Inhalation Suspension 0.5 milliGRAM(s) Inhalation every 12 hours  enoxaparin Injectable 40 milliGRAM(s) SubCutaneous <User Schedule>  furosemide Solution 40 milliGRAM(s) Enteral Tube daily  gabapentin   Solution 100 milliGRAM(s) Enteral Tube three times a day  glucagon  Injectable 1 milliGRAM(s) IntraMuscular once  melatonin 3 milliGRAM(s) Oral at bedtime  multivitamin 1 Tablet(s) Oral daily  pantoprazole  Injectable 40 milliGRAM(s) IV Push daily  polyethylene glycol 3350 17 Gram(s) Oral daily  senna 1 Tablet(s) Oral daily    MEDICATIONS  (PRN):  acetaminophen   Tablet .. 650 milliGRAM(s) Oral every 6 hours PRN Mild Pain (1 - 3)  OLANZapine 2.5 milliGRAM(s) Oral daily PRN anxiety  zaleplon 5 milliGRAM(s) Oral at bedtime PRN Insomnia          Vital Signs Last 24 Hrs  T(C): 36.8 (05 Aug 2021 12:17), Max: 36.8 (05 Aug 2021 12:17)  T(F): 98.2 (05 Aug 2021 12:17), Max: 98.2 (05 Aug 2021 12:17)  HR: 70 (05 Aug 2021 12:17) (70 - 77)  BP: 156/80 (05 Aug 2021 12:17) (117/50 - 159/69)  BP(mean): 78 (05 Aug 2021 03:40) (67 - 107)  RR: 22 (05 Aug 2021 12:17) (14 - 30)  SpO2: 98% (05 Aug 2021 12:17) (92% - 98%)          08-04 @ 07:01  -  08-05 @ 07:00  --------------------------------------------------------  IN: 680 mL / OUT: 900 mL / NET: -220 mL          LABS:                        13.0   9.61  )-----------( 271      ( 05 Aug 2021 05:58 )             39.6     08-05    137  |  99  |  14  ----------------------------<  126<H>  4.0   |  24  |  0.70    Ca    9.1      05 Aug 2021 05:57                Serum Pro-Brain Natriuretic Peptide: 42229 pg/mL (08-04-21 @ 05:59)          CULTURES:       Culture - Urine (collected 08-01-21 @ 21:53)  Source: Clean Catch Clean Catch (Midstream)  Final Report (08-02-21 @ 21:39):    No growth    Culture - Urine (collected 07-28-21 @ 19:10)  Source: Catheterized Catheterized  Final Report (07-29-21 @ 22:56):    No growth                    Physical Examination:  PULM: CTA bilaterally   CVS: S1, S2 heard    RADIOLOGY REVIEWED  CT chest: < from: CT Chest No Cont (08.02.21 @ 16:14) >  FINDINGS:    LUNGS AND AIRWAYS: Anterior bowing of the posterior wall of the trachea, could suggest tracheomalacia. Interlobular septal thickening bilaterally. Bilateral lower lower lung areas of subsegmental, linear or compressive atelectasis  PLEURA: Small bilateral pleural effusions.  MEDIASTINUM AND ELIEL: A few mediastinal lymph nodes largest is about 1.1 cm a precarinal location are likely reactive.  VESSELS: Atherosclerotic changes of the aorta and coronary arteries.  HEART: Cardiomegaly. No pericardial effusion. Left chest wall pacemaker with leads in the right atrium and right ventricle.  CHEST WALL AND LOWER NECK: Within normal limits.  VISUALIZED UPPER ABDOMEN: Small amount of pneumoperitoneum, likely from recent PEG tube placement.  BONES: Unchanged T11 mild compression deformity since abdominal CT of January 23, 2020. Degenerative changes.    IMPRESSION:    Small bilateral pleural effusions with adjacent areas of atelectasis.    Cardiomegaly.    Interlobular septal thickening is suggestive of pulmonary edema given the other findings.    Pneumoperitoneum, likely from recent PEG tube placement.    --- End of Report ---              < end of copied text >

## 2021-08-05 NOTE — PROGRESS NOTE ADULT - PROBLEM SELECTOR PLAN 2
Current functional PPSv2: 40  Nursing care required: Assistance with ADLs  Code status documented: Full code  A review of the paper chart has been conducted  HCP form present:               Yes and valid [x]               Yes but invalid []                No []   MOLST present:                   Yes and valid []               Yes but invalid []                No [x]  Incapacity form present:   Yes and valid []                Yes but invalid []                No []                 N/A [x]  Living Will:                            Yes and valid []                Yes but invalid []                No [x]      Family Health Care Decision Act (FHCDA) Surrogate Decision Maker Hierarchy in the absence of a health care agent  [facility] Article 81 Guardian-->Spouse or domestic partner--> Adult child-->Parent-->Sibling--> Close friend

## 2021-08-05 NOTE — PROGRESS NOTE ADULT - ASSESSMENT
89 y/o F with PMH afib not on AC, PPM, HTN, ovarian and colon ca, GERD presents to the ED as a transfer from [facility] for dysarthria and R hemiparesis. NIHSS 8 on repeat exam. R hemiparesis, R facial droop, aphasia (not fluent, not intact to repetition), and dysarthria. 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     Impression: Left MCA infarction, Left M1 occlusion found on CTA likely secondary to cardioembolic etiology (hx of afib), post unsuccessful recanalization.     NEURO: Patient neurologically without acute change . Continue monitoring for neurologic deterioration.  Nsx noted severe tortuosity preventing access to left ICA intracranially. Hemicrani deferred due to advanced age. Goal for SBP of 100-180mmHg with overall normotension. Patient on statin for LDL goal less than 70. MRI Brain would not change medical management as pt has hx of afib and was not on AC. Head CT as noted above. Physical therapy and OT recommend CHARLES. Zaleplon restarted for insomnia.     ANTITHROMBOTIC THERAPY: ASA 81 PO. Consider beginning Eliquis 5mg BID in 2 weeks (8/8/2021) from onset after repeat stable head CT pending C discussion.     PULMONARY: CXR (08/01/21) Increased basilar congestion compared with prior study, protecting airway, saturating well on room air     CARDIOVASCULAR: TTE shows mild-moderate mitral regurgitation, mild aortic regurgitation, severe global LV systolic dysfunction, normal RV function, mild tricuspid regurgitation. Thickened pericardium with small pericardial effusion.  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 cardio made aware of TTE findings. Patient has PPM placed in 2019. EP interrogated and found multiple episodes of atrial fibrillation and flutter. Rate controlled at this time Dr Stone (Cardiology) follow up appreciated: acute systolic CHF- s/p lasix. BNP ~ 28,000. CT chest shows pulmonary edema. BNP (08/04/21) : 12,929                    SBP goal: 100-180 mmhg     GASTROINTESTINAL:  dysphagia screen- failed 7/26. Re eval, 7/27 failed. PEG placed by IR 7/30  Tube feeds started 7/31 at 5:30 pm. Noted intolerance TF held, nutrition reconsulted with lower recommendations for TF rate. Starting at 10 cc/hr on 8/3 tolerating well.      Diet: PEG with TF    RENAL: BUN/Cr within normal limits, good urine output,  will continue with Lasix 40mg daily as per Cardio recommendations, VF on hold due to concern for fluid overload. Monitor for further hyponatremia. hypokalemia: K+ repleted.      Na Goal: Greater than 135     Kessler: no     HEMATOLOGY: H/H within normal limits, Platelets normal at 271, no active bleeding noted      DVT ppx: lovenox subq     ID: afebrile, no leukocytosis, ID following, finished zosyn 5/5 days. Will continue to monitor for s/sx of infection.      OTHER:   Palliative meeting held on 07/30/21 family all in agreement with PEG placement. Palliative contacted again: Will need follow up regarding DNR/DNI and further GOC.     DISPOSITION: depending on GOC    CORE MEASURES:        Admission NIHSS: 16      TPA: [] YES [x] NO      LDL/HDL: 123/30     Depression Screen: unable to assess      Statin Therapy: yes     Dysphagia Screen: [] PASS [x] FAIL     Smoking [] YES [x] NO      Afib [x] YES [] NO     Stroke Education [x] YES [] NO    Obtain screening lower extremity venous ultrasound in patients who meet 1 or more of the following criteria as patient is high risk for DVT/PE on admission:   [] History of DVT/PE  []Hypercoagulable states (Factor V Leiden, Cancer, OCP, etc. )  []Prolonged immobility (hemiplegia/hemiparesis/post operative or any other extended immobilization)  [] Transferred from outside facility (Rehab or Long term care)  [] Age </= to 50

## 2021-08-05 NOTE — PROGRESS NOTE ADULT - ASSESSMENT
Patient is a 89 y/o Female with PMH afib not on AC, PPM, HTN, ovarian and colon ca, GERD presents to the ED as a transfer from [facility] for stroke.     Problem/Recommendation - 1:  Problem: Stroke. Recommendation: Care as per neurology   Angio noted   ASA/ Statin   protonix  BP control  speech and swallow   GI eval for PEG placement appreicated   palliative eval     worsening leukocytosis  mild low grade T  procal level  UA Noted,   completed zosyn , 5 days course per ID   CXR  high risk of aspiration  may need CT chest   Pan CT  ID eval called for further recs appreicated  pulm eval appreciated     Problem/Recommendation - 2:  ·  Problem: Atrial fibrillation.  Recommendation: rate control  Atenolol 25 oral daily.     Problem/Recommendation - 3:  ·  Problem: Hypertension.  Recommendation: resuem BP meds  monitor and adjust as tolerated.     Problem/Recommendation - 4:  ·  Problem: Colon cancer.     Problem/Recommendation - 5:  ·  Problem: GERD (gastroesophageal reflux disease).  Recommendation: PPI.     Problem/Recommendation - 6:  Problem: Prophylactic measure. Recommendation: DVT and gI PPX.

## 2021-08-05 NOTE — PROGRESS NOTE ADULT - PROBLEM SELECTOR PLAN 4
Actions:  [] Rapport building     [x] Symptom assessment    [x] Eliciting preferences of goals   [] Prognostic understanding    [] Emotional Support  [] Coping skill development  []  Other  Interdisciplinary Referrals: None at this time  No role for opioids at this time.  Documentation Review: [x] Primary Team [x] Consultants [] Interdisciplinary team  Case discussed with Neuro team and family

## 2021-08-05 NOTE — PROGRESS NOTE ADULT - PROBLEM SELECTOR PLAN 3
Condition: CVA  Degree: dysarthria   Active treatment: recent PEG. medical management  Clinical impact on complexity: Significant  No overt distress  BNP 28K-->12K

## 2021-08-05 NOTE — PROGRESS NOTE ADULT - PROBLEM SELECTOR PLAN 4
Cardiomyopathy   ? acute systolic CHF   much better compensated at present time    Lasix 40 mg PO daily   outpt follow up with Dr. Watts

## 2021-08-05 NOTE — GOALS OF CARE CONVERSATION - ADVANCED CARE PLANNING - CONVERSATION DETAILS
Dtr electing for rehab. Anticipatory counseling regarding hospice. Dtr wants a return to the hospital to address reversible causes.
Case discussed at length. Reviewed concerns over lethargy. Engaged in med reconciliation with the team and family separately.   Reviewed concerns held by the team about meeting the tube feed goals. Explored the possibility that rehab may not be feasible.  Family want to continue the current care plan for 72 hours and reevlauate on Monday. They recognize that the goal of rehab may not be an option based upon the clinical status of the patient. Support provided to address their distress and concerns

## 2021-08-05 NOTE — PROGRESS NOTE ADULT - ASSESSMENT
89 y/o F with PMH afib not on AC, PPM, HTN, ovarian and colon ca, GERD, PNA x2 as a child  Presents to the ED as a transfer from [facility] for stroke. Pt was found to have L M1 occlusion on CTA, S/p thrombectomy with unsuccessful recanalization. Course c/b leukocytosis - started on empiric Zosyn for concern of aspiration. Called to consult for wheezing, congested cough this AM, which seems to have improved with Duoneb. Sees pulmonologist as an outpt for chronic cough and wheezing.

## 2021-08-05 NOTE — PROGRESS NOTE ADULT - SUBJECTIVE AND OBJECTIVE BOX
HPI:  89 y/o F with PMH afib not on AC, PPM, HTN, ovarian and colon ca, GERD presents to the ED as a transfer from [facility] for stroke. Per son, pt woke up at 3:30am and went to the bathroom, no issues with gait or speech at that time, accompanied by son. At 7:30 am this morning, pt's son found her half off the bed and unable to speak or walk. Pt was found to have L M1 occlusion on CTA, CTP w/ no core infarct and penumbra 81cc, and transferred for IR thrombectomy. Exam at [facility] notable for severe dysarthria and R hemiparesis. Pt did not receive tpa. NIHSS initially 16 on arrival [facility] ED, NIHSS 8 on repeat exam. At baseline, son reports that pt is able to do own ADLs (showers, reads) but has an aid to make sure she does not fall, walks with a walker without assistance, speech is fluent. (25 Jul 2021 18:03)      Pt appear comfortable  On feeds at 50 cc/hr however for a reduced period of time.      PERTINENT PM/SXH:   Hypertension    Cancer    GERD (gastroesophageal reflux disease)    Anxiety    Ovarian cancer    Colon cancer      H/O small bowel obstruction    H/O total hysterectomy    No significant past surgical history      FAMILY HISTORY:  No pertinent family history in first degree relatives    No pertinent family history in first degree relatives      ITEMS NOT CHECKED ARE NOT PRESENT    SOCIAL HISTORY:   Significant other/partner[ ]  Children[ ]  Bahai/Spirituality:  Substance hx:  [ ]   Tobacco hx:  [ ]   Alcohol hx: [ ]   Home Opioid hx:  [ ] I-Stop Reference No:  Living Situation: [ ]Home  [ ]Long term care  [ ]Rehab [ ]Other    ADVANCE DIRECTIVES:    DNR  MOLST  [ ]  Living Will  [ ]   DECISION MAKER(s):  [ ] Health Care Proxy(s)  [ ] Surrogate(s)  [ ] Guardian           Name(s): Phone Number(s):    BASELINE (I)ADL(s) (prior to admission):  [place]: [ ]Total  [ ] Moderate [ ]Dependent    Allergies    No Known Allergies    Intolerances       PRESENT SYMPTOMS: [ ]Unable to obtain due to poor mentation   Source if other than patient:  [ ]Family   [ ]Team     Pain: [ ]yes [ ]no  QOL impact -   Location -                    Aggravating factors -  Quality -  Radiation -  Timing-  Severity (0-10 scale):  Minimal acceptable level (0-10 scale):     PAIN AD Score:     http://geriatrictoolkit.[facility]/cog/painad.pdf (press ctrl +  left click to view)    Dyspnea:                           [ ]Mild [ ]Moderate [ ]Severe  Anxiety:                             [ ]Mild [ ]Moderate [ ]Severe  Fatigue:                             [ ]Mild [ ]Moderate [ ]Severe  Nausea:                             [ ]Mild [ ]Moderate [ ]Severe  Loss of appetite:              [ ]Mild [ ]Moderate [ ]Severe  Constipation:                    [ ]Mild [ ]Moderate [ ]Severe    Other Symptoms:  [ ]All other review of systems negative     Palliative Performance Status Version 2:      30   %    http://[facility].org/files/news/palliative_performance_scale_[email or web address].pdf  PHYSICAL EXAM:         GENERAL:  [ ]Alert  [ ]Oriented x   [ ]Lethargic  [ ]Cachexia  [ ]Unarousable  [ ]Verbal  [ ]Non-Verbal  Behavioral:   [ ] Anxiety  [ ] Delirium [ ] Agitation [ ] Other  HEENT:  [ ]Normal   [ ]Dry mouth   [ ]ET Tube/Trach  [ ]Oral lesions  PULMONARY:   [ ]Clear [ ]Tachypnea  [ ]Audible excessive secretions   [ ]Rhonchi        [ ]Right [ ]Left [ ]Bilateral  [ ]Crackles        [ ]Right [ ]Left [ ]Bilateral  [ ]Wheezing     [ ]Right [ ]Left [ ]Bilatera  [ ]Diminished breath sounds [ ]right [ ]left [ ]bilateral  CARDIOVASCULAR:    [ ]Regular [ ]Irregular [ ]Tachy  [ ]Cristi [ ]Murmur [ ]Other  GASTROINTESTINAL:  [ ]Soft  [ ]Distended   [ ]+BS  [ ]Non tender [ ]Tender  [ ]PEG [ ]OGT/ NGT  Last BM:   GENITOURINARY:  [ ]Normal [ ] Incontinent   [ ]Oliguria/Anuria   [ ]Kessler  MUSCULOSKELETAL:   [ ]Normal   [ ]Weakness  [ ]Bed/Wheelchair bound [ ]Edema  NEUROLOGIC:   [ ]No focal deficits  [ ]Cognitive impairment  [ ]Dysphagia [ ]Dysarthria [ ]Paresis [ ]Other   SKIN:   [ ]Normal    [ ]Rash  [ ]Pressure ulcer(s)       Present on admission [ ]y [ ]n    CRITICAL CARE:  [ ] Shock Present  [ ]Septic [ ]Cardiogenic [ ]Neurologic [ ]Hypovolemic  [ ]  Vasopressors [ ]  Inotropes   [ ]Respiratory failure present [ ]Mechanical ventilation [ ]Non-invasive ventilatory support [ ]High flow  [ ]Acute  [ ]Chronic [ ]Hypoxic  [ ]Hypercarbic [ ]Other  [ ]Other organ failure     LABS:                        12.5   9.27  )-----------( 239      ( 04 Aug 2021 05:58 )             38.0   08-04    138  |  99  |  13  ----------------------------<  109<H>  3.0<L>   |  25  |  0.77    Ca    8.8      04 Aug 2021 05:59          RADIOLOGY & ADDITIONAL STUDIES:    PROTEIN CALORIE MALNUTRITION PRESENT: [ ]mild [ ]moderate [ ]severe [ ]underweight [ ]morbid obesity  https://www.andeal.org/vault/2440/web/files/ONC/Table_Clinical%20Characteristics%20to%20Document%20Malnutrition-White%20JV%20et%20al%202012.pdf    Height (cm): 162.6 (07-30-21 @ 14:59)  Weight (kg): 83.1 (07-30-21 @ 14:59)  BMI (kg/m2): 31.4 (07-30-21 @ 14:59)    [ ]PPSV2 < or = to 30% [ ]significant weight loss  [ ]poor nutritional intake  [ ]anasarca      [ ]Artificial Nutrition      REFERRALS:   [ ]Chaplaincy  [ ]Hospice  [ ]Child Life  [ ]Social Work  [ ]Case management [ ]Holistic Therapy     Goals of Care Document:

## 2021-08-05 NOTE — PROGRESS NOTE ADULT - ASSESSMENT
89 y/o F with PMH afib not on AC, PPM, HTN, ovarian and colon ca, GERD presents to the ED as a transfer from [facility] for dysarthria and R hemiparesis. NIHSS 8 on repeat exam. R hemiparesis, R facial droop, aphasia (not fluent, not intact to repetition), and dysarthria. 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

## 2021-08-05 NOTE — PROGRESS NOTE ADULT - SUBJECTIVE AND OBJECTIVE BOX
THE PATIENT WAS SEEN AND EXAMINED BY ME WITH THE HOUSESTAFF AND STROKE TEAM DURING MORNING ROUNDS.   HPI:  89 y/o F with PMH afib not on AC, PPM, HTN, ovarian and colon ca, GERD presents to the ED as a transfer from [facility] for stroke. Per son, pt woke up at 3:30am on 7/25 and went to the bathroom, no issues with gait or speech at that time, accompanied by son. At 7:30 am that morning of admit , pt's son found her half off the bed and unable to speak or walk. Pt was found to have L M1 occlusion on CTA, CTP w/ no core infarct and penumbra 81cc, and transferred for IR thrombectomy. Exam at [facility] notable for severe dysarthria and R hemiparesis. Pt did not receive tpa. NIHSS initially 16 on arrival to [facility] ED, NIHSS 8 on repeat exam. At baseline, son reports that pt is able to do own ADLs (showers, reads) but has an aid to make sure she does not fall, walks with a walker without assistance    SUBJECTIVE: No events overnight.  No new neurologic complaints, ROS reported negative unless otherwise noted.        acetaminophen   Tablet .. 650 milliGRAM(s) Oral every 6 hours PRN  albuterol/ipratropium for Nebulization 3 milliLiter(s) Nebulizer every 6 hours  aspirin  chewable 81 milliGRAM(s) Oral daily  ATENolol  Tablet 25 milliGRAM(s) Oral daily  atorvastatin 80 milliGRAM(s) Oral at bedtime  buDESOnide    Inhalation Suspension 0.5 milliGRAM(s) Inhalation every 12 hours  enoxaparin Injectable 40 milliGRAM(s) SubCutaneous <User Schedule>  furosemide Solution 40 milliGRAM(s) Enteral Tube daily  gabapentin   Solution 100 milliGRAM(s) Enteral Tube three times a day  glucagon  Injectable 1 milliGRAM(s) IntraMuscular once  melatonin 3 milliGRAM(s) Oral at bedtime  multivitamin 1 Tablet(s) Oral daily  OLANZapine 2.5 milliGRAM(s) Oral daily PRN  pantoprazole  Injectable 40 milliGRAM(s) IV Push daily  polyethylene glycol 3350 17 Gram(s) Oral daily  senna 1 Tablet(s) Oral daily  zaleplon 5 milliGRAM(s) Oral at bedtime PRN      PHYSICAL EXAM:   Vital Signs Last 24 Hrs  T(C): 36.7 (05 Aug 2021 04:25), Max: 36.7 (04 Aug 2021 20:00)  T(F): 98 (05 Aug 2021 04:25), Max: 98.1 (04 Aug 2021 20:00)  HR: 77 (05 Aug 2021 04:25) (70 - 77)  BP: 159/69 (05 Aug 2021 04:25) (117/50 - 159/69)  BP(mean): 78 (05 Aug 2021 03:40) (67 - 107)  RR: 20 (05 Aug 2021 04:25) (14 - 30)  SpO2: 96% (05 Aug 2021 04:25) (92% - 98%)    General: No acute distress  HEENT: EOM intact, visual fields full  Abdomen: Soft, nontender, nondistended   Extremities: No edema    NEUROLOGICAL EXAM:  Mental status: Awake, alert, does gesture facial expressions relatively in context to situation (smiles appropriately). Not following commands, perseverates on one action. Can mimic physical actions. Generates sounds.  Cranial Nerves: R facial droop, no nystagmus, tongue midline. Blink to threat on right .   Motor exam: right hemiplegia with trace flexion in arm. RLE 3-4/5. Left side moves well against gravity but inattentive and with drift   Sensation: intact to noxious stimuli b/l.    Coordination/ Gait: gait not assessed      LABS:                        13.0   9.61  )-----------( 271      ( 05 Aug 2021 05:58 )             39.6    08-05    137  |  99  |  14  ----------------------------<  126<H>  4.0   |  24  |  0.70    Ca    9.1      05 Aug 2021 05:57      IMAGING: Reviewed by me.     CT chest (08/02/21) Small bilateral pleural effusions with adjacent areas of atelectasis.Cardiomegaly. Interlobular septal thickening is suggestive of pulmonary edema given the other findings.Pneumoperitoneum, likely from recent PEG tube placement.    CT Head No Cont (08.01.21) Continued evolution of left insular and posterior frontotemporal stroke. No areas of intraparenchymal hemorrhage.    CT Head No Cont (07.26.21) Acute left MCA territory infarct without evidence of hemorrhagic transformation which has progressed since 7/25/2021.    TTE with Doppler (w/Cont) (07.28.21) Severe global left ventricular systolic dysfunction. No left ventricular thrombus. 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      CT Head No Cont (08.01.21) Continued evolution of left insular and posterior frontotemporal stroke. No areas of intraparenchymal hemorrhage.

## 2021-08-06 RX ORDER — POLYETHYLENE GLYCOL 3350 17 G/17G
17 POWDER, FOR SOLUTION ORAL DAILY
Refills: 0 | Status: DISCONTINUED | OUTPATIENT
Start: 2021-08-06 | End: 2021-08-09

## 2021-08-06 RX ORDER — ATENOLOL 25 MG/1
25 TABLET ORAL DAILY
Refills: 0 | Status: DISCONTINUED | OUTPATIENT
Start: 2021-08-06 | End: 2021-08-09

## 2021-08-06 RX ORDER — ASPIRIN/CALCIUM CARB/MAGNESIUM 324 MG
81 TABLET ORAL DAILY
Refills: 0 | Status: DISCONTINUED | OUTPATIENT
Start: 2021-08-06 | End: 2021-08-09

## 2021-08-06 RX ORDER — ATORVASTATIN CALCIUM 80 MG/1
80 TABLET, FILM COATED ORAL AT BEDTIME
Refills: 0 | Status: DISCONTINUED | OUTPATIENT
Start: 2021-08-06 | End: 2021-08-09

## 2021-08-06 RX ORDER — OLANZAPINE 15 MG/1
2.5 TABLET, FILM COATED ORAL DAILY
Refills: 0 | Status: DISCONTINUED | OUTPATIENT
Start: 2021-08-06 | End: 2021-08-09

## 2021-08-06 RX ORDER — ACETAMINOPHEN 500 MG
650 TABLET ORAL EVERY 6 HOURS
Refills: 0 | Status: DISCONTINUED | OUTPATIENT
Start: 2021-08-06 | End: 2021-08-09

## 2021-08-06 RX ORDER — ZALEPLON 10 MG
5 CAPSULE ORAL AT BEDTIME
Refills: 0 | Status: DISCONTINUED | OUTPATIENT
Start: 2021-08-06 | End: 2021-08-09

## 2021-08-06 RX ORDER — SENNA PLUS 8.6 MG/1
1 TABLET ORAL DAILY
Refills: 0 | Status: DISCONTINUED | OUTPATIENT
Start: 2021-08-06 | End: 2021-08-09

## 2021-08-06 RX ADMIN — Medication 0.5 MILLIGRAM(S): at 05:18

## 2021-08-06 RX ADMIN — ATORVASTATIN CALCIUM 80 MILLIGRAM(S): 80 TABLET, FILM COATED ORAL at 21:35

## 2021-08-06 RX ADMIN — Medication 1 TABLET(S): at 12:23

## 2021-08-06 RX ADMIN — Medication 0.5 MILLIGRAM(S): at 18:37

## 2021-08-06 RX ADMIN — SENNA PLUS 1 TABLET(S): 8.6 TABLET ORAL at 21:35

## 2021-08-06 RX ADMIN — Medication 3 MILLILITER(S): at 18:37

## 2021-08-06 RX ADMIN — POLYETHYLENE GLYCOL 3350 17 GRAM(S): 17 POWDER, FOR SOLUTION ORAL at 12:23

## 2021-08-06 RX ADMIN — PANTOPRAZOLE SODIUM 40 MILLIGRAM(S): 20 TABLET, DELAYED RELEASE ORAL at 12:22

## 2021-08-06 RX ADMIN — Medication 650 MILLIGRAM(S): at 12:29

## 2021-08-06 RX ADMIN — Medication 3 MILLILITER(S): at 12:22

## 2021-08-06 RX ADMIN — GABAPENTIN 100 MILLIGRAM(S): 400 CAPSULE ORAL at 21:34

## 2021-08-06 RX ADMIN — Medication 81 MILLIGRAM(S): at 12:22

## 2021-08-06 RX ADMIN — Medication 650 MILLIGRAM(S): at 13:00

## 2021-08-06 RX ADMIN — ATENOLOL 25 MILLIGRAM(S): 25 TABLET ORAL at 05:17

## 2021-08-06 RX ADMIN — Medication 40 MILLIGRAM(S): at 05:19

## 2021-08-06 RX ADMIN — Medication 3 MILLILITER(S): at 05:17

## 2021-08-06 RX ADMIN — Medication 3 MILLIGRAM(S): at 21:34

## 2021-08-06 RX ADMIN — Medication 3 MILLILITER(S): at 23:05

## 2021-08-06 RX ADMIN — Medication 650 MILLIGRAM(S): at 21:35

## 2021-08-06 RX ADMIN — ENOXAPARIN SODIUM 40 MILLIGRAM(S): 100 INJECTION SUBCUTANEOUS at 18:38

## 2021-08-06 RX ADMIN — Medication 650 MILLIGRAM(S): at 22:30

## 2021-08-06 NOTE — PROGRESS NOTE ADULT - ASSESSMENT
89 y/o F with PMH afib not on AC, PPM, HTN, ovarian and colon ca, GERD presents to the ED as a transfer from [facility] for dysarthria and R hemiparesis. NIHSS 8 on repeat exam. R hemiparesis, R facial droop, aphasia (not fluent, not intact to repetition), and dysarthria. 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     Impression: Left MCA infarction, Left M1 occlusion found on CTA likely secondary to cardioembolic etiology (hx of afib), post unsuccessful recanalization.     NEURO: Patient neurologically without acute change . Continue monitoring for neurologic deterioration.  Nsx noted severe tortuosity preventing access to left ICA intracranially. Hemicrani deferred due to advanced age. Goal for SBP of 100-180mmHg with overall normotension. Patient on statin for LDL goal less than 70. MRI Brain would not change medical management as pt has hx of afib and was not on AC. Head CT as noted above. Physical therapy and OT recommend CHARLES. Zaleplon restarted for insomnia.     ANTITHROMBOTIC THERAPY: ASA 81 PO. Consider beginning Eliquis 5mg BID in 2 weeks (8/8/2021) from onset after repeat stable head CT pending ongoing GOC discussion.     PULMONARY: CXR (08/01/21) Increased basilar congestion compared with prior study, protecting airway, saturating well on room air     CARDIOVASCULAR: TTE shows mild-moderate mitral regurgitation, mild aortic regurgitation, severe global LV systolic dysfunction, normal RV function, mild tricuspid regurgitation. Thickened pericardium with small pericardial effusion.  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 cardio made aware of TTE findings. Patient has PPM placed in 2019. EP interrogated and found multiple episodes of atrial fibrillation and flutter. Rate controlled at this time Dr Stone (Cardiology) follow up appreciated: acute systolic CHF- s/p lasix. BNP ~ 28,000. CT chest shows pulmonary edema. BNP (08/04/21) : 12,929                    SBP goal: 100-180 mmhg     GASTROINTESTINAL:  dysphagia screen- failed 7/26. Re eval, 7/27 failed. PEG placed by IR 7/30  Tube feeds started 7/31 at 5:30 pm. Noted intolerance TF held, nutrition reconsulted with lower recommendations for TF rate. Starting at 10 cc/hr on 8/3 tolerating well.      Diet: PEG with TF    RENAL: BUN/Cr within normal limits on 8/5, good urine output,  will continue with Lasix 40mg daily as per Cardio recommendations     Na Goal: Greater than 135     Kessler: no     HEMATOLOGY: H/H within normal limits, Platelets normal at 271, no active bleeding noted      DVT ppx: lovenox subq     ID: afebrile, ID following, finished zosyn 5/5 days. Will continue to monitor for s/sx of infection.      OTHER:   Palliative meeting held on 07/30/21 family all in agreement with PEG placement. Palliative re-eval on 8/5: after disucssion with family they reviewed that home dose of gabapentin that was restarted at 100 q8 could have caused increased lethargy and was lowered to 100mg QHS. Discussed tube feeding goals and that we would increase tube feedings to see if patient tolerates her recommended calories. Family want to continue the current care plan for 72 hours and reevlauate on Monday. They recognize that the goal of rehab may not be an option based upon the clinical status of the patient.    DISPOSITION: depending on [facility]    CORE MEASURES:        Admission NIHSS: 16      TPA: [] YES [x] NO      LDL/HDL: 123/30     Depression Screen: unable to assess      Statin Therapy: yes     Dysphagia Screen: [] PASS [x] FAIL     Smoking [] YES [x] NO      Afib [x] YES [] NO     Stroke Education [x] YES [] NO    Obtain screening lower extremity venous ultrasound in patients who meet 1 or more of the following criteria as patient is high risk for DVT/PE on admission:   [] History of DVT/PE  []Hypercoagulable states (Factor V Leiden, Cancer, OCP, etc. )  []Prolonged immobility (hemiplegia/hemiparesis/post operative or any other extended immobilization)  [] Transferred from outside facility (Rehab or Long term care)  [] Age </= to 50 89 y/o F with PMH afib not on AC, PPM, HTN, ovarian and colon ca, GERD presents to the ED as a transfer from [facility] for dysarthria and R hemiparesis. NIHSS 8 on repeat exam. R hemiparesis, R facial droop, aphasia (not fluent, not intact to repetition), and dysarthria. 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 left M1 occlusion s/p thrombectomy with unsuccessful recanalization     Impression: Left MCA infarction, Left M1 occlusion found on CTA likely secondary to cardioembolic etiology (hx of afib), post unsuccessful recanalization.     NEURO: Patient neurologically without acute change . Continue monitoring for neurologic deterioration.  Nsx noted severe tortuosity preventing access to left ICA intracranially. Hemicrani deferred due to advanced age. Goal for SBP of 100-180mmHg with overall normotension. Patient on statin for LDL goal less than 70. MRI Brain would not change medical management as pt has hx of afib and was not on AC. Head CT as noted above. Physical therapy and OT recommend CHARLES. Zaleplon restarted for insomnia.     ANTITHROMBOTIC THERAPY: ASA 81 PO. Consider beginning Eliquis 5mg BID in 2 weeks (8/8/2021) from onset after repeat stable head CT pending ongoing C discussion.     PULMONARY: CXR (08/01/21) Increased basilar congestion compared with prior study, protecting airway, saturating well on room air     CARDIOVASCULAR: TTE shows mild-moderate mitral regurgitation, mild aortic regurgitation, severe global LV systolic dysfunction, normal RV function, mild tricuspid regurgitation. Thickened pericardium with small pericardial effusion.  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 cardio made aware of TTE findings. Patient has PPM placed in 2019. EP interrogated and found multiple episodes of atrial fibrillation and flutter. Rate controlled at this time Dr Stone (Cardiology) follow up appreciated: acute systolic CHF- s/p lasix. BNP ~ 28,000. CT chest shows pulmonary edema. BNP (08/04/21) : 12,929                    SBP goal: 100-180 mmhg     GASTROINTESTINAL:  dysphagia screen- failed 7/26. Re eval, 7/27 failed. PEG placed by IR 7/30  Tube feeds started 7/31 at 5:30 pm. Noted intolerance TF held, nutrition reconsulted with lower recommendations for TF rate. Starting at 10 cc/hr on 8/3 tolerating well.      Diet: PEG with TF    RENAL: BUN/Cr within normal limits on 8/5, good urine output,  will continue with Lasix 40mg daily as per Cardio recommendations     Na Goal: Greater than 135     Kessler: no     HEMATOLOGY: H/H within normal limits, Platelets normal at 271, no active bleeding noted      DVT ppx: lovenox subq     ID: afebrile, ID following, finished zosyn 5/5 days. Will continue to monitor for s/sx of infection.      OTHER:   Palliative meeting held on 07/30/21 family all in agreement with PEG placement. Palliative re-eval on 8/5: after disucssion with family they reviewed that home dose of gabapentin that was restarted at 100 q8 could have caused increased lethargy and was lowered to 100mg QHS. Discussed tube feeding goals and that we would increase tube feedings to see if patient tolerates her recommended calories. Family want to continue the current care plan for 72 hours and reevlauate on Monday. They recognize that the goal of rehab may not be an option based upon the clinical status of the patient.    DISPOSITION: depending on [facility]    CORE MEASURES:        Admission NIHSS: 16      TPA: [] YES [x] NO      LDL/HDL: 123/30     Depression Screen: unable to assess      Statin Therapy: yes     Dysphagia Screen: [] PASS [x] FAIL     Smoking [] YES [x] NO      Afib [x] YES [] NO     Stroke Education [x] YES [] NO    Obtain screening lower extremity venous ultrasound in patients who meet 1 or more of the following criteria as patient is high risk for DVT/PE on admission:   [] History of DVT/PE  []Hypercoagulable states (Factor V Leiden, Cancer, OCP, etc. )  []Prolonged immobility (hemiplegia/hemiparesis/post operative or any other extended immobilization)  [] Transferred from outside facility (Rehab or Long term care)  [] Age </= to 50 89 y/o F with PMH afib not on AC, PPM, HTN, ovarian and colon ca, GERD presents to the ED as a transfer from [facility] for dysarthria and R hemiparesis. NIHSS 8 on repeat exam. R hemiparesis, R facial droop, aphasia (not fluent, not intact to repetition), and dysarthria. 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 left M1 occlusion s/p thrombectomy with unsuccessful recanalization     Impression: Left MCA infarction, Left M1 occlusion found on CTA likely secondary to cardioembolic etiology (hx of afib), post unsuccessful recanalization.     NEURO: Patient neurologically without acute change . Continue monitoring for neurologic deterioration.  Nsx noted severe tortuosity preventing access to left ICA intracranially. Hemicrani deferred due to advanced age. Goal for SBP of 100-180mmHg with overall normotension. Patient on statin for LDL goal less than 70. MRI Brain would not change medical management as pt has hx of afib and was not on AC. Head CT as noted above. Physical therapy and OT recommend CHARLES. Zaleplon restarted for insomnia.     ANTITHROMBOTIC THERAPY: ASA 81 PO. Consider beginning Eliquis 5mg BID in 2 weeks (8/8/2021) from onset after repeat stable head CT pending ongoing C discussion.     PULMONARY: CXR (08/01/21) Increased basilar congestion compared with prior study, protecting airway, saturating well on room air     CARDIOVASCULAR: TTE shows mild-moderate mitral regurgitation, mild aortic regurgitation, severe global LV systolic dysfunction, normal RV function, mild tricuspid regurgitation. Thickened pericardium with small pericardial effusion.  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 cardio made aware of TTE findings. Patient has PPM placed in 2019. EP interrogated and found multiple episodes of atrial fibrillation and flutter. Rate controlled at this time Dr Stone (Cardiology) follow up appreciated: acute systolic CHF- s/p lasix. BNP ~ 28,000. CT chest shows pulmonary edema. BNP (08/04/21) : 12,929                    SBP goal: 100-180 mmhg     GASTROINTESTINAL:  dysphagia screen- failed 7/26. Re eval, 7/27 failed. PEG placed by IR 7/30  Tube feeds started 7/31 at 5:30 pm. Noted intolerance TF held, nutrition reconsulted with lower recommendations for TF rate. Starting at 10 cc/hr on 8/3 tolerating well.      Diet: PEG with TF    RENAL: BUN/Cr within normal limits on 8/5, good urine output,  will continue with Lasix 40mg daily as per Cardio recommendations     Na Goal: Greater than 135     Kessler: no     HEMATOLOGY: H/H within normal limits, Platelets normal at 271, no active bleeding noted      DVT ppx: lovenox subq     ID: afebrile, ID following, finished zosyn 5/5 days. Will continue to monitor for s/sx of infection.      OTHER:   Palliative meeting held on 07/30/21 family all in agreement with PEG placement. Palliative re-eval on 8/5: after disucssion with family they reviewed that home dose of gabapentin that was restarted at 100 q8 could have caused increased lethargy and was lowered to 100mg QHS. Discussed tube feeding goals and that we would increase tube feedings to see if patient tolerates her recommended calories. Family want to continue the current care plan for 72 hours and reevlauate on Monday. They recognize that the goal of rehab may not be an option based upon the clinical status of the patient.    DISPOSITION: depending on GOC and clinical course     CORE MEASURES:        Admission NIHSS: 16      TPA: [] YES [x] NO      LDL/HDL: 123/30     Depression Screen: unable to assess      Statin Therapy: yes     Dysphagia Screen: [] PASS [x] FAIL     Smoking [] YES [x] NO      Afib [x] YES [] NO     Stroke Education [x] YES [] NO    Obtain screening lower extremity venous ultrasound in patients who meet 1 or more of the following criteria as patient is high risk for DVT/PE on admission:   [] History of DVT/PE  []Hypercoagulable states (Factor V Leiden, Cancer, OCP, etc. )  []Prolonged immobility (hemiplegia/hemiparesis/post operative or any other extended immobilization)  [] Transferred from outside facility (Rehab or Long term care)  [] Age </= to 50

## 2021-08-06 NOTE — PROGRESS NOTE ADULT - SUBJECTIVE AND OBJECTIVE BOX
Chief Complaint:  Patient is a 89y old  Female who presents with a chief complaint of stroke transfer (06 Aug 2021 06:24)      Interval Events:   pt. tolerating feeds, no N/V  Allergies:  No Known Allergies        Home Medications:  aspirin 81 mg oral delayed release tablet: 1 tab(s) orally once a day (27 Jan 2020 11:10)  atenolol 25 mg oral tablet: 1 tab(s) orally once a day (27 Jan 2020 11:10)  gabapentin 100 mg oral capsule: 1 cap(s) orally 3 times a day (27 Jan 2020 11:10)  hydrocodocone chlopheniramine: 5 milliliter(s) orally once a day (at bedtime) (24 Jan 2020 11:01)  Multiple Vitamins oral tablet: 1 tab(s) orally once a day (24 Jan 2020 11:01)  omeprazole 20 mg oral delayed release capsule: 1 cap(s) orally once a day (24 Jan 2020 11:01)  Vitamin B12: 5 milliliter(s) orally once a day (24 Jan 2020 11:01)  zolpidem 5 mg oral tablet: 0.5 tab(s) orally once a day (at bedtime) (24 Jan 2020 11:01)        Hospital Medications:  albuterol/ipratropium for Nebulization 3 milliLiter(s) Nebulizer every 6 hours  aspirin  chewable 81 milliGRAM(s) Oral daily  ATENolol  Tablet 25 milliGRAM(s) Oral daily  atorvastatin 80 milliGRAM(s) Oral at bedtime  buDESOnide    Inhalation Suspension 0.5 milliGRAM(s) Inhalation every 12 hours  enoxaparin Injectable 40 milliGRAM(s) SubCutaneous <User Schedule>  furosemide Solution 40 milliGRAM(s) Enteral Tube daily  gabapentin   Solution 100 milliGRAM(s) Enteral Tube at bedtime  glucagon  Injectable 1 milliGRAM(s) IntraMuscular once  melatonin 3 milliGRAM(s) Oral at bedtime  multivitamin 1 Tablet(s) Oral daily  pantoprazole  Injectable 40 milliGRAM(s) IV Push daily  polyethylene glycol 3350 17 Gram(s) Oral daily  senna 1 Tablet(s) Oral daily    MEDICATIONS  (PRN):  acetaminophen   Tablet .. 650 milliGRAM(s) Oral every 6 hours PRN Mild Pain (1 - 3)  OLANZapine 2.5 milliGRAM(s) Oral daily PRN anxiety  zaleplon 5 milliGRAM(s) Oral at bedtime PRN Insomnia    ___________  Active diet:  Diet, NPO with Tube Feed:   Tube Feeding Modality: Gastrostomy  Jevity 1.2 Wei (JEVITY1.2RTH)  Total Volume for 24 Hours (mL): 1200  Continuous  Starting Tube Feed Rate mL per Hour: 40  Increase Tube Feed Rate by (mL): 10     Every 4 hours  Until Goal Tube Feed Rate (mL per Hour): 50  Tube Feed Duration (in Hours): 24  Tube Feed Start Time: 07:00  ___________________        ROS  GI: [- ] Nausea, [- ] vomiting, [ -]abdominal pain    PHYSICAL EXAM:   Vital Signs:  Vital Signs Last 24 Hrs  T(C): 36.4 (06 Aug 2021 08:09), Max: 36.9 (06 Aug 2021 04:17)  T(F): 97.6 (06 Aug 2021 08:09), Max: 98.4 (06 Aug 2021 04:17)  HR: 70 (06 Aug 2021 08:09) (70 - 86)  BP: 143/77 (06 Aug 2021 08:09) (124/69 - 156/80)  BP(mean): --  RR: 18 (06 Aug 2021 08:09) (18 - 22)  SpO2: 96% (06 Aug 2021 08:09) (95% - 99%)  Daily     Daily     GENERAL:  Appears stated age, well-groomed, well-nourished, no distress  HEENT:  NC/AT,  conjunctivae clear and pink, no thyromegaly, nodules, adenopathy, no JVD, sclera -anicteric  NECK: Supple, No masses  CHEST:  Full & symmetric excursion, no increased effort, breath sounds clear  HEART:  Regular rhythm, S1, S2, no murmur/rub/S3/S4, no abdominal bruit, no edema  ABDOMEN:  Soft, GTube C/D, non-tender, non-distended, normoactive bowel sounds,  no masses ,no hepato-splenomegaly, no signs of chronic liver disease  EXTEREMITIES:  no cyanosis,clubbing or edema  SKIN:  No rash/erythema/ecchymoses/petechiae/wounds/abscess/warm/dry  LN: No lymphadenopathy, No masses  NEURO:  Alert, oriented, no asterixis, no tremor, no encephalopathy    LABS:                        13.0   9.61  )-----------( 271      ( 05 Aug 2021 05:58 )             39.6     08-05    137  |  99  |  14  ----------------------------<  126<H>  4.0   |  24  |  0.70    Ca    9.1      05 Aug 2021 05:57                Imaging:

## 2021-08-06 NOTE — PROGRESS NOTE ADULT - SUBJECTIVE AND OBJECTIVE BOX
Name of Patient : SHELBY LESTER  MRN: [id number]  DATE OF SERVICE: 08-06-21     Subjective: Patient seen and examined. No new events except as noted.   doing okay       MEDICATIONS:  MEDICATIONS  (STANDING):  albuterol/ipratropium for Nebulization 3 milliLiter(s) Nebulizer every 6 hours  aspirin  chewable 81 milliGRAM(s) Oral daily  ATENolol  Tablet 25 milliGRAM(s) Oral daily  atorvastatin 80 milliGRAM(s) Oral at bedtime  buDESOnide    Inhalation Suspension 0.5 milliGRAM(s) Inhalation every 12 hours  enoxaparin Injectable 40 milliGRAM(s) SubCutaneous <User Schedule>  furosemide Solution 40 milliGRAM(s) Enteral Tube daily  gabapentin   Solution 100 milliGRAM(s) Enteral Tube at bedtime  glucagon  Injectable 1 milliGRAM(s) IntraMuscular once  melatonin 3 milliGRAM(s) Oral at bedtime  multivitamin 1 Tablet(s) Oral daily  pantoprazole  Injectable 40 milliGRAM(s) IV Push daily  polyethylene glycol 3350 17 Gram(s) Oral daily  senna 1 Tablet(s) Oral daily      PHYSICAL EXAM:  T(C): 36.3 (08-06-21 @ 11:02), Max: 36.9 (08-06-21 @ 04:17)  HR: 72 (08-06-21 @ 11:02) (70 - 79)  BP: 122/68 (08-06-21 @ 11:02) (122/68 - 154/81)  RR: 18 (08-06-21 @ 11:02) (18 - 19)  SpO2: 96% (08-06-21 @ 11:02) (95% - 98%)  Wt(kg): --  I&O's Summary    05 Aug 2021 07:01  -  06 Aug 2021 07:00  --------------------------------------------------------  IN: 670 mL / OUT: 1300 mL / NET: -630 mL    06 Aug 2021 07:01  -  06 Aug 2021 15:49  --------------------------------------------------------  IN: 0 mL / OUT: 800 mL / NET: -800 mL          Appearance: calm 	  HEENT:  PERRLA   Lymphatic: No lymphadenopathy   Cardiovascular: Normal S1 S2  Respiratory: normal effort , clear  Gastrointestinal:  Soft, Non-tender  Skin: No rashes,  warm to touch  Psychiatry:  Mood & affect appropriate  Musculuskeletal: No edema      All labs, Imaging and EKGs personally reviewed       08-05-21 @ 07:01  -  08-06-21 @ 07:00  --------------------------------------------------------  IN: 670 mL / OUT: 1300 mL / NET: -630 mL    08-06-21 @ 07:01  -  08-06-21 @ 15:49  --------------------------------------------------------  IN: 0 mL / OUT: 800 mL / NET: -800 mL                          13.0   9.61  )-----------( 271      ( 05 Aug 2021 05:58 )             39.6               08-05    137  |  99  |  14  ----------------------------<  126<H>  4.0   |  24  |  0.70    Ca    9.1      05 Aug 2021 05:57

## 2021-08-06 NOTE — PROGRESS NOTE ADULT - SUBJECTIVE AND OBJECTIVE BOX
Follow-up Pulm Progress Note    Alert today  Sats 96% 3LNC      Medications:  MEDICATIONS  (STANDING):  albuterol/ipratropium for Nebulization 3 milliLiter(s) Nebulizer every 6 hours  aspirin  chewable 81 milliGRAM(s) Oral daily  ATENolol  Tablet 25 milliGRAM(s) Oral daily  atorvastatin 80 milliGRAM(s) Oral at bedtime  buDESOnide    Inhalation Suspension 0.5 milliGRAM(s) Inhalation every 12 hours  enoxaparin Injectable 40 milliGRAM(s) SubCutaneous <User Schedule>  furosemide Solution 40 milliGRAM(s) Enteral Tube daily  gabapentin   Solution 100 milliGRAM(s) Enteral Tube at bedtime  glucagon  Injectable 1 milliGRAM(s) IntraMuscular once  melatonin 3 milliGRAM(s) Oral at bedtime  multivitamin 1 Tablet(s) Oral daily  pantoprazole  Injectable 40 milliGRAM(s) IV Push daily  polyethylene glycol 3350 17 Gram(s) Oral daily  senna 1 Tablet(s) Oral daily    MEDICATIONS  (PRN):  acetaminophen   Tablet .. 650 milliGRAM(s) Oral every 6 hours PRN Temp greater or equal to 38C (100.4F), Mild Pain (1 - 3), Moderate Pain (4 - 6), Severe Pain (7 - 10)  OLANZapine 2.5 milliGRAM(s) Oral daily PRN anxiety  zaleplon 5 milliGRAM(s) Oral at bedtime PRN Insomnia          Vital Signs Last 24 Hrs  T(C): 36.3 (06 Aug 2021 11:02), Max: 36.9 (06 Aug 2021 04:17)  T(F): 97.4 (06 Aug 2021 11:02), Max: 98.4 (06 Aug 2021 04:17)  HR: 72 (06 Aug 2021 11:02) (70 - 86)  BP: 122/68 (06 Aug 2021 11:02) (122/68 - 154/81)  BP(mean): --  RR: 18 (06 Aug 2021 11:02) (18 - 20)  SpO2: 96% (06 Aug 2021 11:02) (95% - 99%)          08-05 @ 07:01  -  08-06 @ 07:00  --------------------------------------------------------  IN: 670 mL / OUT: 1300 mL / NET: -630 mL          LABS:                        13.0   9.61  )-----------( 271      ( 05 Aug 2021 05:58 )             39.6     08-05    137  |  99  |  14  ----------------------------<  126<H>  4.0   |  24  |  0.70    Ca    9.1      05 Aug 2021 05:57            CAPILLARY BLOOD GLUCOSE              Serum Pro-Brain Natriuretic Peptide: 51977 pg/mL (08-04-21 @ 05:59)                CULTURES:       Culture - Urine (collected 08-01-21 @ 21:53)  Source: Clean Catch Clean Catch (Midstream)  Final Report (08-02-21 @ 21:39):    No growth    Culture - Urine (collected 07-28-21 @ 19:10)  Source: Catheterized Catheterized  Final Report (07-29-21 @ 22:56):    No growth          Physical Examination:  PULM: CTA bilaterally   CVS: S1, S2 heard    RADIOLOGY REVIEWED  CT chest: < from: CT Chest No Cont (08.02.21 @ 16:14) >  FINDINGS:    LUNGS AND AIRWAYS: Anterior bowing of the posterior wall of the trachea, could suggest tracheomalacia. Interlobular septal thickening bilaterally. Bilateral lower lower lung areas of subsegmental, linear or compressive atelectasis  PLEURA: Small bilateral pleural effusions.  MEDIASTINUM AND ELIEL: A few mediastinal lymph nodes largest is about 1.1 cm a precarinal location are likely reactive.  VESSELS: Atherosclerotic changes of the aorta and coronary arteries.  HEART: Cardiomegaly. No pericardial effusion. Left chest wall pacemaker with leads in the right atrium and right ventricle.  CHEST WALL AND LOWER NECK: Within normal limits.  VISUALIZED UPPER ABDOMEN: Small amount of pneumoperitoneum, likely from recent PEG tube placement.  BONES: Unchanged T11 mild compression deformity since abdominal CT of January 23, 2020. Degenerative changes.    IMPRESSION:    Small bilateral pleural effusions with adjacent areas of atelectasis.    Cardiomegaly.    Interlobular septal thickening is suggestive of pulmonary edema given the other findings.    Pneumoperitoneum, likely from recent PEG tube placement.    --- End of Report ---

## 2021-08-06 NOTE — PROGRESS NOTE ADULT - ASSESSMENT
89 y/o F with PMH afib not on AC, PPM, HTN, ovarian and colon ca, GERD presents to the ED as a transfer from [facility] for dysarthria and R hemiparesis. NIHSS 8 on repeat exam. R hemiparesis, R facial droop, aphasia (not fluent, not intact to repetition), and dysarthria. 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

## 2021-08-06 NOTE — PROGRESS NOTE ADULT - PROBLEM SELECTOR PLAN 2
CT chest with small b/l pl effusions and pulm edema   -TTE with EF 30/35%, mild/mod MR  -Elevated ProBNP, continue to trend  -Diuresis per cards, keep O>I as tolerated  -Wean O2 as tolerated, keep sats >90% (currently 3LNC)   -Incentive spirometry as able

## 2021-08-06 NOTE — PROGRESS NOTE ADULT - SUBJECTIVE AND OBJECTIVE BOX
Subjective: Patient seen and examined. No new events except as noted.   resting comfortably     REVIEW OF SYSTEMS:  Unable to obtain      MEDICATIONS:  MEDICATIONS  (STANDING):  albuterol/ipratropium for Nebulization 3 milliLiter(s) Nebulizer every 6 hours  aspirin  chewable 81 milliGRAM(s) Oral daily  ATENolol  Tablet 25 milliGRAM(s) Oral daily  atorvastatin 80 milliGRAM(s) Oral at bedtime  buDESOnide    Inhalation Suspension 0.5 milliGRAM(s) Inhalation every 12 hours  enoxaparin Injectable 40 milliGRAM(s) SubCutaneous <User Schedule>  furosemide Solution 40 milliGRAM(s) Enteral Tube daily  gabapentin   Solution 100 milliGRAM(s) Enteral Tube at bedtime  glucagon  Injectable 1 milliGRAM(s) IntraMuscular once  melatonin 3 milliGRAM(s) Oral at bedtime  multivitamin 1 Tablet(s) Oral daily  pantoprazole  Injectable 40 milliGRAM(s) IV Push daily  polyethylene glycol 3350 17 Gram(s) Oral daily  senna 1 Tablet(s) Oral daily      PHYSICAL EXAM:  T(C): 36.4 (08-06-21 @ 08:09), Max: 36.9 (08-06-21 @ 04:17)  HR: 70 (08-06-21 @ 08:09) (70 - 86)  BP: 143/77 (08-06-21 @ 08:09) (124/69 - 156/80)  RR: 18 (08-06-21 @ 08:09) (18 - 22)  SpO2: 96% (08-06-21 @ 08:09) (95% - 99%)  Wt(kg): --  I&O's Summary    05 Aug 2021 07:01  -  06 Aug 2021 07:00  --------------------------------------------------------  IN: 670 mL / OUT: 1300 mL / NET: -630 mL          Appearance: NAD	  HEENT:   Dry  oral mucosa, PERRL, EOMI	  Lymphatic: No lymphadenopathy  Cardiovascular: irregular S1 S2, No JVD, No murmurs, No edema  Respiratory: Decreased bs  Psychiatry: A & O x 3, sleepy   Gastrointestinal:  Soft, Non-tender, + BS	  Skin: No rashes, No ecchymoses, No cyanosis	  Extremities: Normal range of motion, No clubbing, cyanosis or edema  Vascular: Peripheral pulses palpable 2+ bilaterally  NEUROLOGICAL EXAM:  Mental status: Awake, alert, does gesture facial expressions relatively in context to situation (smiles appropriately), not following commands. Can mimic physical actions. No verbal output   Cranial Nerves: R facial droop, no nystagmus, tongue midline. Blink to threat b/l.   Motor exam: right hemiplegia with trace flexion in arm and triple flexion in leg, left side moves well against gravity but inattentive and with drift   Sensation: decreased on right   Coordination/ Gait: gait not assessed       LABS:    CARDIAC MARKERS:                                13.0   9.61  )-----------( 271      ( 05 Aug 2021 05:58 )             39.6     08-05    137  |  99  |  14  ----------------------------<  126<H>  4.0   |  24  |  0.70    Ca    9.1      05 Aug 2021 05:57      proBNP:   Lipid Profile:   HgA1c:   TSH:             TELEMETRY: 	AF    ECG:  	  RADIOLOGY:   DIAGNOSTIC TESTING:  [ ] Echocardiogram:  [ ]  Catheterization:  [ ] Stress Test:    OTHER:

## 2021-08-06 NOTE — PROGRESS NOTE ADULT - PROBLEM SELECTOR PLAN 6
s/p PEG  -GI f/u  -Aspiration precautions  -Oral care.

## 2021-08-06 NOTE — PROGRESS NOTE ADULT - ASSESSMENT
89 y/o F with PMH afib not on AC, PPM, HTN, ovarian and colon ca, GERD, PNA x2 as a child  Presents to the ED as a transfer from [facility] for stroke. Pt was found to have L M1 occlusion on CTA, S/p thrombectomy with unsuccessful recanalization. Course c/b leukocytosis - started on empiric Zosyn for concern of aspiration. Called to consult for wheezing, congested cough this AM, which seems to have improved with Duoneb. Sees pulmonologist as an outpt for chronic cough and wheezing.

## 2021-08-06 NOTE — PROGRESS NOTE ADULT - ASSESSMENT
Patient is a 89 y/o Female with PMH afib not on AC, PPM, HTN, ovarian and colon ca, GERD presents to the ED as a transfer from [facility] for stroke.     Problem/Recommendation - 1:  Problem: Stroke. Recommendation: Care as per neurology   Angio noted   ASA/ Statin   protonix  BP control  speech and swallow   GI eval for PEG placement appreicated   palliative eval     worsening leukocytosis  mild low grade T  procal level  UA Noted,   completed zosyn , 5 days course per ID   CXR  high risk of aspiration  may need CT chest   Pan CT  ID eval called for further recs appreicated  pulm eval appreciated     Problem/Recommendation - 2:  ·  Problem: Atrial fibrillation.  Recommendation: rate control  Atenolol 25 oral daily.     Problem/Recommendation - 3:  ·  Problem: Hypertension.  Recommendation: resuem BP meds  monitor and adjust as tolerated.     Problem/Recommendation - 4:  ·  Problem: Colon cancer.     Problem/Recommendation - 5:  ·  Problem: GERD (gastroesophageal reflux disease).  Recommendation: PPI.     Problem/Recommendation - 6:  Problem: Prophylactic measure. Recommendation: DVT and gI PPX.

## 2021-08-06 NOTE — PROGRESS NOTE ADULT - SUBJECTIVE AND OBJECTIVE BOX
THE PATIENT WAS SEEN AND EXAMINED BY ME WITH THE HOUSESTAFF AND STROKE TEAM DURING MORNING ROUNDS.   HPI:  89 y/o F with PMH afib not on AC, PPM, HTN, ovarian and colon ca, GERD presents to the ED as a transfer from [facility] for stroke. Per son, pt woke up at 3:30am on 7/25 and went to the bathroom, no issues with gait or speech at that time, accompanied by son. At 7:30 am that morning of admit , pt's son found her half off the bed and unable to speak or walk. Pt was found to have L M1 occlusion on CTA, CTP w/ no core infarct and penumbra 81cc, and transferred for IR thrombectomy. Exam at [facility] notable for severe dysarthria and R hemiparesis. Pt did not receive tpa. NIHSS initially 16 on arrival to [facility] ED, NIHSS 8 on repeat exam. At baseline, son reports that pt is able to do own ADLs (showers, reads) but has an aid to make sure she does not fall, walks with a walker without assistance    SUBJECTIVE: No events overnight.  No new neurologic complaints, ROS reported negative unless otherwise noted.      acetaminophen   Tablet .. 650 milliGRAM(s) Oral every 6 hours PRN  albuterol/ipratropium for Nebulization 3 milliLiter(s) Nebulizer every 6 hours  aspirin  chewable 81 milliGRAM(s) Oral daily  ATENolol  Tablet 25 milliGRAM(s) Oral daily  atorvastatin 80 milliGRAM(s) Oral at bedtime  buDESOnide    Inhalation Suspension 0.5 milliGRAM(s) Inhalation every 12 hours  enoxaparin Injectable 40 milliGRAM(s) SubCutaneous <User Schedule>  furosemide Solution 40 milliGRAM(s) Enteral Tube daily  gabapentin   Solution 100 milliGRAM(s) Enteral Tube at bedtime  glucagon  Injectable 1 milliGRAM(s) IntraMuscular once  melatonin 3 milliGRAM(s) Oral at bedtime  multivitamin 1 Tablet(s) Oral daily  OLANZapine 2.5 milliGRAM(s) Oral daily PRN  pantoprazole  Injectable 40 milliGRAM(s) IV Push daily  polyethylene glycol 3350 17 Gram(s) Oral daily  senna 1 Tablet(s) Oral daily  zaleplon 5 milliGRAM(s) Oral at bedtime PRN      PHYSICAL EXAM:   Vital Signs Last 24 Hrs  T(C): 36.9 (06 Aug 2021 04:17), Max: 36.9 (06 Aug 2021 04:17)  T(F): 98.4 (06 Aug 2021 04:17), Max: 98.4 (06 Aug 2021 04:17)  HR: 73 (06 Aug 2021 04:17) (70 - 86)  BP: 154/81 (06 Aug 2021 04:17) (124/69 - 156/80)  BP(mean): --  RR: 18 (06 Aug 2021 04:17) (18 - 22)  SpO2: 98% (06 Aug 2021 04:17) (95% - 99%)    General: No acute distress  HEENT: EOM intact, visual fields full  Abdomen: Soft, nontender, nondistended   Extremities: No edema    NEUROLOGICAL EXAM:  Mental status: Awake, alert, does gesture facial expressions relatively in context to situation (smiles appropriately). Not following commands, perseverates on one action. Can mimic physical actions. Generates sounds.  Cranial Nerves: R facial droop, no nystagmus, tongue midline. Blink to threat on right .   Motor exam: right hemiplegia with trace flexion in arm. RLE 3-4/5. Left side moves well against gravity but inattentive and with drift   Sensation: intact to noxious stimuli b/l.    Coordination/ Gait: gait not assessed    LABS:                        13.0   9.61  )-----------( 271      ( 05 Aug 2021 05:58 )             39.6    08-05    137  |  99  |  14  ----------------------------<  126<H>  4.0   |  24  |  0.70    Ca    9.1      05 Aug 2021 05:57    IMAGING: Reviewed by me.       CT chest (08/02/21) Small bilateral pleural effusions with adjacent areas of atelectasis.Cardiomegaly. Interlobular septal thickening is suggestive of pulmonary edema given the other findings.Pneumoperitoneum, likely from recent PEG tube placement.    CT Head No Cont (08.01.21) Continued evolution of left insular and posterior frontotemporal stroke. No areas of intraparenchymal hemorrhage.    CT Head No Cont (07.26.21) Acute left MCA territory infarct without evidence of hemorrhagic transformation which has progressed since 7/25/2021.    TTE with Doppler (w/Cont) (07.28.21) Severe global left ventricular systolic dysfunction. No left ventricular thrombus. 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      CT Head No Cont (08.01.21) Continued evolution of left insular and posterior frontotemporal stroke. No areas of intraparenchymal hemorrhage.

## 2021-08-06 NOTE — PROGRESS NOTE ADULT - ASSESSMENT
89 y/o F with PMH afib not on AC, PPM, HTN, ovarian and colon ca, GERD presents to the ED as a transfer from [facility] for stroke. Per son, pt woke up at 3:30am and went to the bathroom, no issues with gait or speech at that time, accompanied by son. At 7:30 am this morning, pt's son found her half off the bed and unable to speak or walk. Pt was found to have L M1 occlusion on CTA, CTP w/ no core infarct and penumbra 81cc, and transferred for IR thrombectomy. Exam at [facility] notable for severe dysarthria and R hemiparesis. Pt did not receive tpa. NIHSS initially 16 on arrival to [facility] ED, NIHSS 8 on repeat exam. At baseline, son reports that pt is able to do own ADLs (showers, reads) but has an aid to make sure she does not fall, walks with a walker without assistance, speech is fluent.  Pt. s/p 2 failed S&S and family approached for PEG.    S/P EGD (07.30.21 @ 09:57) >  Impression:  - Failed PEG placement. The procedure was aborted due to poor endoscopic                        visualization and anatomy.                       - Normal esophagus.                       - Erythematous mucosa in the antrum.                       - Normal examined duodenum.                       - An endoscopically removable PEG placement was attempted but could not be                        successfully completed. The needle/trocar was not passed.                       - No specimens collected.    S/P Failed PEG placement  Pt. s/p GTube placement by IR   S/P one episode of vomiting which resolved.   Currently tolerating feeds.  S/P tachypnea  which is cardiac related and her severe LV Dysfn.  Please keep HOB elevated at 45 degrees.  Aspiration precautions.  Cont. Lovenox for AC  Pt. with resolving leukocytosis and most likely reactive  S/P IV Abx. X 5 days  May cont. ASA  Cont. GTube feeding at 50 CC/hr and consider changing to Bolus feeds to help with PT.   would HOLD OFF on any free water temporarily for now as pt. is still fluid overloaded     Cont. Protonix IV for now till feeding is switched to Bolus feeds.  May change to Nexium Elixir 40 mg via GTube QD 1/2 hr. before feeding in AM.  Nutritionist f/u  Thank you.

## 2021-08-07 RX ORDER — ACETAMINOPHEN 500 MG
1000 TABLET ORAL ONCE
Refills: 0 | Status: COMPLETED | OUTPATIENT
Start: 2021-08-07 | End: 2021-08-07

## 2021-08-07 RX ORDER — OLANZAPINE 15 MG/1
2.5 TABLET, FILM COATED ORAL ONCE
Refills: 0 | Status: COMPLETED | OUTPATIENT
Start: 2021-08-07 | End: 2021-08-07

## 2021-08-07 RX ADMIN — SENNA PLUS 1 TABLET(S): 8.6 TABLET ORAL at 12:29

## 2021-08-07 RX ADMIN — Medication 650 MILLIGRAM(S): at 07:36

## 2021-08-07 RX ADMIN — Medication 3 MILLIGRAM(S): at 21:27

## 2021-08-07 RX ADMIN — Medication 3 MILLILITER(S): at 17:33

## 2021-08-07 RX ADMIN — Medication 650 MILLIGRAM(S): at 22:20

## 2021-08-07 RX ADMIN — OLANZAPINE 2.5 MILLIGRAM(S): 15 TABLET, FILM COATED ORAL at 18:24

## 2021-08-07 RX ADMIN — OLANZAPINE 2.5 MILLIGRAM(S): 15 TABLET, FILM COATED ORAL at 05:16

## 2021-08-07 RX ADMIN — Medication 81 MILLIGRAM(S): at 12:29

## 2021-08-07 RX ADMIN — Medication 400 MILLIGRAM(S): at 12:30

## 2021-08-07 RX ADMIN — PANTOPRAZOLE SODIUM 40 MILLIGRAM(S): 20 TABLET, DELAYED RELEASE ORAL at 12:29

## 2021-08-07 RX ADMIN — ENOXAPARIN SODIUM 40 MILLIGRAM(S): 100 INJECTION SUBCUTANEOUS at 17:32

## 2021-08-07 RX ADMIN — Medication 650 MILLIGRAM(S): at 08:06

## 2021-08-07 RX ADMIN — ATENOLOL 25 MILLIGRAM(S): 25 TABLET ORAL at 05:16

## 2021-08-07 RX ADMIN — Medication 0.5 MILLIGRAM(S): at 05:17

## 2021-08-07 RX ADMIN — Medication 40 MILLIGRAM(S): at 05:15

## 2021-08-07 RX ADMIN — POLYETHYLENE GLYCOL 3350 17 GRAM(S): 17 POWDER, FOR SOLUTION ORAL at 12:29

## 2021-08-07 RX ADMIN — Medication 0.5 MILLIGRAM(S): at 17:33

## 2021-08-07 RX ADMIN — Medication 650 MILLIGRAM(S): at 21:28

## 2021-08-07 RX ADMIN — Medication 3 MILLILITER(S): at 05:15

## 2021-08-07 RX ADMIN — GABAPENTIN 100 MILLIGRAM(S): 400 CAPSULE ORAL at 21:27

## 2021-08-07 RX ADMIN — Medication 1 TABLET(S): at 12:29

## 2021-08-07 RX ADMIN — ATORVASTATIN CALCIUM 80 MILLIGRAM(S): 80 TABLET, FILM COATED ORAL at 21:27

## 2021-08-07 RX ADMIN — Medication 3 MILLILITER(S): at 23:26

## 2021-08-07 RX ADMIN — Medication 3 MILLILITER(S): at 12:30

## 2021-08-07 RX ADMIN — Medication 1000 MILLIGRAM(S): at 13:00

## 2021-08-07 NOTE — PROGRESS NOTE ADULT - ATTENDING COMMENTS
89F with left M1 occlusion s/p unsuccessful attempted mechanical thrombectomy. Severe tortuosity prevented access to the left ICA intracranially. Will not offer hemicraniectomy given advanced age.
Patient seen and examined with the resident, PA, fellow and RN. Agree with the exam assessment and plan as above.
Agree with above.
noted to have ?stridor vs audible wheezing.  Normal laryngoscopy, both cords mobile, mild reflux.  No stridor heard on our exam, c/w current care (already on PPI)
Jun Hart MD  Vascular Neurology

## 2021-08-07 NOTE — PROGRESS NOTE ADULT - SUBJECTIVE AND OBJECTIVE BOX
Subjective: Patient seen and examined. No new events except as noted.     REVIEW OF SYSTEMS:  Unable to obtain       MEDICATIONS:  MEDICATIONS  (STANDING):  albuterol/ipratropium for Nebulization 3 milliLiter(s) Nebulizer every 6 hours  aspirin  chewable 81 milliGRAM(s) Oral daily  ATENolol  Tablet 25 milliGRAM(s) Oral daily  atorvastatin 80 milliGRAM(s) Oral at bedtime  buDESOnide    Inhalation Suspension 0.5 milliGRAM(s) Inhalation every 12 hours  enoxaparin Injectable 40 milliGRAM(s) SubCutaneous <User Schedule>  furosemide Solution 40 milliGRAM(s) Enteral Tube daily  gabapentin   Solution 100 milliGRAM(s) Enteral Tube at bedtime  glucagon  Injectable 1 milliGRAM(s) IntraMuscular once  melatonin 3 milliGRAM(s) Oral at bedtime  multivitamin 1 Tablet(s) Oral daily  pantoprazole  Injectable 40 milliGRAM(s) IV Push daily  polyethylene glycol 3350 17 Gram(s) Oral daily  senna 1 Tablet(s) Oral daily      PHYSICAL EXAM:  T(C): 36.4 (08-07-21 @ 20:40), Max: 36.9 (08-07-21 @ 12:07)  HR: 74 (08-07-21 @ 20:40) (68 - 83)  BP: 141/74 (08-07-21 @ 20:40) (125/52 - 148/81)  RR: 18 (08-07-21 @ 20:40) (18 - 18)  SpO2: 98% (08-07-21 @ 20:40) (94% - 98%)  Wt(kg): --  I&O's Summary    06 Aug 2021 07:01  -  07 Aug 2021 07:00  --------------------------------------------------------  IN: 600 mL / OUT: 1150 mL / NET: -550 mL            Appearance: NAD	  HEENT:   Dry  oral mucosa, PERRL, EOMI	  Lymphatic: No lymphadenopathy  Cardiovascular: irregular S1 S2, No JVD, No murmurs, No edema  Respiratory: Decreased bs  Psychiatry: A & O x 3, sleepy   Gastrointestinal:  Soft, Non-tender, + BS	  Skin: No rashes, No ecchymoses, No cyanosis	  Extremities: Normal range of motion, No clubbing, cyanosis or edema  Vascular: Peripheral pulses palpable 2+ bilaterally  NEUROLOGICAL EXAM:  Mental status: Awake, alert, does gesture facial expressions relatively in context to situation (smiles appropriately), not following commands. Can mimic physical actions. No verbal output   Cranial Nerves: R facial droop, no nystagmus, tongue midline. Blink to threat b/l.   Motor exam: right hemiplegia with trace flexion in arm and triple flexion in leg, left side moves well against gravity but inattentive and with drift   Sensation: decreased on right   Coordination/ Gait: gait not assessed         LABS:    CARDIAC MARKERS:                  proBNP:   Lipid Profile:   HgA1c:   TSH:             TELEMETRY: 	    ECG:  	  RADIOLOGY:   DIAGNOSTIC TESTING:  [ ] Echocardiogram:  [ ]  Catheterization:  [ ] Stress Test:    OTHER:

## 2021-08-07 NOTE — PROGRESS NOTE ADULT - ASSESSMENT
89 y/o F with PMH afib not on AC, PPM, HTN, ovarian and colon ca, GERD presents to the ED as a transfer from [facility] for stroke. Per son, pt woke up at 3:30am and went to the bathroom, no issues with gait or speech at that time, accompanied by son. At 7:30 am this morning, pt's son found her half off the bed and unable to speak or walk. Pt was found to have L M1 occlusion on CTA, CTP w/ no core infarct and penumbra 81cc, and transferred for IR thrombectomy. Exam at [facility] notable for severe dysarthria and R hemiparesis. Pt did not receive tpa. NIHSS initially 16 on arrival to [facility] ED, NIHSS 8 on repeat exam. At baseline, son reports that pt is able to do own ADLs (showers, reads) but has an aid to make sure she does not fall, walks with a walker without assistance, speech is fluent.  Pt. s/p 2 failed S&S and family approached for PEG.    S/P EGD (07.30.21 @ 09:57) >  Impression:  - Failed PEG placement. The procedure was aborted due to poor endoscopic                        visualization and anatomy.                       - Normal esophagus.                       - Erythematous mucosa in the antrum.                       - Normal examined duodenum.                       - An endoscopically removable PEG placement was attempted but could not be                        successfully completed. The needle/trocar was not passed.                       - No specimens collected.    S/P Failed PEG placement  Pt. s/p GTube placement by IR   S/P one episode of vomiting which resolved.   Currently tolerating feeds.  S/P tachypnea  which is cardiac related and her severe LV Dysfn.  Please keep HOB elevated at 45 degrees.  Aspiration precautions.  Cont. Lovenox for AC  Pt. with resolving leukocytosis and most likely reactive  S/P IV Abx. X 5 days  May cont. ASA  Tolerating GTube feeding at 50 CC/hr.  Consider changing to Bolus feeds to help with PT.   would HOLD OFF on any free water temporarily for now as pt. is still fluid overloaded     Cont. Protonix IV for now till feeding is switched to Bolus feeds.  May change to Nexium Elixir 40 mg via GTube QD 1/2 hr. before feeding in AM.  Nutritionist f/u

## 2021-08-07 NOTE — PROGRESS NOTE ADULT - ASSESSMENT
89 y/o F with PMH afib not on AC, PPM, HTN, ovarian and colon ca, GERD presents to the ED as a transfer from [facility] for dysarthria and R hemiparesis. NIHSS 8 on repeat exam. R hemiparesis, R facial droop, aphasia (not fluent, not intact to repetition), and dysarthria. 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 left M1 occlusion s/p thrombectomy with unsuccessful recanalization     Impression: Left MCA infarction, Left M1 occlusion found on CTA likely secondary to cardioembolic etiology (hx of afib), post unsuccessful recanalization.     NEURO: Patient neurologically without acute change . Continue monitoring for neurologic deterioration.  Nsx noted severe tortuosity preventing access to left ICA intracranially. Hemicrani deferred due to advanced age. Goal for SBP of 100-180mmHg with overall normotension. Patient on statin for LDL goal less than 70. MRI Brain would not change medical management as pt has hx of afib and was not on AC. Head CT as noted above. Physical therapy and OT recommend CHARLES. Zaleplon restarted for insomnia.     ANTITHROMBOTIC THERAPY: ASA 81 PO. Consider beginning Eliquis 5mg BID in 2 weeks (8/8/2021) from onset after repeat stable head CT pending ongoing C discussion.     PULMONARY: CXR (08/01/21) Increased basilar congestion compared with prior study, protecting airway, saturating well on room air     CARDIOVASCULAR: TTE shows mild-moderate mitral regurgitation, mild aortic regurgitation, severe global LV systolic dysfunction, normal RV function, mild tricuspid regurgitation. Thickened pericardium with small pericardial effusion.  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 cardio made aware of TTE findings. Patient has PPM placed in 2019. EP interrogated and found multiple episodes of atrial fibrillation and flutter. Rate controlled at this time Dr Stone (Cardiology) follow up appreciated: acute systolic CHF- s/p lasix. BNP ~ 28,000. CT chest shows pulmonary edema. BNP (08/04/21) : 12,929                    SBP goal: 100-180 mmhg     GASTROINTESTINAL:  dysphagia screen- failed 7/26. Re eval, 7/27 failed. PEG placed by IR 7/30. Tolerating tube feedings.     Diet: PEG with TF    RENAL: BUN/Cr within normal limits on 8/5, good urine output,  will continue with Lasix 40mg daily as per Cardio recommendations     Na Goal: Greater than 135     Kessler: no     HEMATOLOGY: no active bleeding noted      DVT ppx: lovenox subq     ID: afebrile, ID following, finished zosyn 5/5 days. Will continue to monitor for s/sx of infection.      OTHER:   Palliative meeting held on 07/30/21 family all in agreement with PEG placement. Palliative re-eval on 8/5: after disucssion with family they reviewed that home dose of gabapentin that was restarted at 100 q8 could have caused increased lethargy and was lowered to 100mg QHS. Discussed tube feeding goals and will continue on current feedings. Family want to continue the current care plan for 72 hours and reevlauate on Monday. They recognize that the goal of rehab may not be an option based upon the clinical status of the patient.    DISPOSITION: depending on GOC and clinical course     CORE MEASURES:        Admission NIHSS: 16      TPA: [] YES [x] NO      LDL/HDL: 123/30     Depression Screen: unable to assess      Statin Therapy: yes     Dysphagia Screen: [] PASS [x] FAIL     Smoking [] YES [x] NO      Afib [x] YES [] NO     Stroke Education [x] YES [] NO    Obtain screening lower extremity venous ultrasound in patients who meet 1 or more of the following criteria as patient is high risk for DVT/PE on admission:   [] History of DVT/PE  []Hypercoagulable states (Factor V Leiden, Cancer, OCP, etc. )  []Prolonged immobility (hemiplegia/hemiparesis/post operative or any other extended immobilization)  [] Transferred from outside facility (Rehab or Long term care)  [] Age </= to 50   89 y/o F with PMH afib not on AC, PPM, HTN, ovarian and colon ca, GERD presents to the ED as a transfer from [facility] for dysarthria and R hemiparesis. NIHSS 8 on repeat exam. R hemiparesis, R facial droop, aphasia (not fluent, not intact to repetition), and dysarthria. 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 left M1 occlusion s/p thrombectomy with unsuccessful recanalization     Impression: Left MCA infarction, Left M1 occlusion found on CTA likely secondary to cardioembolic etiology (hx of afib), post unsuccessful recanalization.     NEURO: Patient neurologically without acute change. IV Tylenol for pain, Continue monitoring for neurologic deterioration.  Nsx noted severe tortuosity preventing access to left ICA intracranially. Hemicrani deferred due to advanced age. Goal for SBP of 100-180mmHg with overall normotension. Patient on statin for LDL goal less than 70. MRI Brain would not change medical management as pt has hx of afib and was not on AC. Head CT as noted above. Physical therapy and OT recommend CHARLES. Zaleplon restarted for insomnia.     ANTITHROMBOTIC THERAPY: ASA 81 PO. Consider beginning Eliquis 5mg BID in 2 weeks (8/8/2021) from onset after repeat stable head CT pending ongoing C discussion.     PULMONARY: CXR (08/01/21) Increased basilar congestion compared with prior study, protecting airway, saturating well on room air     CARDIOVASCULAR: TTE shows mild-moderate mitral regurgitation, mild aortic regurgitation, severe global LV systolic dysfunction, normal RV function, mild tricuspid regurgitation. Thickened pericardium with small pericardial effusion.  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 cardio made aware of TTE findings. Patient has PPM placed in 2019. EP interrogated and found multiple episodes of atrial fibrillation and flutter. Rate controlled at this time Dr Stone (Cardiology) follow up appreciated: acute systolic CHF- s/p lasix. BNP ~ 28,000. CT chest shows pulmonary edema. BNP (08/04/21) : 12,929                    SBP goal: 100-180 mmhg     GASTROINTESTINAL:  dysphagia screen- failed 7/26. Re eval, 7/27 failed. PEG placed by IR 7/30. Tolerating tube feedings.     Diet: PEG with TF    RENAL: BUN/Cr within normal limits on 8/5, good urine output,  will continue with Lasix 40mg daily as per Cardio recommendations     Na Goal: Greater than 135     Kessler: no     HEMATOLOGY: no active bleeding noted      DVT ppx: lovenox subq     ID: afebrile, ID following, finished zosyn 5/5 days. Will continue to monitor for s/sx of infection.      OTHER:   Palliative meeting held on 07/30/21 family all in agreement with PEG placement. Palliative re-eval on 8/5: after disucssion with family they reviewed that home dose of gabapentin that was restarted at 100 q8 could have caused increased lethargy and was lowered to 100mg QHS. Discussed tube feeding goals and will continue on current feedings. Family want to continue the current care plan for 72 hours and reevlauate on Monday. They recognize that the goal of rehab may not be an option based upon the clinical status of the patient.    DISPOSITION: depending on GOC and clinical course     CORE MEASURES:        Admission NIHSS: 16      TPA: [] YES [x] NO      LDL/HDL: 123/30     Depression Screen: unable to assess      Statin Therapy: yes     Dysphagia Screen: [] PASS [x] FAIL     Smoking [] YES [x] NO      Afib [x] YES [] NO     Stroke Education [x] YES [] NO    Obtain screening lower extremity venous ultrasound in patients who meet 1 or more of the following criteria as patient is high risk for DVT/PE on admission:   [] History of DVT/PE  []Hypercoagulable states (Factor V Leiden, Cancer, OCP, etc. )  []Prolonged immobility (hemiplegia/hemiparesis/post operative or any other extended immobilization)  [] Transferred from outside facility (Rehab or Long term care)  [] Age </= to 50

## 2021-08-07 NOTE — PROGRESS NOTE ADULT - ASSESSMENT
89 y/o F with PMH afib not on AC, PPM, HTN, ovarian and colon ca, GERD presents to the ED as a transfer from [facility] for dysarthria and R hemiparesis. NIHSS 8 on repeat exam. R hemiparesis, R facial droop, aphasia (not fluent, not intact to repetition), and dysarthria. 

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
## 2021-08-07 NOTE — PROGRESS NOTE ADULT - SUBJECTIVE AND OBJECTIVE BOX
THE PATIENT WAS SEEN AND EXAMINED BY ME WITH THE HOUSESTAFF AND STROKE TEAM DURING MORNING ROUNDS.   HPI:  89 y/o F with PMH afib not on AC, PPM, HTN, ovarian and colon ca, GERD presents to the ED as a transfer from [facility] for stroke. Per son, pt woke up at 3:30am on 7/25 and went to the bathroom, no issues with gait or speech at that time, accompanied by son. At 7:30 am that morning of admit , pt's son found her half off the bed and unable to speak or walk. Pt was found to have L M1 occlusion on CTA, CTP w/ no core infarct and penumbra 81cc, and transferred for IR thrombectomy. Exam at [facility] notable for severe dysarthria and R hemiparesis. Pt did not receive tpa. NIHSS initially 16 on arrival to [facility] ED, NIHSS 8 on repeat exam. At baseline, son reports that pt is able to do own ADLs (showers, reads) but has an aid to make sure she does not fall, walks with a walker without assistance    SUBJECTIVE: No events overnight.  No new neurologic complaints, ROS reported negative unless otherwise noted.      acetaminophen   Tablet .. 650 milliGRAM(s) Oral every 6 hours PRN  albuterol/ipratropium for Nebulization 3 milliLiter(s) Nebulizer every 6 hours  aspirin  chewable 81 milliGRAM(s) Oral daily  ATENolol  Tablet 25 milliGRAM(s) Oral daily  atorvastatin 80 milliGRAM(s) Oral at bedtime  buDESOnide    Inhalation Suspension 0.5 milliGRAM(s) Inhalation every 12 hours  enoxaparin Injectable 40 milliGRAM(s) SubCutaneous <User Schedule>  furosemide Solution 40 milliGRAM(s) Enteral Tube daily  gabapentin   Solution 100 milliGRAM(s) Enteral Tube at bedtime  glucagon  Injectable 1 milliGRAM(s) IntraMuscular once  melatonin 3 milliGRAM(s) Oral at bedtime  multivitamin 1 Tablet(s) Oral daily  OLANZapine 2.5 milliGRAM(s) Oral daily PRN  pantoprazole  Injectable 40 milliGRAM(s) IV Push daily  polyethylene glycol 3350 17 Gram(s) Oral daily  senna 1 Tablet(s) Oral daily  zaleplon 5 milliGRAM(s) Oral at bedtime PRN    PHYSICAL EXAM:   Vital Signs Last 24 Hrs  T(C): 36.4 (07 Aug 2021 04:33), Max: 36.8 (06 Aug 2021 15:31)  T(F): 97.5 (07 Aug 2021 04:33), Max: 98.3 (06 Aug 2021 15:31)  HR: 83 (07 Aug 2021 04:33) (67 - 83)  BP: 145/78 (07 Aug 2021 04:33) (116/73 - 145/78)  RR: 18 (07 Aug 2021 04:33) (18 - 18)  SpO2: 94% (07 Aug 2021 04:33) (94% - 97%)    General: No acute distress  HEENT: EOM intact, visual fields full  Abdomen: Soft, nontender, nondistended   Extremities: No edema    NEUROLOGICAL EXAM:  Mental status: Awake, alert, does gesture facial expressions relatively in context to situation (smiles appropriately). Not following commands, perseverates on one action. Can mimic physical actions. Generates sounds.  Cranial Nerves: R facial droop, no nystagmus, tongue midline. Blink to threat on right .   Motor exam: right hemiplegia with trace flexion in arm. RLE 3-4/5. Left side moves well against gravity but inattentive and with drift   Sensation: intact to noxious stimuli b/l.    Coordination/ Gait: gait not assessed    IMAGING: Reviewed by me.       CT chest (08/02/21) Small bilateral pleural effusions with adjacent areas of atelectasis.Cardiomegaly. Interlobular septal thickening is suggestive of pulmonary edema given the other findings.Pneumoperitoneum, likely from recent PEG tube placement.    CT Head No Cont (08.01.21) Continued evolution of left insular and posterior frontotemporal stroke. No areas of intraparenchymal hemorrhage.    CT Head No Cont (07.26.21) Acute left MCA territory infarct without evidence of hemorrhagic transformation which has progressed since 7/25/2021.    TTE with Doppler (w/Cont) (07.28.21) Severe global left ventricular systolic dysfunction. No left ventricular thrombus. 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      CT Head No Cont (08.01.21) Continued evolution of left insular and posterior frontotemporal stroke. No areas of intraparenchymal hemorrhage. THE PATIENT WAS SEEN AND EXAMINED BY ME WITH THE HOUSESTAFF AND STROKE TEAM DURING MORNING ROUNDS.   HPI:  89 y/o F with PMH afib not on AC, PPM, HTN, ovarian and colon ca, GERD presents to the ED as a transfer from [facility] for stroke. Per son, pt woke up at 3:30am on 7/25 and went to the bathroom, no issues with gait or speech at that time, accompanied by son. At 7:30 am that morning of admit , pt's son found her half off the bed and unable to speak or walk. Pt was found to have L M1 occlusion on CTA, CTP w/ no core infarct and penumbra 81cc, and transferred for IR thrombectomy. Exam at [facility] notable for severe dysarthria and R hemiparesis. Pt did not receive tpa. NIHSS initially 16 on arrival to [facility] ED, NIHSS 8 on repeat exam. At baseline, son reports that pt is able to do own ADLs (showers, reads) but has an aid to make sure she does not fall, walks with a walker without assistance    SUBJECTIVE: No events overnight.  No new neurologic complaints, ROS reported negative unless otherwise noted.      acetaminophen   Tablet .. 650 milliGRAM(s) Oral every 6 hours PRN  albuterol/ipratropium for Nebulization 3 milliLiter(s) Nebulizer every 6 hours  aspirin  chewable 81 milliGRAM(s) Oral daily  ATENolol  Tablet 25 milliGRAM(s) Oral daily  atorvastatin 80 milliGRAM(s) Oral at bedtime  buDESOnide    Inhalation Suspension 0.5 milliGRAM(s) Inhalation every 12 hours  enoxaparin Injectable 40 milliGRAM(s) SubCutaneous <User Schedule>  furosemide Solution 40 milliGRAM(s) Enteral Tube daily  gabapentin   Solution 100 milliGRAM(s) Enteral Tube at bedtime  glucagon  Injectable 1 milliGRAM(s) IntraMuscular once  melatonin 3 milliGRAM(s) Oral at bedtime  multivitamin 1 Tablet(s) Oral daily  OLANZapine 2.5 milliGRAM(s) Oral daily PRN  pantoprazole  Injectable 40 milliGRAM(s) IV Push daily  polyethylene glycol 3350 17 Gram(s) Oral daily  senna 1 Tablet(s) Oral daily  zaleplon 5 milliGRAM(s) Oral at bedtime PRN    PHYSICAL EXAM:   Vital Signs Last 24 Hrs  T(C): 36.4 (07 Aug 2021 04:33), Max: 36.8 (06 Aug 2021 15:31)  T(F): 97.5 (07 Aug 2021 04:33), Max: 98.3 (06 Aug 2021 15:31)  HR: 83 (07 Aug 2021 04:33) (67 - 83)  BP: 145/78 (07 Aug 2021 04:33) (116/73 - 145/78)  RR: 18 (07 Aug 2021 04:33) (18 - 18)  SpO2: 94% (07 Aug 2021 04:33) (94% - 97%)    General: No acute distress, groining in pain.  HEENT: EOM intact, visual fields full  Abdomen: Soft, nontender, nondistended   Extremities: No edema    NEUROLOGICAL EXAM:  Mental status: Awake, alert, does gesture facial expressions relatively in context to situation (smiles appropriately). Not following commands, perseverates on one action. Can mimic physical actions. Generates unintelligible sounds.  Cranial Nerves: R facial droop, no nystagmus, tongue midline. Blink to threat on right .   Motor exam: right hemiplegia with trace flexion in arm. RLE 3-4/5. Left side moves well against gravity but inattentive and with drift   Sensation: intact to noxious stimuli b/l.    Coordination/ Gait: gait not assessed    IMAGING: Reviewed by me.       CT chest (08/02/21) Small bilateral pleural effusions with adjacent areas of atelectasis.Cardiomegaly. Interlobular septal thickening is suggestive of pulmonary edema given the other findings.Pneumoperitoneum, likely from recent PEG tube placement.    CT Head No Cont (08.01.21) Continued evolution of left insular and posterior frontotemporal stroke. No areas of intraparenchymal hemorrhage.    CT Head No Cont (07.26.21) Acute left MCA territory infarct without evidence of hemorrhagic transformation which has progressed since 7/25/2021.    TTE with Doppler (w/Cont) (07.28.21) Severe global left ventricular systolic dysfunction. No left ventricular thrombus. 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      CT Head No Cont (08.01.21) Continued evolution of left insular and posterior frontotemporal stroke. No areas of intraparenchymal hemorrhage.

## 2021-08-07 NOTE — PROGRESS NOTE ADULT - SUBJECTIVE AND OBJECTIVE BOX
Chief Complaint:  Patient is a 89y old  Female who presents with a chief complaint of stroke transfer (07 Aug 2021 22:34)      Interval Events:   pt. tolerating GTube feeds, No N/V  Allergies:  No Known Allergies        Home Medications:  aspirin 81 mg oral delayed release tablet: 1 tab(s) orally once a day (27 Jan 2020 11:10)  atenolol 25 mg oral tablet: 1 tab(s) orally once a day (27 Jan 2020 11:10)  gabapentin 100 mg oral capsule: 1 cap(s) orally 3 times a day (27 Jan 2020 11:10)  hydrocodocone chlopheniramine: 5 milliliter(s) orally once a day (at bedtime) (24 Jan 2020 11:01)  Multiple Vitamins oral tablet: 1 tab(s) orally once a day (24 Jan 2020 11:01)  omeprazole 20 mg oral delayed release capsule: 1 cap(s) orally once a day (24 Jan 2020 11:01)  Vitamin B12: 5 milliliter(s) orally once a day (24 Jan 2020 11:01)  zolpidem 5 mg oral tablet: 0.5 tab(s) orally once a day (at bedtime) (24 Jan 2020 11:01)        Hospital Medications:  albuterol/ipratropium for Nebulization 3 milliLiter(s) Nebulizer every 6 hours  aspirin  chewable 81 milliGRAM(s) Oral daily  ATENolol  Tablet 25 milliGRAM(s) Oral daily  atorvastatin 80 milliGRAM(s) Oral at bedtime  buDESOnide    Inhalation Suspension 0.5 milliGRAM(s) Inhalation every 12 hours  enoxaparin Injectable 40 milliGRAM(s) SubCutaneous <User Schedule>  furosemide Solution 40 milliGRAM(s) Enteral Tube daily  gabapentin   Solution 100 milliGRAM(s) Enteral Tube at bedtime  glucagon  Injectable 1 milliGRAM(s) IntraMuscular once  melatonin 3 milliGRAM(s) Oral at bedtime  multivitamin 1 Tablet(s) Oral daily  pantoprazole  Injectable 40 milliGRAM(s) IV Push daily  polyethylene glycol 3350 17 Gram(s) Oral daily  senna 1 Tablet(s) Oral daily    MEDICATIONS  (PRN):  acetaminophen   Tablet .. 650 milliGRAM(s) Oral every 6 hours PRN Temp greater or equal to 38C (100.4F), Mild Pain (1 - 3), Moderate Pain (4 - 6), Severe Pain (7 - 10)  OLANZapine 2.5 milliGRAM(s) Oral daily PRN anxiety  zaleplon 5 milliGRAM(s) Oral at bedtime PRN Insomnia    ___________  Active diet:  Diet, NPO with Tube Feed:   Tube Feeding Modality: Gastrostomy  Jevity 1.2 Wei (JEVITY1.2RTH)  Total Volume for 24 Hours (mL): 800  Intermittent  Starting Tube Feed Rate mL per Hour: 10  Increase Tube Feed Rate by (mL): 5    Every 6 hours  Until Goal Tube Feed Rate (mL per Hour): 50  Tube Feeding Hours ON: 16  Tube Feeding OFF (Hours): 8  Tube Feed Start Time: 06:00  ___________________        ROS  GI: [- ] Nausea, [- ] vomiting, [ -]abdominal pain    PHYSICAL EXAM:   Vital Signs:  Vital Signs Last 24 Hrs  T(C): 36.4 (07 Aug 2021 20:40), Max: 36.9 (07 Aug 2021 12:07)  T(F): 97.6 (07 Aug 2021 20:40), Max: 98.5 (07 Aug 2021 12:07)  HR: 74 (07 Aug 2021 20:40) (68 - 83)  BP: 141/74 (07 Aug 2021 20:40) (125/52 - 148/81)  BP(mean): --  RR: 18 (07 Aug 2021 20:40) (18 - 18)  SpO2: 98% (07 Aug 2021 20:40) (94% - 98%)  Daily     Daily     GENERAL:  Appears stated age, well-groomed, well-nourished, no distress  HEENT:  NC/AT,  conjunctivae clear and pink, no thyromegaly, nodules, adenopathy, no JVD, sclera -anicteric  NECK: Supple, No masses  CHEST:  Full & symmetric excursion, no increased effort, breath sounds clear  HEART:  Regular rhythm, S1, S2, no murmur/rub/S3/S4, no abdominal bruit, no edema  ABDOMEN:  Soft, GTube C/D, non-tender, non-distended, normoactive bowel sounds,  no masses ,no hepato-splenomegaly, no signs of chronic liver disease  EXTEREMITIES:  no cyanosis,clubbing or edema  SKIN:  No rash/erythema/ecchymoses/petechiae/wounds/abscess/warm/dry  LN: No lymphadenopathy, No masses  NEURO:  Alert, oriented, no asterixis, no tremor, no encephalopathy    LABS:                    Imaging:

## 2021-08-08 LAB — SARS-COV-2 RNA SPEC QL NAA+PROBE: SIGNIFICANT CHANGE UP

## 2021-08-08 RX ADMIN — Medication 1 TABLET(S): at 11:44

## 2021-08-08 RX ADMIN — SENNA PLUS 1 TABLET(S): 8.6 TABLET ORAL at 17:39

## 2021-08-08 RX ADMIN — Medication 3 MILLILITER(S): at 23:17

## 2021-08-08 RX ADMIN — ENOXAPARIN SODIUM 40 MILLIGRAM(S): 100 INJECTION SUBCUTANEOUS at 17:41

## 2021-08-08 RX ADMIN — ATENOLOL 25 MILLIGRAM(S): 25 TABLET ORAL at 06:01

## 2021-08-08 RX ADMIN — Medication 3 MILLILITER(S): at 11:44

## 2021-08-08 RX ADMIN — Medication 0.5 MILLIGRAM(S): at 17:40

## 2021-08-08 RX ADMIN — ATORVASTATIN CALCIUM 80 MILLIGRAM(S): 80 TABLET, FILM COATED ORAL at 21:29

## 2021-08-08 RX ADMIN — Medication 3 MILLILITER(S): at 17:40

## 2021-08-08 RX ADMIN — GABAPENTIN 100 MILLIGRAM(S): 400 CAPSULE ORAL at 21:29

## 2021-08-08 RX ADMIN — POLYETHYLENE GLYCOL 3350 17 GRAM(S): 17 POWDER, FOR SOLUTION ORAL at 11:43

## 2021-08-08 RX ADMIN — PANTOPRAZOLE SODIUM 40 MILLIGRAM(S): 20 TABLET, DELAYED RELEASE ORAL at 11:44

## 2021-08-08 RX ADMIN — Medication 0.5 MILLIGRAM(S): at 06:02

## 2021-08-08 RX ADMIN — Medication 81 MILLIGRAM(S): at 11:43

## 2021-08-08 RX ADMIN — Medication 3 MILLIGRAM(S): at 21:29

## 2021-08-08 RX ADMIN — Medication 40 MILLIGRAM(S): at 06:01

## 2021-08-08 RX ADMIN — Medication 3 MILLILITER(S): at 06:02

## 2021-08-08 NOTE — PROGRESS NOTE ADULT - SUBJECTIVE AND OBJECTIVE BOX
Chief Complaint:  Patient is a 89y old  Female who presents with a chief complaint of stroke transfer (08 Aug 2021 10:58)      Interval Events:   pt. tolerating feeds well  Allergies:  No Known Allergies        Home Medications:  aspirin 81 mg oral delayed release tablet: 1 tab(s) orally once a day (27 Jan 2020 11:10)  atenolol 25 mg oral tablet: 1 tab(s) orally once a day (27 Jan 2020 11:10)  gabapentin 100 mg oral capsule: 1 cap(s) orally 3 times a day (27 Jan 2020 11:10)  hydrocodocone chlopheniramine: 5 milliliter(s) orally once a day (at bedtime) (24 Jan 2020 11:01)  Multiple Vitamins oral tablet: 1 tab(s) orally once a day (24 Jan 2020 11:01)  omeprazole 20 mg oral delayed release capsule: 1 cap(s) orally once a day (24 Jan 2020 11:01)  Vitamin B12: 5 milliliter(s) orally once a day (24 Jan 2020 11:01)  zolpidem 5 mg oral tablet: 0.5 tab(s) orally once a day (at bedtime) (24 Jan 2020 11:01)        Hospital Medications:  albuterol/ipratropium for Nebulization 3 milliLiter(s) Nebulizer every 6 hours  aspirin  chewable 81 milliGRAM(s) Oral daily  ATENolol  Tablet 25 milliGRAM(s) Oral daily  atorvastatin 80 milliGRAM(s) Oral at bedtime  buDESOnide    Inhalation Suspension 0.5 milliGRAM(s) Inhalation every 12 hours  enoxaparin Injectable 40 milliGRAM(s) SubCutaneous <User Schedule>  furosemide Solution 40 milliGRAM(s) Enteral Tube daily  gabapentin   Solution 100 milliGRAM(s) Enteral Tube at bedtime  glucagon  Injectable 1 milliGRAM(s) IntraMuscular once  melatonin 3 milliGRAM(s) Oral at bedtime  multivitamin 1 Tablet(s) Oral daily  pantoprazole  Injectable 40 milliGRAM(s) IV Push daily  polyethylene glycol 3350 17 Gram(s) Oral daily  senna 1 Tablet(s) Oral daily    MEDICATIONS  (PRN):  acetaminophen   Tablet .. 650 milliGRAM(s) Oral every 6 hours PRN Temp greater or equal to 38C (100.4F), Mild Pain (1 - 3), Moderate Pain (4 - 6), Severe Pain (7 - 10)  OLANZapine 2.5 milliGRAM(s) Oral daily PRN anxiety  zaleplon 5 milliGRAM(s) Oral at bedtime PRN Insomnia    ___________  Active diet:  Diet, NPO with Tube Feed:   Tube Feeding Modality: Gastrostomy  Jevity 1.2 Wei (JEVITY1.2RTH)  Total Volume for 24 Hours (mL): 800  Intermittent  Starting Tube Feed Rate mL per Hour: 10  Increase Tube Feed Rate by (mL): 5    Every 6 hours  Until Goal Tube Feed Rate (mL per Hour): 50  Tube Feeding Hours ON: 16  Tube Feeding OFF (Hours): 8  Tube Feed Start Time: 06:00  ___________________        ROS  GI: [- ] Nausea, [- ] vomiting, [ -]abdominal pain    PHYSICAL EXAM:   Vital Signs:  Vital Signs Last 24 Hrs  T(C): 36.6 (08 Aug 2021 19:38), Max: 36.9 (08 Aug 2021 00:00)  T(F): 97.8 (08 Aug 2021 19:38), Max: 98.5 (08 Aug 2021 00:00)  HR: 71 (08 Aug 2021 19:38) (70 - 76)  BP: 124/65 (08 Aug 2021 19:38) (124/65 - 133/75)  BP(mean): --  RR: 18 (08 Aug 2021 19:38) (18 - 22)  SpO2: 98% (08 Aug 2021 19:38) (98% - 100%)  Daily     Daily     GENERAL:  Appears stated age, well-groomed, well-nourished, no distress  HEENT:  NC/AT,  conjunctivae clear and pink, no thyromegaly, nodules, adenopathy, no JVD, sclera -anicteric  NECK: Supple, No masses  CHEST:  Full & symmetric excursion, no increased effort, breath sounds clear  HEART:  Regular rhythm, S1, S2, no murmur/rub/S3/S4, no abdominal bruit, no edema  ABDOMEN:  Soft, GTube C/D, non-tender, non-distended, normoactive bowel sounds,  no masses ,no hepato-splenomegaly, no signs of chronic liver disease  EXTEREMITIES:  no cyanosis,clubbing or edema  SKIN:  No rash/erythema/ecchymoses/petechiae/wounds/abscess/warm/dry  LN: No lymphadenopathy, No masses  NEURO:  Alert, oriented, no asterixis, no tremor, no encephalopathy    LABS:                    Imaging:

## 2021-08-08 NOTE — PROGRESS NOTE ADULT - ASSESSMENT
89 y/o F with PMH afib not on AC, PPM, HTN, ovarian and colon ca, GERD presents to the ED as a transfer from [facility] for dysarthria and R hemiparesis. NIHSS 8 on repeat exam. R hemiparesis, R facial droop, aphasia (not fluent, not intact to repetition), and dysarthria. 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 left M1 occlusion s/p thrombectomy with unsuccessful recanalization     Impression: Left MCA infarction, Left M1 occlusion found on CTA likely secondary to cardioembolic etiology (hx of afib), post unsuccessful recanalization.     NEURO: Patient neurologically without acute change. IV Tylenol for pain, Continue monitoring for neurologic deterioration.  Nsx noted severe tortuosity preventing access to left ICA intracranially. Hemicrani deferred due to advanced age. Goal for SBP of 100-180mmHg with overall normotension. Patient on statin for LDL goal less than 70. MRI Brain would not change medical management as pt has hx of afib and was not on AC. Head CT as noted above. Physical therapy and OT recommend HCARLES. Zaleplon restarted for insomnia.     ANTITHROMBOTIC THERAPY: ASA 81 PO. Consider beginning Eliquis 5mg BID in 2 weeks (8/8/2021) from onset after repeat stable head CT pending ongoing C discussion.     PULMONARY: CXR (08/01/21) Increased basilar congestion compared with prior study, protecting airway, saturating well on room air     CARDIOVASCULAR: TTE shows mild-moderate mitral regurgitation, mild aortic regurgitation, severe global LV systolic dysfunction, normal RV function, mild tricuspid regurgitation. Thickened pericardium with small pericardial effusion.  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 cardio made aware of TTE findings. Patient has PPM placed in 2019. EP interrogated and found multiple episodes of atrial fibrillation and flutter. Rate controlled at this time Dr Stone (Cardiology) follow up appreciated: acute systolic CHF- s/p lasix. BNP ~ 28,000. CT chest shows pulmonary edema. BNP (08/04/21) : 12,929                    SBP goal: 100-180 mmhg     GASTROINTESTINAL:  dysphagia screen- failed 7/26. Re eval, 7/27 failed. PEG placed by IR 7/30. Tolerating tube feedings.     Diet: PEG with TF    RENAL: BUN/Cr within normal limits on 8/5, good urine output,  will continue with Lasix 40mg daily as per Cardio recommendations     Na Goal: Greater than 135     Kessler: no     HEMATOLOGY: no active bleeding noted      DVT ppx: lovenox subq     ID: afebrile, ID following, finished zosyn 5/5 days. Will continue to monitor for s/sx of infection.      OTHER:   Palliative meeting held on 07/30/21 family all in agreement with PEG placement. Palliative re-eval on 8/5: after disucssion with family they reviewed that home dose of gabapentin that was restarted at 100 q8 could have caused increased lethargy and was lowered to 100mg QHS. Discussed tube feeding goals and will continue on current feedings. Family want to continue the current care plan for 72 hours and reevlauate on Monday. They recognize that the goal of rehab may not be an option based upon the clinical status of the patient.    DISPOSITION: depending on GOC and clinical course     CORE MEASURES:        Admission NIHSS: 16      TPA: [] YES [x] NO      LDL/HDL: 123/30     Depression Screen: unable to assess      Statin Therapy: yes     Dysphagia Screen: [] PASS [x] FAIL     Smoking [] YES [x] NO      Afib [x] YES [] NO     Stroke Education [x] YES [] NO    Obtain screening lower extremity venous ultrasound in patients who meet 1 or more of the following criteria as patient is high risk for DVT/PE on admission:   [] History of DVT/PE  []Hypercoagulable states (Factor V Leiden, Cancer, OCP, etc. )  []Prolonged immobility (hemiplegia/hemiparesis/post operative or any other extended immobilization)  [] Transferred from outside facility (Rehab or Long term care)  [] Age </= to 50

## 2021-08-08 NOTE — PROGRESS NOTE ADULT - ASSESSMENT
Patient is a 89 y/o Female with PMH afib not on AC, PPM, HTN, ovarian and colon ca, GERD presents to the ED as a transfer from [facility] for stroke.     Problem/Recommendation - 1:  Problem: Stroke. Recommendation: Care as per neurology   Angio noted   ASA/ Statin   protonix  BP control  speech and swallow   GI eval for PEG placement appreicated   palliative eval     worsening leukocytosis  mild low grade T  procal level  UA Noted,   completed zosyn , 5 days course per ID   CXR  high risk of aspiration  may need CT chest   Pan CT  ID eval called for further recs appreicated  pulm eval appreciated     Problem/Recommendation - 2:  ·  Problem: Atrial fibrillation.  Recommendation: rate control  Atenolol 25 oral daily.     Problem/Recommendation - 3:  ·  Problem: Hypertension.  Recommendation: resuem BP meds  monitor and adjust as tolerated.     Problem/Recommendation - 4:  ·  Problem: Colon cancer.     Problem/Recommendation - 5:  ·  Problem: GERD (gastroesophageal reflux disease).  Recommendation: PPI.     Problem/Recommendation - 6:  Problem: Prophylactic measure. Recommendation: DVT and gI PPX.

## 2021-08-08 NOTE — PROGRESS NOTE ADULT - SUBJECTIVE AND OBJECTIVE BOX
THE PATIENT WAS SEEN AND EXAMINED BY ME WITH THE HOUSESTAFF AND STROKE TEAM DURING MORNING ROUNDS.   HPI:  89 y/o F with PMH afib not on AC, PPM, HTN, ovarian and colon ca, GERD presents to the ED as a transfer from [facility] for stroke. Per son, pt woke up at 3:30am on 7/25 and went to the bathroom, no issues with gait or speech at that time, accompanied by son. At 7:30 am that morning of admit , pt's son found her half off the bed and unable to speak or walk. Pt was found to have L M1 occlusion on CTA, CTP w/ no core infarct and penumbra 81cc, and transferred for IR thrombectomy. Exam at [facility] notable for severe dysarthria and R hemiparesis. Pt did not receive tpa. NIHSS initially 16 on arrival to [facility] ED, NIHSS 8 on repeat exam. At baseline, son reports that pt is able to do own ADLs (showers, reads) but has an aid to make sure she does not fall, walks with a walker without assistance    SUBJECTIVE: No events overnight.  No new neurologic complaints, ROS reported negative unless otherwise noted.      MEDICATIONS  (STANDING):  albuterol/ipratropium for Nebulization 3 milliLiter(s) Nebulizer every 6 hours  aspirin  chewable 81 milliGRAM(s) Oral daily  ATENolol  Tablet 25 milliGRAM(s) Oral daily  atorvastatin 80 milliGRAM(s) Oral at bedtime  buDESOnide    Inhalation Suspension 0.5 milliGRAM(s) Inhalation every 12 hours  enoxaparin Injectable 40 milliGRAM(s) SubCutaneous <User Schedule>  furosemide Solution 40 milliGRAM(s) Enteral Tube daily  gabapentin   Solution 100 milliGRAM(s) Enteral Tube at bedtime  glucagon  Injectable 1 milliGRAM(s) IntraMuscular once  melatonin 3 milliGRAM(s) Oral at bedtime  multivitamin 1 Tablet(s) Oral daily  pantoprazole  Injectable 40 milliGRAM(s) IV Push daily  polyethylene glycol 3350 17 Gram(s) Oral daily  senna 1 Tablet(s) Oral daily    MEDICATIONS  (PRN):  acetaminophen   Tablet .. 650 milliGRAM(s) Oral every 6 hours PRN Temp greater or equal to 38C (100.4F), Mild Pain (1 - 3), Moderate Pain (4 - 6), Severe Pain (7 - 10)  OLANZapine 2.5 milliGRAM(s) Oral daily PRN anxiety  zaleplon 5 milliGRAM(s) Oral at bedtime PRN Insomnia      PHYSICAL EXAM:   Vital Signs Last 24 Hrs  T(C): 36.5 (08 Aug 2021 07:24), Max: 36.9 (07 Aug 2021 12:07)  T(F): 97.7 (08 Aug 2021 07:24), Max: 98.5 (07 Aug 2021 12:07)  HR: 70 (08 Aug 2021 07:24) (68 - 76)  BP: 133/75 (08 Aug 2021 07:24) (132/63 - 148/81)  RR: 20 (08 Aug 2021 07:24) (18 - 20)  SpO2: 100% (08 Aug 2021 07:24) (97% - 100%)    General: No acute distress, groining in pain.  HEENT: EOM intact, visual fields full  Abdomen: Soft, nontender, nondistended   Extremities: No edema    NEUROLOGICAL EXAM:  Mental status: Awake, alert, does gesture facial expressions relatively in context to situation (smiles appropriately). Not following commands, perseverates on one action. Can mimic physical actions. Generates unintelligible sounds.  Cranial Nerves: R facial droop, no nystagmus, tongue midline. Blink to threat on right .   Motor exam: right hemiplegia with trace flexion in arm. RLE 3-4/5. Left side moves well against gravity but inattentive and with drift   Sensation: intact to noxious stimuli b/l.    Coordination/ Gait: gait not assessed    IMAGING: Reviewed by me.   CT chest (08/02/21) Small bilateral pleural effusions with adjacent areas of atelectasis.Cardiomegaly. Interlobular septal thickening is suggestive of pulmonary edema given the other findings.Pneumoperitoneum, likely from recent PEG tube placement.    CT Head No Cont (08.01.21) Continued evolution of left insular and posterior frontotemporal stroke. No areas of intraparenchymal hemorrhage.    CT Head No Cont (07.26.21) Acute left MCA territory infarct without evidence of hemorrhagic transformation which has progressed since 7/25/2021.    TTE with Doppler (w/Cont) (07.28.21) Severe global left ventricular systolic dysfunction. No left ventricular thrombus. 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      CT Head No Cont (08.01.21) Continued evolution of left insular and posterior frontotemporal stroke. No areas of intraparenchymal hemorrhage.

## 2021-08-08 NOTE — PROGRESS NOTE ADULT - SUBJECTIVE AND OBJECTIVE BOX
Name of Patient : SHELBY LESTER  MRN: [id number]  DATE OF SERVICE: 08-08-21     Subjective: Patient seen and examined. No new events except as noted.       MEDICATIONS:  MEDICATIONS  (STANDING):  albuterol/ipratropium for Nebulization 3 milliLiter(s) Nebulizer every 6 hours  aspirin  chewable 81 milliGRAM(s) Oral daily  ATENolol  Tablet 25 milliGRAM(s) Oral daily  atorvastatin 80 milliGRAM(s) Oral at bedtime  buDESOnide    Inhalation Suspension 0.5 milliGRAM(s) Inhalation every 12 hours  enoxaparin Injectable 40 milliGRAM(s) SubCutaneous <User Schedule>  furosemide Solution 40 milliGRAM(s) Enteral Tube daily  gabapentin   Solution 100 milliGRAM(s) Enteral Tube at bedtime  glucagon  Injectable 1 milliGRAM(s) IntraMuscular once  melatonin 3 milliGRAM(s) Oral at bedtime  multivitamin 1 Tablet(s) Oral daily  pantoprazole  Injectable 40 milliGRAM(s) IV Push daily  polyethylene glycol 3350 17 Gram(s) Oral daily  senna 1 Tablet(s) Oral daily      PHYSICAL EXAM:  T(C): 36.6 (08-08-21 @ 19:38), Max: 36.9 (08-08-21 @ 00:00)  HR: 71 (08-08-21 @ 19:38) (70 - 76)  BP: 124/65 (08-08-21 @ 19:38) (124/65 - 133/75)  RR: 18 (08-08-21 @ 19:38) (18 - 22)  SpO2: 98% (08-08-21 @ 19:38) (98% - 100%)  Wt(kg): --  I&O's Summary    07 Aug 2021 07:01  -  08 Aug 2021 07:00  --------------------------------------------------------  IN: 650 mL / OUT: 550 mL / NET: 100 mL    08 Aug 2021 07:01  -  08 Aug 2021 22:48  --------------------------------------------------------  IN: 0 mL / OUT: 900 mL / NET: -900 mL          Appearance: AWAKE  HEENT:  PERRLA   Lymphatic: No lymphadenopathy   Cardiovascular: Normal S1 S2, no JVD  Respiratory: normal effort , clear  Gastrointestinal:  Soft, Non-tender  Skin: No rashes,  warm to touch  Psychiatry:  Mood & affect appropriate  Musculuskeletal: No edema      All labs, Imaging and EKGs personally reviewed       08-07-21 @ 07:01  -  08-08-21 @ 07:00  --------------------------------------------------------  IN: 650 mL / OUT: 550 mL / NET: 100 mL    08-08-21 @ 07:01  -  08-08-21 @ 22:48  --------------------------------------------------------  IN: 0 mL / OUT: 900 mL / NET: -900 mL

## 2021-08-08 NOTE — PROGRESS NOTE ADULT - ASSESSMENT
89 y/o F with PMH afib not on AC, PPM, HTN, ovarian and colon ca, GERD presents to the ED as a transfer from [facility] for stroke. Per son, pt woke up at 3:30am and went to the bathroom, no issues with gait or speech at that time, accompanied by son. At 7:30 am this morning, pt's son found her half off the bed and unable to speak or walk. Pt was found to have L M1 occlusion on CTA, CTP w/ no core infarct and penumbra 81cc, and transferred for IR thrombectomy. Exam at [facility] notable for severe dysarthria and R hemiparesis. Pt did not receive tpa. NIHSS initially 16 on arrival to [facility] ED, NIHSS 8 on repeat exam. At baseline, son reports that pt is able to do own ADLs (showers, reads) but has an aid to make sure she does not fall, walks with a walker without assistance, speech is fluent.  Pt. s/p 2 failed S&S and family approached for PEG.    S/P EGD (07.30.21 @ 09:57) >  Impression:  - Failed PEG placement. The procedure was aborted due to poor endoscopic                        visualization and anatomy.                       - Normal esophagus.                       - Erythematous mucosa in the antrum.                       - Normal examined duodenum.                       - An endoscopically removable PEG placement was attempted but could not be                        successfully completed. The needle/trocar was not passed.                       - No specimens collected.    S/P Failed PEG placement  Pt. s/p GTube placement by IR   S/P one episode of vomiting which resolved.   Currently tolerating feeds.  S/P tachypnea  which is cardiac related and her severe LV Dysfn.  Please keep HOB elevated at 45 degrees.  Aspiration precautions.  Cont. Lovenox for AC  Pt. with resolving leukocytosis and most likely reactive  S/P IV Abx. X 5 days  May cont. ASA  Tolerating GTube feeding at 50 CC/hr.  Would Consider changing to Bolus feeds to help with PT.   would HOLD OFF on any free water temporarily for now as pt. is still fluid overloaded     Cont. Protonix IV for now till feeding is switched to Bolus feeds.  May change to Nexium Elixir 40 mg via GTube QD 1/2 hr. before feeding in AM.  Nutritionist f/u

## 2021-08-08 NOTE — PROGRESS NOTE ADULT - ASSESSMENT
89 y/o F with PMH afib not on AC, PPM, HTN, ovarian and colon ca, GERD presents to the ED as a transfer from [facility] for dysarthria and R hemiparesis. NIHSS 8 on repeat exam. R hemiparesis, R facial droop, aphasia (not fluent, not intact to repetition), and dysarthria. 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

## 2021-08-09 VITALS
SYSTOLIC BLOOD PRESSURE: 125 MMHG | RESPIRATION RATE: 19 BRPM | OXYGEN SATURATION: 99 % | HEART RATE: 71 BPM | TEMPERATURE: 97 F | DIASTOLIC BLOOD PRESSURE: 80 MMHG

## 2021-08-09 PROCEDURE — 86850 RBC ANTIBODY SCREEN: CPT

## 2021-08-09 PROCEDURE — 84443 ASSAY THYROID STIM HORMONE: CPT

## 2021-08-09 PROCEDURE — 83036 HEMOGLOBIN GLYCOSYLATED A1C: CPT

## 2021-08-09 PROCEDURE — 92526 ORAL FUNCTION THERAPY: CPT

## 2021-08-09 PROCEDURE — 82803 BLOOD GASES ANY COMBINATION: CPT

## 2021-08-09 PROCEDURE — 82330 ASSAY OF CALCIUM: CPT

## 2021-08-09 PROCEDURE — U0003: CPT

## 2021-08-09 PROCEDURE — 97535 SELF CARE MNGMENT TRAINING: CPT

## 2021-08-09 PROCEDURE — 36224 PLACE CATH CAROTD ART: CPT

## 2021-08-09 PROCEDURE — 97110 THERAPEUTIC EXERCISES: CPT

## 2021-08-09 PROCEDURE — 97162 PT EVAL MOD COMPLEX 30 MIN: CPT

## 2021-08-09 PROCEDURE — 84100 ASSAY OF PHOSPHORUS: CPT

## 2021-08-09 PROCEDURE — 85027 COMPLETE CBC AUTOMATED: CPT

## 2021-08-09 PROCEDURE — 97165 OT EVAL LOW COMPLEX 30 MIN: CPT

## 2021-08-09 PROCEDURE — 0225U NFCT DS DNA&RNA 21 SARSCOV2: CPT

## 2021-08-09 PROCEDURE — 86901 BLOOD TYPING SEROLOGIC RH(D): CPT

## 2021-08-09 PROCEDURE — 84132 ASSAY OF SERUM POTASSIUM: CPT

## 2021-08-09 PROCEDURE — 84145 PROCALCITONIN (PCT): CPT

## 2021-08-09 PROCEDURE — 99233 SBSQ HOSP IP/OBS HIGH 50: CPT

## 2021-08-09 PROCEDURE — 71045 X-RAY EXAM CHEST 1 VIEW: CPT

## 2021-08-09 PROCEDURE — C1769: CPT

## 2021-08-09 PROCEDURE — 86900 BLOOD TYPING SEROLOGIC ABO: CPT

## 2021-08-09 PROCEDURE — 93005 ELECTROCARDIOGRAM TRACING: CPT

## 2021-08-09 PROCEDURE — 93970 EXTREMITY STUDY: CPT

## 2021-08-09 PROCEDURE — 82435 ASSAY OF BLOOD CHLORIDE: CPT

## 2021-08-09 PROCEDURE — 85025 COMPLETE CBC W/AUTO DIFF WBC: CPT

## 2021-08-09 PROCEDURE — C1889: CPT

## 2021-08-09 PROCEDURE — 74176 CT ABD & PELVIS W/O CONTRAST: CPT

## 2021-08-09 PROCEDURE — 92523 SPEECH SOUND LANG COMPREHEN: CPT

## 2021-08-09 PROCEDURE — C1887: CPT

## 2021-08-09 PROCEDURE — 84484 ASSAY OF TROPONIN QUANT: CPT

## 2021-08-09 PROCEDURE — C1725: CPT

## 2021-08-09 PROCEDURE — 83880 ASSAY OF NATRIURETIC PEPTIDE: CPT

## 2021-08-09 PROCEDURE — 70450 CT HEAD/BRAIN W/O DYE: CPT

## 2021-08-09 PROCEDURE — 84295 ASSAY OF SERUM SODIUM: CPT

## 2021-08-09 PROCEDURE — 70450 CT HEAD/BRAIN W/O DYE: CPT | Mod: 26

## 2021-08-09 PROCEDURE — 83735 ASSAY OF MAGNESIUM: CPT

## 2021-08-09 PROCEDURE — 82962 GLUCOSE BLOOD TEST: CPT

## 2021-08-09 PROCEDURE — 94640 AIRWAY INHALATION TREATMENT: CPT

## 2021-08-09 PROCEDURE — 85610 PROTHROMBIN TIME: CPT

## 2021-08-09 PROCEDURE — 84480 ASSAY TRIIODOTHYRONINE (T3): CPT

## 2021-08-09 PROCEDURE — 99291 CRITICAL CARE FIRST HOUR: CPT

## 2021-08-09 PROCEDURE — C1760: CPT

## 2021-08-09 PROCEDURE — 97116 GAIT TRAINING THERAPY: CPT

## 2021-08-09 PROCEDURE — 71250 CT THORAX DX C-: CPT

## 2021-08-09 PROCEDURE — 49440 PLACE GASTROSTOMY TUBE PERC: CPT

## 2021-08-09 PROCEDURE — 92507 TX SP LANG VOICE COMM INDIV: CPT

## 2021-08-09 PROCEDURE — 84436 ASSAY OF TOTAL THYROXINE: CPT

## 2021-08-09 PROCEDURE — 80061 LIPID PANEL: CPT

## 2021-08-09 PROCEDURE — 87086 URINE CULTURE/COLONY COUNT: CPT

## 2021-08-09 PROCEDURE — 80053 COMPREHEN METABOLIC PANEL: CPT

## 2021-08-09 PROCEDURE — C8929: CPT

## 2021-08-09 PROCEDURE — 85730 THROMBOPLASTIN TIME PARTIAL: CPT

## 2021-08-09 PROCEDURE — C1894: CPT

## 2021-08-09 PROCEDURE — 81001 URINALYSIS AUTO W/SCOPE: CPT

## 2021-08-09 PROCEDURE — 83605 ASSAY OF LACTIC ACID: CPT

## 2021-08-09 PROCEDURE — 97530 THERAPEUTIC ACTIVITIES: CPT

## 2021-08-09 PROCEDURE — 82947 ASSAY GLUCOSE BLOOD QUANT: CPT

## 2021-08-09 PROCEDURE — 85018 HEMOGLOBIN: CPT

## 2021-08-09 PROCEDURE — 92610 EVALUATE SWALLOWING FUNCTION: CPT

## 2021-08-09 PROCEDURE — 80048 BASIC METABOLIC PNL TOTAL CA: CPT

## 2021-08-09 PROCEDURE — L8699: CPT

## 2021-08-09 PROCEDURE — 86140 C-REACTIVE PROTEIN: CPT

## 2021-08-09 PROCEDURE — 85014 HEMATOCRIT: CPT

## 2021-08-09 PROCEDURE — U0005: CPT

## 2021-08-09 RX ORDER — PANTOPRAZOLE SODIUM 20 MG/1
40 TABLET, DELAYED RELEASE ORAL DAILY
Refills: 0 | Status: DISCONTINUED | OUTPATIENT
Start: 2021-08-10 | End: 2021-08-09

## 2021-08-09 RX ORDER — APIXABAN 2.5 MG/1
1 TABLET, FILM COATED ORAL
Qty: 0 | Refills: 0 | DISCHARGE
Start: 2021-08-09

## 2021-08-09 RX ORDER — POLYETHYLENE GLYCOL 3350 17 G/17G
17 POWDER, FOR SOLUTION ORAL
Qty: 0 | Refills: 0 | DISCHARGE
Start: 2021-08-09

## 2021-08-09 RX ORDER — IPRATROPIUM/ALBUTEROL SULFATE 18-103MCG
3 AEROSOL WITH ADAPTER (GRAM) INHALATION
Qty: 0 | Refills: 0 | DISCHARGE
Start: 2021-08-09

## 2021-08-09 RX ORDER — SENNA PLUS 8.6 MG/1
1 TABLET ORAL
Qty: 0 | Refills: 0 | DISCHARGE
Start: 2021-08-09

## 2021-08-09 RX ORDER — PANTOPRAZOLE SODIUM 20 MG/1
40 TABLET, DELAYED RELEASE ORAL
Qty: 0 | Refills: 0 | DISCHARGE
Start: 2021-08-09

## 2021-08-09 RX ORDER — APIXABAN 2.5 MG/1
5 TABLET, FILM COATED ORAL EVERY 12 HOURS
Refills: 0 | Status: DISCONTINUED | OUTPATIENT
Start: 2021-08-09 | End: 2021-08-09

## 2021-08-09 RX ORDER — ZALEPLON 10 MG
1 CAPSULE ORAL
Qty: 0 | Refills: 0 | DISCHARGE
Start: 2021-08-09

## 2021-08-09 RX ORDER — PREGABALIN 225 MG/1
5 CAPSULE ORAL
Qty: 0 | Refills: 0 | DISCHARGE

## 2021-08-09 RX ORDER — OMEPRAZOLE 10 MG/1
1 CAPSULE, DELAYED RELEASE ORAL
Qty: 0 | Refills: 0 | DISCHARGE

## 2021-08-09 RX ORDER — ATENOLOL 25 MG/1
1 TABLET ORAL
Qty: 0 | Refills: 0 | DISCHARGE
Start: 2021-08-09

## 2021-08-09 RX ORDER — ZOLPIDEM TARTRATE 10 MG/1
0.5 TABLET ORAL
Qty: 0 | Refills: 0 | DISCHARGE

## 2021-08-09 RX ORDER — ATORVASTATIN CALCIUM 80 MG/1
1 TABLET, FILM COATED ORAL
Qty: 0 | Refills: 0 | DISCHARGE
Start: 2021-08-09

## 2021-08-09 RX ORDER — GABAPENTIN 400 MG/1
2 CAPSULE ORAL
Qty: 0 | Refills: 0 | DISCHARGE
Start: 2021-08-09

## 2021-08-09 RX ORDER — LANOLIN ALCOHOL/MO/W.PET/CERES
1 CREAM (GRAM) TOPICAL
Qty: 0 | Refills: 0 | DISCHARGE
Start: 2021-08-09

## 2021-08-09 RX ORDER — FUROSEMIDE 40 MG
5 TABLET ORAL
Qty: 0 | Refills: 0 | DISCHARGE
Start: 2021-08-09

## 2021-08-09 RX ADMIN — Medication 3 MILLILITER(S): at 05:52

## 2021-08-09 RX ADMIN — Medication 0.5 MILLIGRAM(S): at 05:52

## 2021-08-09 RX ADMIN — ATENOLOL 25 MILLIGRAM(S): 25 TABLET ORAL at 05:52

## 2021-08-09 RX ADMIN — Medication 40 MILLIGRAM(S): at 05:52

## 2021-08-09 RX ADMIN — POLYETHYLENE GLYCOL 3350 17 GRAM(S): 17 POWDER, FOR SOLUTION ORAL at 11:20

## 2021-08-09 RX ADMIN — Medication 3 MILLILITER(S): at 13:37

## 2021-08-09 RX ADMIN — Medication 81 MILLIGRAM(S): at 11:20

## 2021-08-09 RX ADMIN — Medication 1 TABLET(S): at 11:20

## 2021-08-09 RX ADMIN — OLANZAPINE 2.5 MILLIGRAM(S): 15 TABLET, FILM COATED ORAL at 03:59

## 2021-08-09 NOTE — PROGRESS NOTE ADULT - SUBJECTIVE AND OBJECTIVE BOX
Follow-up Pulm Progress Note    OOB to chair  Lethargic   Sats 97% 2LNC    Medications:  MEDICATIONS  (STANDING):  albuterol/ipratropium for Nebulization 3 milliLiter(s) Nebulizer every 6 hours  aspirin  chewable 81 milliGRAM(s) Oral daily  ATENolol  Tablet 25 milliGRAM(s) Oral daily  atorvastatin 80 milliGRAM(s) Oral at bedtime  buDESOnide    Inhalation Suspension 0.5 milliGRAM(s) Inhalation every 12 hours  enoxaparin Injectable 40 milliGRAM(s) SubCutaneous <User Schedule>  furosemide Solution 40 milliGRAM(s) Enteral Tube daily  gabapentin   Solution 100 milliGRAM(s) Enteral Tube at bedtime  glucagon  Injectable 1 milliGRAM(s) IntraMuscular once  melatonin 3 milliGRAM(s) Oral at bedtime  multivitamin 1 Tablet(s) Oral daily  pantoprazole  Injectable 40 milliGRAM(s) IV Push daily  polyethylene glycol 3350 17 Gram(s) Oral daily  senna 1 Tablet(s) Oral daily    MEDICATIONS  (PRN):  acetaminophen   Tablet .. 650 milliGRAM(s) Oral every 6 hours PRN Temp greater or equal to 38C (100.4F), Mild Pain (1 - 3), Moderate Pain (4 - 6), Severe Pain (7 - 10)  OLANZapine 2.5 milliGRAM(s) Oral daily PRN anxiety  zaleplon 5 milliGRAM(s) Oral at bedtime PRN Insomnia          Vital Signs Last 24 Hrs  T(C): 36.8 (09 Aug 2021 08:01), Max: 36.8 (09 Aug 2021 08:01)  T(F): 98.3 (09 Aug 2021 08:01), Max: 98.3 (09 Aug 2021 08:01)  HR: 72 (09 Aug 2021 09:59) (70 - 80)  BP: 126/70 (09 Aug 2021 09:59) (123/74 - 131/84)  BP(mean): --  RR: 20 (09 Aug 2021 09:59) (18 - 22)  SpO2: 97% (09 Aug 2021 09:59) (96% - 98%)          08-08 @ 07:01  -  08-09 @ 07:00  --------------------------------------------------------  IN: 550 mL / OUT: 1400 mL / NET: -850 mL            CULTURES:      Culture - Urine (collected 08-01-21 @ 21:53)  Source: Clean Catch Clean Catch (Midstream)  Final Report (08-02-21 @ 21:39):    No growth        Physical Examination:  PULM: CTA bilaterally   CVS: S1, S2 heard    RADIOLOGY REVIEWED  CT chest: < from: CT Chest No Cont (08.02.21 @ 16:14) >  FINDINGS:    LUNGS AND AIRWAYS: Anterior bowing of the posterior wall of the trachea, could suggest tracheomalacia. Interlobular septal thickening bilaterally. Bilateral lower lower lung areas of subsegmental, linear or compressive atelectasis  PLEURA: Small bilateral pleural effusions.  MEDIASTINUM AND ELIEL: A few mediastinal lymph nodes largest is about 1.1 cm a precarinal location are likely reactive.  VESSELS: Atherosclerotic changes of the aorta and coronary arteries.  HEART: Cardiomegaly. No pericardial effusion. Left chest wall pacemaker with leads in the right atrium and right ventricle.  CHEST WALL AND LOWER NECK: Within normal limits.  VISUALIZED UPPER ABDOMEN: Small amount of pneumoperitoneum, likely from recent PEG tube placement.  BONES: Unchanged T11 mild compression deformity since abdominal CT of January 23, 2020. Degenerative changes.    IMPRESSION:    Small bilateral pleural effusions with adjacent areas of atelectasis.    Cardiomegaly.    Interlobular septal thickening is suggestive of pulmonary edema given the other findings.    Pneumoperitoneum, likely from recent PEG tube placement.    --- End of Report ---

## 2021-08-09 NOTE — PROGRESS NOTE ADULT - ASSESSMENT
89 y/o F with PMH afib not on AC, PPM, HTN, ovarian and colon ca, GERD presents to the ED as a transfer from [facility] for dysarthria and R hemiparesis. NIHSS 8 on repeat exam. R hemiparesis, R facial droop, aphasia (not fluent, not intact to repetition), and dysarthria. 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

## 2021-08-09 NOTE — PROGRESS NOTE ADULT - ASSESSMENT
89 y/o F with PMH afib not on AC, PPM, HTN, ovarian and colon ca, GERD, PNA x2 as a child  Presents to the ED as a transfer from [facility] for stroke. Pt was found to have L M1 occlusion on CTA, S/p thrombectomy with unsuccessful recanalization. Course c/b leukocytosis - started on empiric Zosyn for concern of aspiration. Called to consult for wheezing, congested cough - which has improved with nebulizers and diuresis. Pt noted to have abnormal breathing pattern - which seems to have resolved. Currently breathing comfortably on 3LNC.

## 2021-08-09 NOTE — PROGRESS NOTE ADULT - ASSESSMENT
89 y/o F with PMH afib not on AC, PPM, HTN, ovarian and colon ca, GERD presents to the ED as a transfer from [facility] for dysarthria and R hemiparesis. NIHSS 8 on repeat exam. R hemiparesis, R facial droop, aphasia (not fluent, not intact to repetition), and dysarthria. 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 left M1 occlusion s/p thrombectomy with unsuccessful recanalization     Impression: Left MCA infarction, Left M1 occlusion found on CTA likely secondary to cardioembolic etiology (hx of afib), post unsuccessful recanalization.     NEURO: Patient neurologically without acute change. IV Tylenol for pain, Continue monitoring for neurologic deterioration.  Nsx noted severe tortuosity preventing access to left ICA intracranially. Hemicrani deferred due to advanced age. Goal for SBP of 100-180mmHg with overall normotension. Patient on statin for LDL goal less than 70. MRI Brain would not change medical management as pt has hx of afib and was not on AC. Head CT as noted above. Physical therapy and OT recommend CHARLES. Zaleplon restarted for insomnia.     ANTITHROMBOTIC THERAPY: ASA 81 PO. Consider beginning Eliquis 5mg BID in 2 weeks (8/9/2021) from onset after repeat stable head CT pending ongoing C discussion.     PULMONARY: CXR (08/01/21) Increased basilar congestion compared with prior study, protecting airway, saturating well on room air     CARDIOVASCULAR: TTE shows mild-moderate mitral regurgitation, mild aortic regurgitation, severe global LV systolic dysfunction, normal RV function, mild tricuspid regurgitation. Thickened pericardium with small pericardial effusion.  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 cardio made aware of TTE findings. Patient has PPM placed in 2019. EP interrogated and found multiple episodes of atrial fibrillation and flutter. Rate controlled at this time Dr Stone (Cardiology) follow up appreciated: acute systolic CHF- s/p lasix. BNP ~ 28,000. CT chest shows pulmonary edema. BNP (08/04/21) : 12,929                    SBP goal: 100-180 mmhg     GASTROINTESTINAL:  dysphagia screen- failed 7/26. Re eval, 7/27 failed. PEG placed by IR 7/30. Tolerating tube feedings.     Diet: PEG with TF    RENAL: BUN/Cr within normal limits on 8/5, good urine output,  will continue with Lasix 40mg daily as per Cardio recommendations     Na Goal: Greater than 135     Kessler: no     HEMATOLOGY: no active bleeding noted      DVT ppx: lovenox subq     ID: afebrile, ID following, finished zosyn 5/5 days. Will continue to monitor for s/sx of infection.      OTHER:  Palliative meeting held on 07/30/21 family all in agreement with PEG placement. Palliative re-eval on 8/5: after disucssion with family they reviewed that home dose of gabapentin that was restarted at 100 q8 could have caused increased lethargy and was lowered to 100mg QHS. Discussed tube feeding goals and will continue on current feedings. They recognize that the goal of rehab may not be an option based upon the clinical status of the patient.    DISPOSITION: depending on GOC and clinical course     CORE MEASURES:        Admission NIHSS: 16      TPA: [] YES [x] NO      LDL/HDL: 123/30     Depression Screen: unable to assess      Statin Therapy: yes     Dysphagia Screen: [] PASS [x] FAIL     Smoking [] YES [x] NO      Afib [x] YES [] NO     Stroke Education [x] YES [] NO    Obtain screening lower extremity venous ultrasound in patients who meet 1 or more of the following criteria as patient is high risk for DVT/PE on admission:   [] History of DVT/PE  []Hypercoagulable states (Factor V Leiden, Cancer, OCP, etc. )  []Prolonged immobility (hemiplegia/hemiparesis/post operative or any other extended immobilization)  [] Transferred from outside facility (Rehab or Long term care)  [] Age </= to 50   89 y/o F with PMH afib not on AC, PPM, HTN, ovarian and colon ca, GERD presents to the ED as a transfer from [facility] for dysarthria and R hemiparesis. NIHSS 8 on repeat exam. R hemiparesis, R facial droop, aphasia (not fluent, not intact to repetition), and dysarthria. 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 left M1 occlusion s/p thrombectomy with unsuccessful recanalization     Impression: Left MCA infarction, Left M1 occlusion found on CTA likely secondary to cardioembolic etiology (hx of afib), post unsuccessful recanalization.     NEURO: Patient neurologically without acute change. IV Tylenol for pain, Continue monitoring for neurologic deterioration.  Nsx noted severe tortuosity preventing access to left ICA intracranially. Hemicrani deferred due to advanced age. Goal for SBP of 100-180mmHg with overall normotension. Patient on statin for LDL goal less than 70. MRI Brain would not change medical management as pt has hx of afib and was not on AC. Head CT as noted above. Physical therapy and OT recommend CHARLES. Zaleplon restarted for insomnia.     ANTITHROMBOTIC THERAPY: ASA 81 PO. Plan to start Eliquis 5mg BID on 8/9/2021 pending repeat stable CT head scan. Family in agreement.     PULMONARY: CXR (08/01/21) Increased basilar congestion compared with prior study, protecting airway, saturating well on room air     CARDIOVASCULAR: TTE shows mild-moderate mitral regurgitation, mild aortic regurgitation, severe global LV systolic dysfunction, normal RV function, mild tricuspid regurgitation. Thickened pericardium with small pericardial effusion. 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 cardio made aware of TTE findings. Patient has PPM placed in 2019. EP interrogated and found multiple episodes of atrial fibrillation and flutter. Rate controlled at this time Dr Stone (Cardiology) follow up appreciated: acute systolic CHF- s/p lasix. BNP ~ 28,000. CT chest shows pulmonary edema. BNP (08/04/21) : 12,929. Will follow up with Dr. Watts as outpatient.                   SBP goal: 100-180 mmhg     GASTROINTESTINAL:  dysphagia screen- failed 7/26. Re eval, 7/27 failed. PEG placed by IR 7/30. Tolerating tube feedings.     Diet: PEG with TF    RENAL: BUN/Cr within normal limits on 8/5, good urine output,  will continue with Lasix 40mg daily as per Cardio recommendations     Na Goal: Greater than 135     Kessler: no     HEMATOLOGY: no active bleeding noted      DVT ppx: lovenox subq     ID: afebrile, ID following, finished zosyn 5/5 days. Will continue to monitor for s/sx of infection.      OTHER: Palliative meeting held on 07/30/21 family all in agreement with PEG placement. Palliative re-eval on 8/5: after discussion with family they reviewed that home dose of gabapentin that was restarted at 100 q8 could have caused increased lethargy and was lowered to 100mg QHS. Discussed tube feeding goals and will continue on current feedings. They recognize that the goal of rehab may not be an option based upon the clinical status of the patient.    DISPOSITION: Plan of care discussed with daughter and son. All questions and concerns addressed. Emotional support provided. Plan to discharge today, 8/9 to rehab.     CORE MEASURES:        Admission NIHSS: 16      TPA: [] YES [x] NO      LDL/HDL: 123/30     Depression Screen: unable to assess      Statin Therapy: yes     Dysphagia Screen: [] PASS [x] FAIL     Smoking [] YES [x] NO      Afib [x] YES [] NO     Stroke Education [x] YES [] NO    Obtain screening lower extremity venous ultrasound in patients who meet 1 or more of the following criteria as patient is high risk for DVT/PE on admission:   [] History of DVT/PE  []Hypercoagulable states (Factor V Leiden, Cancer, OCP, etc. )  []Prolonged immobility (hemiplegia/hemiparesis/post operative or any other extended immobilization)  [] Transferred from outside facility (Rehab or Long term care)  [] Age </= to 50

## 2021-08-09 NOTE — PROGRESS NOTE ADULT - ASSESSMENT
89 y/o F with PMH afib not on AC, PPM, HTN, ovarian and colon ca, GERD presents to the ED as a transfer from [facility] for stroke. Per son, pt woke up at 3:30am and went to the bathroom, no issues with gait or speech at that time, accompanied by son. At 7:30 am this morning, pt's son found her half off the bed and unable to speak or walk. Pt was found to have L M1 occlusion on CTA, CTP w/ no core infarct and penumbra 81cc, and transferred for IR thrombectomy. Exam at [facility] notable for severe dysarthria and R hemiparesis. Pt did not receive tpa. NIHSS initially 16 on arrival to [facility] ED, NIHSS 8 on repeat exam. At baseline, son reports that pt is able to do own ADLs (showers, reads) but has an aid to make sure she does not fall, walks with a walker without assistance, speech is fluent.  Pt. s/p 2 failed S&S and family approached for PEG.    S/P EGD (07.30.21 @ 09:57) >  Impression:  - Failed PEG placement. The procedure was aborted due to poor endoscopic                        visualization and anatomy.                       - Normal esophagus.                       - Erythematous mucosa in the antrum.                       - Normal examined duodenum.                       - An endoscopically removable PEG placement was attempted but could not be                        successfully completed. The needle/trocar was not passed.                       - No specimens collected.    S/P Failed PEG placement  Pt. s/p GTube placement by IR   S/P one episode of vomiting which resolved.   Currently tolerating feeds.  S/P tachypnea  which is cardiac related and due to her severe LV Dysfn.  Please keep HOB elevated at 45 degrees.  Aspiration precautions.  Cont. Lovenox for AC  Pt. with resolving leukocytosis and most likely reactive  S/P IV Abx. X 5 days  Tolerating GTube feeding at 50 CC/hr.  Pt. is on Lasix 40 mg QD and on no free water!  Would add some free water of about 500 to 600 CC per day via GTube to prevent dehydration and possibly lower her Lasix dose.   Cont. Protonix Suspension 40 mg Via GTube 1/2 hr before feeding QD.

## 2021-08-09 NOTE — PROGRESS NOTE ADULT - SUBJECTIVE AND OBJECTIVE BOX
THE PATIENT WAS SEEN AND EXAMINED BY ME WITH THE HOUSESTAFF AND STROKE TEAM DURING MORNING ROUNDS.   HPI:  89 y/o F with PMH afib not on AC, PPM, HTN, ovarian and colon ca, GERD presents to the ED as a transfer from [facility] for stroke. Per son, pt woke up at 3:30am on 7/25 and went to the bathroom, no issues with gait or speech at that time, accompanied by son. At 7:30 am that morning of admit , pt's son found her half off the bed and unable to speak or walk. Pt was found to have L M1 occlusion on CTA, CTP w/ no core infarct and penumbra 81cc, and transferred for IR thrombectomy. Exam at [facility] notable for severe dysarthria and R hemiparesis. Pt did not receive tpa. NIHSS initially 16 on arrival to [facility] ED, NIHSS 8 on repeat exam. At baseline, son reports that pt is able to do own ADLs (showers, reads) but has an aid to make sure she does not fall, walks with a walker without assistance    SUBJECTIVE: No events overnight.  No new neurologic complaints, ROS reported negative unless otherwise noted.      MEDICATIONS  (STANDING):  albuterol/ipratropium for Nebulization 3 milliLiter(s) Nebulizer every 6 hours  aspirin  chewable 81 milliGRAM(s) Oral daily  ATENolol  Tablet 25 milliGRAM(s) Oral daily  atorvastatin 80 milliGRAM(s) Oral at bedtime  buDESOnide    Inhalation Suspension 0.5 milliGRAM(s) Inhalation every 12 hours  enoxaparin Injectable 40 milliGRAM(s) SubCutaneous <User Schedule>  furosemide Solution 40 milliGRAM(s) Enteral Tube daily  gabapentin   Solution 100 milliGRAM(s) Enteral Tube at bedtime  glucagon  Injectable 1 milliGRAM(s) IntraMuscular once  melatonin 3 milliGRAM(s) Oral at bedtime  multivitamin 1 Tablet(s) Oral daily  pantoprazole  Injectable 40 milliGRAM(s) IV Push daily  polyethylene glycol 3350 17 Gram(s) Oral daily  senna 1 Tablet(s) Oral daily    MEDICATIONS  (PRN):  acetaminophen   Tablet .. 650 milliGRAM(s) Oral every 6 hours PRN Temp greater or equal to 38C (100.4F), Mild Pain (1 - 3), Moderate Pain (4 - 6), Severe Pain (7 - 10)  OLANZapine 2.5 milliGRAM(s) Oral daily PRN anxiety  zaleplon 5 milliGRAM(s) Oral at bedtime PRN Insomnia      PHYSICAL EXAM:   Vital Signs Last 24 Hrs  T(C): 36.5 (08 Aug 2021 07:24), Max: 36.9 (07 Aug 2021 12:07)  T(F): 97.7 (08 Aug 2021 07:24), Max: 98.5 (07 Aug 2021 12:07)  HR: 70 (08 Aug 2021 07:24) (68 - 76)  BP: 133/75 (08 Aug 2021 07:24) (132/63 - 148/81)  RR: 20 (08 Aug 2021 07:24) (18 - 20)  SpO2: 100% (08 Aug 2021 07:24) (97% - 100%)    General: No acute distress, groining in pain.  HEENT: EOM intact, visual fields full  Abdomen: Soft, nontender, nondistended   Extremities: No edema    NEUROLOGICAL EXAM:  Mental status: Awake, alert, does gesture facial expressions relatively in context to situation (smiles appropriately). Not following commands, perseverates on one action. Can mimic physical actions. Generates unintelligible sounds.  Cranial Nerves: R facial droop, no nystagmus, tongue midline. Blink to threat on right .   Motor exam: right hemiplegia with trace flexion in arm. RLE 3-4/5. Left side moves well against gravity but inattentive and with drift   Sensation: intact to noxious stimuli b/l.    Coordination/ Gait: gait not assessed    IMAGING: Reviewed by me.   CT chest (08/02/21) Small bilateral pleural effusions with adjacent areas of atelectasis.Cardiomegaly. Interlobular septal thickening is suggestive of pulmonary edema given the other findings.Pneumoperitoneum, likely from recent PEG tube placement.    CT Head No Cont (08.01.21) Continued evolution of left insular and posterior frontotemporal stroke. No areas of intraparenchymal hemorrhage.    CT Head No Cont (07.26.21) Acute left MCA territory infarct without evidence of hemorrhagic transformation which has progressed since 7/25/2021.    TTE with Doppler (w/Cont) (07.28.21) Severe global left ventricular systolic dysfunction. No left ventricular thrombus. 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      CT Head No Cont (08.01.21) Continued evolution of left insular and posterior frontotemporal stroke. No areas of intraparenchymal hemorrhage.     THE PATIENT WAS SEEN AND EXAMINED BY ME WITH THE HOUSESTAFF AND STROKE TEAM DURING MORNING ROUNDS.   HPI:  89 y/o F with PMH afib not on AC, PPM, HTN, ovarian and colon ca, GERD presents to the ED as a transfer from [facility] for stroke. Per son, pt woke up at 3:30am on 7/25 and went to the bathroom, no issues with gait or speech at that time, accompanied by son. At 7:30 am that morning of admit , pt's son found her half off the bed and unable to speak or walk. Pt was found to have L M1 occlusion on CTA, CTP w/ no core infarct and penumbra 81cc, and transferred for IR thrombectomy. Exam at [facility] notable for severe dysarthria and R hemiparesis. Pt did not receive tpa. NIHSS initially 16 on arrival to [facility] ED, NIHSS 8 on repeat exam. At baseline, son reports that pt is able to do own ADLs (showers, reads) but has an aid to make sure she does not fall, walks with a walker without assistance    SUBJECTIVE: No events overnight.  No new neurologic complaints, ROS reported negative unless otherwise noted.      MEDICATIONS  (STANDING):  albuterol/ipratropium for Nebulization 3 milliLiter(s) Nebulizer every 6 hours  aspirin  chewable 81 milliGRAM(s) Oral daily  ATENolol  Tablet 25 milliGRAM(s) Oral daily  atorvastatin 80 milliGRAM(s) Oral at bedtime  buDESOnide    Inhalation Suspension 0.5 milliGRAM(s) Inhalation every 12 hours  enoxaparin Injectable 40 milliGRAM(s) SubCutaneous <User Schedule>  furosemide Solution 40 milliGRAM(s) Enteral Tube daily  gabapentin   Solution 100 milliGRAM(s) Enteral Tube at bedtime  glucagon  Injectable 1 milliGRAM(s) IntraMuscular once  melatonin 3 milliGRAM(s) Oral at bedtime  multivitamin 1 Tablet(s) Oral daily  pantoprazole  Injectable 40 milliGRAM(s) IV Push daily  polyethylene glycol 3350 17 Gram(s) Oral daily  senna 1 Tablet(s) Oral daily    MEDICATIONS  (PRN):  acetaminophen   Tablet .. 650 milliGRAM(s) Oral every 6 hours PRN Temp greater or equal to 38C (100.4F), Mild Pain (1 - 3), Moderate Pain (4 - 6), Severe Pain (7 - 10)  OLANZapine 2.5 milliGRAM(s) Oral daily PRN anxiety  zaleplon 5 milliGRAM(s) Oral at bedtime PRN Insomnia    PHYSICAL EXAM:   Vital Signs Last 24 Hrs  T(C): 36.6 (09 Aug 2021 14:07), Max: 36.8 (09 Aug 2021 08:01)  T(F): 97.9 (09 Aug 2021 14:07), Max: 98.3 (09 Aug 2021 08:01)  HR: 71 (09 Aug 2021 14:07) (70 - 80)  BP: 121/76 (09 Aug 2021 14:07) (121/76 - 131/84)  RR: 20 (09 Aug 2021 14:07) (18 - 22)  SpO2: 99% (09 Aug 2021 14:07) (96% - 99%)    General: No acute distress  HEENT: EOM intact, visual fields full  Abdomen: Soft, nontender, nondistended   Extremities: No edema    NEUROLOGICAL EXAM:  Mental status: Awake, alert, does gesture facial expressions relatively in context to situation (smiles appropriately). Not following commands, perseverates on one action. Can mimic physical actions. Generates unintelligible sounds.  Cranial Nerves: R facial droop, no nystagmus, tongue midline. Blink to threat on right .   Motor exam: right hemiplegia with trace flexion in arm. RLE 3-4/5. Left side moves well against gravity but inattentive and with drift   Sensation: intact to noxious stimuli b/l.    Coordination/ Gait: gait not assessed    IMAGING: Reviewed by me.   CT Chest (08/02/21) Small bilateral pleural effusions with adjacent areas of atelectasis.Cardiomegaly. Interlobular septal thickening is suggestive of pulmonary edema given the other findings.Pneumoperitoneum, likely from recent PEG tube placement.    CT Head No Cont (08.01.21) Continued evolution of left insular and posterior frontotemporal stroke. No areas of intraparenchymal hemorrhage.    CT Head No Cont (07.26.21) Acute left MCA territory infarct without evidence of hemorrhagic transformation which has progressed since 7/25/2021.    TTE with Doppler (w/Cont) (07.28.21) Severe global left ventricular systolic dysfunction. No left ventricular thrombus. 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      CT Head No Cont (08.01.21) Continued evolution of left insular and posterior frontotemporal stroke. No areas of intraparenchymal hemorrhage.

## 2021-08-09 NOTE — PROGRESS NOTE ADULT - PROBLEM SELECTOR PROBLEM 3
Atrial fibrillation
Atrial fibrillation
Leukocytosis
Atrial fibrillation
Cerebrovascular accident (CVA), unspecified mechanism
Atrial fibrillation
Cerebrovascular accident (CVA), unspecified mechanism
Leukocytosis
Atrial fibrillation
Leukocytosis

## 2021-08-09 NOTE — PROGRESS NOTE ADULT - PROBLEM SELECTOR PROBLEM 2
Pleural effusion
Debility
Debility
Pleural effusion
Dysphagia, unspecified type
Pleural effusion

## 2021-08-09 NOTE — PROGRESS NOTE ADULT - SUBJECTIVE AND OBJECTIVE BOX
Subjective: Patient seen and examined. No new events except as noted.     REVIEW OF SYSTEMS:  Unable to obtain      MEDICATIONS:  MEDICATIONS  (STANDING):  albuterol/ipratropium for Nebulization 3 milliLiter(s) Nebulizer every 6 hours  aspirin  chewable 81 milliGRAM(s) Oral daily  ATENolol  Tablet 25 milliGRAM(s) Oral daily  atorvastatin 80 milliGRAM(s) Oral at bedtime  buDESOnide    Inhalation Suspension 0.5 milliGRAM(s) Inhalation every 12 hours  enoxaparin Injectable 40 milliGRAM(s) SubCutaneous <User Schedule>  furosemide Solution 40 milliGRAM(s) Enteral Tube daily  gabapentin   Solution 100 milliGRAM(s) Enteral Tube at bedtime  glucagon  Injectable 1 milliGRAM(s) IntraMuscular once  melatonin 3 milliGRAM(s) Oral at bedtime  multivitamin 1 Tablet(s) Oral daily  pantoprazole  Injectable 40 milliGRAM(s) IV Push daily  polyethylene glycol 3350 17 Gram(s) Oral daily  senna 1 Tablet(s) Oral daily      PHYSICAL EXAM:  T(C): 36.8 (08-09-21 @ 08:01), Max: 36.8 (08-09-21 @ 08:01)  HR: 72 (08-09-21 @ 09:59) (70 - 80)  BP: 126/70 (08-09-21 @ 09:59) (123/74 - 131/84)  RR: 20 (08-09-21 @ 09:59) (18 - 22)  SpO2: 97% (08-09-21 @ 09:59) (96% - 100%)  Wt(kg): --  I&O's Summary    08 Aug 2021 07:01  -  09 Aug 2021 07:00  --------------------------------------------------------  IN: 550 mL / OUT: 1400 mL / NET: -850 mL          Appearance: NAD	  HEENT:   Dry  oral mucosa, PERRL, EOMI	  Lymphatic: No lymphadenopathy  Cardiovascular: irregular S1 S2, No JVD, No murmurs, No edema  Respiratory: Decreased bs  Psychiatry: A & O x 3, sleepy   Gastrointestinal:  Soft, Non-tender, + BS	  Skin: No rashes, No ecchymoses, No cyanosis	  Extremities: Normal range of motion, No clubbing, cyanosis or edema  Vascular: Peripheral pulses palpable 2+ bilaterally  NEUROLOGICAL EXAM:  Mental status: Awake, alert, does gesture facial expressions relatively in context to situation (smiles appropriately), not following commands. Can mimic physical actions. No verbal output   Cranial Nerves: R facial droop, no nystagmus, tongue midline. Blink to threat b/l.   Motor exam: right hemiplegia with trace flexion in arm and triple flexion in leg, left side moves well against gravity but inattentive and with drift   Sensation: decreased on right   Coordination/ Gait: gait not assessed       LABS:    CARDIAC MARKERS:                  proBNP:   Lipid Profile:   HgA1c:   TSH:             TELEMETRY: 	 AF    ECG:  	  RADIOLOGY:   DIAGNOSTIC TESTING:  [ ] Echocardiogram:  [ ]  Catheterization:  [ ] Stress Test:    OTHER:

## 2021-08-09 NOTE — PROGRESS NOTE ADULT - SUBJECTIVE AND OBJECTIVE BOX
Name of Patient : SHELBY LESTER  MRN: [id number]  DATE OF SERVICE: 08-09-21     Subjective: Patient seen and examined. No new events except as noted.     MEDICATIONS:  MEDICATIONS  (STANDING):  albuterol/ipratropium for Nebulization 3 milliLiter(s) Nebulizer every 6 hours  apixaban 5 milliGRAM(s) Oral every 12 hours  ATENolol  Tablet 25 milliGRAM(s) Oral daily  atorvastatin 80 milliGRAM(s) Oral at bedtime  buDESOnide    Inhalation Suspension 0.5 milliGRAM(s) Inhalation every 12 hours  furosemide Solution 40 milliGRAM(s) Enteral Tube daily  gabapentin   Solution 100 milliGRAM(s) Enteral Tube at bedtime  glucagon  Injectable 1 milliGRAM(s) IntraMuscular once  melatonin 3 milliGRAM(s) Oral at bedtime  multivitamin 1 Tablet(s) Oral daily  polyethylene glycol 3350 17 Gram(s) Oral daily  senna 1 Tablet(s) Oral daily      PHYSICAL EXAM:  T(C): 36.3 (08-09-21 @ 15:46), Max: 36.8 (08-09-21 @ 08:01)  HR: 71 (08-09-21 @ 15:46) (70 - 80)  BP: 125/80 (08-09-21 @ 15:46) (121/76 - 131/84)  RR: 19 (08-09-21 @ 15:46) (19 - 22)  SpO2: 99% (08-09-21 @ 15:46) (96% - 99%)  Wt(kg): --  I&O's Summary    08 Aug 2021 07:01  -  09 Aug 2021 07:00  --------------------------------------------------------  IN: 550 mL / OUT: 1400 mL / NET: -850 mL    09 Aug 2021 07:01  -  09 Aug 2021 22:44  --------------------------------------------------------  IN: 0 mL / OUT: 300 mL / NET: -300 mL          Appearance: Normal	  HEENT:  PERRLA   Lymphatic: No lymphadenopathy   Cardiovascular: Normal S1 S2, no JVD  Respiratory: normal effort , clear  Gastrointestinal:  Soft, Non-tender  Skin: No rashes,  warm to touch  Psychiatry:  Mood & affect appropriate  Musculuskeletal: No edema      All labs, Imaging and EKGs personally reviewed       08-08-21 @ 07:01  -  08-09-21 @ 07:00  --------------------------------------------------------  IN: 550 mL / OUT: 1400 mL / NET: -850 mL    08-09-21 @ 07:01  -  08-09-21 @ 22:44  --------------------------------------------------------  IN: 0 mL / OUT: 300 mL / NET: -300 mL

## 2021-08-09 NOTE — PROGRESS NOTE ADULT - NSICDXPILOT_GEN_ALL_CORE
[place]
[employer] [place]
[place]

## 2021-08-09 NOTE — PROGRESS NOTE ADULT - PROBLEM SELECTOR PLAN 5
intermittent Cheyne-Jade breathing   -VBG with no CO2 retention  -Appears to be breathing comfortably at this time
-VBG with no CO2 retention
intermittent Cheyne-Jade breathing   -VBG with no CO2 retention  -Appears to be breathing comfortably at this time
intermittent Cheyne-Jade breathing   -VBG with no CO2 retention
s/p PEG  -GI f/u  -Aspiration precautions  -Oral care.

## 2021-08-09 NOTE — PROGRESS NOTE ADULT - ASSESSMENT
Patient is a 89 y/o Female with PMH afib not on AC, PPM, HTN, ovarian and colon ca, GERD presents to the ED as a transfer from [facility] for stroke.     Problem/Recommendation - 1:  Problem: Stroke. Recommendation: Care as per neurology   Angio noted   ASA/ Statin   protonix  BP control  speech and swallow   GI eval for PEG placement appreicated   palliative eval     worsening leukocytosis  mild low grade T  procal level  UA Noted,   completed zosyn , 5 days course per ID   CXR  high risk of aspiration  may need CT chest   Pan CT  ID eval called for further recs appreicated  pulm eval appreciated     Problem/Recommendation - 2:  ·  Problem: Atrial fibrillation.  Recommendation: rate control  Atenolol 25 oral daily.     Problem/Recommendation - 3:  ·  Problem: Hypertension.  Recommendation: resuem BP meds  monitor and adjust as tolerated.     Problem/Recommendation - 4:  ·  Problem: Colon cancer.     Problem/Recommendation - 5:  ·  Problem: GERD (gastroesophageal reflux disease).  Recommendation: PPI.     Problem/Recommendation - 6:  Problem: Prophylactic measure. Recommendation: DVT and gI PPX.

## 2021-08-09 NOTE — PHARMACOTHERAPY INTERVENTION NOTE - COMMENTS
Spoke with patient's son Romeo her apixaban use, side effects, monitoring, compliance, coordination with cardiologist or neurologist if need to stop apixaban for any reason. Spoke with patients son and review what medications she is currently on.

## 2021-08-09 NOTE — PROGRESS NOTE ADULT - PROVIDER SPECIALTY LIST ADULT
Gastroenterology
Infectious Disease
Internal Medicine
Neurology
Neurosurgery
Gastroenterology
Gastroenterology
Internal Medicine
Internal Medicine
NSICU
Neurology
Neurology
Rehab Medicine
Cardiology
Gastroenterology
Infectious Disease
Internal Medicine
Internal Medicine
Intervent Radiology
Neurology
Pulmonology
Cardiology
Gastroenterology
Infectious Disease
Internal Medicine
Neurology
Neurology
Cardiology
Gastroenterology
Gastroenterology
Infectious Disease
Internal Medicine
NSICU
NSICU
Neurology
Palliative Care
Cardiology
ENT
Pulmonology
Palliative Care
Cardiology
Pulmonology
Cardiology

## 2021-08-09 NOTE — CHART NOTE - NSCHARTNOTEFT_GEN_A_CORE
Case discussed with the son twice today, tentative discharge.  Potential transition to rehab today.  Will sign off.      In the event of newly developing, evolving, or worsening symptoms, please contact the Palliative Medicine team via pager (if the patient is at [facility] #[phone] or if the patient is at [facility] #[phone]) The Geriatric and Palliative Medicine service has coverage 24 hours a day/ 7 days a week to provide medical recommendations regarding symptom management needs via telephone

## 2021-08-09 NOTE — PROGRESS NOTE ADULT - PROBLEM SELECTOR PROBLEM 4
Abnormal echocardiogram
CVA (cerebral vascular accident)
Abnormal echocardiogram
Abnormal echocardiogram
Palliative care encounter
Abnormal echocardiogram
CVA (cerebral vascular accident)
Palliative care encounter
Abnormal echocardiogram
CVA (cerebral vascular accident)

## 2021-08-09 NOTE — PROGRESS NOTE ADULT - TIME BILLING
Advanced care planning was discussed with patient and family.  Advanced care planning forms were reviewed and discussed as appropriate.  Differential diagnosis and plan of care discussed with patient after the evaluation.   Pain assessed and judicious use of narcotics when appropriate was discussed.  Importance of Fall prevention discussed.  Counseling on Smoking and Alcohol cessation was offered when appropriate.  Counseling on Diet, exercise, and medication compliance was done.
mb
Advanced care planning was discussed with patient and family.  Advanced care planning forms were reviewed and discussed as appropriate.  Differential diagnosis and plan of care discussed with patient after the evaluation.   Pain assessed and judicious use of narcotics when appropriate was discussed.  Importance of Fall prevention discussed.  Counseling on Smoking and Alcohol cessation was offered when appropriate.  Counseling on Diet, exercise, and medication compliance was done.

## 2021-08-09 NOTE — PROGRESS NOTE ADULT - PROBLEM SELECTOR PLAN 2
CT chest with small b/l pl effusions and pulm edema   -TTE with EF 30/35%, mild/mod MR  -Elevated ProBNP, continue to trend  -Diuresis per cards, keep O>I as tolerated  -Wean O2 as tolerated, keep sats >90% (currently 2LNC)   -Incentive spirometry as able

## 2021-08-09 NOTE — PROGRESS NOTE ADULT - PROBLEM SELECTOR PLAN 4
-Per stroke team.  -D/c planning per primary team.   -Palliative care f/u for ongoing GOC discussions

## 2021-08-09 NOTE — PROGRESS NOTE ADULT - SUBJECTIVE AND OBJECTIVE BOX
Chief Complaint:  Patient is a 89y old  Female who presents with a chief complaint of stroke transfer (09 Aug 2021 12:36)      Interval Events:   pt. tolerating GTube feedind  Allergies:  No Known Allergies        Home Medications:  apixaban 5 mg oral tablet: 1 tab(s) orally every 12 hours (09 Aug 2021 14:18)  atenolol 25 mg oral tablet: 1 tab(s) orally once a day (09 Aug 2021 14:18)  atorvastatin 80 mg oral tablet: 1 tab(s) orally once a day (at bedtime) (09 Aug 2021 14:18)  budesonide 0.5 mg/2 mL inhalation suspension: 2 milliliter(s) inhaled 2 times a day (09 Aug 2021 14:18)  furosemide 40 mg/5 mL oral solution: 5 milliliter(s) orally once a day (09 Aug 2021 14:18)  gabapentin 250 mg/5 mL oral solution: 2 milliliter(s) orally once a day (at bedtime) (09 Aug 2021 14:18)  ipratropium-albuterol 0.5 mg-2.5 mg/3 mL inhalation solution: 3 milliliter(s) inhaled every 6 hours (09 Aug 2021 14:18)  melatonin 3 mg oral tablet: 1 tab(s) orally once a day (at bedtime) (09 Aug 2021 14:18)  Multiple Vitamins oral tablet: 1 tab(s) orally once a day (09 Aug 2021 14:18)  pantoprazole 40 mg oral granule, delayed release: 40 milligram(s) by gastrostomy tube once a day (09 Aug 2021 14:18)  polyethylene glycol 3350 oral powder for reconstitution: 17 gram(s) orally once a day (09 Aug 2021 14:18)  senna oral tablet: 1 tab(s) orally once a day (09 Aug 2021 14:18)  zaleplon 5 mg oral capsule: 1 cap(s) orally once a day (at bedtime), As needed, Insomnia (09 Aug 2021 14:18)        Hospital Medications:  albuterol/ipratropium for Nebulization 3 milliLiter(s) Nebulizer every 6 hours  apixaban 5 milliGRAM(s) Oral every 12 hours  ATENolol  Tablet 25 milliGRAM(s) Oral daily  atorvastatin 80 milliGRAM(s) Oral at bedtime  buDESOnide    Inhalation Suspension 0.5 milliGRAM(s) Inhalation every 12 hours  furosemide Solution 40 milliGRAM(s) Enteral Tube daily  gabapentin   Solution 100 milliGRAM(s) Enteral Tube at bedtime  glucagon  Injectable 1 milliGRAM(s) IntraMuscular once  melatonin 3 milliGRAM(s) Oral at bedtime  multivitamin 1 Tablet(s) Oral daily  polyethylene glycol 3350 17 Gram(s) Oral daily  senna 1 Tablet(s) Oral daily    MEDICATIONS  (PRN):  acetaminophen   Tablet .. 650 milliGRAM(s) Oral every 6 hours PRN Temp greater or equal to 38C (100.4F), Mild Pain (1 - 3), Moderate Pain (4 - 6), Severe Pain (7 - 10)  OLANZapine 2.5 milliGRAM(s) Oral daily PRN anxiety  zaleplon 5 milliGRAM(s) Oral at bedtime PRN Insomnia    ___________  Active diet:  Diet, NPO with Tube Feed:   Tube Feeding Modality: Gastrostomy  Jevity 1.2 Wei (JEVITY1.2RTH)  Total Volume for 24 Hours (mL): 800  Intermittent  Starting Tube Feed Rate mL per Hour: 10  Increase Tube Feed Rate by (mL): 5    Every 6 hours  Until Goal Tube Feed Rate (mL per Hour): 50  Tube Feeding Hours ON: 16  Tube Feeding OFF (Hours): 8  Tube Feed Start Time: 06:00  ___________________        ROS  GI: [- ] Nausea, [- ] vomiting, [ -]abdominal pain    PHYSICAL EXAM:   Vital Signs:  Vital Signs Last 24 Hrs  T(C): 36.3 (09 Aug 2021 15:46), Max: 36.8 (09 Aug 2021 08:01)  T(F): 97.3 (09 Aug 2021 15:46), Max: 98.3 (09 Aug 2021 08:01)  HR: 71 (09 Aug 2021 15:46) (70 - 80)  BP: 125/80 (09 Aug 2021 15:46) (121/76 - 131/84)  BP(mean): --  RR: 19 (09 Aug 2021 15:46) (18 - 22)  SpO2: 99% (09 Aug 2021 15:46) (96% - 99%)  Daily     Daily     GENERAL:  Appears stated age, well-groomed, well-nourished, no distress  HEENT:  NC/AT,  conjunctivae clear and pink, no thyromegaly, nodules, adenopathy, no JVD, sclera -anicteric  NECK: Supple, No masses  CHEST:  B/L Decreased breath sounds   HEART:  Regular rhythm, S1, S2, no murmur/rub/S3/S4, no abdominal bruit, no edema  ABDOMEN:  Soft, non-tender, non-distended, normoactive bowel sounds,  no masses ,no hepato-splenomegaly, no signs of chronic liver disease  EXTEREMITIES:  no cyanosis,clubbing or edema  SKIN:  No rash/erythema/ecchymoses/petechiae/wounds/abscess/warm/dry  LN: No lymphadenopathy, No masses  NEURO:  Alert, oriented, no asterixis, no tremor, no encephalopathy    LABS:                    Imaging:

## 2021-08-09 NOTE — PROGRESS NOTE ADULT - PROBLEM SELECTOR PROBLEM 1
Noisy breathing
R/O COPD (chronic obstructive pulmonary disease)
Dysphagia, unspecified type
Cerebrovascular accident (CVA), unspecified mechanism
R/O COPD (chronic obstructive pulmonary disease)
Cerebrovascular accident (CVA), unspecified mechanism
R/O COPD (chronic obstructive pulmonary disease)
Dysphagia, unspecified type
R/O COPD (chronic obstructive pulmonary disease)
Cerebrovascular accident (CVA), unspecified mechanism
R/O COPD (chronic obstructive pulmonary disease)

## 2021-08-09 NOTE — PROGRESS NOTE ADULT - REASON FOR ADMISSION
stroke transfer

## 2021-08-14 ENCOUNTER — INPATIENT (INPATIENT)
Facility: HOSPITAL | Age: 86
LOS: 8 days | Discharge: NURSING, REHAB OR LONG-TERM CARE | DRG: 299 | End: 2021-08-23
Attending: INTERNAL MEDICINE | Admitting: INTERNAL MEDICINE
Payer: MEDICARE

## 2021-08-14 VITALS
SYSTOLIC BLOOD PRESSURE: 122 MMHG | TEMPERATURE: 98 F | OXYGEN SATURATION: 98 % | DIASTOLIC BLOOD PRESSURE: 65 MMHG | HEIGHT: 64 IN | RESPIRATION RATE: 20 BRPM | WEIGHT: 175.05 LBS | HEART RATE: 69 BPM

## 2021-08-14 DIAGNOSIS — N39.0 URINARY TRACT INFECTION, SITE NOT SPECIFIED: ICD-10-CM

## 2021-08-14 DIAGNOSIS — I50.22 CHRONIC SYSTOLIC (CONGESTIVE) HEART FAILURE: ICD-10-CM

## 2021-08-14 DIAGNOSIS — Z87.19 PERSONAL HISTORY OF OTHER DISEASES OF THE DIGESTIVE SYSTEM: Chronic | ICD-10-CM

## 2021-08-14 DIAGNOSIS — Z29.9 ENCOUNTER FOR PROPHYLACTIC MEASURES, UNSPECIFIED: ICD-10-CM

## 2021-08-14 DIAGNOSIS — I63.9 CEREBRAL INFARCTION, UNSPECIFIED: ICD-10-CM

## 2021-08-14 DIAGNOSIS — I48.19 OTHER PERSISTENT ATRIAL FIBRILLATION: ICD-10-CM

## 2021-08-14 DIAGNOSIS — R09.89 OTHER SPECIFIED SYMPTOMS AND SIGNS INVOLVING THE CIRCULATORY AND RESPIRATORY SYSTEMS: ICD-10-CM

## 2021-08-14 DIAGNOSIS — G93.40 ENCEPHALOPATHY, UNSPECIFIED: ICD-10-CM

## 2021-08-14 DIAGNOSIS — Z90.710 ACQUIRED ABSENCE OF BOTH CERVIX AND UTERUS: Chronic | ICD-10-CM

## 2021-08-14 DIAGNOSIS — J96.11 CHRONIC RESPIRATORY FAILURE WITH HYPOXIA: ICD-10-CM

## 2021-08-14 LAB
ALBUMIN SERPL ELPH-MCNC: 3.6 G/DL — SIGNIFICANT CHANGE UP (ref 3.3–5)
ALP SERPL-CCNC: 102 U/L — SIGNIFICANT CHANGE UP (ref 40–120)
ALT FLD-CCNC: 37 U/L — SIGNIFICANT CHANGE UP (ref 10–45)
ANION GAP SERPL CALC-SCNC: 15 MMOL/L — SIGNIFICANT CHANGE UP (ref 5–17)
APPEARANCE UR: ABNORMAL
AST SERPL-CCNC: 47 U/L — HIGH (ref 10–40)
BACTERIA # UR AUTO: ABNORMAL
BASE EXCESS BLDV CALC-SCNC: 9.7 MMOL/L — HIGH (ref -2–2)
BASOPHILS # BLD AUTO: 0.06 K/UL — SIGNIFICANT CHANGE UP (ref 0–0.2)
BASOPHILS NFR BLD AUTO: 0.5 % — SIGNIFICANT CHANGE UP (ref 0–2)
BILIRUB SERPL-MCNC: 1.1 MG/DL — SIGNIFICANT CHANGE UP (ref 0.2–1.2)
BILIRUB UR-MCNC: NEGATIVE — SIGNIFICANT CHANGE UP
BUN SERPL-MCNC: 55 MG/DL — HIGH (ref 7–23)
CA-I SERPL-SCNC: 1.12 MMOL/L — SIGNIFICANT CHANGE UP (ref 1.12–1.3)
CALCIUM SERPL-MCNC: 9.9 MG/DL — SIGNIFICANT CHANGE UP (ref 8.4–10.5)
CHLORIDE BLDV-SCNC: 100 MMOL/L — SIGNIFICANT CHANGE UP (ref 96–108)
CHLORIDE SERPL-SCNC: 97 MMOL/L — SIGNIFICANT CHANGE UP (ref 96–108)
CO2 BLDV-SCNC: 38 MMOL/L — HIGH (ref 22–30)
CO2 SERPL-SCNC: 29 MMOL/L — SIGNIFICANT CHANGE UP (ref 22–31)
COLOR SPEC: YELLOW — SIGNIFICANT CHANGE UP
CREAT SERPL-MCNC: 1.17 MG/DL — SIGNIFICANT CHANGE UP (ref 0.5–1.3)
DIFF PNL FLD: ABNORMAL
EOSINOPHIL # BLD AUTO: 0.32 K/UL — SIGNIFICANT CHANGE UP (ref 0–0.5)
EOSINOPHIL NFR BLD AUTO: 2.8 % — SIGNIFICANT CHANGE UP (ref 0–6)
EPI CELLS # UR: 5 — SIGNIFICANT CHANGE UP
GAS PNL BLDV: 140 MMOL/L — SIGNIFICANT CHANGE UP (ref 135–145)
GAS PNL BLDV: SIGNIFICANT CHANGE UP
GAS PNL BLDV: SIGNIFICANT CHANGE UP
GLUCOSE BLDV-MCNC: 135 MG/DL — HIGH (ref 70–99)
GLUCOSE SERPL-MCNC: 143 MG/DL — HIGH (ref 70–99)
GLUCOSE UR QL: NEGATIVE — SIGNIFICANT CHANGE UP
HCO3 BLDV-SCNC: 36 MMOL/L — HIGH (ref 21–29)
HCT VFR BLD CALC: 42.7 % — SIGNIFICANT CHANGE UP (ref 34.5–45)
HCT VFR BLDA CALC: 44 % — SIGNIFICANT CHANGE UP (ref 39–50)
HGB BLD CALC-MCNC: 14.5 G/DL — SIGNIFICANT CHANGE UP (ref 11.5–15.5)
HGB BLD-MCNC: 13.9 G/DL — SIGNIFICANT CHANGE UP (ref 11.5–15.5)
HYALINE CASTS # UR AUTO: 4 /LPF — HIGH (ref 0–7)
IMM GRANULOCYTES NFR BLD AUTO: 0.4 % — SIGNIFICANT CHANGE UP (ref 0–1.5)
KETONES UR-MCNC: NEGATIVE — SIGNIFICANT CHANGE UP
LACTATE BLDV-MCNC: 2 MMOL/L — SIGNIFICANT CHANGE UP (ref 0.7–2)
LEUKOCYTE ESTERASE UR-ACNC: ABNORMAL
LYMPHOCYTES # BLD AUTO: 2.94 K/UL — SIGNIFICANT CHANGE UP (ref 1–3.3)
LYMPHOCYTES # BLD AUTO: 26 % — SIGNIFICANT CHANGE UP (ref 13–44)
MCHC RBC-ENTMCNC: 32.1 PG — SIGNIFICANT CHANGE UP (ref 27–34)
MCHC RBC-ENTMCNC: 32.6 GM/DL — SIGNIFICANT CHANGE UP (ref 32–36)
MCV RBC AUTO: 98.6 FL — SIGNIFICANT CHANGE UP (ref 80–100)
MONOCYTES # BLD AUTO: 1.43 K/UL — HIGH (ref 0–0.9)
MONOCYTES NFR BLD AUTO: 12.7 % — SIGNIFICANT CHANGE UP (ref 2–14)
NEUTROPHILS # BLD AUTO: 6.5 K/UL — SIGNIFICANT CHANGE UP (ref 1.8–7.4)
NEUTROPHILS NFR BLD AUTO: 57.6 % — SIGNIFICANT CHANGE UP (ref 43–77)
NITRITE UR-MCNC: NEGATIVE — SIGNIFICANT CHANGE UP
NRBC # BLD: 0 /100 WBCS — SIGNIFICANT CHANGE UP (ref 0–0)
OTHER CELLS CSF MANUAL: 13 ML/DL — LOW (ref 18–22)
PCO2 BLDV: 56 MMHG — HIGH (ref 35–50)
PH BLDV: 7.42 — SIGNIFICANT CHANGE UP (ref 7.35–7.45)
PH UR: 6.5 — SIGNIFICANT CHANGE UP (ref 5–8)
PLATELET # BLD AUTO: 303 K/UL — SIGNIFICANT CHANGE UP (ref 150–400)
PO2 BLDV: 38 MMHG — SIGNIFICANT CHANGE UP (ref 25–45)
POTASSIUM BLDV-SCNC: 4.5 MMOL/L — SIGNIFICANT CHANGE UP (ref 3.5–5.3)
POTASSIUM SERPL-MCNC: 4.1 MMOL/L — SIGNIFICANT CHANGE UP (ref 3.5–5.3)
POTASSIUM SERPL-SCNC: 4.1 MMOL/L — SIGNIFICANT CHANGE UP (ref 3.5–5.3)
PROT SERPL-MCNC: 7.2 G/DL — SIGNIFICANT CHANGE UP (ref 6–8.3)
PROT UR-MCNC: ABNORMAL
RBC # BLD: 4.33 M/UL — SIGNIFICANT CHANGE UP (ref 3.8–5.2)
RBC # FLD: 14.4 % — SIGNIFICANT CHANGE UP (ref 10.3–14.5)
RBC CASTS # UR COMP ASSIST: 9 /HPF — HIGH (ref 0–4)
SAO2 % BLDV: 66 % — LOW (ref 67–88)
SARS-COV-2 RNA SPEC QL NAA+PROBE: SIGNIFICANT CHANGE UP
SODIUM SERPL-SCNC: 141 MMOL/L — SIGNIFICANT CHANGE UP (ref 135–145)
SP GR SPEC: 1.02 — SIGNIFICANT CHANGE UP (ref 1.01–1.02)
TROPONIN T, HIGH SENSITIVITY RESULT: 30 NG/L — SIGNIFICANT CHANGE UP (ref 0–51)
UROBILINOGEN FLD QL: ABNORMAL
WBC # BLD: 11.29 K/UL — HIGH (ref 3.8–10.5)
WBC # FLD AUTO: 11.29 K/UL — HIGH (ref 3.8–10.5)
WBC UR QL: 1809 /HPF — HIGH (ref 0–5)

## 2021-08-14 PROCEDURE — 71045 X-RAY EXAM CHEST 1 VIEW: CPT | Mod: 26

## 2021-08-14 PROCEDURE — 99284 EMERGENCY DEPT VISIT MOD MDM: CPT | Mod: GC

## 2021-08-14 PROCEDURE — 93308 TTE F-UP OR LMTD: CPT | Mod: 26

## 2021-08-14 PROCEDURE — 99223 1ST HOSP IP/OBS HIGH 75: CPT

## 2021-08-14 PROCEDURE — 70450 CT HEAD/BRAIN W/O DYE: CPT | Mod: 26,MD

## 2021-08-14 RX ORDER — SODIUM CHLORIDE 9 MG/ML
250 INJECTION INTRAMUSCULAR; INTRAVENOUS; SUBCUTANEOUS ONCE
Refills: 0 | Status: COMPLETED | OUTPATIENT
Start: 2021-08-14 | End: 2021-08-14

## 2021-08-14 RX ORDER — APIXABAN 2.5 MG/1
5 TABLET, FILM COATED ORAL EVERY 12 HOURS
Refills: 0 | Status: DISCONTINUED | OUTPATIENT
Start: 2021-08-14 | End: 2021-08-23

## 2021-08-14 RX ORDER — FUROSEMIDE 40 MG
40 TABLET ORAL DAILY
Refills: 0 | Status: DISCONTINUED | OUTPATIENT
Start: 2021-08-14 | End: 2021-08-23

## 2021-08-14 RX ORDER — ATORVASTATIN CALCIUM 80 MG/1
80 TABLET, FILM COATED ORAL AT BEDTIME
Refills: 0 | Status: DISCONTINUED | OUTPATIENT
Start: 2021-08-14 | End: 2021-08-23

## 2021-08-14 RX ORDER — ATENOLOL 25 MG/1
25 TABLET ORAL DAILY
Refills: 0 | Status: DISCONTINUED | OUTPATIENT
Start: 2021-08-14 | End: 2021-08-23

## 2021-08-14 RX ORDER — BUDESONIDE, MICRONIZED 100 %
0.5 POWDER (GRAM) MISCELLANEOUS
Refills: 0 | Status: DISCONTINUED | OUTPATIENT
Start: 2021-08-14 | End: 2021-08-23

## 2021-08-14 RX ORDER — BUDESONIDE, MICRONIZED 100 %
2 POWDER (GRAM) MISCELLANEOUS
Qty: 0 | Refills: 0 | DISCHARGE

## 2021-08-14 RX ORDER — CEFTRIAXONE 500 MG/1
1000 INJECTION, POWDER, FOR SOLUTION INTRAMUSCULAR; INTRAVENOUS EVERY 24 HOURS
Refills: 0 | Status: DISCONTINUED | OUTPATIENT
Start: 2021-08-15 | End: 2021-08-18

## 2021-08-14 RX ORDER — GABAPENTIN 400 MG/1
100 CAPSULE ORAL DAILY
Refills: 0 | Status: DISCONTINUED | OUTPATIENT
Start: 2021-08-14 | End: 2021-08-23

## 2021-08-14 RX ORDER — LANOLIN ALCOHOL/MO/W.PET/CERES
3 CREAM (GRAM) TOPICAL AT BEDTIME
Refills: 0 | Status: DISCONTINUED | OUTPATIENT
Start: 2021-08-14 | End: 2021-08-23

## 2021-08-14 RX ORDER — SENNA PLUS 8.6 MG/1
1 TABLET ORAL DAILY
Refills: 0 | Status: DISCONTINUED | OUTPATIENT
Start: 2021-08-14 | End: 2021-08-23

## 2021-08-14 RX ORDER — ACETAMINOPHEN 500 MG
650 TABLET ORAL EVERY 6 HOURS
Refills: 0 | Status: DISCONTINUED | OUTPATIENT
Start: 2021-08-14 | End: 2021-08-23

## 2021-08-14 RX ORDER — CEFTRIAXONE 500 MG/1
1000 INJECTION, POWDER, FOR SOLUTION INTRAMUSCULAR; INTRAVENOUS ONCE
Refills: 0 | Status: COMPLETED | OUTPATIENT
Start: 2021-08-14 | End: 2021-08-14

## 2021-08-14 RX ORDER — PANTOPRAZOLE SODIUM 20 MG/1
40 TABLET, DELAYED RELEASE ORAL DAILY
Refills: 0 | Status: DISCONTINUED | OUTPATIENT
Start: 2021-08-14 | End: 2021-08-23

## 2021-08-14 RX ORDER — POLYETHYLENE GLYCOL 3350 17 G/17G
17 POWDER, FOR SOLUTION ORAL DAILY
Refills: 0 | Status: DISCONTINUED | OUTPATIENT
Start: 2021-08-14 | End: 2021-08-23

## 2021-08-14 RX ORDER — ONDANSETRON 8 MG/1
4 TABLET, FILM COATED ORAL EVERY 8 HOURS
Refills: 0 | Status: DISCONTINUED | OUTPATIENT
Start: 2021-08-14 | End: 2021-08-23

## 2021-08-14 RX ADMIN — ATORVASTATIN CALCIUM 80 MILLIGRAM(S): 80 TABLET, FILM COATED ORAL at 23:05

## 2021-08-14 RX ADMIN — SODIUM CHLORIDE 250 MILLILITER(S): 9 INJECTION INTRAMUSCULAR; INTRAVENOUS; SUBCUTANEOUS at 17:13

## 2021-08-14 RX ADMIN — Medication 0.5 MILLIGRAM(S): at 23:05

## 2021-08-14 RX ADMIN — CEFTRIAXONE 100 MILLIGRAM(S): 500 INJECTION, POWDER, FOR SOLUTION INTRAMUSCULAR; INTRAVENOUS at 16:58

## 2021-08-14 RX ADMIN — SENNA PLUS 1 TABLET(S): 8.6 TABLET ORAL at 23:10

## 2021-08-14 RX ADMIN — Medication 3 MILLIGRAM(S): at 23:05

## 2021-08-14 NOTE — CONSULT NOTE ADULT - ATTENDING COMMENTS
NEUROLOGY ATTENDING: LESA     CC: CONFUSION, RECENT L MCA CVA    PATIENT SEEN AND EXAMINED 8/15/21  [facility] NOTES REVIEWED  IMAGING REVIEWED  LABS REVIEWED    Briefly, 89 yo RH woman with AFIB on eliquus, with chronic O2 requirement, ovarian + colon cancers, recent L MCA CVA, s/p PEG, brought in from [place] [facility] after her son found her confused, off her usual oxygen supplementation.    On examination, she is alert, comfortable, and globally aphasic, with right hemiparesis.    IMAGING   CT demonstrates significant cortical atrophy    IMPRESSION  Challenging situation as she is devastated by the left sided MCA CVA and prone to episodes of confusion and unable to use language appropriately. NEUROLOGY ATTENDING: LESA     CC: CONFUSION, RECENT L MCA CVA    PATIENT SEEN AND EXAMINED 8/15/21  [facility] NOTES REVIEWED  IMAGING REVIEWED  LABS REVIEWED    Briefly, 89 yo RH woman with AFIB on eliquus, with chronic O2 requirement, ovarian + colon cancers, recent L MCA CVA, s/p PEG, brought in from [place] [facility] after her son found her confused, off her usual oxygen supplementation.    On examination, she is alert, comfortable, and globally aphasic, with right hemiparesis.    I note the examination in [place] dated 8/9/2021, upon discharge to rehab, is described similarly as, "Awake, alert, does gesture facial expressions relatively in context to situation (smiles appropriately). Not following commands, perseverates on one action. Can mimic physical actions. Generates unintelligible sounds."    IMAGING   CT demonstrates significant cortical atrophy    IMPRESSION  Challenging situation as she is devastated by the left sided MCA CVA and prone to episodes of confusion and unable to use language appropriately. Patient appears stable, when compared with condition upon discharge. She may improve over next 3-6 months.

## 2021-08-14 NOTE — H&P ADULT - NSHPPHYSICALEXAM_GEN_ALL_CORE
Vital Signs Last 24 Hrs  T(C): 36.4 (08-14-21 @ 17:15), Max: 37.3 (08-14-21 @ 15:59)  T(F): 97.6 (08-14-21 @ 17:15), Max: 99.2 (08-14-21 @ 15:59)  HR: 70 (08-14-21 @ 18:15) (69 - 71)  BP: 140/65 (08-14-21 @ 18:15) (122/65 - 150/62)  BP(mean): 84 (08-14-21 @ 17:15) (84 - 84)  RR: 22 (08-14-21 @ 18:15) (20 - 23)  SpO2: 99% (08-14-21 @ 18:15) (98% - 100%)

## 2021-08-14 NOTE — H&P ADULT - NSHPADDITIONALINFOADULT_GEN_ALL_CORE
I was asked to see this patient by the hospitalist in charge. Dr. Valdovinos to assume care for patient in AM and thereafter.

## 2021-08-14 NOTE — ED ADULT NURSE NOTE - OBJECTIVE STATEMENT
89y/o F coming to the ED from NH s/p syncopal episode. As per EMS, pt's son was visiting and she was sitting in a chair when she went unresponsive for 5minutes and is unable to follow commands. As per son, pt had a stroke recently was has had dysarthria since and had a peg tube placed d/t failed dysphagia. As per son, pt is more lethargic than normal and is A&Ox2 @ baseline. On exam, pt is breathing spontaneously, unable to speak & 99% on 3L @ baseline. Heart monitor placed, pacemaker noted. EKG completed. Abdomen soft, nontender, nondistended, Peg tube noted. Pt straight cathed using sterile technique.  2 RNs present. Ecchymosis noted to R hip, pt on blood thinners.  Pt tolerated procedure well. Sterile specimen collected. UA and Culture sent. 300cc of dark yellow urine noted. 2IVs placed, labs collected and sent. Son @ bedside and explained call bell system. Bed placed in lowest position with both side rails up. 89y/o F coming to the ED from NH s/p syncopal episode. As per EMS, pt's son was visiting and she was sitting in a chair when she went unresponsive for 5minutes. As per son, pt had a stroke recently was has had dysarthria since and had a peg tube placed d/t failed dysphagia. As per son, pt is more lethargic than normal and is A&Ox2 @ baseline and has been declining since being at the nursing home. On exam, pt is breathing spontaneously, unable to speak & 99% on 3L @ baseline. Heart monitor placed, pacemaker noted. EKG completed. Abdomen soft, nontender, nondistended, Peg tube noted. Pt straight cathed using sterile technique.  2 RNs present. Ecchymosis noted to R hip, pt on blood thinners.  Pt tolerated procedure well. Sterile specimen collected. UA and Culture sent. 300cc of dark yellow urine noted. 2IVs placed, labs collected and sent. Son @ bedside and explained call bell system. Bed placed in lowest position with both side rails up. 89y/o F coming to the ED from NH s/p syncopal episode. As per EMS, pt's son was visiting and she was sitting in a chair when she went unresponsive for 5minutes. As per son, pt had a stroke recently was has had dysarthria since and had a peg tube placed d/t failed dysphagia. As per son, pt is more lethargic than normal and is A&Ox2 @ baseline and has been declining since being at the nursing home. Son denies any reports of falls, vomiting or diarrhea. On exam, pt is breathing spontaneously, unable to speak & 99% on 3L @ baseline. Heart monitor placed, pacemaker noted. EKG completed. Abdomen soft, nontender, nondistended, Peg tube noted. Pt straight cathed using sterile technique.  2 RNs present. Ecchymosis noted to R hip, pt on blood thinners.  Pt tolerated procedure well. Sterile specimen collected. UA and Culture sent. 300cc of dark yellow urine noted. 2IVs placed, labs collected and sent. Son @ bedside and explained call bell system. Bed placed in lowest position with both side rails up.

## 2021-08-14 NOTE — H&P ADULT - HISTORY OF PRESENT ILLNESS
89 y/o F with PMHx of afib recently started on eliquis, s/p PPM, w/ recent L MCA CVA, chronic respiratory failure on 02, HTN, ovarian and colon ca, GERD p/w episode of acute encephalopathy. Pt was recently admitted to [facility] after being discovered with new  Left MCA infarction, Left M1 occlusion. Recannulization was unsuccessful. Pt now with residual R facial droop, hemiplegia and dysarthria. Was started on eliquis, had PEG tube placed for dysphagia and eventually discharged to [place] rehab 5 days ago. Pt's son was visiting her today when he found her in hallway without her oxygen on. Pt was altered compared to baseline and seemed borderline non-responsive. She was then rushed to [facility] ER for further evaluation. Pt's son states she is now back to her baseline mental status and without distress. Cautions resumption of tube feeds as she could not tolerate 60 ccs per hour last admission.    In ER: Given , ceftriaxone 1gm IV

## 2021-08-14 NOTE — ED PROVIDER NOTE - ATTENDING CONTRIBUTION TO CARE
Attending MD Raquel Paul:  I personally have seen and examined this patient.  Resident note reviewed and agree on plan of care and except where noted.  See HPI, PE, and MDM for details.

## 2021-08-14 NOTE — H&P ADULT - PROBLEM SELECTOR PLAN 4
S/p large L sided MCA CVA. Residual R sided deficits, facial droop and severe dysarthria. Severe dysphagia. Failed speech/swallow eval here but reportedly passed in [place]??  -Aspiration precautions  -NPO for now  -Nutrition consult for tube feeds  -Gentle IVF overnight  -Cont. atorvastatin

## 2021-08-14 NOTE — ED PROVIDER NOTE - PHYSICAL EXAMINATION
Attending Raquel Paul: Gen: chronically ill appearing heent: atrauamtic, perrla,dry mucous membranes, op pink, uvula midline, neck; nttp, no nuchal rigidity, chest: nttp, no crepitus, cv: rrr +murmurlungs: ctab, abd: soft, nontender, nondistended, no peritoneal signs, , ext: wwp,ecchymoes to right groin area, no thriss, skin: no rash, neuro: awake not following commands

## 2021-08-14 NOTE — H&P ADULT - PROBLEM SELECTOR PLAN 5
S/p PPM. S/p recent CVA likely of cardioembolic origin  -Cont. eliquis 5mg BID  -Cont. atenolol daily

## 2021-08-14 NOTE — ED ADULT NURSE NOTE - NSIMPLEMENTINTERV_GEN_ALL_ED
Implemented All Fall with Harm Risk Interventions:  [place] to call system. Call bell, personal items and telephone within reach. Instruct patient to call for assistance. Room bathroom lighting operational. Non-slip footwear when patient is off stretcher. Physically safe environment: no spills, clutter or unnecessary equipment. Stretcher in lowest position, wheels locked, appropriate side rails in place. Provide visual cue, wrist band, yellow gown, etc. Monitor gait and stability. Monitor for mental status changes and reorient to person, place, and time. Review medications for side effects contributing to fall risk. Reinforce activity limits and safety measures with patient and family. Provide visual clues: red socks.

## 2021-08-14 NOTE — H&P ADULT - ASSESSMENT
89 y/o F with PMHx of afib recently started on eliquis, s/p PPM, w/ recent L MCA CVA, chronic respiratory failure on 02, HTN, ovarian and colon ca, GERD p/w episode of acute encephalopathy

## 2021-08-14 NOTE — ED PROVIDER NOTE - CLINICAL SUMMARY MEDICAL DECISION MAKING FREE TEXT BOX
Jazlyn Mcadams MD, PGY-2: 89 yo female with complex pmh including h/o recent cva where thrombectomy was attempted but unable to retrieve clot, heart disease, on home oxygen 3L bibems for concern for ams, 1x syncope episode. VS stable. suspect hemorrhagic conversion, delirium secondary to infection. plan for ct head, infectious workup, admission. Jazlyn Mcadams MD, PGY-2: 89 yo female with complex pmh including h/o recent cva where thrombectomy was attempted but unable to retrieve clot, heart disease, on home oxygen 3L bibems for concern for ams, 1x syncope episode. VS stable. suspect hemorrhagic conversion, delirium secondary to infection. plan for ct head, infectious workup, admission.  Attending Raquel Paul: 89 yo female with multiple medical issues including h/o recent cva presenting with weakness and concern for mental status change. upon arrival pt chronically ill appearing. pt is on chronic oxygen. pocus shows a line predominant lung fields with flat ivc with reduced EF. pt started on anticoagulation. ct head preformed showing no acute intracrnaial hemorrhage. neurology consulted. unclear whether seizure as cause however less likely. concern for metabolic vs infectious etiology. UA positive for infection, reviewed recent cultures and given rocephin. will admit

## 2021-08-14 NOTE — ED PROVIDER NOTE - NS ED ROS FT
Gen: Denies fevers  Skin: denies color changes  Resp: Denies SOB, cough  GI: Denies nausea, vomiting  : Denies dysuria, changes in frequency  Neuro: Denies LOC

## 2021-08-14 NOTE — CONSULT NOTE ADULT - SUBJECTIVE AND OBJECTIVE BOX
HPI:  89 y/o F with PMHx of afib recently started on eliquis, s/p PPM, w/ recent L MCA CVA, chronic respiratory failure on 02, HTN, ovarian and colon ca, GERD p/w episode of acute encephalopathy. Pt was recently admitted to [facility] after being discovered with new  Left MCA infarction, Left M1 occlusion. Recannulization was unsuccessful. Pt now with residual R facial droop, hemiplegia and dysarthria. Was started on eliquis, had PEG tube placed for dysphagia and eventually discharged to [place] [facility] 5 days ago. Pt's son was visiting her today when he found her in hallway without her oxygen on. Pt was altered compared to baseline and seemed borderline non-responsive. She was then rushed to [facility] ER for further evaluation. Pt's son states she is now back to her baseline mental status and without distress. Cautions resumption of tube feeds as she could not tolerate 60 ccs per hour last admission.    In ER: Given , ceftriaxone 1gm IV (14 Aug 2021 20:45)    Neurology consulted for AMS episode. Patient unable to contribute to history. History obtained via chart review.     REVIEW OF SYSTEMS  A 10-system ROS was performed and is negative except for those items noted above and/or in the HPI.    PAST MEDICAL & SURGICAL HISTORY:  Hypertension    Cancer    GERD (gastroesophageal reflux disease)    Anxiety    Ovarian cancer    Colon cancer    H/O small bowel obstruction    H/O total hysterectomy  with BSO    No significant past surgical history      FAMILY HISTORY:  No pertinent family history in first degree relatives    No pertinent family history in first degree relatives      SOCIAL HISTORY:   T/E/D:   Occupation:   Lives with:     MEDICATIONS (HOME):  Home Medications:  apixaban 5 mg oral tablet: 1 tab(s) orally every 12 hours (14 Aug 2021 19:59)  atenolol 25 mg oral tablet: 1 tab(s) orally once a day (14 Aug 2021 19:59)  atorvastatin 80 mg oral tablet: 1 tab(s) orally once a day (at bedtime) (14 Aug 2021 19:59)  budesonide 0.5 mg/2 mL inhalation suspension: 2 milliliter(s) inhaled 2 times a day (14 Aug 2021 19:59)  Calmoseptine 0.44%-20.6% topical ointment: Apply to buttock area q shift for protection (14 Aug 2021 19:59)  furosemide 40 mg/5 mL oral solution: 5 milliliter(s) orally once a day (14 Aug 2021 19:59)  gabapentin 250 mg/5 mL oral solution: 2 milliliter(s) orally once a day (at bedtime) (14 Aug 2021 19:59)  ipratropium-albuterol 0.5 mg-2.5 mg/3 mL inhalation solution: 3 milliliter(s) inhaled every 6 hours (14 Aug 2021 19:59)  melatonin 3 mg oral tablet: 1 tab(s) orally once a day (at bedtime) (14 Aug 2021 19:59)  Multiple Vitamins oral tablet: 1 tab(s) orally once a day (14 Aug 2021 19:59)  pantoprazole 40 mg oral granule, delayed release: 40 milligram(s) by gastrostomy tube once a day (14 Aug 2021 19:59)  polyethylene glycol 3350 oral powder for reconstitution: 17 gram(s) orally once a day (14 Aug 2021 19:59)  senna oral tablet: 1 tab(s) orally once a day (14 Aug 2021 19:59)  zaleplon 5 mg oral capsule: 1 cap(s) orally once a day (at bedtime), As needed, Insomnia (14 Aug 2021 19:59)    MEDICATIONS  (STANDING):  apixaban 5 milliGRAM(s) Enteral Tube every 12 hours  ATENolol  Tablet 25 milliGRAM(s) Enteral Tube daily  atorvastatin 80 milliGRAM(s) Oral at bedtime  buDESOnide    Inhalation Suspension 0.5 milliGRAM(s) Inhalation two times a day  furosemide Solution 40 milliGRAM(s) Oral daily  gabapentin   Solution 100 milliGRAM(s) Oral daily  melatonin 3 milliGRAM(s) Oral at bedtime  multivitamin 1 Tablet(s) Oral daily  pantoprazole   Suspension 40 milliGRAM(s) Oral daily  polyethylene glycol 3350 17 Gram(s) Oral daily  senna 1 Tablet(s) Oral daily    MEDICATIONS  (PRN):  acetaminophen   Tablet .. 650 milliGRAM(s) Oral every 6 hours PRN Temp greater or equal to 38.5C (101.3F), Mild Pain (1 - 3)  ondansetron Injectable 4 milliGRAM(s) IV Push every 8 hours PRN Nausea and/or Vomiting    ALLERGIES/INTOLERANCES:  Allergies  No Known Allergies    Intolerances    VITALS & EXAMINATION:  Vital Signs Last 24 Hrs  T(C): 36.4 (14 Aug 2021 17:15), Max: 37.3 (14 Aug 2021 15:59)  T(F): 97.6 (14 Aug 2021 17:15), Max: 99.2 (14 Aug 2021 15:59)  HR: 70 (14 Aug 2021 18:15) (69 - 71)  BP: 140/65 (14 Aug 2021 18:15) (122/65 - 150/62)  BP(mean): 84 (14 Aug 2021 17:15) (84 - 84)  RR: 22 (14 Aug 2021 18:15) (20 - 23)  SpO2: 99% (14 Aug 2021 18:15) (98% - 100%)    General: Elderly Female, appears stated age, in no apparent distress including pain, covering herself with a blanket  Skin: lesion on nose    Neurological (>12):  MS: eyes closed, opens eyes to voice, maintains alertness, does not follow commands    Language: No clear verbal output    CNs: pupils miotic, round, reactive to light (2 mm OU). CVF by BTT intact grossly. EOMI grossly intact as tracks examiner, no nystagmus. Symmetric palate elevation in midline. Gag reflex deferred. Head turning & shoulder shrug intact b/l. Tongue midline, normal movements, no atrophy.    Fundoscopic: deferred    Motor: Decreased muscle bulk throughout.   Moves UE's spontaneously, LUE>RUE  adducts/abducts RLE/LLE within plane of bed, dorsiflexes/plantarflexes RLE>LLE spontaneously, no effort against gravity in LE's     Sensation: intact to noxious stimuli throughout (withdraws extremities)    Cortical: Extinction on DSS (neglect): none    Reflexes: biceps 3+ on right, 2+ on left, plantars mute on right, down on left    Coordination: No dysmetria to FTN/HTS    Gait: deferred    LABORATORY:  CBC                       13.9   11.29 )-----------( 303      ( 14 Aug 2021 16:22 )             42.7     Chem 08-14    141  |  97  |  55<H>  ----------------------------<  143<H>  4.1   |  29  |  1.17    Ca    9.9      14 Aug 2021 16:22  Phos  3.8     08-  Mg     2.4     -    TPro  7.2  /  Alb  3.6  /  TBili  1.1  /  DBili  x   /  AST  47<H>  /  ALT  37  /  AlkPhos  102  -    LFTs LIVER FUNCTIONS - ( 14 Aug 2021 16:22 )  Alb: 3.6 g/dL / Pro: 7.2 g/dL / ALK PHOS: 102 U/L / ALT: 37 U/L / AST: 47 U/L / GGT: x           Coagulopathy   Lipid Panel  Chol 214<H> LDL -- HDL 30<L> Trig 303<H>  A1c   Cardiac enzymes     U/A Urinalysis Basic - ( 14 Aug 2021 16:22 )    Color: Yellow / Appearance: Turbid / S.018 / pH: x  Gluc: x / Ketone: Negative  / Bili: Negative / Urobili: 8 mg/dL   Blood: x / Protein: 30 mg/dL / Nitrite: Negative   Leuk Esterase: Large / RBC: 9 /hpf / WBC 1809 /HPF   Sq Epi: x / Non Sq Epi: 5 / Bacteria: TNTC      CSF  Immunological  Other    STUDIES & IMAGING:  Studies (EKG, EEG, EMG, etc):     Radiology (XR, CT, MR, U/S, TTE/ANA):  < from: CT Head No Cont (21 @ 17:53) >  IMPRESSION:    HEAD CT: Mild volume loss, microvascular disease, no acute hemorrhage or midline shift.  Sequelae of previous left MCA infarct. MRI may be helpful for further evaluation, if clinically indicated.    < end of copied text >   HPI:  89 y/o F with PMHx of afib recently started on eliquis, s/p PPM, w/ recent L MCA CVA, chronic respiratory failure on 02, HTN, ovarian and colon ca, GERD p/w episode of acute encephalopathy. Pt was recently admitted to [facility] after being discovered with new  Left MCA infarction, Left M1 occlusion. Recannulization was unsuccessful. Pt now with residual R facial droop, hemiplegia and dysarthria. Was started on eliquis, had PEG tube placed for dysphagia and eventually discharged to [place] re[facility] 5 days ago. Pt's son was visiting her today when he found her in hallway without her oxygen on. Pt was altered compared to baseline and seemed borderline non-responsive. She was then rushed to [facility] ER for further evaluation. Pt's son states she is now back to her baseline mental status and without distress. Cautions resumption of tube feeds as she could not tolerate 60 ccs per hour last admission.    In ER: Given , ceftriaxone 1gm IV (14 Aug 2021 20:45)    Neurology consulted for AMS episode. Patient unable to contribute to history. History obtained via chart review.     REVIEW OF SYSTEMS  A 10-system ROS was performed and is negative except for those items noted above and/or in the HPI.    PAST MEDICAL & SURGICAL HISTORY:  Hypertension    Cancer    GERD (gastroesophageal reflux disease)    Anxiety    Ovarian cancer    Colon cancer    H/O small bowel obstruction    H/O total hysterectomy  with BSO    No significant past surgical history      FAMILY HISTORY:  No pertinent family history in first degree relatives    No pertinent family history in first degree relatives      SOCIAL HISTORY:   T/E/D:   Occupation:   Lives with:     MEDICATIONS (HOME):  Home Medications:  apixaban 5 mg oral tablet: 1 tab(s) orally every 12 hours (14 Aug 2021 19:59)  atenolol 25 mg oral tablet: 1 tab(s) orally once a day (14 Aug 2021 19:59)  atorvastatin 80 mg oral tablet: 1 tab(s) orally once a day (at bedtime) (14 Aug 2021 19:59)  budesonide 0.5 mg/2 mL inhalation suspension: 2 milliliter(s) inhaled 2 times a day (14 Aug 2021 19:59)  Calmoseptine 0.44%-20.6% topical ointment: Apply to buttock area q shift for protection (14 Aug 2021 19:59)  furosemide 40 mg/5 mL oral solution: 5 milliliter(s) orally once a day (14 Aug 2021 19:59)  gabapentin 250 mg/5 mL oral solution: 2 milliliter(s) orally once a day (at bedtime) (14 Aug 2021 19:59)  ipratropium-albuterol 0.5 mg-2.5 mg/3 mL inhalation solution: 3 milliliter(s) inhaled every 6 hours (14 Aug 2021 19:59)  melatonin 3 mg oral tablet: 1 tab(s) orally once a day (at bedtime) (14 Aug 2021 19:59)  Multiple Vitamins oral tablet: 1 tab(s) orally once a day (14 Aug 2021 19:59)  pantoprazole 40 mg oral granule, delayed release: 40 milligram(s) by gastrostomy tube once a day (14 Aug 2021 19:59)  polyethylene glycol 3350 oral powder for reconstitution: 17 gram(s) orally once a day (14 Aug 2021 19:59)  senna oral tablet: 1 tab(s) orally once a day (14 Aug 2021 19:59)  zaleplon 5 mg oral capsule: 1 cap(s) orally once a day (at bedtime), As needed, Insomnia (14 Aug 2021 19:59)    MEDICATIONS  (STANDING):  apixaban 5 milliGRAM(s) Enteral Tube every 12 hours  ATENolol  Tablet 25 milliGRAM(s) Enteral Tube daily  atorvastatin 80 milliGRAM(s) Oral at bedtime  buDESOnide    Inhalation Suspension 0.5 milliGRAM(s) Inhalation two times a day  furosemide Solution 40 milliGRAM(s) Oral daily  gabapentin   Solution 100 milliGRAM(s) Oral daily  melatonin 3 milliGRAM(s) Oral at bedtime  multivitamin 1 Tablet(s) Oral daily  pantoprazole   Suspension 40 milliGRAM(s) Oral daily  polyethylene glycol 3350 17 Gram(s) Oral daily  senna 1 Tablet(s) Oral daily    MEDICATIONS  (PRN):  acetaminophen   Tablet .. 650 milliGRAM(s) Oral every 6 hours PRN Temp greater or equal to 38.5C (101.3F), Mild Pain (1 - 3)  ondansetron Injectable 4 milliGRAM(s) IV Push every 8 hours PRN Nausea and/or Vomiting    ALLERGIES/INTOLERANCES:  Allergies  No Known Allergies    Intolerances    VITALS & EXAMINATION:  Vital Signs Last 24 Hrs  T(C): 36.4 (14 Aug 2021 17:15), Max: 37.3 (14 Aug 2021 15:59)  T(F): 97.6 (14 Aug 2021 17:15), Max: 99.2 (14 Aug 2021 15:59)  HR: 70 (14 Aug 2021 18:15) (69 - 71)  BP: 140/65 (14 Aug 2021 18:15) (122/65 - 150/62)  BP(mean): 84 (14 Aug 2021 17:15) (84 - 84)  RR: 22 (14 Aug 2021 18:15) (20 - 23)  SpO2: 99% (14 Aug 2021 18:15) (98% - 100%)    General: Elderly Female, appears stated age, in no apparent distress including pain, covering herself with a blanket  Skin: lesion on nose    Neurological (>12):  MS: eyes closed, opens eyes to voice, maintains alertness, does not follow commands    Language: No clear verbal output    CNs: pupils miotic, round, reactive to light (2 mm OU). CVF by BTT intact grossly. EOMI grossly intact as tracks examiner, no nystagmus. Symmetric palate elevation in midline. Gag reflex deferred. Head turning & shoulder shrug intact b/l. Tongue midline, normal movements, no atrophy.    Fundoscopic: deferred    Motor: Decreased muscle bulk throughout.   Moves UE's spontaneously, LUE>RUE  adducts/abducts RLE/LLE within plane of bed, dorsiflexes/plantarflexes RLE>LLE spontaneously, no effort against gravity in LE's     Sensation: intact to noxious stimuli throughout (withdraws extremities)    Cortical: Extinction on DSS (neglect): none    Reflexes: biceps 3+ on right, 2+ on left, plantars mute on right, down on left    Coordination: patient could not participate    Gait: deferred    LABORATORY:  CBC                       13.9   11.29 )-----------( 303      ( 14 Aug 2021 16:22 )             42.7     Chem 08-14    141  |  97  |  55<H>  ----------------------------<  143<H>  4.1   |  29  |  1.17    Ca    9.9      14 Aug 2021 16:22  Phos  3.8     08-  Mg     2.4     -    TPro  7.2  /  Alb  3.6  /  TBili  1.1  /  DBili  x   /  AST  47<H>  /  ALT  37  /  AlkPhos  102  -    LFTs LIVER FUNCTIONS - ( 14 Aug 2021 16:22 )  Alb: 3.6 g/dL / Pro: 7.2 g/dL / ALK PHOS: 102 U/L / ALT: 37 U/L / AST: 47 U/L / GGT: x           Coagulopathy   Lipid Panel  Chol 214<H> LDL -- HDL 30<L> Trig 303<H>  A1c   Cardiac enzymes     U/A Urinalysis Basic - ( 14 Aug 2021 16:22 )    Color: Yellow / Appearance: Turbid / S.018 / pH: x  Gluc: x / Ketone: Negative  / Bili: Negative / Urobili: 8 mg/dL   Blood: x / Protein: 30 mg/dL / Nitrite: Negative   Leuk Esterase: Large / RBC: 9 /hpf / WBC 1809 /HPF   Sq Epi: x / Non Sq Epi: 5 / Bacteria: TNTC      CSF  Immunological  Other    STUDIES & IMAGING:  Studies (EKG, EEG, EMG, etc):     Radiology (XR, CT, MR, U/S, TTE/ANA):  < from: CT Head No Cont (21 @ 17:53) >  IMPRESSION:    HEAD CT: Mild volume loss, microvascular disease, no acute hemorrhage or midline shift.  Sequelae of previous left MCA infarct. MRI may be helpful for further evaluation, if clinically indicated.    < end of copied text >   HPI:  89 y/o F with PMHx of afib recently started on eliquis, s/p PPM, w/ recent L MCA CVA, chronic respiratory failure on 02, HTN, ovarian and colon ca, GERD p/w episode of acute encephalopathy. Pt was recently admitted to [facility] after being discovered with new  Left MCA infarction, Left M1 occlusion. Recannulization was unsuccessful. Pt now with residual R facial droop, hemiplegia and dysarthria. Was started on eliquis, had PEG tube placed for dysphagia and eventually discharged to [place] re[facility] 5 days ago. Pt's son was visiting her today when he found her in hallway without her oxygen on. Pt was altered compared to baseline and seemed borderline non-responsive. She was then rushed to [facility] ER for further evaluation. Pt's son states she is now back to her baseline mental status and without distress. Cautions resumption of tube feeds as she could not tolerate 60 ccs per hour last admission.    In ER: Given , ceftriaxone 1gm IV (14 Aug 2021 20:45)    Neurology consulted for AMS episode. Patient unable to contribute to history. History obtained via chart review.     REVIEW OF SYSTEMS  A 10-system ROS was performed and is negative except for those items noted above and/or in the HPI.    PAST MEDICAL & SURGICAL HISTORY:  Hypertension    Cancer    GERD (gastroesophageal reflux disease)    Anxiety    Ovarian cancer    Colon cancer    H/O small bowel obstruction    H/O total hysterectomy  with BSO    No significant past surgical history      FAMILY HISTORY:  No pertinent family history in first degree relatives    No pertinent family history in first degree relatives      SOCIAL HISTORY:   T/E/D:   Occupation:   Lives with:     MEDICATIONS (HOME):  Home Medications:  apixaban 5 mg oral tablet: 1 tab(s) orally every 12 hours (14 Aug 2021 19:59)  atenolol 25 mg oral tablet: 1 tab(s) orally once a day (14 Aug 2021 19:59)  atorvastatin 80 mg oral tablet: 1 tab(s) orally once a day (at bedtime) (14 Aug 2021 19:59)  budesonide 0.5 mg/2 mL inhalation suspension: 2 milliliter(s) inhaled 2 times a day (14 Aug 2021 19:59)  Calmoseptine 0.44%-20.6% topical ointment: Apply to buttock area q shift for protection (14 Aug 2021 19:59)  furosemide 40 mg/5 mL oral solution: 5 milliliter(s) orally once a day (14 Aug 2021 19:59)  gabapentin 250 mg/5 mL oral solution: 2 milliliter(s) orally once a day (at bedtime) (14 Aug 2021 19:59)  ipratropium-albuterol 0.5 mg-2.5 mg/3 mL inhalation solution: 3 milliliter(s) inhaled every 6 hours (14 Aug 2021 19:59)  melatonin 3 mg oral tablet: 1 tab(s) orally once a day (at bedtime) (14 Aug 2021 19:59)  Multiple Vitamins oral tablet: 1 tab(s) orally once a day (14 Aug 2021 19:59)  pantoprazole 40 mg oral granule, delayed release: 40 milligram(s) by gastrostomy tube once a day (14 Aug 2021 19:59)  polyethylene glycol 3350 oral powder for reconstitution: 17 gram(s) orally once a day (14 Aug 2021 19:59)  senna oral tablet: 1 tab(s) orally once a day (14 Aug 2021 19:59)  zaleplon 5 mg oral capsule: 1 cap(s) orally once a day (at bedtime), As needed, Insomnia (14 Aug 2021 19:59)    MEDICATIONS  (STANDING):  apixaban 5 milliGRAM(s) Enteral Tube every 12 hours  ATENolol  Tablet 25 milliGRAM(s) Enteral Tube daily  atorvastatin 80 milliGRAM(s) Oral at bedtime  buDESOnide    Inhalation Suspension 0.5 milliGRAM(s) Inhalation two times a day  furosemide Solution 40 milliGRAM(s) Oral daily  gabapentin   Solution 100 milliGRAM(s) Oral daily  melatonin 3 milliGRAM(s) Oral at bedtime  multivitamin 1 Tablet(s) Oral daily  pantoprazole   Suspension 40 milliGRAM(s) Oral daily  polyethylene glycol 3350 17 Gram(s) Oral daily  senna 1 Tablet(s) Oral daily    MEDICATIONS  (PRN):  acetaminophen   Tablet .. 650 milliGRAM(s) Oral every 6 hours PRN Temp greater or equal to 38.5C (101.3F), Mild Pain (1 - 3)  ondansetron Injectable 4 milliGRAM(s) IV Push every 8 hours PRN Nausea and/or Vomiting    ALLERGIES/INTOLERANCES:  Allergies  No Known Allergies    Intolerances    VITALS & EXAMINATION:  Vital Signs Last 24 Hrs  T(C): 36.4 (14 Aug 2021 17:15), Max: 37.3 (14 Aug 2021 15:59)  T(F): 97.6 (14 Aug 2021 17:15), Max: 99.2 (14 Aug 2021 15:59)  HR: 70 (14 Aug 2021 18:15) (69 - 71)  BP: 140/65 (14 Aug 2021 18:15) (122/65 - 150/62)  BP(mean): 84 (14 Aug 2021 17:15) (84 - 84)  RR: 22 (14 Aug 2021 18:15) (20 - 23)  SpO2: 99% (14 Aug 2021 18:15) (98% - 100%)    General: Elderly Female, appears stated age, in no apparent distress including pain, covering herself with a blanket  Skin: lesion on nose  ENT: dry mucous membranes    Neurological (>12):  MS: eyes closed, opens eyes to voice, maintains alertness, does not follow commands    Language: No clear verbal output    CNs: pupils miotic, round, reactive to light (2 mm OU). CVF by BTT intact grossly. EOMI grossly intact as tracks examiner, no nystagmus. Gag reflex deferred. Head turning intact b/l. Tongue midline.    Fundoscopic: deferred    Motor: Decreased muscle bulk throughout.   Moves UE's spontaneously, LUE>RUE  adducts/abducts RLE/LLE within plane of bed, dorsiflexes/plantarflexes RLE>LLE spontaneously, no effort against gravity in LE's     Sensation: intact to noxious stimuli throughout (withdraws extremities)    Cortical: Extinction on DSS (neglect): none    Reflexes: biceps 3+ on right, 2+ on left, plantars mute on right, down on left    Coordination: patient could not participate    Gait: deferred    LABORATORY:  CBC                       13.9   11.29 )-----------( 303      ( 14 Aug 2021 16:22 )             42.7     Chem 08-14    141  |  97  |  55<H>  ----------------------------<  143<H>  4.1   |  29  |  1.17    Ca    9.9      14 Aug 2021 16:22  Phos  3.8     08-  Mg     2.4     -    TPro  7.2  /  Alb  3.6  /  TBili  1.1  /  DBili  x   /  AST  47<H>  /  ALT  37  /  AlkPhos  102  -    LFTs LIVER FUNCTIONS - ( 14 Aug 2021 16:22 )  Alb: 3.6 g/dL / Pro: 7.2 g/dL / ALK PHOS: 102 U/L / ALT: 37 U/L / AST: 47 U/L / GGT: x           Coagulopathy   Lipid Panel  Chol 214<H> LDL -- HDL 30<L> Trig 303<H>  A1c   Cardiac enzymes     U/A Urinalysis Basic - ( 14 Aug 2021 16:22 )    Color: Yellow / Appearance: Turbid / S.018 / pH: x  Gluc: x / Ketone: Negative  / Bili: Negative / Urobili: 8 mg/dL   Blood: x / Protein: 30 mg/dL / Nitrite: Negative   Leuk Esterase: Large / RBC: 9 /hpf / WBC 1809 /HPF   Sq Epi: x / Non Sq Epi: 5 / Bacteria: TNTC      CSF  Immunological  Other    STUDIES & IMAGING:  Studies (EKG, EEG, EMG, etc):     Radiology (XR, CT, MR, U/S, TTE/ANA):  < from: CT Head No Cont (21 @ 17:53) >  IMPRESSION:    HEAD CT: Mild volume loss, microvascular disease, no acute hemorrhage or midline shift.  Sequelae of previous left MCA infarct. MRI may be helpful for further evaluation, if clinically indicated.    < end of copied text >

## 2021-08-14 NOTE — ED PROVIDER NOTE - OBJECTIVE STATEMENT
Attending Raquel Paul: 89 yo female with complex pmh including h/o recent cva where thrombectomy was attempted but unable to retrieve clot, heart disease, on home oxygen 3L bibems for concern for ams. per son went to visit today. was altered and possibly unresponsive. no reports of falls or trauma. per son has been declining since nursing home. no reoprts of head trauma. no vomiting or diarrhea. per son more altered then her baseline. was started on eloquis

## 2021-08-14 NOTE — H&P ADULT - COMMENTS
Attempted to obtain directly from patient but unable to due to speech impairment, relevant ROS obtained from son in HPI

## 2021-08-14 NOTE — ED PROVIDER NOTE - PROGRESS NOTE DETAILS
Attending Raquel Paul: called to ct to expedite Attending Raquel Paul: spoke with neuro said symptoms likely from UTI. covered with abx rosio dmit

## 2021-08-14 NOTE — H&P ADULT - NSHPLABSRESULTS_GEN_ALL_CORE
I have reviewed the labs, imaging and ekg I have reviewed the labs, imaging and ekg. EKG with HR 70 and paced rhythm. CXR w/o focal consolidation

## 2021-08-14 NOTE — ED ADULT NURSE REASSESSMENT NOTE - NS ED NURSE REASSESS COMMENT FT1
Report taken from ANAYELI Zhou. Pt resting in bed with son at bedside. VSS. PIV intact. Pt on 2L NC, PT awaiting bed upstairs. Will continue to monitor

## 2021-08-15 LAB
ALBUMIN SERPL ELPH-MCNC: 3.5 G/DL — SIGNIFICANT CHANGE UP (ref 3.3–5)
ALP SERPL-CCNC: 95 U/L — SIGNIFICANT CHANGE UP (ref 40–120)
ALT FLD-CCNC: 29 U/L — SIGNIFICANT CHANGE UP (ref 10–45)
ANION GAP SERPL CALC-SCNC: 17 MMOL/L — SIGNIFICANT CHANGE UP (ref 5–17)
AST SERPL-CCNC: 36 U/L — SIGNIFICANT CHANGE UP (ref 10–40)
BILIRUB SERPL-MCNC: 1.4 MG/DL — HIGH (ref 0.2–1.2)
BUN SERPL-MCNC: 46 MG/DL — HIGH (ref 7–23)
CALCIUM SERPL-MCNC: 9.7 MG/DL — SIGNIFICANT CHANGE UP (ref 8.4–10.5)
CHLORIDE SERPL-SCNC: 101 MMOL/L — SIGNIFICANT CHANGE UP (ref 96–108)
CO2 SERPL-SCNC: 28 MMOL/L — SIGNIFICANT CHANGE UP (ref 22–31)
COVID-19 SPIKE DOMAIN AB INTERP: POSITIVE
COVID-19 SPIKE DOMAIN ANTIBODY RESULT: >250 U/ML — HIGH
CREAT SERPL-MCNC: 1.03 MG/DL — SIGNIFICANT CHANGE UP (ref 0.5–1.3)
GLUCOSE SERPL-MCNC: 119 MG/DL — HIGH (ref 70–99)
HCT VFR BLD CALC: 41.2 % — SIGNIFICANT CHANGE UP (ref 34.5–45)
HGB BLD-MCNC: 13.4 G/DL — SIGNIFICANT CHANGE UP (ref 11.5–15.5)
LACTATE SERPL-SCNC: 1.4 MMOL/L — SIGNIFICANT CHANGE UP (ref 0.7–2)
MAGNESIUM SERPL-MCNC: 2.3 MG/DL — SIGNIFICANT CHANGE UP (ref 1.6–2.6)
MCHC RBC-ENTMCNC: 32.4 PG — SIGNIFICANT CHANGE UP (ref 27–34)
MCHC RBC-ENTMCNC: 32.5 GM/DL — SIGNIFICANT CHANGE UP (ref 32–36)
MCV RBC AUTO: 99.8 FL — SIGNIFICANT CHANGE UP (ref 80–100)
NRBC # BLD: 0 /100 WBCS — SIGNIFICANT CHANGE UP (ref 0–0)
PLATELET # BLD AUTO: 281 K/UL — SIGNIFICANT CHANGE UP (ref 150–400)
POTASSIUM SERPL-MCNC: 3.7 MMOL/L — SIGNIFICANT CHANGE UP (ref 3.5–5.3)
POTASSIUM SERPL-SCNC: 3.7 MMOL/L — SIGNIFICANT CHANGE UP (ref 3.5–5.3)
PROT SERPL-MCNC: 6.9 G/DL — SIGNIFICANT CHANGE UP (ref 6–8.3)
RBC # BLD: 4.13 M/UL — SIGNIFICANT CHANGE UP (ref 3.8–5.2)
RBC # FLD: 14.3 % — SIGNIFICANT CHANGE UP (ref 10.3–14.5)
SARS-COV-2 IGG+IGM SERPL QL IA: >250 U/ML — HIGH
SARS-COV-2 IGG+IGM SERPL QL IA: POSITIVE
SODIUM SERPL-SCNC: 146 MMOL/L — HIGH (ref 135–145)
TSH SERPL-MCNC: 3.16 UIU/ML — SIGNIFICANT CHANGE UP (ref 0.27–4.2)
WBC # BLD: 9.45 K/UL — SIGNIFICANT CHANGE UP (ref 3.8–10.5)
WBC # FLD AUTO: 9.45 K/UL — SIGNIFICANT CHANGE UP (ref 3.8–10.5)

## 2021-08-15 PROCEDURE — 99223 1ST HOSP IP/OBS HIGH 75: CPT | Mod: GC

## 2021-08-15 RX ADMIN — Medication 650 MILLIGRAM(S): at 22:38

## 2021-08-15 RX ADMIN — Medication 650 MILLIGRAM(S): at 23:10

## 2021-08-15 RX ADMIN — SENNA PLUS 1 TABLET(S): 8.6 TABLET ORAL at 12:05

## 2021-08-15 RX ADMIN — ATENOLOL 25 MILLIGRAM(S): 25 TABLET ORAL at 05:19

## 2021-08-15 RX ADMIN — APIXABAN 5 MILLIGRAM(S): 2.5 TABLET, FILM COATED ORAL at 05:20

## 2021-08-15 RX ADMIN — Medication 0.5 MILLIGRAM(S): at 05:19

## 2021-08-15 RX ADMIN — Medication 650 MILLIGRAM(S): at 06:49

## 2021-08-15 RX ADMIN — Medication 1 TABLET(S): at 12:05

## 2021-08-15 RX ADMIN — ATORVASTATIN CALCIUM 80 MILLIGRAM(S): 80 TABLET, FILM COATED ORAL at 20:47

## 2021-08-15 RX ADMIN — Medication 0.5 MILLIGRAM(S): at 17:07

## 2021-08-15 RX ADMIN — Medication 650 MILLIGRAM(S): at 07:20

## 2021-08-15 RX ADMIN — GABAPENTIN 100 MILLIGRAM(S): 400 CAPSULE ORAL at 17:07

## 2021-08-15 RX ADMIN — Medication 40 MILLIGRAM(S): at 05:20

## 2021-08-15 RX ADMIN — PANTOPRAZOLE SODIUM 40 MILLIGRAM(S): 20 TABLET, DELAYED RELEASE ORAL at 12:05

## 2021-08-15 RX ADMIN — CEFTRIAXONE 100 MILLIGRAM(S): 500 INJECTION, POWDER, FOR SOLUTION INTRAMUSCULAR; INTRAVENOUS at 17:07

## 2021-08-15 RX ADMIN — Medication 3 MILLIGRAM(S): at 20:47

## 2021-08-15 RX ADMIN — APIXABAN 5 MILLIGRAM(S): 2.5 TABLET, FILM COATED ORAL at 17:07

## 2021-08-15 RX ADMIN — POLYETHYLENE GLYCOL 3350 17 GRAM(S): 17 POWDER, FOR SOLUTION ORAL at 20:47

## 2021-08-15 NOTE — CONSULT NOTE ADULT - SUBJECTIVE AND OBJECTIVE BOX
HPI:   Patient is a 89y female who was hospitalized late July to early August for stroke, had attempted thrombectomy which failed, required peg placement. She had leukocytosis but no infection was found so just had a few days of zosyn. She now returns because she was very lethargic for the past few days at the nursing home. As best I can tell she has not been febrile but per notes she was hypoxic off supplemental oxygen. I cannot get any information from her. We are called because she has pyuria raising concern for a uti    REVIEW OF SYSTEMS:  All other review of systems negative (Comprehensive ROS)    PAST MEDICAL & SURGICAL HISTORY:  Hypertension    Cancer    GERD (gastroesophageal reflux disease)    Anxiety    Ovarian cancer    Colon cancer    H/O small bowel obstruction    H/O total hysterectomy  with BSO    No significant past surgical history        Allergies    No Known Allergies    Intolerances        Antimicrobials Day #  :1  cefTRIAXone   IVPB 1000 milliGRAM(s) IV Intermittent every 24 hours    Other Medications:  acetaminophen   Tablet .. 650 milliGRAM(s) Oral every 6 hours PRN  apixaban 5 milliGRAM(s) Enteral Tube every 12 hours  ATENolol  Tablet 25 milliGRAM(s) Enteral Tube daily  atorvastatin 80 milliGRAM(s) Oral at bedtime  buDESOnide    Inhalation Suspension 0.5 milliGRAM(s) Inhalation two times a day  furosemide Solution 40 milliGRAM(s) Oral daily  gabapentin   Solution 100 milliGRAM(s) Oral daily  melatonin 3 milliGRAM(s) Oral at bedtime  multivitamin 1 Tablet(s) Oral daily  ondansetron Injectable 4 milliGRAM(s) IV Push every 8 hours PRN  pantoprazole   Suspension 40 milliGRAM(s) Oral daily  polyethylene glycol 3350 17 Gram(s) Oral daily  senna 1 Tablet(s) Oral daily      FAMILY HISTORY:  No pertinent family history in first degree relatives    No pertinent family history in first degree relatives        SOCIAL HISTORY:  Smoking: [ ]Yes [ x]No  ETOH: [ ]Yes [x ]No  Drug Use: [ ]Yes [x ]No   [x ] Single[ ]    T(F): 97.3 (08-15-21 @ 12:33), Max: 98.5 (08-15-21 @ 00:55)  HR: 71 (08-15-21 @ 12:33)  BP: 138/88 (08-15-21 @ 12:33)  RR: 18 (08-15-21 @ 12:33)  SpO2: 100% (08-15-21 @ 12:33)  Wt(kg): --    PHYSICAL EXAM:  General: awake,  no acute distress  Eyes:  anicteric, no conjunctival injection, no discharge  Oropharynx: no lesions or injection , nose mass	  Neck: supple, without adenopathy  Lungs: basilar rales  to auscultation  Heart: regular rate and rhythm; no murmur, rubs or gallops  Abdomen: soft, nondistended, nontender, without mass or organomegaly, peg  Skin: no lesions  Extremities: no clubbing, cyanosis, or edema  Neurologic: awake  not very interactive  back no lesions  LAB RESULTS:                        13.4   9.45  )-----------( 281      ( 15 Aug 2021 07:23 )             41.2     08-15    146<H>  |  101  |  46<H>  ----------------------------<  119<H>  3.7   |  28  |  1.03    Ca    9.7      15 Aug 2021 07:22  Phos  3.8     08-  Mg     2.3     -15    TPro  6.9  /  Alb  3.5  /  TBili  1.4<H>  /  DBili  x   /  AST  36  /  ALT  29  /  AlkPhos  95  08-15    LIVER FUNCTIONS - ( 15 Aug 2021 07:22 )  Alb: 3.5 g/dL / Pro: 6.9 g/dL / ALK PHOS: 95 U/L / ALT: 29 U/L / AST: 36 U/L / GGT: x           Urinalysis Basic - ( 14 Aug 2021 16:22 )    Color: Yellow / Appearance: Turbid / S.018 / pH: x  Gluc: x / Ketone: Negative  / Bili: Negative / Urobili: 8 mg/dL   Blood: x / Protein: 30 mg/dL / Nitrite: Negative   Leuk Esterase: Large / RBC: 9 /hpf / WBC 1809 /HPF   Sq Epi: x / Non Sq Epi: 5 / Bacteria: TNTC        MICROBIOLOGY:  RECENT CULTURES:        RADIOLOGY REVIEWED:  < from: CT Head No Cont (21 @ 17:53) >  EXAM:  CT BRAIN                            PROCEDURE DATE:  2021            INTERPRETATION:  CT HEAD  HEAD CT    INDICATIONS: syncope, AMS    syncope, AMS, pt was sitting in a chair when she went unresponsive for 5 minutes and is unable to follow commands. As per son, pt had a stroke recently was has had dysarthria since. pt is more lethargic than normal and is A&Ox2 @ baseline. On exam, pt is breathing spontaneously, unable to speak & 99% on 3L @ baseline.    TECHNIQUE:    HEAD CT:  Serial axial images were obtained from the skull base to the vertex without the use of intravenous contrast.    COMPARISON EXAMINATION: Head CT 2021 and 2021    FINDINGS:    HEAD CT:    VENTRICLES AND SULCI: Ventricles and sulci are unremarkable for patient age.  INTRA-AXIAL: No intracranial mass, acute hemorrhage, or midline shift is present. There is non-specific decreased attenuation in the white matter likely related to sequelae of microvascular disease. There is serpiginous increased attenuation along the peripheral left MCA infarct, likely some laminar necrosis. Calcification in the left parietal lobe unchanged.  EXTRA-AXIAL: No extra-axial fluid collection is present.  INTRACRANIAL HEMORRHAGE: None.    VISUALIZED SINUSES: No air-fluid levels are identified.  VISUALIZED MASTOIDS:  Clear.  CALVARIUM:  Intact.  MISCELLANEOUS:  None.    SOFT TISSUES: Unremarkable.  BONES: Unremarkable.      IMPRESSION:    HEAD CT: Mild volume loss, microvascular disease, no acute hemorrhage or midline shift.  Sequelae of previous left MCA infarct. MRI may be helpful for further evaluation, if clinically indicated.    --- End of Report ---      < end of copied text >    < from: Xray Chest 1 View- PORTABLE-Urgent (Xray Chest 1 View- PORTABLE-Urgent .) (21 @ 16:33) >    EXAM:  XR CHEST PORTABLE URGENT 1V                            PROCEDURE DATE:  2021            INTERPRETATION:  EXAMINATION: XR CHEST URGENT    CLINICAL INDICATION: ams, syncope    TECHNIQUE: Single frontal, portable view of the chest was obtained.    COMPARISON: Chest x-ray 2021.    FINDINGS:  Biventricular pacemaker with its leads in the right ventricle and right atrium.  The heart is enlarged.  The lungs are clear.  There is no pneumothorax or pleural effusion.    IMPRESSION:  Cardiomegaly, otherwise clear lungs.    --- End of Report ---          < end of copied text >        Impression:    patient s/p recent left mca stroke, failed attempt at thrombectomy, s/p peg, went to nursing home , came back for report of lethargy, hypoxic when off her oxygen ,  has some pyuria raising concern for a uti.  I cannot get any information from her so cannot exclude a cystitis. Given lack of fever and normal wbc I doubt a uti is the cause of her altered mental status that is now back to baseline.   Recommendations:  continue ceftriaxone for now  f/u urine cutlure

## 2021-08-16 LAB
ALBUMIN SERPL ELPH-MCNC: 4 G/DL — SIGNIFICANT CHANGE UP (ref 3.3–5)
ALP SERPL-CCNC: 114 U/L — SIGNIFICANT CHANGE UP (ref 40–120)
ALT FLD-CCNC: 35 U/L — SIGNIFICANT CHANGE UP (ref 10–45)
ANION GAP SERPL CALC-SCNC: 17 MMOL/L — SIGNIFICANT CHANGE UP (ref 5–17)
AST SERPL-CCNC: 41 U/L — HIGH (ref 10–40)
BASOPHILS # BLD AUTO: 0.05 K/UL — SIGNIFICANT CHANGE UP (ref 0–0.2)
BASOPHILS NFR BLD AUTO: 0.5 % — SIGNIFICANT CHANGE UP (ref 0–2)
BILIRUB SERPL-MCNC: 1.1 MG/DL — SIGNIFICANT CHANGE UP (ref 0.2–1.2)
BUN SERPL-MCNC: 37 MG/DL — HIGH (ref 7–23)
CALCIUM SERPL-MCNC: 10.4 MG/DL — SIGNIFICANT CHANGE UP (ref 8.4–10.5)
CHLORIDE SERPL-SCNC: 94 MMOL/L — LOW (ref 96–108)
CO2 SERPL-SCNC: 29 MMOL/L — SIGNIFICANT CHANGE UP (ref 22–31)
CREAT SERPL-MCNC: 0.95 MG/DL — SIGNIFICANT CHANGE UP (ref 0.5–1.3)
EOSINOPHIL # BLD AUTO: 0.35 K/UL — SIGNIFICANT CHANGE UP (ref 0–0.5)
EOSINOPHIL NFR BLD AUTO: 3.7 % — SIGNIFICANT CHANGE UP (ref 0–6)
GLUCOSE SERPL-MCNC: 124 MG/DL — HIGH (ref 70–99)
HCT VFR BLD CALC: 47.8 % — HIGH (ref 34.5–45)
HGB BLD-MCNC: 15.5 G/DL — SIGNIFICANT CHANGE UP (ref 11.5–15.5)
IMM GRANULOCYTES NFR BLD AUTO: 0.5 % — SIGNIFICANT CHANGE UP (ref 0–1.5)
LYMPHOCYTES # BLD AUTO: 2.38 K/UL — SIGNIFICANT CHANGE UP (ref 1–3.3)
LYMPHOCYTES # BLD AUTO: 25.1 % — SIGNIFICANT CHANGE UP (ref 13–44)
MAGNESIUM SERPL-MCNC: 2.2 MG/DL — SIGNIFICANT CHANGE UP (ref 1.6–2.6)
MCHC RBC-ENTMCNC: 32.4 GM/DL — SIGNIFICANT CHANGE UP (ref 32–36)
MCHC RBC-ENTMCNC: 32.4 PG — SIGNIFICANT CHANGE UP (ref 27–34)
MCV RBC AUTO: 99.8 FL — SIGNIFICANT CHANGE UP (ref 80–100)
MONOCYTES # BLD AUTO: 0.9 K/UL — SIGNIFICANT CHANGE UP (ref 0–0.9)
MONOCYTES NFR BLD AUTO: 9.5 % — SIGNIFICANT CHANGE UP (ref 2–14)
NEUTROPHILS # BLD AUTO: 5.76 K/UL — SIGNIFICANT CHANGE UP (ref 1.8–7.4)
NEUTROPHILS NFR BLD AUTO: 60.7 % — SIGNIFICANT CHANGE UP (ref 43–77)
NRBC # BLD: 0 /100 WBCS — SIGNIFICANT CHANGE UP (ref 0–0)
PLATELET # BLD AUTO: 304 K/UL — SIGNIFICANT CHANGE UP (ref 150–400)
POTASSIUM SERPL-MCNC: 3.6 MMOL/L — SIGNIFICANT CHANGE UP (ref 3.5–5.3)
POTASSIUM SERPL-SCNC: 3.6 MMOL/L — SIGNIFICANT CHANGE UP (ref 3.5–5.3)
PROT SERPL-MCNC: 7.9 G/DL — SIGNIFICANT CHANGE UP (ref 6–8.3)
RBC # BLD: 4.79 M/UL — SIGNIFICANT CHANGE UP (ref 3.8–5.2)
RBC # FLD: 13.8 % — SIGNIFICANT CHANGE UP (ref 10.3–14.5)
SODIUM SERPL-SCNC: 140 MMOL/L — SIGNIFICANT CHANGE UP (ref 135–145)
WBC # BLD: 9.49 K/UL — SIGNIFICANT CHANGE UP (ref 3.8–10.5)
WBC # FLD AUTO: 9.49 K/UL — SIGNIFICANT CHANGE UP (ref 3.8–10.5)

## 2021-08-16 PROCEDURE — 93926 LOWER EXTREMITY STUDY: CPT | Mod: 26,RT

## 2021-08-16 RX ORDER — NYSTATIN 500MM UNIT
500000 POWDER (EA) MISCELLANEOUS ONCE
Refills: 0 | Status: COMPLETED | OUTPATIENT
Start: 2021-08-16 | End: 2021-08-16

## 2021-08-16 RX ORDER — BACITRACIN ZINC 500 UNIT/G
1 OINTMENT IN PACKET (EA) TOPICAL
Refills: 0 | Status: COMPLETED | OUTPATIENT
Start: 2021-08-16 | End: 2021-08-23

## 2021-08-16 RX ADMIN — ATORVASTATIN CALCIUM 80 MILLIGRAM(S): 80 TABLET, FILM COATED ORAL at 21:48

## 2021-08-16 RX ADMIN — GABAPENTIN 100 MILLIGRAM(S): 400 CAPSULE ORAL at 16:57

## 2021-08-16 RX ADMIN — Medication 1 TABLET(S): at 14:16

## 2021-08-16 RX ADMIN — SENNA PLUS 1 TABLET(S): 8.6 TABLET ORAL at 14:15

## 2021-08-16 RX ADMIN — POLYETHYLENE GLYCOL 3350 17 GRAM(S): 17 POWDER, FOR SOLUTION ORAL at 14:15

## 2021-08-16 RX ADMIN — PANTOPRAZOLE SODIUM 40 MILLIGRAM(S): 20 TABLET, DELAYED RELEASE ORAL at 14:15

## 2021-08-16 RX ADMIN — Medication 40 MILLIGRAM(S): at 05:43

## 2021-08-16 RX ADMIN — ATENOLOL 25 MILLIGRAM(S): 25 TABLET ORAL at 05:43

## 2021-08-16 RX ADMIN — Medication 3 MILLIGRAM(S): at 21:47

## 2021-08-16 RX ADMIN — Medication 500000 UNIT(S): at 21:48

## 2021-08-16 RX ADMIN — Medication 1 APPLICATION(S): at 17:05

## 2021-08-16 RX ADMIN — Medication 0.5 MILLIGRAM(S): at 17:58

## 2021-08-16 RX ADMIN — Medication 0.5 MILLIGRAM(S): at 07:30

## 2021-08-16 RX ADMIN — APIXABAN 5 MILLIGRAM(S): 2.5 TABLET, FILM COATED ORAL at 05:43

## 2021-08-16 RX ADMIN — APIXABAN 5 MILLIGRAM(S): 2.5 TABLET, FILM COATED ORAL at 17:05

## 2021-08-16 RX ADMIN — CEFTRIAXONE 100 MILLIGRAM(S): 500 INJECTION, POWDER, FOR SOLUTION INTRAMUSCULAR; INTRAVENOUS at 16:57

## 2021-08-16 NOTE — PHYSICAL THERAPY INITIAL EVALUATION ADULT - PERTINENT HX OF CURRENT PROBLEM, REHAB EVAL
89 y/o F with PMHx of afib recently started on eliquis, s/p PPM, w/ recent L MCA CVA, chronic respiratory failure on 02, HTN, ovarian and colon ca, GERD p/w episode of acute encephalopathy.

## 2021-08-16 NOTE — DIETITIAN INITIAL EVALUATION ADULT. - REASON INDICATOR FOR ASSESSMENT
Tube Feeding, pressure ulcer. "Dx: AMS likely 2/2 UTI- CTH negative for ICH (previous left MCA infarct), + UA on ceftriaxone (8/14-  )        Hx CVA- Residual R sided deficits, facial droop, severe dysarthria, severe dysphagia"

## 2021-08-16 NOTE — PHYSICAL THERAPY INITIAL EVALUATION ADULT - PRECAUTIONS/LIMITATIONS, REHAB EVAL
IMAGING: CT HEAD: Mild volume loss, microvascular disease, no acute hemorrhage or midline shift. Sequelae of previous left MCA infarct. MRI may be helpful for further evaluation, if clinically indicated. US Duplex:  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 The more proximal pseudoaneurysm measures 1.1 cm x 6 mm x 7 mm. More peripheral pseudoaneurysm measures 1.5 cm x 1.7 cm x 2.2 cm. The more peripheral larger pseudoaneurysm is partially thrombosed with a patent lumen of 1.9 cm x 1.7 cm x 2.2 cm./fall precautions

## 2021-08-16 NOTE — DIETITIAN INITIAL EVALUATION ADULT. - ENTERAL
Jevity 1.2 @10ml/hr increase by 10mlQ2 hrs to goal rate @60ml/hr x24 hrs to provide total 1440 ml, 1728 calories, 21.7/kg,  protein   80gm, 1gm/kg based on current weight 79.4kg. Free water in formula 1153ml Jevity 1.2 @10ml/hr increase by 10mlQ2 hrs to goal rate @60ml/hr x24 hrs to provide total 1440 ml, 1728 calories, 21.7/kg, based on current weight 79.4kg;  protein   80gm, (1.46gm/kg based on IBW 54.5kg. )  Free water in formula 1153ml

## 2021-08-16 NOTE — DIETITIAN INITIAL EVALUATION ADULT. - PROBLEM SELECTOR PLAN 4
S/p large L sided MCA CVA. Residual R sided deficits, facial droop and severe dysarthria. Severe dysphagia. Failed speech/swallow eval here but reportedly passed in [place]??  -Aspiration precautions  -NPO for now  -Nutrition consult for tube feeds  -Gentle IVF overnight  -Cont. atorvastatin

## 2021-08-16 NOTE — SWALLOW BEDSIDE ASSESSMENT ADULT - SWALLOW EVAL: DIAGNOSIS
Orders received for BSE, chart reviewed to date.  Attempted to see patient, however patient off floor at ultrasound per RN Rajani. This service will re-attempt as available to initiate bedside swallow evaluation.

## 2021-08-16 NOTE — PHYSICAL THERAPY INITIAL EVALUATION ADULT - ADDITIONAL COMMENTS
Patient lives alone in private home, 4 steps to enter with unilateral railing, 1 flight to bedroom / bathroom with chair lift. Per case management assessment patient has 24hour HHA Patient lives alone in private home, 4 steps to enter with unilateral railing, 1 flight to bedroom / bathroom with chair lift. As per dtr, family is present for pt 24/7 for assistance prn. PTA, pt requires supervision for functional mobility with RW or rollator for ambulation.

## 2021-08-16 NOTE — DIETITIAN INITIAL EVALUATION ADULT. - OTHER INFO
H&P:  "89 y/o F with PMHx of afib recently started on eliquis, s/p PPM, w/ recent L MCA CVA, chronic respiratory failure on 02, HTN, ovarian and colon ca, GERD p/w episode of acute encephalopathy"    Weight record: 7/ 25/21   182.8  lbs                         8/14/21    175  lbs H&P:  "89 y/o F with PMHx of afib recently started on eliquis, s/p PPM, w/ recent L MCA CVA, chronic respiratory failure on 02, HTN, ovarian and colon ca, GERD p/w episode of acute encephalopathy"    Weight record: 7/ 25/21   182.8  lbs                         8/14/21    175  lbs    Sacral decubiti  st II noted

## 2021-08-16 NOTE — DIETITIAN INITIAL EVALUATION ADULT. - PERTINENT MEDS FT
MEDICATIONS  (STANDING):  apixaban 5 milliGRAM(s) Enteral Tube every 12 hours  ATENolol  Tablet 25 milliGRAM(s) Enteral Tube daily  atorvastatin 80 milliGRAM(s) Oral at bedtime  BACItracin   Ointment 1 Application(s) Topical two times a day  buDESOnide    Inhalation Suspension 0.5 milliGRAM(s) Inhalation two times a day  cefTRIAXone   IVPB 1000 milliGRAM(s) IV Intermittent every 24 hours  furosemide Solution 40 milliGRAM(s) Oral daily  gabapentin   Solution 100 milliGRAM(s) Oral daily  melatonin 3 milliGRAM(s) Oral at bedtime  multivitamin 1 Tablet(s) Oral daily  pantoprazole   Suspension 40 milliGRAM(s) Oral daily  polyethylene glycol 3350 17 Gram(s) Oral daily  senna 1 Tablet(s) Oral daily

## 2021-08-16 NOTE — PHYSICAL THERAPY INITIAL EVALUATION ADULT - GENERAL OBSERVATIONS, REHAB EVAL
Received pt semi-supine, +IVL, +NGT, +3LNC, +primafit, NAD. Pt presenting with minimal garbled speech, otherwise non-verbal t/o session.

## 2021-08-16 NOTE — DIETITIAN INITIAL EVALUATION ADULT. - PROBLEM SELECTOR PLAN 1
Likely metabolic encephalopathy given positive UA and maybe component of hypoxia since pt was off 02. Pt at baseline right now as per son by bedside. CT head negative for new CVA  -Cont. IV ceftriaxone  -Consider PPM interrogation regarding possible syncope  -Falls and aspiration precautions  -SW and PT consult as able  -See below

## 2021-08-16 NOTE — DIETITIAN INITIAL EVALUATION ADULT. - ORAL INTAKE PTA/DIET HISTORY
none patient with PEG in place none patient with PEG in place. Seen by SLP for bedside evaluation, noted  "Problem: CVA (cerebrovascular accident). Patient with severe dysphagia, noted by SLP patient "failed speech/swallow evaluation here but reportedly passed in [place]??"   \Unable to obtain information from patient due to AMS

## 2021-08-17 LAB
-  AMPICILLIN: SIGNIFICANT CHANGE UP
-  CIPROFLOXACIN: SIGNIFICANT CHANGE UP
-  LEVOFLOXACIN: SIGNIFICANT CHANGE UP
-  NITROFURANTOIN: SIGNIFICANT CHANGE UP
-  TETRACYCLINE: SIGNIFICANT CHANGE UP
-  VANCOMYCIN: SIGNIFICANT CHANGE UP
ALBUMIN SERPL ELPH-MCNC: 3.5 G/DL — SIGNIFICANT CHANGE UP (ref 3.3–5)
ALP SERPL-CCNC: 111 U/L — SIGNIFICANT CHANGE UP (ref 40–120)
ALT FLD-CCNC: 37 U/L — SIGNIFICANT CHANGE UP (ref 10–45)
ANION GAP SERPL CALC-SCNC: 18 MMOL/L — HIGH (ref 5–17)
APTT BLD: 32.8 SEC — SIGNIFICANT CHANGE UP (ref 27.5–35.5)
AST SERPL-CCNC: 69 U/L — HIGH (ref 10–40)
BILIRUB SERPL-MCNC: 0.6 MG/DL — SIGNIFICANT CHANGE UP (ref 0.2–1.2)
BUN SERPL-MCNC: 40 MG/DL — HIGH (ref 7–23)
CALCIUM SERPL-MCNC: 9.1 MG/DL — SIGNIFICANT CHANGE UP (ref 8.4–10.5)
CHLORIDE SERPL-SCNC: 95 MMOL/L — LOW (ref 96–108)
CO2 SERPL-SCNC: 25 MMOL/L — SIGNIFICANT CHANGE UP (ref 22–31)
CREAT SERPL-MCNC: 0.88 MG/DL — SIGNIFICANT CHANGE UP (ref 0.5–1.3)
CULTURE RESULTS: SIGNIFICANT CHANGE UP
GLUCOSE SERPL-MCNC: 182 MG/DL — HIGH (ref 70–99)
HCT VFR BLD CALC: 41.2 % — SIGNIFICANT CHANGE UP (ref 34.5–45)
HGB BLD-MCNC: 14 G/DL — SIGNIFICANT CHANGE UP (ref 11.5–15.5)
INR BLD: 1.5 RATIO — HIGH (ref 0.88–1.16)
MCHC RBC-ENTMCNC: 33 PG — SIGNIFICANT CHANGE UP (ref 27–34)
MCHC RBC-ENTMCNC: 34 GM/DL — SIGNIFICANT CHANGE UP (ref 32–36)
MCV RBC AUTO: 97.2 FL — SIGNIFICANT CHANGE UP (ref 80–100)
METHOD TYPE: SIGNIFICANT CHANGE UP
NRBC # BLD: 0 /100 WBCS — SIGNIFICANT CHANGE UP (ref 0–0)
ORGANISM # SPEC MICROSCOPIC CNT: SIGNIFICANT CHANGE UP
ORGANISM # SPEC MICROSCOPIC CNT: SIGNIFICANT CHANGE UP
PLATELET # BLD AUTO: 308 K/UL — SIGNIFICANT CHANGE UP (ref 150–400)
POTASSIUM SERPL-MCNC: 4.1 MMOL/L — SIGNIFICANT CHANGE UP (ref 3.5–5.3)
POTASSIUM SERPL-SCNC: 4.1 MMOL/L — SIGNIFICANT CHANGE UP (ref 3.5–5.3)
PROT SERPL-MCNC: 7 G/DL — SIGNIFICANT CHANGE UP (ref 6–8.3)
PROTHROM AB SERPL-ACNC: 17.6 SEC — HIGH (ref 10.6–13.6)
RBC # BLD: 4.24 M/UL — SIGNIFICANT CHANGE UP (ref 3.8–5.2)
RBC # FLD: 13.7 % — SIGNIFICANT CHANGE UP (ref 10.3–14.5)
SODIUM SERPL-SCNC: 138 MMOL/L — SIGNIFICANT CHANGE UP (ref 135–145)
SPECIMEN SOURCE: SIGNIFICANT CHANGE UP
WBC # BLD: 11.5 K/UL — HIGH (ref 3.8–10.5)
WBC # FLD AUTO: 11.5 K/UL — HIGH (ref 3.8–10.5)

## 2021-08-17 RX ADMIN — Medication 1 APPLICATION(S): at 05:45

## 2021-08-17 RX ADMIN — Medication 1 APPLICATION(S): at 20:23

## 2021-08-17 RX ADMIN — Medication 40 MILLIGRAM(S): at 05:45

## 2021-08-17 RX ADMIN — GABAPENTIN 100 MILLIGRAM(S): 400 CAPSULE ORAL at 14:36

## 2021-08-17 RX ADMIN — Medication 0.5 MILLIGRAM(S): at 23:46

## 2021-08-17 RX ADMIN — Medication 650 MILLIGRAM(S): at 23:47

## 2021-08-17 RX ADMIN — PANTOPRAZOLE SODIUM 40 MILLIGRAM(S): 20 TABLET, DELAYED RELEASE ORAL at 12:51

## 2021-08-17 RX ADMIN — APIXABAN 5 MILLIGRAM(S): 2.5 TABLET, FILM COATED ORAL at 23:47

## 2021-08-17 RX ADMIN — CEFTRIAXONE 100 MILLIGRAM(S): 500 INJECTION, POWDER, FOR SOLUTION INTRAMUSCULAR; INTRAVENOUS at 20:25

## 2021-08-17 RX ADMIN — APIXABAN 5 MILLIGRAM(S): 2.5 TABLET, FILM COATED ORAL at 05:45

## 2021-08-17 RX ADMIN — ATORVASTATIN CALCIUM 80 MILLIGRAM(S): 80 TABLET, FILM COATED ORAL at 23:47

## 2021-08-17 RX ADMIN — Medication 3 MILLIGRAM(S): at 23:47

## 2021-08-17 RX ADMIN — POLYETHYLENE GLYCOL 3350 17 GRAM(S): 17 POWDER, FOR SOLUTION ORAL at 12:51

## 2021-08-17 RX ADMIN — Medication 1 TABLET(S): at 12:52

## 2021-08-17 RX ADMIN — SENNA PLUS 1 TABLET(S): 8.6 TABLET ORAL at 12:52

## 2021-08-17 RX ADMIN — Medication 0.5 MILLIGRAM(S): at 05:45

## 2021-08-17 RX ADMIN — ATENOLOL 25 MILLIGRAM(S): 25 TABLET ORAL at 05:45

## 2021-08-17 NOTE — PROVIDER CONTACT NOTE (OTHER) - SITUATION
as per daughter, pt cannot tolerate tube feed at 60
peg site erythematous with small amount of yellow drainage noted

## 2021-08-17 NOTE — SWALLOW BEDSIDE ASSESSMENT ADULT - SPECIFY REASON(S)
to subjectively assess swallow function, r/o dysphagia
to subjectively assess swallow function, r/o dysphagia

## 2021-08-17 NOTE — SWALLOW BEDSIDE ASSESSMENT ADULT - SWALLOW EVAL: VELAR ELEVATION
CRITICAL CARE INTERDISCIPLINARY ROUNDS Patient Information: 
 
Name:   Darryl Garcia Age:   23 y.o. Admission Date:   2/16/2019 Critical Care Day: 1 Attending Provider:   Radha Rodarte MD 
 
 
Consultant(s):   Cardiology Nivia Ríos Critical Care Physician:  Irene Hooper Code Status: Full Code Problem List:    
Patient Active Problem List  
Diagnosis Code  Acute pericarditis I30.9  Acute myopericarditis I30.9  Elevated troponin R74.8  Marijuana use F12.90 Principal Problem:  Acute pericarditis During rounds the following quality care indicators and evidence based practices were addressed :  DVT Prophylaxis and PUD Prophylaxis Louise Day N/A (M-Care N/A) ; Central Line Day: N/A Isolation: N/A; Antibiotic Stewardship: vanc; Probiotics Necessary: N/A Glycemic Control:  
Recent Labs  
  02/18/19 
0200 02/17/19 
0155 02/16/19 
1545 GLU 91 100* 91  
;No results for input(s): PHI, PCO2I, PO2I in the last 72 hours. No lab exists for component: 3 Adjustments Potassium replacement protocol Interdisciplinary team rounds were held with the following team membersCare Management, Diabetes Treatment Specialist, Nursing, Nutrition, Pastoral Care, Physician, Respiratory Therapy and Speech Therapy. Plan of care discussed. Goals of Care/ Recommendations: Monitor fever, antibiotics, electrolyte replacement, echocardiogram today See clinical pathway and/or care plan for interventions and desired outcomes. Critical Care Discharge Status:     Yellow Unable to assess

## 2021-08-17 NOTE — SWALLOW BEDSIDE ASSESSMENT ADULT - SLP GENERAL OBSERVATIONS
Pt encountered in bed, awake, on 3L NC. Daughter at bedside. Pt with nonfluent aphasia/ dysarthria. Speech is largely unintelligible. Will nod head 'yes' in response to questions. Vocal quality WFL.

## 2021-08-17 NOTE — PROVIDER CONTACT NOTE (OTHER) - ASSESSMENT
pt noted to have erythema around PEG site with small amount of thick yellow drainage noted. no warmth or swelling noted. site cleansed with saline and skin barrier applied. PEG flushing and functioning w/o difficulty.
Pt VS WNL, 99% O2 sat on 3 L O2 NC, pt with some SOB

## 2021-08-17 NOTE — CHART NOTE - NSCHARTNOTEFT_GEN_A_CORE
Spoke with team about Neurology Consult note recommendations including EEG. If patient appearing back to baseline with treatment of UTI and other metabolic disturbances, EEG would likely be of low yield.    primary team may call back for further questions/discussions.     d/w primary team

## 2021-08-17 NOTE — SWALLOW BEDSIDE ASSESSMENT ADULT - SLP PERTINENT HISTORY OF CURRENT PROBLEM
89 y/o F with PMHx of afib recently started on eliquis, s/p PPM, w/ recent L MCA CVA, chronic respiratory failure on 02, HTN, ovarian and colon ca, GERD p/w episode of acute encephalopathy. Pt was recently admitted to [facility] after being discovered with new  Left MCA infarction, Left M1 occlusion. Recannulization was unsuccessful. Pt now with residual R facial droop, hemiplegia and dysarthria. Was started on eliquis, had PEG tube placed for dysphagia and eventually discharged to [place] [facility] 5 days ago. Pt's son was visiting her today when he found her in hallway without her oxygen on. Pt was altered compared to baseline and seemed borderline non-responsive. She was then rushed to [facility] ER for further evaluation. Pt's son states she is now back to her baseline mental status and without distress. Cautions resumption of tube feeds as she could not tolerate 60 ccs per hour last admission.
89 y/o F with PMHx of afib recently started on eliquis, s/p PPM, w/ recent L MCA CVA, chronic respiratory failure on 02, HTN, ovarian and colon ca, GERD p/w episode of acute encephalopathy. Pt was recently admitted to [facility] after being discovered with new  Left MCA infarction, Left M1 occlusion. Recannulization was unsuccessful. Pt now with residual R facial droop, hemiplegia and dysarthria. Was started on eliquis, had PEG tube placed for dysphagia and eventually discharged to [place] [facility] 5 days ago. Pt's son was visiting her today when he found her in hallway without her oxygen on. Pt was altered compared to baseline and seemed borderline non-responsive. She was then rushed to [facility] ER for further evaluation. Pt's son states she is now back to her baseline mental status and without distress. Cautions resumption of tube feeds as she could not tolerate 60 ccs per hour last admission.

## 2021-08-17 NOTE — PROVIDER CONTACT NOTE (OTHER) - REASON
peg site erythematous with small amount of yellow drainage noted
as per daughter pt cannot tolerate feeding at 60 ml per hour

## 2021-08-17 NOTE — SWALLOW BEDSIDE ASSESSMENT ADULT - ASR SWALLOW RECOMMEND DIAG
An MBS is recommended to objectively assess swallow function prior to initiation of a PO diet. Will schedule upon receipt of order./VFSS/MBS

## 2021-08-17 NOTE — SWALLOW BEDSIDE ASSESSMENT ADULT - SWALLOW EVAL: DIAGNOSIS
Pt presents with an oropharyngeal dysphagia. Swallow function characterized by delayed oral transit, delayed pharyngeal swallow trigger, and multiple swallows (x2), suggestive of pharyngeal residue. No overt signs of laryngeal penetration/aspiration observed, however, silent aspiration cannot be ruled out at bedside. 89 y/o F with PMHx of afib recently started on eliquis, s/p PPM, w/ recent L MCA CVA, chronic respiratory failure on 02, HTN, ovarian and colon ca, GERD p/w episode of acute encephalopathy. Pt presents with an oropharyngeal dysphagia. Swallow function characterized by delayed oral transit, delayed pharyngeal swallow trigger, and multiple swallows (x2), suggestive of pharyngeal residue. No overt signs of laryngeal penetration/aspiration observed, however, silent aspiration cannot be ruled out at bedside.

## 2021-08-17 NOTE — CONSULT NOTE ADULT - SUBJECTIVE AND OBJECTIVE BOX
SHELBY LESTER [id number]  89y Female  3d    HPI:  89 y/o F with PMHx of afib recently started on eliquis, s/p PPM, w/ recent L MCA CVA s/p attempted cerebral angiogram via right groin access, chronic respiratory failure on 02, HTN, ovarian and colon ca, GERD p/w AMS. Vascular was consulted because of right groin swelling with US showing pseudoaneurysm.      PAST MEDICAL & SURGICAL HISTORY:  Hypertension    Cancer    GERD (gastroesophageal reflux disease)    Anxiety    Ovarian cancer    Colon cancer    H/O small bowel obstruction    H/O total hysterectomy  with BSO    No significant past surgical history    MEDICATIONS  (STANDING):  apixaban 5 milliGRAM(s) Enteral Tube every 12 hours  ATENolol  Tablet 25 milliGRAM(s) Enteral Tube daily  atorvastatin 80 milliGRAM(s) Oral at bedtime  BACItracin   Ointment 1 Application(s) Topical two times a day  buDESOnide    Inhalation Suspension 0.5 milliGRAM(s) Inhalation two times a day  cefTRIAXone   IVPB 1000 milliGRAM(s) IV Intermittent every 24 hours  furosemide Solution 40 milliGRAM(s) Oral daily  gabapentin   Solution 100 milliGRAM(s) Oral daily  melatonin 3 milliGRAM(s) Oral at bedtime  multivitamin 1 Tablet(s) Oral daily  pantoprazole   Suspension 40 milliGRAM(s) Oral daily  polyethylene glycol 3350 17 Gram(s) Oral daily  senna 1 Tablet(s) Oral daily    MEDICATIONS  (PRN):  acetaminophen   Tablet .. 650 milliGRAM(s) Oral every 6 hours PRN Temp greater or equal to 38.5C (101.3F), Mild Pain (1 - 3)  ondansetron Injectable 4 milliGRAM(s) IV Push every 8 hours PRN Nausea and/or Vomiting    Allergies  No Known Allergies  Intolerances    REVIEW OF SYSTEMS  [ x ] A ten-point review of systems was otherwise negative except as noted.  [ ] Due to altered mental status/intubation, subjective information were not able to be obtained from the patient. History was obtained, to the extent possible, from review of the chart and collateral sources of information.      Vital Signs Last 24 Hrs  T(C): 36.4 (17 Aug 2021 16:19), Max: 36.8 (17 Aug 2021 16:17)  T(F): 97.6 (17 Aug 2021 16:19), Max: 98.2 (17 Aug 2021 16:17)  HR: 72 (17 Aug 2021 16:19) (68 - 72)  BP: 133/69 (17 Aug 2021 16:19) (113/74 - 142/80)  RR: 18 (17 Aug 2021 16:19) (18 - 18)  SpO2: 99% (17 Aug 2021 16:19) (97% - 100%)    PHYSICAL EXAM:  GENERAL: aphasic, awake  ABDOMEN: right groin with ecchymosis, palpable and pulsatile lump approximately 2x2cm, palpable DP and PT 1+ distally  EXTREMITIES:  No clubbing, cyanosis, or edema      LABS:  Labs:  CAPILLARY BLOOD GLUCOSE                              14.0   11.50 )-----------( 308      ( 17 Aug 2021 09:55 )             41.2         08-17    138  |  95<L>  |  40<H>  ----------------------------<  182<H>  4.1   |  25  |  0.88      Calcium, Total Serum: 9.1 mg/dL (08-17-21 @ 09:55)      LFTs:             7.0  | 0.6  | 69       ------------------[111     ( 17 Aug 2021 09:55 )  3.5  | x    | 37          Lipase:x      Amylase:x         Lactate, Blood: 1.4 mmol/L (08-15-21 @ 07:21)  Blood Gas Venous - Lactate: 2.0 mmoL/L (08-14-21 @ 17:40)      Coags:        Serum Pro-Brain Natriuretic Peptide: 3467 pg/mL (08-14-21 @ 16:22)          Culture - Urine (collected 14 Aug 2021 20:49)  Source: Catheterized Catheterized  Final Report (17 Aug 2021 14:07):    >100,000 CFU/ml Enterococcus faecalis    <10,000 CFU/ml Normal Urogenital juliane present  Organism: Enterococcus faecalis (17 Aug 2021 14:07)  Organism: Enterococcus faecalis (17 Aug 2021 14:07)          RADIOLOGY & ADDITIONAL STUDIES:    < from: US Duplex Arterial Lower Ext Ltd, Right (08.16.21 @ 11:41) >  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  The more proximal pseudoaneurysm measures 1.1 cm x 6 mm x 7 mm.  More peripheral pseudoaneurysm measures 1.5 cm x 1.7 cm x 2.2 cm. The more peripheral larger pseudoaneurysm is partially thrombosed with a patent lumen of 1.9 cm x 1.7 cm x 2.2 cm.    < end of copied text >

## 2021-08-17 NOTE — SWALLOW BEDSIDE ASSESSMENT ADULT - COMMENTS
Patient is known to this service, seen on last admission with recommendations for NPO (7/26).    Per ID 8/16: CXR is clear and while she has pyuria, this can be a nonspecific finding.  Her wbc count is normal, her temp is normal, hence not clear if a positive culture would be reflective of colonization or true UTI.    Per Internal Medicine 8/15:   Problem: CVA (cerebrovascular accident).  Plan: S/p large L sided MCA CVA. Residual R sided deficits, facial droop and severe dysarthria. Severe dysphagia. Failed speech/swallow eval here but reportedly passed in [place]??   Problem: CVA (cerebrovascular accident).  Plan: S/p large L sided MCA CVA. Residual R sided deficits, facial droop and severe dysarthria. Severe dysphagia. Failed speech/swallow eval here but reportedly passed in [place]??
Patient is known to this service, seen on last admission with recommendations for NPO (7/26).    Per ID 8/16: CXR is clear and while she has pyuria, this can be a nonspecific finding.  Her wbc count is normal, her temp is normal, hence not clear if a positive culture would be reflective of colonization or true UTI.    Per Internal Medicine 8/15:   Problem: CVA (cerebrovascular accident).  Plan: S/p large L sided MCA CVA. Residual R sided deficits, facial droop and severe dysarthria. Severe dysphagia. Failed speech/swallow eval here but reportedly passed in [place]??

## 2021-08-18 LAB
ALBUMIN SERPL ELPH-MCNC: 3.5 G/DL — SIGNIFICANT CHANGE UP (ref 3.3–5)
ALP SERPL-CCNC: 115 U/L — SIGNIFICANT CHANGE UP (ref 40–120)
ALT FLD-CCNC: 32 U/L — SIGNIFICANT CHANGE UP (ref 10–45)
ANION GAP SERPL CALC-SCNC: 13 MMOL/L — SIGNIFICANT CHANGE UP (ref 5–17)
AST SERPL-CCNC: 42 U/L — HIGH (ref 10–40)
BILIRUB SERPL-MCNC: 0.6 MG/DL — SIGNIFICANT CHANGE UP (ref 0.2–1.2)
BUN SERPL-MCNC: 38 MG/DL — HIGH (ref 7–23)
CALCIUM SERPL-MCNC: 9.5 MG/DL — SIGNIFICANT CHANGE UP (ref 8.4–10.5)
CHLORIDE SERPL-SCNC: 97 MMOL/L — SIGNIFICANT CHANGE UP (ref 96–108)
CO2 SERPL-SCNC: 26 MMOL/L — SIGNIFICANT CHANGE UP (ref 22–31)
CREAT SERPL-MCNC: 0.88 MG/DL — SIGNIFICANT CHANGE UP (ref 0.5–1.3)
GLUCOSE SERPL-MCNC: 139 MG/DL — HIGH (ref 70–99)
HCT VFR BLD CALC: 42.5 % — SIGNIFICANT CHANGE UP (ref 34.5–45)
HGB BLD-MCNC: 14.1 G/DL — SIGNIFICANT CHANGE UP (ref 11.5–15.5)
MCHC RBC-ENTMCNC: 32 PG — SIGNIFICANT CHANGE UP (ref 27–34)
MCHC RBC-ENTMCNC: 33.2 GM/DL — SIGNIFICANT CHANGE UP (ref 32–36)
MCV RBC AUTO: 96.4 FL — SIGNIFICANT CHANGE UP (ref 80–100)
NRBC # BLD: 0 /100 WBCS — SIGNIFICANT CHANGE UP (ref 0–0)
PLATELET # BLD AUTO: 240 K/UL — SIGNIFICANT CHANGE UP (ref 150–400)
POTASSIUM SERPL-MCNC: 3.7 MMOL/L — SIGNIFICANT CHANGE UP (ref 3.5–5.3)
POTASSIUM SERPL-SCNC: 3.7 MMOL/L — SIGNIFICANT CHANGE UP (ref 3.5–5.3)
PROT SERPL-MCNC: 7.1 G/DL — SIGNIFICANT CHANGE UP (ref 6–8.3)
RBC # BLD: 4.41 M/UL — SIGNIFICANT CHANGE UP (ref 3.8–5.2)
RBC # FLD: 13.9 % — SIGNIFICANT CHANGE UP (ref 10.3–14.5)
SODIUM SERPL-SCNC: 136 MMOL/L — SIGNIFICANT CHANGE UP (ref 135–145)
WBC # BLD: 9.87 K/UL — SIGNIFICANT CHANGE UP (ref 3.8–10.5)
WBC # FLD AUTO: 9.87 K/UL — SIGNIFICANT CHANGE UP (ref 3.8–10.5)

## 2021-08-18 PROCEDURE — 36002 PSEUDOANEURYSM INJECTION TRT: CPT

## 2021-08-18 RX ADMIN — PANTOPRAZOLE SODIUM 40 MILLIGRAM(S): 20 TABLET, DELAYED RELEASE ORAL at 13:44

## 2021-08-18 RX ADMIN — Medication 650 MILLIGRAM(S): at 07:30

## 2021-08-18 RX ADMIN — ATORVASTATIN CALCIUM 80 MILLIGRAM(S): 80 TABLET, FILM COATED ORAL at 21:25

## 2021-08-18 RX ADMIN — ATENOLOL 25 MILLIGRAM(S): 25 TABLET ORAL at 06:19

## 2021-08-18 RX ADMIN — POLYETHYLENE GLYCOL 3350 17 GRAM(S): 17 POWDER, FOR SOLUTION ORAL at 13:43

## 2021-08-18 RX ADMIN — Medication 0.5 MILLIGRAM(S): at 17:02

## 2021-08-18 RX ADMIN — Medication 1 APPLICATION(S): at 17:02

## 2021-08-18 RX ADMIN — Medication 1 APPLICATION(S): at 06:55

## 2021-08-18 RX ADMIN — Medication 3 MILLIGRAM(S): at 21:25

## 2021-08-18 RX ADMIN — Medication 650 MILLIGRAM(S): at 06:24

## 2021-08-18 RX ADMIN — Medication 650 MILLIGRAM(S): at 00:30

## 2021-08-18 RX ADMIN — APIXABAN 5 MILLIGRAM(S): 2.5 TABLET, FILM COATED ORAL at 09:59

## 2021-08-18 RX ADMIN — SENNA PLUS 1 TABLET(S): 8.6 TABLET ORAL at 21:25

## 2021-08-18 RX ADMIN — GABAPENTIN 100 MILLIGRAM(S): 400 CAPSULE ORAL at 17:01

## 2021-08-18 RX ADMIN — Medication 0.5 MILLIGRAM(S): at 06:19

## 2021-08-18 RX ADMIN — Medication 1 TABLET(S): at 13:44

## 2021-08-18 RX ADMIN — Medication 40 MILLIGRAM(S): at 06:19

## 2021-08-18 RX ADMIN — APIXABAN 5 MILLIGRAM(S): 2.5 TABLET, FILM COATED ORAL at 21:25

## 2021-08-18 NOTE — OCCUPATIONAL THERAPY INITIAL EVALUATION ADULT - PERTINENT HX OF CURRENT PROBLEM, REHAB EVAL
89 y/o F p/w episode of acute encephalopathy. Pt was recently admitted to [facility] after being discovered with new  Left MCA infarction, Left M1 occlusion Pt with residual R facial droop, hemiplegia and dysarthria discharged rehab 5 days ago. Pt's son was visiting her today when he found her in hallway without her oxygen on. Pt was altered compared to baseline and seemed borderline non-responsive. She was then rushed to [facility] ER for further evaluation.

## 2021-08-18 NOTE — PROCEDURE NOTE - NSINFORMCONSENT_GEN_A_CORE
obtained over phone from son who is healthcare proxy/Benefits, risks, and possible complications of procedure explained to patient/caregiver who verbalized understanding and gave verbal consent.

## 2021-08-18 NOTE — OCCUPATIONAL THERAPY INITIAL EVALUATION ADULT - DIAGNOSIS, OT EVAL
Patient presents with decreased balance, cognition, ROM, coordination strength, endurance impacting ability to perform ADLs and functional mobility

## 2021-08-18 NOTE — PROCEDURE NOTE - NSICDXPROCEDURE_GEN_ALL_CORE_FT
PROCEDURES:  Injection of thrombin into pseudoaneurysm with ultrasound guidance 18-Aug-2021 13:06:39  Acosta Brewer

## 2021-08-18 NOTE — PROCEDURE NOTE - ADDITIONAL PROCEDURE DETAILS
Patient brought to the ultrasound suite where informed consent was verified. Patient placed in supine position. Vascular ultrasound technologist confirmed presence of ~3cm right femoral artery pseudoaneurysm with a 0.7cm tortuous neck. The procedural field was then prepped and draped. Under ultrasound guidance an 18g spinal needle was inserted into the pseudoaneurysm. 0.1cc of thrombin +  sterile water was injected into the pseudoaneurysm. Color flow diminished and then disappeared within the pseudoaneurysm. The vascular ultrasound technologist confirmed that the pseudoaneurysm appeared thrombosed. There was minimal residual flow in the proximal neck. The femoral artery was patent with a normal triphasic spectral waveform. The patient tolerated the procedure well.

## 2021-08-18 NOTE — OCCUPATIONAL THERAPY INITIAL EVALUATION ADULT - LIVES WITH, PROFILE
Pt admitted from rehab, from prior evaluation · Lives With: alone; As per chart- pt lives alone in PVT house, +4STE, 2 floors, bed and bath on 2nd floor, pt uses chair lift. PLOF: Assist with all ADLs and functional transfers. [facility]HA 7 days/ week. AD/DME- rolling walker, chair lift.

## 2021-08-19 LAB — SARS-COV-2 RNA SPEC QL NAA+PROBE: SIGNIFICANT CHANGE UP

## 2021-08-19 PROCEDURE — 99232 SBSQ HOSP IP/OBS MODERATE 35: CPT

## 2021-08-19 PROCEDURE — 74230 X-RAY XM SWLNG FUNCJ C+: CPT | Mod: 26

## 2021-08-19 PROCEDURE — 93926 LOWER EXTREMITY STUDY: CPT | Mod: 26,RT

## 2021-08-19 RX ADMIN — Medication 1 TABLET(S): at 13:06

## 2021-08-19 RX ADMIN — Medication 1 APPLICATION(S): at 17:28

## 2021-08-19 RX ADMIN — Medication 40 MILLIGRAM(S): at 05:29

## 2021-08-19 RX ADMIN — Medication 0.5 MILLIGRAM(S): at 05:29

## 2021-08-19 RX ADMIN — GABAPENTIN 100 MILLIGRAM(S): 400 CAPSULE ORAL at 13:05

## 2021-08-19 RX ADMIN — Medication 1 APPLICATION(S): at 05:29

## 2021-08-19 RX ADMIN — Medication 650 MILLIGRAM(S): at 20:45

## 2021-08-19 RX ADMIN — PANTOPRAZOLE SODIUM 40 MILLIGRAM(S): 20 TABLET, DELAYED RELEASE ORAL at 13:06

## 2021-08-19 RX ADMIN — SENNA PLUS 1 TABLET(S): 8.6 TABLET ORAL at 22:35

## 2021-08-19 RX ADMIN — ATORVASTATIN CALCIUM 80 MILLIGRAM(S): 80 TABLET, FILM COATED ORAL at 22:35

## 2021-08-19 RX ADMIN — Medication 0.5 MILLIGRAM(S): at 17:27

## 2021-08-19 RX ADMIN — APIXABAN 5 MILLIGRAM(S): 2.5 TABLET, FILM COATED ORAL at 13:06

## 2021-08-19 RX ADMIN — ATENOLOL 25 MILLIGRAM(S): 25 TABLET ORAL at 05:29

## 2021-08-19 RX ADMIN — POLYETHYLENE GLYCOL 3350 17 GRAM(S): 17 POWDER, FOR SOLUTION ORAL at 13:06

## 2021-08-19 RX ADMIN — Medication 3 MILLIGRAM(S): at 22:34

## 2021-08-19 RX ADMIN — Medication 650 MILLIGRAM(S): at 20:01

## 2021-08-19 NOTE — SWALLOW VFSS/MBS ASSESSMENT ADULT - SLP GENERAL OBSERVATIONS
Pt encountered in radiology, secure in SAIDA chair. +3L NC. Awake & alert, with severe nonfluent aphasia. Difficulty following commands. Pt moaning during exam, appears uncomfortable. Unclear if 2/2 positioning in SAIDA chair vs other, as pt unable to report due to language deficits. Attempted to reposition pt, initially with relief, however within 5 minutes pt again with moaning/ facial grimacing.

## 2021-08-19 NOTE — SWALLOW VFSS/MBS ASSESSMENT ADULT - RECOMMENDED FEEDING/EATING TECHNIQUES
check mouth frequently for oral residue/pocketing/maintain upright posture during/after eating for 30 mins

## 2021-08-19 NOTE — SWALLOW VFSS/MBS ASSESSMENT ADULT - CONSISTENCIES ADMINISTERED
x2 teaspoons administered/puree thin Only x1 teaspoon administered/puree thick Only x1 teaspoon administered/nectar thick honey thick/puree thick

## 2021-08-19 NOTE — SWALLOW VFSS/MBS ASSESSMENT ADULT - PHARYNGEAL PHASE COMMENTS
Oral and pharyngeal residue is eventually cleared via liquid wash Nectar-thick liquid via teaspoon utilized as a liquid wash given oral + pharyngeal residue with previous trial. Min-moderate remained in the oral cavity after the primary swallow with nectar-thick, suspect still from thin puree trial. Upon reinitiation of fluoro, material evident at the vocal cords and below the level of the trachea. Unable to determine is aspirant is related to nectar-thick liquid vs thin puree residue. Pt with delayed, weak cough response which is ineffective in clearing material from the trachea.

## 2021-08-19 NOTE — SWALLOW VFSS/MBS ASSESSMENT ADULT - COMMENTS
Patient is known to this service, seen on last admission with recommendations for NPO (7/26).    Per ID 8/16: CXR is clear and while she has pyuria, this can be a nonspecific finding.  Her wbc count is normal, her temp is normal, hence not clear if a positive culture would be reflective of colonization or true UTI.    Per Internal Medicine 8/15:   Problem: CVA (cerebrovascular accident).  Plan: S/p large L sided MCA CVA. Residual R sided deficits, facial droop and severe dysarthria. Severe dysphagia. Failed speech/swallow eval here but reportedly passed in [place]??

## 2021-08-19 NOTE — SWALLOW VFSS/MBS ASSESSMENT ADULT - ADDITIONAL RECOMMENDATIONS
Continue excellent oral care.   Recommend skilled ST services at next level of care to address dysphagia, further advancement of PO diet, and speech/language deficits. Pt would likely benefit from repeat objective assessment, given limited scope of this exam.

## 2021-08-19 NOTE — PROGRESS NOTE ADULT - ATTENDING COMMENTS
Right femoral PSA thrombosed.  Proximal neck still has some flow however this should resolve over time.  No further vascular intervention indicated.  Please recall with further questions or concerns.

## 2021-08-19 NOTE — SWALLOW VFSS/MBS ASSESSMENT ADULT - ORAL PREPARATORY PHASE
Functional Oral holding Intermittent bolus holding, suspect related to discomfort, as pt moaning with PO in oral cavity Intermittent bolus holding

## 2021-08-19 NOTE — SWALLOW VFSS/MBS ASSESSMENT ADULT - ASPIRATION PRECAUTIONS
Monitor for s/s aspiration/laryngeal penetration. If noted:  D/C p.o. intake, provide non-oral nutrition/hydration/meds, and contact this service @ [id number]

## 2021-08-19 NOTE — SWALLOW VFSS/MBS ASSESSMENT ADULT - DIAGNOSTIC IMPRESSIONS
MBS was limited due to patient's discomfort in SAIDA chair. Unable to make formal diet recommendation, as insufficient trials were provided. Additionally, suspect that swallow function was negatively impacted by pt's discomfort; there was oral holding, moaning with PO in the oral cavity, and minimal-moderate amount of oral stasis was observed with pureed textures, not previously seen at bedside. x1 instance of aspiration occurred off fluoro; suspect related to passive spillover of oral residue from prior trial of thin puree followed by nectar-thick tsp wash. Weak, delayed cough noted, though ineffective in clearing material from the trachea. With honey-thick liquid via teaspoon, there is adequate oral prep/transfer, adequate airway protection, and minimal pharyngeal residue, which cleared with a spontaneous repeat swallow. Unable to fully assess safety of pureed textures given above limitations. MBS was limited due to patient's discomfort in SAIDA chair. Unable to make formal diet recommendation, as insufficient trials were provided. Additionally, suspect that swallow function was negatively impacted by pt's discomfort; there was oral holding, moaning with PO in the oral cavity, and minimal-moderate amount of oral stasis observed with pureed textures, not previously seen at bedside. x1 instance of aspiration occurred off fluoro; suspect related to passive spillover of oral residue from prior trial of thin puree followed by nectar-thick tsp wash. Weak, delayed cough noted, though ineffective in clearing material from the trachea. With honey-thick liquid via teaspoon, there is adequate oral prep/transfer, adequate airway protection, and minimal pharyngeal residue, which cleared with a spontaneous repeat swallow. Unable to fully assess safety of pureed textures given above limitations.

## 2021-08-19 NOTE — SWALLOW VFSS/MBS ASSESSMENT ADULT - ORAL PHASE COMMENTS
Swallow is triggered as bolus reaches the aryepiglottic folds Swallow is triggered as bolus reaches the valleculae

## 2021-08-19 NOTE — SWALLOW VFSS/MBS ASSESSMENT ADULT - SLP PERTINENT HISTORY OF CURRENT PROBLEM
89 y/o F with PMHx of afib recently started on eliquis, s/p PPM, w/ recent L MCA CVA, chronic respiratory failure on 02, HTN, ovarian and colon ca, GERD p/w episode of acute encephalopathy. Pt was recently admitted to [facility] after being discovered with new  Left MCA infarction, Left M1 occlusion. Recannulization was unsuccessful. Pt now with residual R facial droop, hemiplegia and dysarthria. Was started on eliquis, had PEG tube placed for dysphagia and eventually discharged to [facility] 5 days ago. Pt's son was visiting her today when he found her in hallway without her oxygen on. Pt was altered compared to baseline and seemed borderline non-responsive. She was then rushed to [facility] ER for further evaluation. Pt's son states she is now back to her baseline mental status and without distress. Cautions resumption of tube feeds as she could not tolerate 60 ccs per hour last admission.

## 2021-08-19 NOTE — SWALLOW VFSS/MBS ASSESSMENT ADULT - RECOMMENDED CONSISTENCY
Continue NPO with PEG for primary means of nutrition/hydration/medications. Pt may have honey-thick liquids via teaspoon only, will full assistance/supervision.

## 2021-08-20 ENCOUNTER — TRANSCRIPTION ENCOUNTER (OUTPATIENT)
Age: 86
End: 2021-08-20

## 2021-08-20 RX ORDER — BARRIER CREAM 200; 4.5 MG/G; MG/G
0 CREAM TOPICAL
Qty: 0 | Refills: 0 | DISCHARGE

## 2021-08-20 RX ORDER — ACETAMINOPHEN 500 MG
2 TABLET ORAL
Qty: 0 | Refills: 0 | DISCHARGE
Start: 2021-08-20

## 2021-08-20 RX ADMIN — POLYETHYLENE GLYCOL 3350 17 GRAM(S): 17 POWDER, FOR SOLUTION ORAL at 12:21

## 2021-08-20 RX ADMIN — APIXABAN 5 MILLIGRAM(S): 2.5 TABLET, FILM COATED ORAL at 01:04

## 2021-08-20 RX ADMIN — ATENOLOL 25 MILLIGRAM(S): 25 TABLET ORAL at 05:53

## 2021-08-20 RX ADMIN — Medication 1 APPLICATION(S): at 17:28

## 2021-08-20 RX ADMIN — Medication 0.5 MILLIGRAM(S): at 17:10

## 2021-08-20 RX ADMIN — SENNA PLUS 1 TABLET(S): 8.6 TABLET ORAL at 12:21

## 2021-08-20 RX ADMIN — Medication 0.5 MILLIGRAM(S): at 05:53

## 2021-08-20 RX ADMIN — GABAPENTIN 100 MILLIGRAM(S): 400 CAPSULE ORAL at 13:23

## 2021-08-20 RX ADMIN — Medication 650 MILLIGRAM(S): at 23:45

## 2021-08-20 RX ADMIN — APIXABAN 5 MILLIGRAM(S): 2.5 TABLET, FILM COATED ORAL at 12:22

## 2021-08-20 RX ADMIN — Medication 650 MILLIGRAM(S): at 23:02

## 2021-08-20 RX ADMIN — Medication 1 APPLICATION(S): at 05:53

## 2021-08-20 RX ADMIN — PANTOPRAZOLE SODIUM 40 MILLIGRAM(S): 20 TABLET, DELAYED RELEASE ORAL at 12:23

## 2021-08-20 RX ADMIN — APIXABAN 5 MILLIGRAM(S): 2.5 TABLET, FILM COATED ORAL at 23:02

## 2021-08-20 RX ADMIN — Medication 10 MILLIGRAM(S): at 05:40

## 2021-08-20 RX ADMIN — Medication 40 MILLIGRAM(S): at 05:53

## 2021-08-20 RX ADMIN — ATORVASTATIN CALCIUM 80 MILLIGRAM(S): 80 TABLET, FILM COATED ORAL at 23:03

## 2021-08-20 RX ADMIN — Medication 1 TABLET(S): at 12:22

## 2021-08-20 RX ADMIN — Medication 3 MILLIGRAM(S): at 23:02

## 2021-08-20 NOTE — DISCHARGE NOTE PROVIDER - NSDCCPCAREPLAN_GEN_ALL_CORE_FT
PRINCIPAL DISCHARGE DIAGNOSIS  Diagnosis: Bacterial UTI  Assessment and Plan of Treatment: conpleted course of antibiotics  Follow up with your primary care provider       PRINCIPAL DISCHARGE DIAGNOSIS  Diagnosis: AMS (altered mental status)  Assessment and Plan of Treatment: - Likely metabolic encephalopathy given positive UA  - Completed the course of IV ceftriaxone  -Falls and aspiration precautions      SECONDARY DISCHARGE DIAGNOSES  Diagnosis: Bacterial UTI  Assessment and Plan of Treatment: conpleted course of antibiotics  Follow up with your primary care provider    Diagnosis: Pseudoaneurysm  Assessment and Plan of Treatment: - Seen vascular s/p thrombin injection, US shows Thrombosis of the 2 larger components of the right groin pseudoaneurysm status post thrombin injection.  -No further intervention or workup from vascular surgery, remaining care per primary team.  After discharge please have patient f/u with Dr. Brewer in the office within 1-2 weeks.    Diagnosis: AMS (altered mental status)  Assessment and Plan of Treatment:      PRINCIPAL DISCHARGE DIAGNOSIS  Diagnosis: AMS (altered mental status)  Assessment and Plan of Treatment: - Likely metabolic encephalopathy given positive UA  - Completed the course of IV ceftriaxone  -Falls and aspiration precautions      SECONDARY DISCHARGE DIAGNOSES  Diagnosis: Pseudoaneurysm  Assessment and Plan of Treatment: - Seen vascular s/p thrombin injection, US shows Thrombosis of the 2 larger components of the right groin pseudoaneurysm status post thrombin injection.  -No further intervention or workup from vascular surgery, remaining care per primary team.  After discharge please have patient f/u with Dr. Brewer in the office within 1-2 weeks.    Diagnosis: AMS (altered mental status)  Assessment and Plan of Treatment: Back to baseline   Follow-up with your PCP ---call for appointment    Diagnosis: Other persistent atrial fibrillation  Assessment and Plan of Treatment: Atrial fibrillation is the most common heart rhythm problem.  The condition puts you at risk for has stroke and heart attack  It helps if you control your blood pressure, not drink more than 1-2 alcohol drinks per day, cut down on caffeine, getting treatment for over active thyroid gland, and get regular exercise  Call your doctor if you feel your heart racing or beating unusually, chest tightness or pain, lightheaded, faint, shortness of breath especially with exercise  It is important to take your heart medication as prescribed  You may be on anticoagulation which is very important to take as directed - you may need blood work to monitor drug levels    Diagnosis: CHF with cardiomyopathy  Assessment and Plan of Treatment: continue current medication ( Lasix/atenolol)  conitue home oxygen supplemeent   Weigh yourself daily.  If you gain 3lbs in 3 days, or 5lbs in a week call your Health Care Provider.  Do not eat or drink foods containing more than 2000mg of salt (sodium) in your diet every day.  Call your Health Care Provider if you have any swelling or increased swelling in your feet, ankles, and/or stomach.  Take all of your medication as directed.  If you become dizzy call your Health Care Provider.    Diagnosis: Bacterial UTI  Assessment and Plan of Treatment: conpleted course of antibiotics  Follow up with your primary care provider

## 2021-08-20 NOTE — CHART NOTE - NSCHARTNOTEFT_GEN_A_CORE
Pt is s/p thrombin injection yesterday for pseudoaneurysm. Repeat U/S after injection showing thrombosis of pseudoaneurysm.    No further intervention or workup from vascular surgery, remaining care per primary team.  After discharge please have patient f/u with Dr. Brewer in the office within 1-2 weeks.    Vascular Surgery  [id number]

## 2021-08-20 NOTE — DISCHARGE NOTE PROVIDER - NSDCMRMEDTOKEN_GEN_ALL_CORE_FT
acetaminophen 325 mg oral tablet: 2 tab(s) orally every 6 hours, As needed, Temp greater or equal to 38.5C (101.3F), Mild Pain (1 - 3)  apixaban 5 mg oral tablet: 1 tab(s) orally every 12 hours  atenolol 25 mg oral tablet: 1 tab(s) orally once a day  atorvastatin 80 mg oral tablet: 1 tab(s) orally once a day (at bedtime)  budesonide 0.5 mg/2 mL inhalation suspension: 2 milliliter(s) inhaled 2 times a day  furosemide 40 mg/5 mL oral solution: 5 milliliter(s) orally once a day  gabapentin 250 mg/5 mL oral solution: 2 milliliter(s) orally once a day (at bedtime)  melatonin 3 mg oral tablet: 1 tab(s) orally once a day (at bedtime)  Multiple Vitamins oral tablet: 1 tab(s) orally once a day  pantoprazole 40 mg oral granule, delayed release: 40 milligram(s) by gastrostomy tube once a day  polyethylene glycol 3350 oral powder for reconstitution: 17 gram(s) orally once a day  senna oral tablet: 1 tab(s) orally once a day

## 2021-08-20 NOTE — DISCHARGE NOTE PROVIDER - HOSPITAL COURSE
89 y/o F with PMHx of afib recently started on eliquis, s/p PPM, w/ recent L MCA CVA, chronic respiratory failure on 02, HTN, ovarian and colon ca, GERD p/w episode of acute encephalopathy     Acute encephalopathy. Likely metabolic encephalopathy given positive UA  -S/p IV ceftriaxone, completed course   -Falls and aspiration precautions  -SW and PT consult as able.      Urinary tract infection. UA could be considered positive  -S/P IV ceftriaxone daily  -F/u UCx  - ID eval appreciated.      Chronic respiratory failure with hypoxia. Pt discharged on 2L NC reportedly  - Cont. nasal cannula for now  - Resume budesonide as able.      CVA (cerebrovascular accident). S/p large L sided MCA CVA. Residual R sided deficits, facial droop and severe dysarthria. Severe dysphagia. Failed speech/swallow eval here but reportedly passed in [place]??  -Aspiration precautions  -NPO, tube feeding   -Nutrition consult for tube feeds  -Cont. atorvastatin  - pseudoaneurysm noted  - vascular eval appreciated, 89 y/o F with PMHx of afib recently started on eliquis, s/p PPM, w/ recent L MCA CVA, chronic respiratory failure on 02, HTN, ovarian and colon ca, GERD p/w episode of acute encephalopathy    ·  Problem: Acute encephalopathy.  Plan: Likely metabolic encephalopathy given positive UA  -S/p IV ceftriaxone, completed course   -Falls and aspiration precautions  -SW and PT consult as able.     ·  Problem: Urinary tract infection.  Plan: UA could be considered positive  -S/P IV ceftriaxone daily  - Seen by ID    ·  Problem: Chronic respiratory failure with hypoxia.  Plan: - Pt discharged on 2L NC reportedly  - Cont. nasal cannula for now  - Resume budesonide as able.     ·  Problem: CVA (cerebrovascular accident).  Plan: S/p large L sided MCA CVA. Residual R sided deficits, facial droop and severe dysarthria. Severe dysphagia. Failed speech/swallow eval here but reportedly passed in [place]??  -Aspiration precautions  -NPO, tube feeding  - Seen by S&S and MBS, recs NPO for now  -Nutrition consult for tube feeds  - Cont. atorvastatin     ·   Right groin pseudoaneurysm  - Seen vascular s/p thrombin injection, US shows Thrombosis of the 2 larger components of the right groin pseudoaneurysm status post thrombin injection.  -No further intervention or workup from vascular surgery, remaining care per primary team.  After discharge please have patient f/u with Dr. Brewer in the office within 1-2 weeks.    ·  Problem: Other persistent atrial fibrillation.  Plan: S/p PPM. S/p recent CVA likely of cardioembolic origin  -Cont. eliquis 5mg BID  -Cont. atenolol daily.     ·  Problem: Chronic systolic congestive heart failure. Plan: EF 35%  -Cont. lasix 40mg liquid PO daily  -Pt on atenolol.    Pt is medically stable and optimized to DC [facility].

## 2021-08-21 RX ORDER — OLANZAPINE 15 MG/1
2.5 TABLET, FILM COATED ORAL ONCE
Refills: 0 | Status: COMPLETED | OUTPATIENT
Start: 2021-08-21 | End: 2021-08-21

## 2021-08-21 RX ADMIN — ATORVASTATIN CALCIUM 80 MILLIGRAM(S): 80 TABLET, FILM COATED ORAL at 22:53

## 2021-08-21 RX ADMIN — Medication 3 MILLIGRAM(S): at 22:53

## 2021-08-21 RX ADMIN — Medication 1 APPLICATION(S): at 05:01

## 2021-08-21 RX ADMIN — OLANZAPINE 2.5 MILLIGRAM(S): 15 TABLET, FILM COATED ORAL at 22:52

## 2021-08-21 RX ADMIN — GABAPENTIN 100 MILLIGRAM(S): 400 CAPSULE ORAL at 15:38

## 2021-08-21 RX ADMIN — PANTOPRAZOLE SODIUM 40 MILLIGRAM(S): 20 TABLET, DELAYED RELEASE ORAL at 13:29

## 2021-08-21 RX ADMIN — Medication 1 TABLET(S): at 13:29

## 2021-08-21 RX ADMIN — ATENOLOL 25 MILLIGRAM(S): 25 TABLET ORAL at 05:01

## 2021-08-21 RX ADMIN — APIXABAN 5 MILLIGRAM(S): 2.5 TABLET, FILM COATED ORAL at 13:29

## 2021-08-21 RX ADMIN — Medication 40 MILLIGRAM(S): at 05:00

## 2021-08-21 RX ADMIN — Medication 1 APPLICATION(S): at 18:00

## 2021-08-21 RX ADMIN — Medication 650 MILLIGRAM(S): at 22:53

## 2021-08-21 RX ADMIN — Medication 0.5 MILLIGRAM(S): at 17:57

## 2021-08-21 RX ADMIN — Medication 0.5 MILLIGRAM(S): at 05:01

## 2021-08-22 LAB — SARS-COV-2 RNA SPEC QL NAA+PROBE: SIGNIFICANT CHANGE UP

## 2021-08-22 RX ADMIN — POLYETHYLENE GLYCOL 3350 17 GRAM(S): 17 POWDER, FOR SOLUTION ORAL at 12:56

## 2021-08-22 RX ADMIN — ATORVASTATIN CALCIUM 80 MILLIGRAM(S): 80 TABLET, FILM COATED ORAL at 21:02

## 2021-08-22 RX ADMIN — GABAPENTIN 100 MILLIGRAM(S): 400 CAPSULE ORAL at 12:55

## 2021-08-22 RX ADMIN — SENNA PLUS 1 TABLET(S): 8.6 TABLET ORAL at 12:56

## 2021-08-22 RX ADMIN — PANTOPRAZOLE SODIUM 40 MILLIGRAM(S): 20 TABLET, DELAYED RELEASE ORAL at 12:57

## 2021-08-22 RX ADMIN — Medication 40 MILLIGRAM(S): at 05:02

## 2021-08-22 RX ADMIN — Medication 3 MILLIGRAM(S): at 21:02

## 2021-08-22 RX ADMIN — Medication 650 MILLIGRAM(S): at 00:00

## 2021-08-22 RX ADMIN — APIXABAN 5 MILLIGRAM(S): 2.5 TABLET, FILM COATED ORAL at 12:56

## 2021-08-22 RX ADMIN — APIXABAN 5 MILLIGRAM(S): 2.5 TABLET, FILM COATED ORAL at 00:43

## 2021-08-22 RX ADMIN — ATENOLOL 25 MILLIGRAM(S): 25 TABLET ORAL at 05:02

## 2021-08-22 RX ADMIN — Medication 650 MILLIGRAM(S): at 12:55

## 2021-08-22 RX ADMIN — Medication 1 TABLET(S): at 12:56

## 2021-08-22 RX ADMIN — Medication 1 APPLICATION(S): at 18:05

## 2021-08-22 RX ADMIN — APIXABAN 5 MILLIGRAM(S): 2.5 TABLET, FILM COATED ORAL at 23:18

## 2021-08-22 RX ADMIN — Medication 650 MILLIGRAM(S): at 22:39

## 2021-08-22 RX ADMIN — Medication 0.5 MILLIGRAM(S): at 18:04

## 2021-08-22 RX ADMIN — Medication 0.5 MILLIGRAM(S): at 05:06

## 2021-08-22 RX ADMIN — Medication 1 APPLICATION(S): at 05:29

## 2021-08-23 ENCOUNTER — TRANSCRIPTION ENCOUNTER (OUTPATIENT)
Age: 86
End: 2021-08-23

## 2021-08-23 VITALS
RESPIRATION RATE: 18 BRPM | DIASTOLIC BLOOD PRESSURE: 76 MMHG | OXYGEN SATURATION: 96 % | SYSTOLIC BLOOD PRESSURE: 125 MMHG | HEART RATE: 70 BPM | TEMPERATURE: 97 F

## 2021-08-23 DIAGNOSIS — R13.10 DYSPHAGIA, UNSPECIFIED: ICD-10-CM

## 2021-08-23 DIAGNOSIS — I48.0 PAROXYSMAL ATRIAL FIBRILLATION: ICD-10-CM

## 2021-08-23 DIAGNOSIS — I50.9 HEART FAILURE, UNSPECIFIED: ICD-10-CM

## 2021-08-23 LAB
BASOPHILS # BLD AUTO: 0.05 K/UL — SIGNIFICANT CHANGE UP (ref 0–0.2)
BASOPHILS NFR BLD AUTO: 0.4 % — SIGNIFICANT CHANGE UP (ref 0–2)
EOSINOPHIL # BLD AUTO: 0.51 K/UL — HIGH (ref 0–0.5)
EOSINOPHIL NFR BLD AUTO: 4.6 % — SIGNIFICANT CHANGE UP (ref 0–6)
HCT VFR BLD CALC: 40.1 % — SIGNIFICANT CHANGE UP (ref 34.5–45)
HGB BLD-MCNC: 13.1 G/DL — SIGNIFICANT CHANGE UP (ref 11.5–15.5)
IMM GRANULOCYTES NFR BLD AUTO: 0.5 % — SIGNIFICANT CHANGE UP (ref 0–1.5)
LYMPHOCYTES # BLD AUTO: 2.72 K/UL — SIGNIFICANT CHANGE UP (ref 1–3.3)
LYMPHOCYTES # BLD AUTO: 24.3 % — SIGNIFICANT CHANGE UP (ref 13–44)
MCHC RBC-ENTMCNC: 32.4 PG — SIGNIFICANT CHANGE UP (ref 27–34)
MCHC RBC-ENTMCNC: 32.7 GM/DL — SIGNIFICANT CHANGE UP (ref 32–36)
MCV RBC AUTO: 99.3 FL — SIGNIFICANT CHANGE UP (ref 80–100)
MONOCYTES # BLD AUTO: 1.06 K/UL — HIGH (ref 0–0.9)
MONOCYTES NFR BLD AUTO: 9.5 % — SIGNIFICANT CHANGE UP (ref 2–14)
NEUTROPHILS # BLD AUTO: 6.78 K/UL — SIGNIFICANT CHANGE UP (ref 1.8–7.4)
NEUTROPHILS NFR BLD AUTO: 60.7 % — SIGNIFICANT CHANGE UP (ref 43–77)
NRBC # BLD: 0 /100 WBCS — SIGNIFICANT CHANGE UP (ref 0–0)
PLATELET # BLD AUTO: 213 K/UL — SIGNIFICANT CHANGE UP (ref 150–400)
RBC # BLD: 4.04 M/UL — SIGNIFICANT CHANGE UP (ref 3.8–5.2)
RBC # FLD: 13.7 % — SIGNIFICANT CHANGE UP (ref 10.3–14.5)
WBC # BLD: 11.18 K/UL — HIGH (ref 3.8–10.5)
WBC # FLD AUTO: 11.18 K/UL — HIGH (ref 3.8–10.5)

## 2021-08-23 PROCEDURE — U0003: CPT

## 2021-08-23 PROCEDURE — 71045 X-RAY EXAM CHEST 1 VIEW: CPT

## 2021-08-23 PROCEDURE — 83735 ASSAY OF MAGNESIUM: CPT

## 2021-08-23 PROCEDURE — 74230 X-RAY XM SWLNG FUNCJ C+: CPT

## 2021-08-23 PROCEDURE — 85014 HEMATOCRIT: CPT

## 2021-08-23 PROCEDURE — 84132 ASSAY OF SERUM POTASSIUM: CPT

## 2021-08-23 PROCEDURE — 85018 HEMOGLOBIN: CPT

## 2021-08-23 PROCEDURE — 82435 ASSAY OF BLOOD CHLORIDE: CPT

## 2021-08-23 PROCEDURE — 92611 MOTION FLUOROSCOPY/SWALLOW: CPT

## 2021-08-23 PROCEDURE — 80053 COMPREHEN METABOLIC PANEL: CPT

## 2021-08-23 PROCEDURE — 82330 ASSAY OF CALCIUM: CPT

## 2021-08-23 PROCEDURE — 82803 BLOOD GASES ANY COMBINATION: CPT

## 2021-08-23 PROCEDURE — 37252 INTRVASC US NONCORONARY 1ST: CPT

## 2021-08-23 PROCEDURE — U0005: CPT

## 2021-08-23 PROCEDURE — 84100 ASSAY OF PHOSPHORUS: CPT

## 2021-08-23 PROCEDURE — 97161 PT EVAL LOW COMPLEX 20 MIN: CPT

## 2021-08-23 PROCEDURE — 92610 EVALUATE SWALLOWING FUNCTION: CPT

## 2021-08-23 PROCEDURE — 85025 COMPLETE CBC W/AUTO DIFF WBC: CPT

## 2021-08-23 PROCEDURE — 81001 URINALYSIS AUTO W/SCOPE: CPT

## 2021-08-23 PROCEDURE — 84443 ASSAY THYROID STIM HORMONE: CPT

## 2021-08-23 PROCEDURE — 84484 ASSAY OF TROPONIN QUANT: CPT

## 2021-08-23 PROCEDURE — 85027 COMPLETE CBC AUTOMATED: CPT

## 2021-08-23 PROCEDURE — 87086 URINE CULTURE/COLONY COUNT: CPT

## 2021-08-23 PROCEDURE — 99285 EMERGENCY DEPT VISIT HI MDM: CPT

## 2021-08-23 PROCEDURE — 97530 THERAPEUTIC ACTIVITIES: CPT

## 2021-08-23 PROCEDURE — 83880 ASSAY OF NATRIURETIC PEPTIDE: CPT

## 2021-08-23 PROCEDURE — 84295 ASSAY OF SERUM SODIUM: CPT

## 2021-08-23 PROCEDURE — 87186 SC STD MICRODIL/AGAR DIL: CPT

## 2021-08-23 PROCEDURE — 93926 LOWER EXTREMITY STUDY: CPT

## 2021-08-23 PROCEDURE — 83605 ASSAY OF LACTIC ACID: CPT

## 2021-08-23 PROCEDURE — 97166 OT EVAL MOD COMPLEX 45 MIN: CPT

## 2021-08-23 PROCEDURE — 70450 CT HEAD/BRAIN W/O DYE: CPT | Mod: MD

## 2021-08-23 PROCEDURE — 85610 PROTHROMBIN TIME: CPT

## 2021-08-23 PROCEDURE — 86769 SARS-COV-2 COVID-19 ANTIBODY: CPT

## 2021-08-23 PROCEDURE — 85730 THROMBOPLASTIN TIME PARTIAL: CPT

## 2021-08-23 PROCEDURE — 94640 AIRWAY INHALATION TREATMENT: CPT

## 2021-08-23 PROCEDURE — 82947 ASSAY GLUCOSE BLOOD QUANT: CPT

## 2021-08-23 PROCEDURE — 97116 GAIT TRAINING THERAPY: CPT

## 2021-08-23 PROCEDURE — 93308 TTE F-UP OR LMTD: CPT

## 2021-08-23 PROCEDURE — 96374 THER/PROPH/DIAG INJ IV PUSH: CPT

## 2021-08-23 RX ORDER — OLANZAPINE 15 MG/1
2.5 TABLET, FILM COATED ORAL ONCE
Refills: 0 | Status: COMPLETED | OUTPATIENT
Start: 2021-08-23 | End: 2021-08-23

## 2021-08-23 RX ADMIN — OLANZAPINE 2.5 MILLIGRAM(S): 15 TABLET, FILM COATED ORAL at 13:30

## 2021-08-23 RX ADMIN — GABAPENTIN 100 MILLIGRAM(S): 400 CAPSULE ORAL at 13:06

## 2021-08-23 RX ADMIN — Medication 650 MILLIGRAM(S): at 12:52

## 2021-08-23 RX ADMIN — Medication 650 MILLIGRAM(S): at 00:03

## 2021-08-23 RX ADMIN — SENNA PLUS 1 TABLET(S): 8.6 TABLET ORAL at 12:51

## 2021-08-23 RX ADMIN — POLYETHYLENE GLYCOL 3350 17 GRAM(S): 17 POWDER, FOR SOLUTION ORAL at 12:51

## 2021-08-23 RX ADMIN — Medication 1 TABLET(S): at 12:51

## 2021-08-23 RX ADMIN — Medication 0.5 MILLIGRAM(S): at 05:02

## 2021-08-23 RX ADMIN — Medication 40 MILLIGRAM(S): at 05:02

## 2021-08-23 RX ADMIN — PANTOPRAZOLE SODIUM 40 MILLIGRAM(S): 20 TABLET, DELAYED RELEASE ORAL at 12:51

## 2021-08-23 RX ADMIN — Medication 1 APPLICATION(S): at 05:03

## 2021-08-23 RX ADMIN — APIXABAN 5 MILLIGRAM(S): 2.5 TABLET, FILM COATED ORAL at 12:51

## 2021-08-23 RX ADMIN — ATENOLOL 25 MILLIGRAM(S): 25 TABLET ORAL at 05:02

## 2021-08-23 NOTE — PROGRESS NOTE ADULT - PROVIDER SPECIALTY LIST ADULT
Internal Medicine
Infectious Disease
Vascular Surgery
Infectious Disease
Internal Medicine

## 2021-08-23 NOTE — PROGRESS NOTE ADULT - PROBLEM SELECTOR PLAN 6
EF 35%  -Cont. lasix 40mg liquid PO daily  -Pt on atenolol

## 2021-08-23 NOTE — PROGRESS NOTE ADULT - PROBLEM SELECTOR PROBLEM 2
Urinary tract infection

## 2021-08-23 NOTE — PROGRESS NOTE ADULT - PROBLEM SELECTOR PROBLEM 3
Chronic respiratory failure with hypoxia

## 2021-08-23 NOTE — PROGRESS NOTE ADULT - PROBLEM SELECTOR PROBLEM 1
Acute encephalopathy

## 2021-08-23 NOTE — PROGRESS NOTE ADULT - PROBLEM SELECTOR PROBLEM 4
CVA (cerebrovascular accident)

## 2021-08-23 NOTE — PROGRESS NOTE ADULT - SUBJECTIVE AND OBJECTIVE BOX
CC: f/u for altered mental status    Patient reports  nothing intelligible  REVIEW OF SYSTEMS:  All other review of systems negative (Comprehensive ROS)    Antimicrobials Day #  :    Other Medications Reviewed    T(F): 97.2 (08-19-21 @ 19:19), Max: 97.9 (08-19-21 @ 00:11)  HR: 70 (08-19-21 @ 19:19)  BP: 115/60 (08-19-21 @ 19:19)  RR: 18 (08-19-21 @ 19:19)  SpO2: 98% (08-19-21 @ 19:19)  Wt(kg): --    PHYSICAL EXAM:  General:  no acute distress  Eyes:  anicteric, no conjunctival injection, no discharge  Oropharynx: no lesions or injection 	  Neck: supple, without adenopathy  Lungs: clear to auscultation  Heart: regular rate and rhythm; no murmur, rubs or gallops  Abdomen: soft, nondistended, nontender, without mass or organomegaly, rlq ecchymosis  Skin: no lesions  Extremities: no clubbing, cyanosis, or edema. lump in right groin  Neurologic: alert, oriented, moves very little, garbled speech    LAB RESULTS:                        14.1   9.87  )-----------( 240      ( 18 Aug 2021 06:42 )             42.5     08-18    136  |  97  |  38<H>  ----------------------------<  139<H>  3.7   |  26  |  0.88    Ca    9.5      18 Aug 2021 06:42    TPro  7.1  /  Alb  3.5  /  TBili  0.6  /  DBili  x   /  AST  42<H>  /  ALT  32  /  AlkPhos  115  08-18    LIVER FUNCTIONS - ( 18 Aug 2021 06:42 )  Alb: 3.5 g/dL / Pro: 7.1 g/dL / ALK PHOS: 115 U/L / ALT: 32 U/L / AST: 42 U/L / GGT: x             MICROBIOLOGY:  RECENT CULTURES:      RADIOLOGY REVIEWED:    < from: US Duplex Arterial Lower Ext Ltd, Right (08.19.21 @ 09:18) >  EXAM:  US DPLX LWR EXT ARTS LTD RT                            PROCEDURE DATE:  08/19/2021            INTERPRETATION:  CLINICAL INFORMATION is evaluate pseudoaneurysm status post thrombin injection.    COMPARISON: Prior ultrasound dated 2021.    TECHNIQUE: The right groin was scanned. Color and spectral Doppler was performed.    FINDINGS: Previously noted larger component of the right groin pseudoaneurysm is thrombosed, measuring 2.6 x 1.8 x 2.3 cm. An additional component of the pseudoaneurysm is also thrombosed, measuring 1.4 x 1 x 0.7 cm. A small portion of the proximal neck of the pseudoaneurysm remains patent, measuring 0.7 x 0.6 x 0.4 cm.    The right common femoral artery and vein are patent.    IMPRESSION: Thrombosis of the 2 larger components of the right groin pseudoaneurysm status post thrombin injection. A small portion of the proximal neck of the pseudoaneurysm remains patent, measuring 0.7 x 0.6 x 0.4 cm.  Continued follow-up is recommended.    --- End of Report ---    < end of copied text >            Assessment:  Elderly woman s/p recent stroke, failed thrombectomy, s/p peg, went to rehab, came back with altered ms, concern raised for uti but got back to baseline on ctx which did not cover urine isolate enterococcus. Now s/p thrombin injection to pseudoaneurysm. No infection is apparent at present.   Plan:  monitor off antibiotics
CC: f/u for change in mental status    Patient reports: she appears comfortable, aphasic and not following commands    REVIEW OF SYSTEMS:  All other review of systems negative (Comprehensive ROS): limited by condition    Antimicrobials Day #  :off    Other Medications Reviewed  MEDICATIONS  (STANDING):  apixaban 5 milliGRAM(s) Enteral Tube every 12 hours  ATENolol  Tablet 25 milliGRAM(s) Enteral Tube daily  atorvastatin 80 milliGRAM(s) Oral at bedtime  BACItracin   Ointment 1 Application(s) Topical two times a day  buDESOnide    Inhalation Suspension 0.5 milliGRAM(s) Inhalation two times a day  furosemide Solution 40 milliGRAM(s) Oral daily  gabapentin   Solution 100 milliGRAM(s) Oral daily  melatonin 3 milliGRAM(s) Oral at bedtime  multivitamin 1 Tablet(s) Oral daily  pantoprazole   Suspension 40 milliGRAM(s) Oral daily  polyethylene glycol 3350 17 Gram(s) Oral daily  senna 1 Tablet(s) Oral daily    T(F): 97.5 (08-22-21 @ 04:36), Max: 100 (08-22-21 @ 00:00)  HR: 62 (08-22-21 @ 04:36)  BP: 136/63 (08-22-21 @ 04:36)  RR: 20 (08-22-21 @ 04:36)  SpO2: 93% (08-22-21 @ 04:36)  Wt(kg): --    PHYSICAL EXAM:  General: lethargic,, no acute distress  Eyes:  anicteric, no conjunctival injection, no discharge  Oropharynx: no lesions or injection 	  Neck: supple, without adenopathy  Lungs: clear to auscultation  Heart: irregular rate and rhythm;   Abdomen: soft, nondistended, nontender, peg  Skin: no lesions  Extremities: no clubbing, cyanosis, or edema  Neurologic: lethargic, aphasic, rt sided paralysis  : herzog    LAB RESULTS:              MICROBIOLOGY:  RECENT CULTURES:      RADIOLOGY REVIEWED:    
CC: f/u for change in mental status    Patient reports; she is non verbal, poorly interactive on supplemental oxygen    REVIEW OF SYSTEMS:  All other review of systems negative (Comprehensive ROS)    Antimicrobials Day #  :day 2  cefTRIAXone   IVPB 1000 milliGRAM(s) IV Intermittent every 24 hours    Other Medications Reviewed  MEDICATIONS  (STANDING):  apixaban 5 milliGRAM(s) Enteral Tube every 12 hours  ATENolol  Tablet 25 milliGRAM(s) Enteral Tube daily  atorvastatin 80 milliGRAM(s) Oral at bedtime  BACItracin   Ointment 1 Application(s) Topical two times a day  buDESOnide    Inhalation Suspension 0.5 milliGRAM(s) Inhalation two times a day  cefTRIAXone   IVPB 1000 milliGRAM(s) IV Intermittent every 24 hours  furosemide Solution 40 milliGRAM(s) Oral daily  gabapentin   Solution 100 milliGRAM(s) Oral daily  melatonin 3 milliGRAM(s) Oral at bedtime  multivitamin 1 Tablet(s) Oral daily  pantoprazole   Suspension 40 milliGRAM(s) Oral daily  polyethylene glycol 3350 17 Gram(s) Oral daily  senna 1 Tablet(s) Oral daily    T(F): 97.7 (21 @ 05:41), Max: 98.3 (08-15-21 @ 20:28)  HR: 71 (21 @ 05:41)  BP: 147/60 (21 @ 05:41)  RR: 16 (21 @ 05:41)  SpO2: 99% (21 @ 05:41)  Wt(kg): --    PHYSICAL EXAM:  Generallethargic, no acute distress, on supplemental  oxygen  Eyes:  anicteric, no conjunctival injection, no discharge  Oropharynx: no lesions or injection 	  Neck: supple, without adenopathy  Lungs: coarse BS  Heart: irregular rate and rhythm; no murmur,   Abdomen: soft, nondistended, nontender, without mass or organomegaly, peg  Skin: no lesions  Extremities: no clubbing, cyanosis, or edema  Neurologic: poorly interactive, Rt sided paralysis    LAB RESULTS:                        13.4   9.45  )-----------( 281      ( 15 Aug 2021 07:23 )             41.2     08-15    146<H>  |  101  |  46<H>  ----------------------------<  119<H>  3.7   |  28  |  1.03    Ca    9.7      15 Aug 2021 07:22  Phos  3.8     08-14  Mg     2.3     08-15    TPro  6.9  /  Alb  3.5  /  TBili  1.4<H>  /  DBili  x   /  AST  36  /  ALT  29  /  AlkPhos  95  08-15    LIVER FUNCTIONS - ( 15 Aug 2021 07:22 )  Alb: 3.5 g/dL / Pro: 6.9 g/dL / ALK PHOS: 95 U/L / ALT: 29 U/L / AST: 36 U/L / GGT: x           Urinalysis Basic - ( 14 Aug 2021 16:22 )    Color: Yellow / Appearance: Turbid / S.018 / pH: x  Gluc: x / Ketone: Negative  / Bili: Negative / Urobili: 8 mg/dL   Blood: x / Protein: 30 mg/dL / Nitrite: Negative   Leuk Esterase: Large / RBC: 9 /hpf / WBC 1809 /HPF   Sq Epi: x / Non Sq Epi: 5 / Bacteria: TNTC      MICROBIOLOGY:  RECENT CULTURES:      RADIOLOGY REVIEWED:  < from: CT Head No Cont (21 @ 17:53) >    < from: CT Head No Cont (21 @ 17:53) >  IMPRESSION:    HEAD CT: Mild volume loss, microvascular disease, no acute hemorrhage or midline shift.  Sequelae of previous left MCA infarct. MRI may be helpful for further evaluation, if clinically indicated.    < end of copied text >    < end of copied text >  < from: US TTE 2D F/U, Limited w/o Contrast (ED) (21 @ 16:20) >  INTERPRETATION:  A focused transthoracic cardiac ultrasound examination was performed.  Limited evaluation  No large pericardial effusion was present however a trace effusion cannot be excluded.  Globally reduced cardiac contractility  A line predominant lung fields  IVC was flat    IMPRESSION:  Severely reduced left ventricular contractility  A line predominant lung fields    < end of copied text >    
CC: f/u for possible  UTI    Patient reports: she is not verbal, she is awake and attentive    REVIEW OF SYSTEMS:  All other review of systems negative (Comprehensive ROS)    Antimicrobials Day #  :day 3  cefTRIAXone   IVPB 1000 milliGRAM(s) IV Intermittent every 24 hours    Other Medications Reviewed  MEDICATIONS  (STANDING):  apixaban 5 milliGRAM(s) Enteral Tube every 12 hours  ATENolol  Tablet 25 milliGRAM(s) Enteral Tube daily  atorvastatin 80 milliGRAM(s) Oral at bedtime  BACItracin   Ointment 1 Application(s) Topical two times a day  buDESOnide    Inhalation Suspension 0.5 milliGRAM(s) Inhalation two times a day  cefTRIAXone   IVPB 1000 milliGRAM(s) IV Intermittent every 24 hours  furosemide Solution 40 milliGRAM(s) Oral daily  gabapentin   Solution 100 milliGRAM(s) Oral daily  melatonin 3 milliGRAM(s) Oral at bedtime  multivitamin 1 Tablet(s) Oral daily  pantoprazole   Suspension 40 milliGRAM(s) Oral daily  polyethylene glycol 3350 17 Gram(s) Oral daily  senna 1 Tablet(s) Oral daily    T(F): 97.6 (08-17-21 @ 16:19), Max: 98.2 (08-17-21 @ 16:17)  HR: 72 (08-17-21 @ 16:19)  BP: 133/69 (08-17-21 @ 16:19)  RR: 18 (08-17-21 @ 16:19)  SpO2: 99% (08-17-21 @ 16:19)  Wt(kg): --    PHYSICAL EXAM:  General: alert, no acute distress  Eyes:  anicteric, no conjunctival injection, no discharge  Oropharynx: no lesions or injection 	  Neck: supple, without adenopathy  Lungs: clear to auscultation  Heart: irregular rate and rhythm; no murmur,  Abdomen: soft, nondistended, nontender, peg  Skin: no lesions  Extremities: no clubbing, cyanosis, or edema  Neurologic: alert, awake, rt sided weakness    LAB RESULTS:                        14.0   11.50 )-----------( 308      ( 17 Aug 2021 09:55 )             41.2     08-17    138  |  95<L>  |  40<H>  ----------------------------<  182<H>  4.1   |  25  |  0.88    Ca    9.1      17 Aug 2021 09:55  Mg     2.2     08-16    TPro  7.0  /  Alb  3.5  /  TBili  0.6  /  DBili  x   /  AST  69<H>  /  ALT  37  /  AlkPhos  111  08-17    LIVER FUNCTIONS - ( 17 Aug 2021 09:55 )  Alb: 3.5 g/dL / Pro: 7.0 g/dL / ALK PHOS: 111 U/L / ALT: 37 U/L / AST: 69 U/L / GGT: x             MICROBIOLOGY:  RECENT CULTURES:  08-14 @ 20:49 Catheterized Catheterized Enterococcus faecalis    >100,000 CFU/ml Enterococcus faecalis  <10,000 CFU/ml Normal Urogenital juliane present          RADIOLOGY REVIEWED:  < from: US Duplex Arterial Lower Ext Ltd, Right (08.16.21 @ 11:41) >  IMPRESSION: 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  The more proximal pseudoaneurysm measures 1.1 cm x 6 mm x 7 mm.  More peripheral pseudoaneurysm measures 1.5 cm x 1.7 cm x 2.2 cm. The more peripheral larger pseudoaneurysm is partially thrombosed with a patent lumen of 1.9 cm x 1.7 cm x 2.2 cm.    --- End of Report ---    < end of copied text >  < from: CT Head No Cont (08.14.21 @ 17:53) >  IMPRESSION:    HEAD CT: Mild volume loss, microvascular disease, no acute hemorrhage or midline shift.  Sequelae of previous left MCA infarct. MRI may be helpful for further evaluation, if clinically indicated.    --- End of Report ---    < end of copied text >    
CC: f/u for change in mental status    Patient reports: she is aphasic, rt sided weakness    REVIEW OF SYSTEMS:  All other review of systems negative (Comprehensive ROS)    Antimicrobials Day #  :off    Other Medications Reviewed  MEDICATIONS  (STANDING):  apixaban 5 milliGRAM(s) Enteral Tube every 12 hours  ATENolol  Tablet 25 milliGRAM(s) Enteral Tube daily  atorvastatin 80 milliGRAM(s) Oral at bedtime  BACItracin   Ointment 1 Application(s) Topical two times a day  bisacodyl Suppository 10 milliGRAM(s) Rectal once  buDESOnide    Inhalation Suspension 0.5 milliGRAM(s) Inhalation two times a day  furosemide Solution 40 milliGRAM(s) Oral daily  gabapentin   Solution 100 milliGRAM(s) Oral daily  melatonin 3 milliGRAM(s) Oral at bedtime  multivitamin 1 Tablet(s) Oral daily  pantoprazole   Suspension 40 milliGRAM(s) Oral daily  polyethylene glycol 3350 17 Gram(s) Oral daily  senna 1 Tablet(s) Oral daily    T(F): 97.5 (08-20-21 @ 05:51), Max: 97.7 (08-20-21 @ 00:10)  HR: 63 (08-20-21 @ 05:51)  BP: 135/84 (08-20-21 @ 05:51)  RR: 18 (08-20-21 @ 05:51)  SpO2: 96% (08-20-21 @ 05:51)  Wt(kg): --    PHYSICAL EXAM:  General: alert, no acute distress  Eyes:  anicteric, no conjunctival injection, no discharge  Oropharynx: no lesions or injection 	  Neck: supple, without adenopathy  Lungs: clear to auscultation  Heart: irregular rate and rhythm; no murmur, rubs or gallops  Abdomen: soft, nondistended, nontender, without mass or organomegaly  Skin: no lesions  Extremities: no clubbing, cyanosis, or edema  Neurologic: alert, aphasic, rt sided weakness    LAB RESULTS:              MICROBIOLOGY:  RECENT CULTURES:      RADIOLOGY REVIEWED:  < from: US Duplex Arterial Lower Ext Ltd, Right (08.19.21 @ 09:18) >    IMPRESSION: Thrombosis of the 2 larger components of the right groin pseudoaneurysm status post thrombin injection. A small portion of the proximal neck of the pseudoaneurysm remains patent, measuring 0.7 x 0.6 x 0.4 cm.  Continued follow-up is recommended.    < end of copied text >    
CC: f/u for change in mental status    Patient reports: she is non verbal, appears stable    REVIEW OF SYSTEMS:  All other review of systems negative (Comprehensive ROS), tolerating enteral feeds    Antimicrobials Day #  :off    Other Medications Reviewed  MEDICATIONS  (STANDING):  apixaban 5 milliGRAM(s) Enteral Tube every 12 hours  ATENolol  Tablet 25 milliGRAM(s) Enteral Tube daily  atorvastatin 80 milliGRAM(s) Oral at bedtime  BACItracin   Ointment 1 Application(s) Topical two times a day  buDESOnide    Inhalation Suspension 0.5 milliGRAM(s) Inhalation two times a day  furosemide Solution 40 milliGRAM(s) Oral daily  gabapentin   Solution 100 milliGRAM(s) Oral daily  melatonin 3 milliGRAM(s) Oral at bedtime  multivitamin 1 Tablet(s) Oral daily  pantoprazole   Suspension 40 milliGRAM(s) Oral daily  polyethylene glycol 3350 17 Gram(s) Oral daily  senna 1 Tablet(s) Oral daily    T(F): 97.5 (08-21-21 @ 04:50), Max: 98 (08-20-21 @ 12:45)  HR: 76 (08-21-21 @ 04:50)  BP: 125/75 (08-21-21 @ 04:50)  RR: 18 (08-21-21 @ 04:50)  SpO2: 99% (08-21-21 @ 04:50)  Wt(kg): --    PHYSICAL EXAM:  General: alert, no acute distress  Eyes:  anicteric, no conjunctival injection, no discharge  Oropharynx: no lesions or injection 	  Neck: supple, without adenopathy  Lungs: clear to auscultation  Heart: irregular rate and rhythm; no murmur,   Abdomen: soft, nondistended, nontender, without mass or organomegaly, peg  Skin: no lesions  Extremities: no clubbing, cyanosis, or edema  Neurologic: awake, rt sided hemiparesis and aphasia    LAB RESULTS:              MICROBIOLOGY:  RECENT CULTURES:      RADIOLOGY REVIEWED:    
CC: f/u for possible infection    Patient reports: she is alert, appears uncomfortable, not able to follow commands    REVIEW OF SYSTEMS:  All other review of systems negative (Comprehensive ROS):limited by condition    Antimicrobials Day #  :off, s/p 3 days of CTX    Other Medications Reviewed  MEDICATIONS  (STANDING):  apixaban 5 milliGRAM(s) Enteral Tube every 12 hours  ATENolol  Tablet 25 milliGRAM(s) Enteral Tube daily  atorvastatin 80 milliGRAM(s) Oral at bedtime  BACItracin   Ointment 1 Application(s) Topical two times a day  buDESOnide    Inhalation Suspension 0.5 milliGRAM(s) Inhalation two times a day  furosemide Solution 40 milliGRAM(s) Oral daily  gabapentin   Solution 100 milliGRAM(s) Oral daily  melatonin 3 milliGRAM(s) Oral at bedtime  multivitamin 1 Tablet(s) Oral daily  pantoprazole   Suspension 40 milliGRAM(s) Oral daily  polyethylene glycol 3350 17 Gram(s) Oral daily  senna 1 Tablet(s) Oral daily    T(F): 98.1 (08-18-21 @ 09:12), Max: 98.4 (08-17-21 @ 20:21)  HR: 70 (08-18-21 @ 09:12)  BP: 124/57 (08-18-21 @ 09:12)  RR: 18 (08-18-21 @ 09:12)  SpO2: 95% (08-18-21 @ 09:12)  Wt(kg): --    PHYSICAL EXAM:  General: lethargic, no acute distress  Eyes:  anicteric, no conjunctival injection, no discharge  Oropharynx: no lesions or injection 	  Neck: supple, without adenopathy  Lungs: clear to auscultation  Heart: irregular rate and rhythm; no murmur, rubs or gallops  Abdomen: soft, nondistended, nontender, peg  Skin: no lesions  Extremities: no clubbing, cyanosis, or edema  Neurologic: awake, aphasic, rt sided paralysis    LAB RESULTS:                        14.1   9.87  )-----------( 240      ( 18 Aug 2021 06:42 )             42.5     08-18    136  |  97  |  38<H>  ----------------------------<  139<H>  3.7   |  26  |  0.88    Ca    9.5      18 Aug 2021 06:42  Mg     2.2     08-16    TPro  7.1  /  Alb  3.5  /  TBili  0.6  /  DBili  x   /  AST  42<H>  /  ALT  32  /  AlkPhos  115  08-18    LIVER FUNCTIONS - ( 18 Aug 2021 06:42 )  Alb: 3.5 g/dL / Pro: 7.1 g/dL / ALK PHOS: 115 U/L / ALT: 32 U/L / AST: 42 U/L / GGT: x             MICROBIOLOGY:  RECENT CULTURES:  08-14 @ 20:49 Catheterized Catheterized Enterococcus faecalis    >100,000 CFU/ml Enterococcus faecalis  <10,000 CFU/ml Normal Urogenital juliane present          RADIOLOGY REVIEWED:    < from:  Duplex Arterial Lower Ext Ltd, Right (08.16.21 @ 11:41) >  IMPRESSION: 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  The more proximal pseudoaneurysm measures 1.1 cm x 6 mm x 7 mm.  More peripheral pseudoaneurysm measures 1.5 cm x 1.7 cm x 2.2 cm. The more peripheral larger pseudoaneurysm is partially thrombosed with a patent lumen of 1.9 cm x 1.7 cm x 2.2 cm.    --- End of Report ---    < end of copied text >  
GENERAL SURGERY PROGRESS NOTE     SHELBY LESTER  89y  Female  Hospital day :5d    OVERNIGHT EVENTS: no acute events overnight     T(F): 97.5 (08-19-21 @ 05:05), Max: 97.9 (08-19-21 @ 00:11)  HR: 70 (08-19-21 @ 05:05) (69 - 73)  BP: 127/79 (08-19-21 @ 05:05) (109/70 - 128/75)  RR: 20 (08-19-21 @ 05:05) (18 - 20)  SpO2: 97% (08-19-21 @ 05:05) (93% - 99%)    DIET/FLUIDS: multivitamin 1 Tablet(s) Oral daily     GI proph:  pantoprazole   Suspension 40 milliGRAM(s) Oral daily    PHYSICAL EXAM:  GENERAL: NAD  ABDOMEN: right groin soft, small hematoma, no pulsatility to mass  EXTREMITIES:  No clubbing, cyanosis, or edema    LABS  CAPILLARY BLOOD GLUCOSE                        14.1   9.87  )-----------( 240      ( 18 Aug 2021 06:42 )             42.5         08-18    136  |  97  |  38<H>  ----------------------------<  139<H>  3.7   |  26  |  0.88          LFTs:             7.1  | 0.6  | 42       ------------------[115     ( 18 Aug 2021 06:42 )  3.5  | x    | 32          Lipase:x      Amylase:x             Coags:     17.6   ----< 1.50    ( 17 Aug 2021 20:04 )     32.8            Serum Pro-Brain Natriuretic Peptide: 3467 pg/mL (08-14-21 @ 16:22)                
Name of Patient : SHELBY LESTER  MRN: [id number]  Date of visit: 08-23-21     Subjective: Patient seen and examined. No new events except as noted.   doing okay         MEDICATIONS:  MEDICATIONS  (STANDING):  apixaban 5 milliGRAM(s) Enteral Tube every 12 hours  ATENolol  Tablet 25 milliGRAM(s) Enteral Tube daily  atorvastatin 80 milliGRAM(s) Oral at bedtime  buDESOnide    Inhalation Suspension 0.5 milliGRAM(s) Inhalation two times a day  furosemide Solution 40 milliGRAM(s) Oral daily  gabapentin   Solution 100 milliGRAM(s) Oral daily  melatonin 3 milliGRAM(s) Oral at bedtime  multivitamin 1 Tablet(s) Oral daily  pantoprazole   Suspension 40 milliGRAM(s) Oral daily  polyethylene glycol 3350 17 Gram(s) Oral daily  senna 1 Tablet(s) Oral daily      PHYSICAL EXAM:  T(C): 36.3 (08-23-21 @ 13:45), Max: 36.8 (08-23-21 @ 08:58)  HR: 70 (08-23-21 @ 13:45) (69 - 70)  BP: 125/76 (08-23-21 @ 13:45) (110/77 - 130/75)  RR: 18 (08-23-21 @ 13:45) (18 - 20)  SpO2: 96% (08-23-21 @ 13:45) (95% - 99%)  Wt(kg): --  I&O's Summary    22 Aug 2021 07:01  -  23 Aug 2021 07:00  --------------------------------------------------------  IN: 1050 mL / OUT: 800 mL / NET: 250 mL    23 Aug 2021 07:01  -  23 Aug 2021 23:34  --------------------------------------------------------  IN: 600 mL / OUT: 700 mL / NET: -100 mL          Appearance: calm   HEENT:  PERRLA   Lymphatic: No lymphadenopathy   Cardiovascular: Normal S1 S2  Respiratory: normal effort , clear  Gastrointestinal:  Soft, Non-tender  Skin: No rashes,  warm to touch  Psychiatry:  Mood & affect appropriate  Musculuskeletal: No edema      All labs, Imaging and EKGs personally reviewed     08-22-21 @ 07:01  -  08-23-21 @ 07:00  --------------------------------------------------------  IN: 1050 mL / OUT: 800 mL / NET: 250 mL    08-23-21 @ 07:01  -  08-23-21 @ 23:34  --------------------------------------------------------  IN: 600 mL / OUT: 700 mL / NET: -100 mL                            13.1   11.18 )-----------( 213      ( 23 Aug 2021 07:27 )             40.1                                                
Name of Patient : SHELBY LESTER  MRN: [id number]  DATE OF SERVICE: 08-15-21       Subjective: Patient seen and examined. No new events except as noted.   frail, nonverbal       MEDICATIONS:  MEDICATIONS  (STANDING):  apixaban 5 milliGRAM(s) Enteral Tube every 12 hours  ATENolol  Tablet 25 milliGRAM(s) Enteral Tube daily  atorvastatin 80 milliGRAM(s) Oral at bedtime  buDESOnide    Inhalation Suspension 0.5 milliGRAM(s) Inhalation two times a day  cefTRIAXone   IVPB 1000 milliGRAM(s) IV Intermittent every 24 hours  furosemide Solution 40 milliGRAM(s) Oral daily  gabapentin   Solution 100 milliGRAM(s) Oral daily  melatonin 3 milliGRAM(s) Oral at bedtime  multivitamin 1 Tablet(s) Oral daily  pantoprazole   Suspension 40 milliGRAM(s) Oral daily  polyethylene glycol 3350 17 Gram(s) Oral daily  senna 1 Tablet(s) Oral daily      PHYSICAL EXAM:  T(C): 36.8 (08-15-21 @ 20:28), Max: 36.9 (08-15-21 @ 00:55)  HR: 67 (08-15-21 @ 20:28) (67 - 71)  BP: 125/68 (08-15-21 @ 20:28) (125/68 - 142/75)  RR: 18 (08-15-21 @ 20:28) (18 - 18)  SpO2: 100% (08-15-21 @ 20:28) (97% - 100%)  Wt(kg): --  I&O's Summary    14 Aug 2021 07:  -  15 Aug 2021 07:00  --------------------------------------------------------  IN: 0 mL / OUT: 300 mL / NET: -300 mL    15 Aug 2021 07:  -  15 Aug 2021 22:27  --------------------------------------------------------  IN: 440 mL / OUT: 300 mL / NET: 140 mL          Appearance: awake, nonverbal   HEENT:  PERRLA   Lymphatic: No lymphadenopathy   Cardiovascular: Normal S1 S2, no JVD  Respiratory: normal effort , clear  Gastrointestinal:  Soft, Non-tender  Skin: No rashes,  warm to touch  Psychiatry:  Mood & affect appropriate  Musculuskeletal: No edema      All labs, Imaging and EKGs personally reviewed       21 @ 07:01  -  08-15-21 @ 07:00  --------------------------------------------------------  IN: 0 mL / OUT: 300 mL / NET: -300 mL    08-15-21 @ 07:01  -  08-15-21 @ 22:27  --------------------------------------------------------  IN: 440 mL / OUT: 300 mL / NET: 140 mL                          13.4   9.45  )-----------( 281      ( 15 Aug 2021 07:23 )             41.2               08-15    146<H>  |  101  |  46<H>  ----------------------------<  119<H>  3.7   |  28  |  1.03    Ca    9.7      15 Aug 2021 07:22  Phos  3.8     08-14  Mg     2.3     08-15    TPro  6.9  /  Alb  3.5  /  TBili  1.4<H>  /  DBili  x   /  AST  36  /  ALT  29  /  AlkPhos  95  08-15                       Urinalysis Basic - ( 14 Aug 2021 16:22 )    Color: Yellow / Appearance: Turbid / S.018 / pH: x  Gluc: x / Ketone: Negative  / Bili: Negative / Urobili: 8 mg/dL   Blood: x / Protein: 30 mg/dL / Nitrite: Negative   Leuk Esterase: Large / RBC: 9 /hpf / WBC 1809 /HPF   Sq Epi: x / Non Sq Epi: 5 / Bacteria: TNTC              
Name of Patient : SHELBY LESTER  MRN: [id number]  Date of visit: 08-21-21       Subjective: Patient seen and examined. No new events except as noted.   doing okay     MEDICATIONS:  MEDICATIONS  (STANDING):  apixaban 5 milliGRAM(s) Enteral Tube every 12 hours  ATENolol  Tablet 25 milliGRAM(s) Enteral Tube daily  atorvastatin 80 milliGRAM(s) Oral at bedtime  BACItracin   Ointment 1 Application(s) Topical two times a day  buDESOnide    Inhalation Suspension 0.5 milliGRAM(s) Inhalation two times a day  furosemide Solution 40 milliGRAM(s) Oral daily  gabapentin   Solution 100 milliGRAM(s) Oral daily  melatonin 3 milliGRAM(s) Oral at bedtime  multivitamin 1 Tablet(s) Oral daily  OLANZapine 2.5 milliGRAM(s) Oral once  pantoprazole   Suspension 40 milliGRAM(s) Oral daily  polyethylene glycol 3350 17 Gram(s) Oral daily  senna 1 Tablet(s) Oral daily      PHYSICAL EXAM:  T(C): 36.7 (08-21-21 @ 18:03), Max: 37.4 (08-21-21 @ 16:17)  HR: 70 (08-21-21 @ 18:03) (70 - 80)  BP: 123/58 (08-21-21 @ 18:03) (113/71 - 135/72)  RR: 18 (08-21-21 @ 18:03) (18 - 18)  SpO2: 93% (08-21-21 @ 18:03) (93% - 99%)  Wt(kg): --  I&O's Summary    20 Aug 2021 07:01  -  21 Aug 2021 07:00  --------------------------------------------------------  IN: 2040 mL / OUT: 850 mL / NET: 1190 mL          Appearance: awake,c thomas   HEENT:  PERRLA   Lymphatic: No lymphadenopathy   Cardiovascular: Normal S1 S2, no JVD  Respiratory: normal effort , clear  Gastrointestinal:  Soft, Non-tender  Skin: No rashes,  warm to touch  Psychiatry:  Mood & affect appropriate  Musculuskeletal: No edema      All labs, Imaging and EKGs personally reviewed       08-20-21 @ 07:01  -  08-21-21 @ 07:00  --------------------------------------------------------  IN: 2040 mL / OUT: 850 mL / NET: 1190 mL            
Name of Patient : SHELBY LESTER  MRN: [id number]  Date of visit: 08-22-21       Subjective: Patient seen and examined. No new events except as noted.   Son at the bedside  patient is calm         MEDICATIONS:  MEDICATIONS  (STANDING):  apixaban 5 milliGRAM(s) Enteral Tube every 12 hours  ATENolol  Tablet 25 milliGRAM(s) Enteral Tube daily  atorvastatin 80 milliGRAM(s) Oral at bedtime  BACItracin   Ointment 1 Application(s) Topical two times a day  buDESOnide    Inhalation Suspension 0.5 milliGRAM(s) Inhalation two times a day  furosemide Solution 40 milliGRAM(s) Oral daily  gabapentin   Solution 100 milliGRAM(s) Oral daily  melatonin 3 milliGRAM(s) Oral at bedtime  multivitamin 1 Tablet(s) Oral daily  pantoprazole   Suspension 40 milliGRAM(s) Oral daily  polyethylene glycol 3350 17 Gram(s) Oral daily  senna 1 Tablet(s) Oral daily      PHYSICAL EXAM:  T(C): 36.6 (08-22-21 @ 20:12), Max: 37.8 (08-22-21 @ 00:00)  HR: 73 (08-22-21 @ 20:12) (62 - 73)  BP: 128/77 (08-22-21 @ 20:12) (115/61 - 136/63)  RR: 18 (08-22-21 @ 20:12) (18 - 20)  SpO2: 99% (08-22-21 @ 20:12) (93% - 99%)  Wt(kg): --  I&O's Summary    21 Aug 2021 07:01  -  22 Aug 2021 07:00  --------------------------------------------------------  IN: 900 mL / OUT: 300 mL / NET: 600 mL    22 Aug 2021 07:01  -  22 Aug 2021 22:41  --------------------------------------------------------  IN: 1000 mL / OUT: 600 mL / NET: 400 mL          Appearance: awake, clam, demented 	  HEENT:  PERRLA   Lymphatic: No lymphadenopathy   Cardiovascular: Normal S1 S2, no JVD  Respiratory: normal effort , clear  Gastrointestinal: PEG tube   Skin: No rashes,  warm to touch  Psychiatry:  Mood & affect appropriate  Musculuskeletal: No edema      All labs, Imaging and EKGs personally reviewed       08-21-21 @ 07:01  -  08-22-21 @ 07:00  --------------------------------------------------------  IN: 900 mL / OUT: 300 mL / NET: 600 mL    08-22-21 @ 07:01  -  08-22-21 @ 22:41  --------------------------------------------------------  IN: 1000 mL / OUT: 600 mL / NET: 400 mL            
Name of Patient : SHELBY LESTER  MRN: [id number]  Date of visit: 08-20-21       Subjective: Patient seen and examined. No new events except as noted.   Doing okay   D/Cplaning  at her baseline         MEDICATIONS:  MEDICATIONS  (STANDING):  apixaban 5 milliGRAM(s) Enteral Tube every 12 hours  ATENolol  Tablet 25 milliGRAM(s) Enteral Tube daily  atorvastatin 80 milliGRAM(s) Oral at bedtime  BACItracin   Ointment 1 Application(s) Topical two times a day  buDESOnide    Inhalation Suspension 0.5 milliGRAM(s) Inhalation two times a day  furosemide Solution 40 milliGRAM(s) Oral daily  gabapentin   Solution 100 milliGRAM(s) Oral daily  melatonin 3 milliGRAM(s) Oral at bedtime  multivitamin 1 Tablet(s) Oral daily  pantoprazole   Suspension 40 milliGRAM(s) Oral daily  polyethylene glycol 3350 17 Gram(s) Oral daily  senna 1 Tablet(s) Oral daily      PHYSICAL EXAM:  T(C): 36.6 (08-20-21 @ 21:02), Max: 36.7 (08-20-21 @ 12:45)  HR: 71 (08-20-21 @ 21:02) (63 - 71)  BP: 125/63 (08-20-21 @ 21:02) (115/72 - 139/73)  RR: 18 (08-20-21 @ 21:02) (18 - 18)  SpO2: 98% (08-20-21 @ 21:02) (96% - 98%)  Wt(kg): --  I&O's Summary    19 Aug 2021 07:01  -  20 Aug 2021 07:00  --------------------------------------------------------  IN: 1960 mL / OUT: 1900 mL / NET: 60 mL    20 Aug 2021 07:01  -  20 Aug 2021 22:15  --------------------------------------------------------  IN: 900 mL / OUT: 250 mL / NET: 650 mL          Appearance: awake, demented   HEENT:  PERRLA   Lymphatic: No lymphadenopathy   Cardiovascular: Normal S1 S2, no JVD  Respiratory: normal effort , clear  Gastrointestinal:  Soft, Non-tender  Skin: No rashes,  warm to touch  Psychiatry:  Mood & affect appropriate  Musculuskeletal: No edema      All labs, Imaging and EKGs personally reviewed       08-19-21 @ 07:01  -  08-20-21 @ 07:00  --------------------------------------------------------  IN: 1960 mL / OUT: 1900 mL / NET: 60 mL    08-20-21 @ 07:01  -  08-20-21 @ 22:15  --------------------------------------------------------  IN: 900 mL / OUT: 250 mL / NET: 650 mL            
Name of Patient : SHELBY LESTER  MRN: [id number]  DATE OF SERVICE: 08-18-21     Subjective: Patient seen and examined. No new events except as noted.   stable       MEDICATIONS:  MEDICATIONS  (STANDING):  apixaban 5 milliGRAM(s) Enteral Tube every 12 hours  ATENolol  Tablet 25 milliGRAM(s) Enteral Tube daily  atorvastatin 80 milliGRAM(s) Oral at bedtime  BACItracin   Ointment 1 Application(s) Topical two times a day  buDESOnide    Inhalation Suspension 0.5 milliGRAM(s) Inhalation two times a day  furosemide Solution 40 milliGRAM(s) Oral daily  gabapentin   Solution 100 milliGRAM(s) Oral daily  melatonin 3 milliGRAM(s) Oral at bedtime  multivitamin 1 Tablet(s) Oral daily  pantoprazole   Suspension 40 milliGRAM(s) Oral daily  polyethylene glycol 3350 17 Gram(s) Oral daily  senna 1 Tablet(s) Oral daily      PHYSICAL EXAM:  T(C): 36.3 (08-18-21 @ 20:45), Max: 36.9 (08-18-21 @ 00:24)  HR: 73 (08-18-21 @ 20:45) (70 - 73)  BP: 109/70 (08-18-21 @ 20:45) (109/70 - 144/71)  RR: 18 (08-18-21 @ 20:45) (18 - 20)  SpO2: 99% (08-18-21 @ 20:45) (94% - 99%)  Wt(kg): --  I&O's Summary    17 Aug 2021 07:01  -  18 Aug 2021 07:00  --------------------------------------------------------  IN: 0 mL / OUT: 450 mL / NET: -450 mL    18 Aug 2021 07:01  -  18 Aug 2021 22:20  --------------------------------------------------------  IN: 1000 mL / OUT: 950 mL / NET: 50 mL          Appearance: awake, nonverbal   HEENT:  PERRLA   Lymphatic: No lymphadenopathy   Cardiovascular: Normal S1 S2, no JVD  Respiratory: normal effort , clear  Gastrointestinal:  Soft, Non-tender  Skin: No rashes,  warm to touch  Psychiatry:  Mood & affect appropriate  Musculuskeletal: No edema      All labs, Imaging and EKGs personally reviewed       08-17-21 @ 07:01  -  08-18-21 @ 07:00  --------------------------------------------------------  IN: 0 mL / OUT: 450 mL / NET: -450 mL    08-18-21 @ 07:01  -  08-18-21 @ 22:20  --------------------------------------------------------  IN: 1000 mL / OUT: 950 mL / NET: 50 mL                            14.1   9.87  )-----------( 240      ( 18 Aug 2021 06:42 )             42.5               08-18    136  |  97  |  38<H>  ----------------------------<  139<H>  3.7   |  26  |  0.88    Ca    9.5      18 Aug 2021 06:42    TPro  7.1  /  Alb  3.5  /  TBili  0.6  /  DBili  x   /  AST  42<H>  /  ALT  32  /  AlkPhos  115  08-18    PT/INR - ( 17 Aug 2021 20:04 )   PT: 17.6 sec;   INR: 1.50 ratio         PTT - ( 17 Aug 2021 20:04 )  PTT:32.8 sec                           
Name of Patient : SHELBY LESTER  MRN: [id number]  Date of visit: 08-19-21       Subjective: Patient seen and examined. No new events except as noted.   doing okay      MEDICATIONS:  MEDICATIONS  (STANDING):  apixaban 5 milliGRAM(s) Enteral Tube every 12 hours  ATENolol  Tablet 25 milliGRAM(s) Enteral Tube daily  atorvastatin 80 milliGRAM(s) Oral at bedtime  BACItracin   Ointment 1 Application(s) Topical two times a day  buDESOnide    Inhalation Suspension 0.5 milliGRAM(s) Inhalation two times a day  furosemide Solution 40 milliGRAM(s) Oral daily  gabapentin   Solution 100 milliGRAM(s) Oral daily  melatonin 3 milliGRAM(s) Oral at bedtime  multivitamin 1 Tablet(s) Oral daily  pantoprazole   Suspension 40 milliGRAM(s) Oral daily  polyethylene glycol 3350 17 Gram(s) Oral daily  senna 1 Tablet(s) Oral daily      PHYSICAL EXAM:  T(C): 36.2 (08-19-21 @ 19:19), Max: 36.6 (08-19-21 @ 00:11)  HR: 70 (08-19-21 @ 19:19) (69 - 71)  BP: 115/60 (08-19-21 @ 19:19) (115/60 - 127/79)  RR: 18 (08-19-21 @ 19:19) (18 - 20)  SpO2: 98% (08-19-21 @ 19:19) (93% - 99%)  Wt(kg): --  I&O's Summary    18 Aug 2021 07:01  -  19 Aug 2021 07:00  --------------------------------------------------------  IN: 2150 mL / OUT: 1450 mL / NET: 700 mL    19 Aug 2021 07:01  -  19 Aug 2021 23:06  --------------------------------------------------------  IN: 960 mL / OUT: 1200 mL / NET: -240 mL          Appearance: Normal	  HEENT:  PERRLA   Lymphatic: No lymphadenopathy   Cardiovascular: Normal S1 S2, no JVD  Respiratory: normal effort , clear  Gastrointestinal:  Soft, PEG tub e  Skin: No rashes,  warm to touch  Psychiatry:  Mood & affect appropriate  Musculuskeletal: No edema      All labs, Imaging and EKGs personally reviewed       08-18-21 @ 07:01  -  08-19-21 @ 07:00  --------------------------------------------------------  IN: 2150 mL / OUT: 1450 mL / NET: 700 mL    08-19-21 @ 07:01  -  08-19-21 @ 23:06  --------------------------------------------------------  IN: 960 mL / OUT: 1200 mL / NET: -240 mL                          14.1   9.87  )-----------( 240      ( 18 Aug 2021 06:42 )             42.5               08-18    136  |  97  |  38<H>  ----------------------------<  139<H>  3.7   |  26  |  0.88    Ca    9.5      18 Aug 2021 06:42    TPro  7.1  /  Alb  3.5  /  TBili  0.6  /  DBili  x   /  AST  42<H>  /  ALT  32  /  AlkPhos  115  08-18                                 
Name of Patient : SHELBY LESTER  MRN: [id number]  DATE OF SERVICE: 08-17-21     Subjective: Patient seen and examined. No new events except as noted.   stable    REVIEW OF SYSTEMS:    CONSTITUTIONAL: No weakness, fevers or chills  EYES/ENT: No visual changes;  No vertigo or throat pain   NECK: No pain or stiffness  RESPIRATORY: No cough, wheezing, hemoptysis; No shortness of breath  CARDIOVASCULAR: No chest pain or palpitations  GASTROINTESTINAL: No abdominal or epigastric pain.   GENITOURINARY: No dysuria, frequency or hematuria  NEUROLOGICAL: No numbness or weakness  SKIN: No itching, burning, rashes, or lesions   All other review of systems is negative unless indicated above.    MEDICATIONS:  MEDICATIONS  (STANDING):  apixaban 5 milliGRAM(s) Enteral Tube every 12 hours  ATENolol  Tablet 25 milliGRAM(s) Enteral Tube daily  atorvastatin 80 milliGRAM(s) Oral at bedtime  BACItracin   Ointment 1 Application(s) Topical two times a day  buDESOnide    Inhalation Suspension 0.5 milliGRAM(s) Inhalation two times a day  cefTRIAXone   IVPB 1000 milliGRAM(s) IV Intermittent every 24 hours  furosemide Solution 40 milliGRAM(s) Oral daily  gabapentin   Solution 100 milliGRAM(s) Oral daily  melatonin 3 milliGRAM(s) Oral at bedtime  multivitamin 1 Tablet(s) Oral daily  pantoprazole   Suspension 40 milliGRAM(s) Oral daily  polyethylene glycol 3350 17 Gram(s) Oral daily  senna 1 Tablet(s) Oral daily      PHYSICAL EXAM:  T(C): 36.4 (08-17-21 @ 16:19), Max: 36.8 (08-17-21 @ 16:17)  HR: 72 (08-17-21 @ 16:19) (68 - 72)  BP: 133/69 (08-17-21 @ 16:19) (113/74 - 142/80)  RR: 18 (08-17-21 @ 16:19) (18 - 18)  SpO2: 99% (08-17-21 @ 16:19) (97% - 100%)  Wt(kg): --  I&O's Summary    16 Aug 2021 07:01  -  17 Aug 2021 07:00  --------------------------------------------------------  IN: 1150 mL / OUT: 1250 mL / NET: -100 mL          Appearance: awake, nonverbal 	  HEENT:  PERRLA   Lymphatic: No lymphadenopathy   Cardiovascular: Normal S1 S2  Respiratory: normal effort , clear  Gastrointestinal:  Soft, Non-tender  Skin: No rashes,  warm to touch  Psychiatry:  Mood & affect appropriate  Musculuskeletal: No edema      All labs, Imaging and EKGs personally reviewed       08-16-21 @ 07:01  -  08-17-21 @ 07:00  --------------------------------------------------------  IN: 1150 mL / OUT: 1250 mL / NET: -100 mL                          14.0   11.50 )-----------( 308      ( 17 Aug 2021 09:55 )             41.2               08-17    138  |  95<L>  |  40<H>  ----------------------------<  182<H>  4.1   |  25  |  0.88    Ca    9.1      17 Aug 2021 09:55  Mg     2.2     08-16    TPro  7.0  /  Alb  3.5  /  TBili  0.6  /  DBili  x   /  AST  69<H>  /  ALT  37  /  AlkPhos  111  08-17                                 
Name of Patient : SHELBY LESTER  MRN: [id number]  DATE OF SERVICE: 08-16-21     Subjective: Patient seen and examined. No new events except as noted.   calm      MEDICATIONS:  MEDICATIONS  (STANDING):  apixaban 5 milliGRAM(s) Enteral Tube every 12 hours  ATENolol  Tablet 25 milliGRAM(s) Enteral Tube daily  atorvastatin 80 milliGRAM(s) Oral at bedtime  BACItracin   Ointment 1 Application(s) Topical two times a day  buDESOnide    Inhalation Suspension 0.5 milliGRAM(s) Inhalation two times a day  cefTRIAXone   IVPB 1000 milliGRAM(s) IV Intermittent every 24 hours  furosemide Solution 40 milliGRAM(s) Oral daily  gabapentin   Solution 100 milliGRAM(s) Oral daily  melatonin 3 milliGRAM(s) Oral at bedtime  multivitamin 1 Tablet(s) Oral daily  nystatin    Suspension 239991 Unit(s) Oral once  pantoprazole   Suspension 40 milliGRAM(s) Oral daily  polyethylene glycol 3350 17 Gram(s) Oral daily  senna 1 Tablet(s) Oral daily      PHYSICAL EXAM:  T(C): 36.9 (08-16-21 @ 13:58), Max: 36.9 (08-16-21 @ 13:58)  HR: 69 (08-16-21 @ 16:00) (67 - 84)  BP: 138/67 (08-16-21 @ 16:00) (125/68 - 147/60)  RR: 18 (08-16-21 @ 16:00) (16 - 18)  SpO2: 100% (08-16-21 @ 16:00) (95% - 100%)  Wt(kg): --  I&O's Summary    15 Aug 2021 07:01  -  16 Aug 2021 07:00  --------------------------------------------------------  IN: 980 mL / OUT: 1100 mL / NET: -120 mL    16 Aug 2021 07:01  -  16 Aug 2021 20:12  --------------------------------------------------------  IN: 250 mL / OUT: 350 mL / NET: -100 mL          Appearance: awake, calm   HEENT:  PERRLA   Lymphatic: No lymphadenopathy   Cardiovascular: Normal S1 S2  Respiratory: normal effort , clear  Gastrointestinal:  Soft, Non-tender  Skin: No rashes,  warm to touch  Psychiatry:  Mood & affect appropriate  Musculuskeletal: No edema      All labs, Imaging and EKGs personally reviewed     08-15-21 @ 07:01  -  08-16-21 @ 07:00  --------------------------------------------------------  IN: 980 mL / OUT: 1100 mL / NET: -120 mL    08-16-21 @ 07:01  -  08-16-21 @ 20:12  --------------------------------------------------------  IN: 250 mL / OUT: 350 mL / NET: -100 mL                          15.5   9.49  )-----------( 304      ( 16 Aug 2021 16:18 )             47.8               08-16    140  |  94<L>  |  37<H>  ----------------------------<  124<H>  3.6   |  29  |  0.95    Ca    10.4      16 Aug 2021 16:18  Mg     2.2     08-16    TPro  7.9  /  Alb  4.0  /  TBili  1.1  /  DBili  x   /  AST  41<H>  /  ALT  35  /  AlkPhos  114  08-16         < from: US Duplex Arterial Lower Ext Ltd, Right (08.16.21 @ 11:41) >  IMPRESSION: 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  The more proximal pseudoaneurysm measures 1.1 cm x 6 mm x 7 mm.  More peripheral pseudoaneurysm measures 1.5 cm x 1.7 cm x 2.2 cm. The more peripheral larger pseudoaneurysm is partially thrombosed with a patent lumen of 1.9 cm x 1.7 cm x 2.2 cm.

## 2021-08-23 NOTE — DISCHARGE NOTE NURSING/CASE MANAGEMENT/SOCIAL WORK - PATIENT PORTAL LINK FT
You can access the FollowMyHealth Patient Portal offered by [facility] by registering at the following website: [email or web address]. By joining [facility]’s FollowMyHealth portal, you will also be able to view your health information using other applications (apps) compatible with our system.

## 2021-08-23 NOTE — DISCHARGE NOTE NURSING/CASE MANAGEMENT/SOCIAL WORK - NSDCPEFALRISK_GEN_ALL_CORE
For information on Fall & injury Prevention, visit [email or web address]

## 2021-08-23 NOTE — PROGRESS NOTE ADULT - PROBLEM SELECTOR PLAN 1
Likely metabolic encephalopathy given positive UA  -S/p IV ceftriaxone, completed course   -Falls and aspiration precautions  -SW and PT consult as able
Likely metabolic encephalopathy given positive UA  -S/p IV ceftriaxone, completed course   -Falls and aspiration precautions  -SW and PT consult as able
Likely metabolic encephalopathy given positive UA and maybe component of hypoxia since pt was off 02. Pt at baseline right now as per son by bedside. CT head negative for new CVA  -Cont. IV ceftriaxone  -Consider PPM interrogation regarding possible syncope  -Falls and aspiration precautions  -SW and PT consult as able  -See below
Likely metabolic encephalopathy given positive UA  -S/p IV ceftriaxone, completed course   -Falls and aspiration precautions  -SW and PT consult as able
Likely metabolic encephalopathy given positive UA and maybe component of hypoxia since pt was off 02. Pt at baseline right now as per son by bedside. CT head negative for new CVA  -Cont. IV ceftriaxone  -Consider PPM interrogation regarding possible syncope  -Falls and aspiration precautions  -SW and PT consult as able  -See below
Likely metabolic encephalopathy given positive UA  -S/p IV ceftriaxone, completed course   -Falls and aspiration precautions  -SW and PT consult as able
Likely metabolic encephalopathy given positive UA and maybe component of hypoxia since pt was off 02. Pt at baseline right now as per son by bedside. CT head negative for new CVA  -Cont. IV ceftriaxone  -Consider PPM interrogation regarding possible syncope  -Falls and aspiration precautions  -SW and PT consult as able  -See below

## 2021-08-23 NOTE — PROGRESS NOTE ADULT - PROBLEM SELECTOR PLAN 4
S/p large L sided MCA CVA. Residual R sided deficits, facial droop and severe dysarthria. Severe dysphagia. Failed speech/swallow eval here but reportedly passed in [facility] [place]??  -Aspiration precautions  -NPO for now  -Nutrition consult for tube feeds  -Gentle IVF overnight  -Cont. atorvastatin
S/p large L sided MCA CVA. Residual R sided deficits, facial droop and severe dysarthria. Severe dysphagia. Failed speech/swallow eval here but reportedly passed in [facility] [place]??  -Aspiration precautions  -NPO, tube feeding   -Nutrition consult for tube feeds  -Gentle IVF overnight  -Cont. atorvastatin
S/p large L sided MCA CVA. Residual R sided deficits, facial droop and severe dysarthria. Severe dysphagia. Failed speech/swallow eval here but reportedly passed in [facility] [place]??  -Aspiration precautions  -NPO, tube feeding   -Nutrition consult for tube feeds  -Cont. atorvastatin  - pseudoaneurysm noted  - vascular eval appreciated, intervention per team
S/p large L sided MCA CVA. Residual R sided deficits, facial droop and severe dysarthria. Severe dysphagia. Failed speech/swallow eval here but reportedly passed in [facility] [place]??  -Aspiration precautions  -NPO, tube feeding   -Nutrition consult for tube feeds  -Cont. atorvastatin  - pseudoaneurysm noted  - vascular eval appreciated, intervention per team
S/p large L sided MCA CVA. Residual R sided deficits, facial droop and severe dysarthria. Severe dysphagia. Failed speech/swallow eval here but reportedly passed in [facility] [place]??  -Aspiration precautions  -NPO, tube feeding   -Nutrition consult for tube feeds  -Cont. atorvastatin  - pseudoaneurysm noted  - vascular eval appreciated, intervention per team, injection per team, completed, clear for D/C
S/p large L sided MCA CVA. Residual R sided deficits, facial droop and severe dysarthria. Severe dysphagia. Failed speech/swallow eval here but reportedly passed in [facility] [place]??  -Aspiration precautions  -NPO, tube feeding   -Nutrition consult for tube feeds  -Cont. atorvastatin  - pseudoaneurysm noted  - vascular eval appreciated, intervention per team
S/p large L sided MCA CVA. Residual R sided deficits, facial droop and severe dysarthria. Severe dysphagia. Failed speech/swallow eval here but reportedly passed in [facility] [place]??  -Aspiration precautions  -NPO, tube feeding   -Nutrition consult for tube feeds  -Cont. atorvastatin  - pseudoaneurysm noted  - vascular eval appreciated, intervention per team, injection per team, completed, clear for D/C
S/p large L sided MCA CVA. Residual R sided deficits, facial droop and severe dysarthria. Severe dysphagia. Failed speech/swallow eval here but reportedly passed in [facility] [place]??  -Aspiration precautions  -NPO, tube feeding   -Nutrition consult for tube feeds  -Gentle IVF overnight  -Cont. atorvastatin  - pseudoaneurysm noted  - vascular eval called
S/p large L sided MCA CVA. Residual R sided deficits, facial droop and severe dysarthria. Severe dysphagia. Failed speech/swallow eval here but reportedly passed in [facility] [place]??  -Aspiration precautions  -NPO, tube feeding   -Nutrition consult for tube feeds  -Cont. atorvastatin  - pseudoaneurysm noted  - vascular eval appreciated, intervention per team, injection per team, completed, clear for D/C

## 2021-08-23 NOTE — CONSULT NOTE ADULT - ASSESSMENT
89 yo female with history of A Fib, multiple CVA including a recent Left MCA CVA, s/p recent peg, admitted with "change in mental status" in setting of possible UTI   She is non verbal, has a dense rt hemiparesis.
Assessment:  89y Female with PMH above, afib on eliquis, recent left MCA stroke s/p attempted cerebral angio via right groin access on 7/25. Vascular consulted for right groin pseudoaneurysm seen on US.     Plan:  - held pressure for 1 hour when patient was examined at bedside  - will plan for repeat US duplex possible need for US guided compression  - will then repeat US duplex tomorrow, if PSA still open then may need to consider thrombin injection  - george vascular fellow    [id number]
89 y/o F with PMH atrial fibrillation on eliquis, PPM, HTN, ovarian and colon ca, recent left MCA ischemic infarct (M1 occlusion from cardioembolism 7/2021), GERD who presented on 8/14 due to AMS. Neurology consulted for AMS and recent stroke admission. Physical exam significant for right sided hyperreflexia, no verbal output, limited due to limited patient cooperation. Labs significant for positive UA, WBC of 11.29, and BUN/Cr of 55/1.17. CTH w/o showed recent left MCA infarct.    Impression: Episode of AMS likely secondary to UTI/hypoxia, evaluate for seizure activity considering recent infarct    Plan:  routine EEG  Continue eliquis  Treatment of UTI as per primary team   PT/OT  Fall, seizure, aspiration precautions    Case to be discussed with neurology attending, Dr. Atkinson

## 2021-08-23 NOTE — PROGRESS NOTE ADULT - ASSESSMENT
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  She is non verbal, has a dense rt hemiparesis.  CXR is clear and while she has pyuria, this can be a nonspecific finding.  Her wbc count is normal, her temp is normal, hence not clear if a positive culture would be reflective of colonization or true UTI.  She appears hemodynamically stable.  I do not see any evidence of a systemic infection  Her urine culture is growing enterococcus , this was not being covered by CTX  She has 2 small rt femoral artery pseudoaneurysms   Enterococcus in urine may be a colonizer and at this point with her being afebrile and her wbc being normal I will not extend treatment.  ? If she is at baseline  Suggest:  1.I have stopped CTX  2.I am reluctant to treat enterococcus, may simply be a colonizer, will leave it alone  3.Appreciate vascular plans for pseudoanuerysms  4.Daughter updated at bedside yesterday   5.additional ID w/u as indicated  
89 yo female with history of A Fib, multiple CVA including a recent Left MCA CVA, s/p recent peg, admitted with "change in mental status" which apparently resolved in ER.  She is non verbal, has a dense rt hemiparesis.  CXR is clear and while she has pyuria, this can be a nonspecific finding.  Her wbc count is normal, her temp is normal, hence not clear if a positive culture would be reflective of colonization or true UTI.  She appears hemodynamically stable.  I do not see any evidence of a systemic infection.  Await urine culture, will leave on CTX, anticipate a short course  Supportive care  Advise blood cultures for any fever spike, not sent in ER.  
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  She is non verbal, has a dense rt hemiparesis.  CXR is clear and while she has pyuria, this can be a nonspecific finding.  Her wbc count is normal, her temp is normal, hence not clear if a positive culture would be reflective of colonization or true UTI.  She appears hemodynamically stable.  I do not see any evidence of a systemic infection  Her urine culture is growing enterococcus , this was not being covered by CTX  She has 2 small rt femoral artery pseudoaneurysms , s/p thrombin injection.  Enterococcus in urine may be a colonizer and at this point with her being afebrile and her wbc being normal I will not extend treatment.  ? If she is at baseline  She has received a limited course of CTX and is being monitored off antibiotics  No evidence of recurrent infection although has had a documented temp of 100  Suggest:  1.Monitor off antibiotics  2.I am reluctant to treat enterococcus, may simply be a colonizer, will leave it alone   3 .additional ID w/u as indicated, will order cbc for AM  
Assessment:  89y Female with PMH above, afib on eliquis, recent left MCA stroke s/p attempted cerebral angio via right groin access on 7/25. Vascular consulted for right groin pseudoaneurysm seen on US. Failed to resolve with compression alone. Now s/p thrombin injection.    Plan:  - will obtain repeat duplex today
89 yo female with history of A Fib, multiple CVA including a recent Left MCA CVA, s/p recent peg, admitted with "change in mental status" which apparently resolved in ER.  She is non verbal, has a dense rt hemiparesis.  CXR is clear and while she has pyuria, this can be a nonspecific finding.  Her wbc count is normal, her temp is normal, hence not clear if a positive culture would be reflective of colonization or true UTI.  She appears hemodynamically stable.  I do not see any evidence of a systemic infection  Her urine culture is growing enterococcus , this was not being covered by CTX  She has 2 small rt femoral artery pseudoaneurysms , s/p thrombin injection.  Enterococcus in urine may be a colonizer and at this point with her being afebrile and her wbc being normal I will not extend treatment.  ? If she is at baseline  She has received a limited course of CTX and is being monitored off antibiotics  Suggest:  1.Monitor off antibiotics  2.I am reluctant to treat enterococcus, may simply be a colonizer, will leave it alone   3 .additional ID w/u as indicated  
89 y/o F with PMHx of afib recently started on eliquis, s/p PPM, w/ recent L MCA CVA, chronic respiratory failure on 02, HTN, ovarian and colon ca, GERD p/w episode of acute encephalopathy
89 yo female with history of A Fib, multiple CVA including a recent Left MCA CVA, s/p recent peg, admitted with "change in mental status" which apparently resolved in ER.  She is non verbal, has a dense rt hemiparesis.  CXR is clear and while she has pyuria, this can be a nonspecific finding.  Her wbc count is normal, her temp is normal, hence not clear if a positive culture would be reflective of colonization or true UTI.  She appears hemodynamically stable.  I do not see any evidence of a systemic infection  Her urine culture is growing enterococcus , this was not being covered by CTX  She has 2 small rt femoral artery pseudoaneurysms , s/p thrombin injection.  Enterococcus in urine may be a colonizer and at this point with her being afebrile and her wbc being normal I will not extend treatment.  ? If she is at baseline  She has received a limited course of CTX and is being monitored off antibiotics  No evidence of recurrent infection  Suggest:  1.Monitor off antibiotics  2.I am reluctant to treat enterococcus, may simply be a colonizer, will leave it alone   3 .additional ID w/u as indicated  
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  She is non verbal, has a dense rt hemiparesis.  CXR is clear and while she has pyuria, this can be a nonspecific finding.  Her wbc count is normal, her temp is normal, hence not clear if a positive culture would be reflective of colonization or true UTI.  She appears hemodynamically stable.  I do not see any evidence of a systemic infection  Her urine culture is growing enterococcus , this is not being covered by CTX  She has 2 small rt femoral artery pseudoaneurysms   Enterococcus in urine may be a colonizer  Suggest:  1.Will d/c CTX after todays dose  2.I am reluctant to treat enterococcus, may simply be a colonizer  3.Appreciate vascular plans  4.Daughter updated at bedside  
89 y/o F with PMHx of afib recently started on eliquis, s/p PPM, w/ recent L MCA CVA, chronic respiratory failure on 02, HTN, ovarian and colon ca, GERD p/w episode of acute encephalopathy
89 y/o F with PMHx of afib recently started on eliquis, s/p PPM, w/ recent L MCA CVA, chronic respiratory failure on 02, HTN, ovarian and colon ca, GERD p/w episode of acute encephalopathy
89 y/o F with PMHx of afib recently started on eliquis, s/p PPM, w/ recent L MCA CVA, chronic respiratory failure on 02, HTN, ovarian and colon ca, GERD p/w episode of acute encephalopathy
89 y/o F with PMHx of afib recently started on eliquis, s/p PPM, w/ recent L MCA CVA, chronic respiratory failure on 02, HTN, ovarian and colon ca, GERD p/w episode of acute encephalopathy
89 y/o F with PMHx of afib recently started on eliquis, s/p PPM, w/ recent L MCA CVA, chronic respiratory failure on 02, HTN, ovarian and colon ca, GERD p/w episode of acute encephalopathy
89 y/o F with PMHx of afib recently started on eliquis, s/p PPM, w/ recent L MCA CVA, chronic respiratory failure on 02, HTN, ovarian and colon ca, GERD p/w episode of acute encephalopathy
89 y/o F with PMHx of afib recently started on eliquis, s/p PPM, w/ recent L MCA CVA, chronic respiratory failure on 02, HTN, ovarian and colon ca, GERD p/w episode of acute encephalopathy
89 y/o F with PMHx of afib recently started on eliquis, s/p PPM, w/ recent L MCA CVA, chronic respiratory failure on 02, HTN, ovarian and colon ca, GERD p/w episode of acute encephalopathy

## 2021-08-23 NOTE — PROGRESS NOTE ADULT - PROBLEM SELECTOR PLAN 2
UA could be considered positive  -S/P IV ceftriaxone daily  -F/u UCx  - ID eval appreciated
UA could be considered positive  -S/P IV ceftriaxone daily  -F/u UCx  - ID eval appreciated
UA could be considered positive  -Cont. IV ceftriaxone daily  -F/u UCx  - ID eval appreciated
UA could be considered positive  -S/P IV ceftriaxone daily  -F/u UCx  - ID eval appreciated
UA could be considered positive  -Cont. IV ceftriaxone daily  -F/u UCx  - ID eval appreciated
UA could be considered positive  -Cont. IV ceftriaxone daily  -F/u UCx  - ID eval called

## 2021-08-23 NOTE — PROGRESS NOTE ADULT - PROBLEM SELECTOR PLAN 7
DVT PPx  -Pt on eliquis

## 2021-08-23 NOTE — CHART NOTE - NSCHARTNOTEFT_GEN_A_CORE
Dr. Valdovinos has medically cleared  pt for discharge to [facility]. Seen and examined pt at the bedside - NAD noted.

## 2021-08-23 NOTE — PROGRESS NOTE ADULT - REASON FOR ADMISSION
Altered Mental status

## 2021-08-23 NOTE — CONSULT NOTE ADULT - SUBJECTIVE AND OBJECTIVE BOX
CHIEF COMPLAINT:  case discussed with patients daughter Aniyah Randall at length this am.   All questions were answered     HISTORY OF PRESENT ILLNESS:  89 y/o F well known to me from recent prolonged hospitalization, with PMHx of recently diagnosed afib started on eliquis, s/p PPM, w/ recent L MCA CVA, cardiomyopathy and systolic CHF, chronic respiratory failure on 02, HTN, ovarian and colon ca, GERD p/w episode of acute encephalopathy. Pt was recently admitted to [facility] after being discovered with new  Left MCA infarction, Left M1 occlusion. Recannulization was unsuccessful. Pt now with residual R facial droop, hemiplegia and dysarthria. Was started on eliquis, had PEG tube placed for dysphagia and eventually discharged to [place] rehab 5 days ago. Pt's son was visiting her today when he found her in hallway without her oxygen on. Pt was altered compared to baseline and seemed borderline non-responsive. She was then rushed to [facility] ER for further evaluation. She was sent back to her baseline mental status and without distress.      PAST MEDICAL & SURGICAL HISTORY:  Hypertension    Cancer    GERD (gastroesophageal reflux disease)    Anxiety    Ovarian cancer    Colon cancer    H/O small bowel obstruction    H/O total hysterectomy  with BSO    No significant past surgical history  MEDICATIONS:  apixaban 5 milliGRAM(s) Enteral Tube every 12 hours  ATENolol  Tablet 25 milliGRAM(s) Enteral Tube daily  furosemide Solution 40 milliGRAM(s) Oral daily      buDESOnide    Inhalation Suspension 0.5 milliGRAM(s) Inhalation two times a day    acetaminophen   Tablet .. 650 milliGRAM(s) Oral every 6 hours PRN  gabapentin   Solution 100 milliGRAM(s) Oral daily  melatonin 3 milliGRAM(s) Oral at bedtime  OLANZapine 2.5 milliGRAM(s) Oral once  ondansetron Injectable 4 milliGRAM(s) IV Push every 8 hours PRN    pantoprazole   Suspension 40 milliGRAM(s) Oral daily  polyethylene glycol 3350 17 Gram(s) Oral daily  senna 1 Tablet(s) Oral daily    atorvastatin 80 milliGRAM(s) Oral at bedtime    multivitamin 1 Tablet(s) Oral daily      FAMILY HISTORY:  No pertinent family history in first degree relatives    No pertinent family history in first degree relatives    SOCIAL HISTORY:    [ ] Non-smoker  [ ] Smoker  [ ] Alcohol    Allergies    No Known Allergies    Intolerances    REVIEW OF SYSTEMS:    [X ] All others negative	  [ ] Unable to obtain    PHYSICAL EXAM:  T(C): 36.8 (21 @ 08:58), Max: 36.8 (21 @ 08:58)  HR: 69 (21 @ 08:58) (69 - 73)  BP: 129/64 (21 @ 08:58) (110/77 - 130/75)  RR: 18 (21 @ 08:58) (18 - 20)  SpO2: 96% (21 @ 08:58) (95% - 99%)  Wt(kg): --  I&O's Summary    22 Aug 2021 07:01  -  23 Aug 2021 07:00  --------------------------------------------------------  IN: 1050 mL / OUT: 800 mL / NET: 250 mL    Appearance: NAD 	  HEENT:  Dry oral mucosa, PERRL, EOMI	  Lymphatic: No lymphadenopathy  Cardiovascular: Normal S1 S2, No JVD, No murmurs, No edema  Respiratory: Decreased bs   Psychiatry: A & O x 3, Mood & affect appropriate  Gastrointestinal:  Soft, Non-tender, + BS	  Skin: No rashes, No ecchymoses, No cyanosis	  Extremities: Normal range of motion, No clubbing, cyanosis or edema  Vascular: Peripheral pulses palpable 2+ bilaterally  Neurological (>12):  MS: eyes closed, opens eyes to voice, maintains alertness, does not follow commands  Language: No clear verbal output  CNs: pupils miotic, round, reactive to light (2 mm OU). CVF by BTT intact grossly. EOMI grossly intact as tracks examiner, no nystagmus. Gag reflex deferred. Head turning intact b/l. Tongue midline.    Fundoscopic: deferred    Motor: Decreased muscle bulk throughout.   Moves UE's spontaneously, LUE>RUE  adducts/abducts RLE/LLE within plane of bed, dorsiflexes/plantarflexes RLE>LLE spontaneously, no effort against gravity in LE's     Sensation: intact to noxious stimuli throughout (withdraws extremities)    Cortical: Extinction on DSS (neglect): none    Reflexes: biceps 3+ on right, 2+ on left, plantars mute on right, down on left    Coordination: patient could not participate    TELEMETRY: 	    ECG:  	  RADIOLOGY: ct< from: CT Head No Cont (21 @ 17:53) >    EXAM:  CT BRAIN                          PROCEDURE DATE:  2021      INTERPRETATION:  CT HEAD  HEAD CT    INDICATIONS: syncope, AMS    syncope, AMS, pt was sitting in a chair when she went unresponsive for 5 minutes and is unable to follow commands. As per son, pt had a stroke recently was has had dysarthria since. pt is more lethargic than normal and is A&Ox2 @ baseline. On exam, pt is breathing spontaneously, unable to speak & 99% on 3L @ baseline.    TECHNIQUE:    HEAD CT:  Serial axial images were obtained from the skull base to the vertex without the use of intravenous contrast.    COMPARISON EXAMINATION: Head CT 2021 and 2021    FINDINGS:    HEAD CT:    VENTRICLES AND SULCI: Ventricles and sulci are unremarkable for patient age.  INTRA-AXIAL: No intracranial mass, acute hemorrhage, or midline shift is present. There is non-specific decreased attenuation in the white matter likely related to sequelae of microvascular disease. There is serpiginous increased attenuation along the peripheral left MCA infarct, likely some laminar necrosis. Calcification in the left parietal lobe unchanged.  EXTRA-AXIAL: No extra-axial fluid collection is present.  INTRACRANIAL HEMORRHAGE: None.    VISUALIZED SINUSES: No air-fluid levels are identified.  VISUALIZED MASTOIDS:  Clear.  CALVARIUM:  Intact.  MISCELLANEOUS:  None.    SOFT TISSUES: Unremarkable.  BONES: Unremarkable.      IMPRESSION:    HEAD CT: Mild volume loss, microvascular disease, no acute hemorrhage or midline shift.  Sequelae of previous left MCA infarct. MRI may be helpful for further evaluation, if clinically indicated.    --- End of Report ---    < end of copied text >  < from: Xray Cinesophagram Swallow Function w/ Contrast (21 @ 09:53) >    EXAM:  XR SWAL FUNC JOSEPH VID CON STDY                            PROCEDURE DATE:  2021            INTERPRETATION:  CLINICAL INFORMATION: Dysphagia status post CVA.    TECHNIQUE: Limited evaluation of liquids and solids were mixed with barium and patient was asked to consume them under direct fluoroscopic evaluation.  The study was performed in cooperation with the speech pathologist.    FLUOROSCOPIC TIME:1.4 minutes.    COMPARISON:  None available.    IMPRESSION: Limited study as patient was unable to cooperate the entirety of the exam. There is passive spillage of contrast into the vallecula and pyriform sinus with residue leading to subsequent aspiration. A seperate report will be issued by the department of speech and hearing      < end of copied text >  < from: Xray Chest 1 View- PORTABLE-Urgent (Xray Chest 1 View- PORTABLE-Urgent .) (21 @ 16:33) >    EXAM:  XR CHEST PORTABLE URGENT 1V                            PROCEDURE DATE:  2021      INTERPRETATION:  EXAMINATION: XR CHEST URGENT    CLINICAL INDICATION: ams, syncope    TECHNIQUE: Single frontal, portable view of the chest was obtained.    COMPARISON: Chest x-ray 2021.    FINDINGS:  Biventricular pacemaker with its leads in the right ventricle and right atrium.  The heart is enlarged.  The lungs are clear.  There is no pneumothorax or pleural effusion.    IMPRESSION:  Cardiomegaly, otherwise clear lungs.    --- End of Report ---    < end of copied text >    OTHER: 	  	  LABS:	 	    CARDIAC MARKERS:      uCulture - Urine (21 @ 20:49)   - Ampicillin: S <=2 Predicts results to ampicillin/sulbactam, amoxacillin-clavulanate and piperacillin-tazobactam.   - Ciprofloxacin: S <=1   - Levofloxacin: S <=1   - Nitrofurantoin: S <=32 Should not be used to treat pyelonephritis.   - Tetra/Doxy: R >8   - Vancomycin: S 2   Specimen Source: Catheterized Catheterized   Culture Results:   >100,000 CFU/ml Enterococcus faecalis   <10,000 CFU/ml Normal Urogenital juliane present   Organism Identification: Enterococcus faecalis   Organism: Enterococcus faecalis   Method Type: MARYLOU                      13.1   11.18 )-----------( 213      ( 23 Aug 2021 07:27 )             40.1       PROCEDURE:   · Procedure Name	PPM Interrogation Note  · TIME OUT	Patient's first and last name, , procedure, and correct site confirmed prior to the start of procedure.  · Practitioner performing the TIME OUT	Myself  · Procedure Date/Time	2021 12:59  · Informed Consent	Benefits, risks, and possible complications of procedure explained to patient/caregiver who verbalized understanding and gave verbal consent., Family at bedside  · Procedure Performed By	Myself  · Procedural Sedation Used	No  · 	[employer]  · Model	Accolade MRI L311  · Mode	DDDR  · Rate	60 ppm  · Atrial Lead	Yes  · P-wave amplitude (mV)	2  · Impedance (ohms)	738  · Right Ventricular Lead	Yes  · R-wave amplitude (mV)	17.7  · RV lead Impedance (ohms)	670  · Threshold (V)	0.8  · Threshold at (ms)	0.5  · Battery	Good  · Underlying Rhythm	Atrial Fibrillation, occ Vpaced  · Comments	PPM Interrogation r/o Afib, Left MCA Stroke  · Additional Procedure Details	1. Battery longevity 4.5 years  2. Lead impedances WNL  3. Sensing thresholds and RV pacing threshold WNL  4. Atrial pacing threshold not done 2/2 underlying Afib  5. Five PMT episodes stored on 21, available EGMs c/w pAFlutter  6. Twelve mode switches stored on 21, between 11:47am to 12:32am, duration 4 secs to 14 mins, available EGMs c/w Afib  7. AT/AF overview with 311 events since 10/31/2019, AF burden 39%, total time in AT/.2 days  8. Longest AT/AF was on 3/9/21 lasting >48hrs; Fastest VS rate on 21 at 126 bpm lasting >48hrs  9. Not PM dependent: Apaced 40%, Vpaced 98%; underlying Afib 60's  10. Normal pacemaker function    NICOLE Sheehan  #[id number]      Electronic Signatures:  Corinna Julian (MARIAJOSE)  (Signed 2021 13:13)  	Authored: PROCEDURE

## 2021-08-23 NOTE — PROGRESS NOTE ADULT - PROBLEM SELECTOR PLAN 3
- Pt discharged on 2L NC reportedly  - Cont. nasal cannula for now  - Resume budesonide as able
- Pt discharged on 2L NC reportedly  - Cont. nasal cannula for now  - Resume budesonide as able
Pt discharged on 2L NC reportedly  -Cont. nasal cannula for now  -Resume budesonide as able
- Pt discharged on 2L NC reportedly  - Cont. nasal cannula for now  - Resume budesonide as able
Pt discharged on 2L NC reportedly  -Cont. nasal cannula for now  -Resume budesonide as able
- Pt discharged on 2L NC reportedly  - Cont. nasal cannula for now  - Resume budesonide as able

## 2021-08-23 NOTE — CONSULT NOTE ADULT - TIME BILLING
Advanced care planning was discussed with patient's daughter kamla Randall.  Advanced care planning forms were reviewed and discussed as appropriate.  Differential diagnosis and plan of care discussed with patient after the evaluation.   Pain assessed and judicious use of narcotics when appropriate was discussed.  Importance of Fall prevention discussed.  Counseling on Diet, exercise, and medication compliance was done.

## 2021-09-21 NOTE — PROGRESS NOTE ADULT - PROBLEM SELECTOR PROBLEM 6
Chronic systolic congestive heart failure
(1) Other Medications/None

## 2021-10-27 NOTE — PRE PROCEDURE NOTE - PROCEDURE SERVICE
Endoscopy
Vascular and Interventional Radiology
Orbicularis Oris Muscle Flap Text: The defect edges were debeveled with a #15 scalpel blade.  Given that the defect affected the competency of the oral sphincter an orbicularis oris muscle flap was deemed most appropriate to restore this competency and normal muscle function.  Using a sterile surgical marker, an appropriate flap was drawn incorporating the defect. The area thus outlined was incised with a #15 scalpel blade.

## 2022-07-25 NOTE — PROGRESS NOTE ADULT - PROBLEM SELECTOR PROBLEM 3
Caller: ABEBA     Relationship to patient: SELF    Best call back number: [phone]    Chief complaint: L KNEE    Type of visit:  GEL INJ    Requested date: NEXT AVAIL        Urinary tract infection without hematuria, site unspecified

## 2022-09-13 NOTE — PROGRESS NOTE ADULT - PROBLEM/PLAN-3
DISPLAY PLAN FREE TEXT
5

## 2022-09-29 NOTE — PROCEDURE NOTE - ADDITIONAL PROCEDURE DETAILS
"Refills remaining (thru [facility]).  Current request comes from [facility], [place].  Noted in refusal transmittal to [facility] that pt still has refills current thru 2/16/2023 from [facility].  Last Written Prescription Date:  2/16/2022  Last Fill Quantity: 90,  # refills: 3       Requested Prescriptions   Pending Prescriptions Disp Refills     rosuvastatin (CRESTOR) 10 MG tablet [Pharmacy Med Name: ROSUVASTATIN CALCIUM 10 MG TAB] 45 tablet 7     Sig: TAKE 1 TABLET BY MOUTH EVERY OTHER DAY       Statins Protocol Failed - 9/29/2022 10:09 AM        Failed - LDL on file in past 12 months     Recent Labs   Lab Test 09/11/19  0845   LDL 99             Passed - No abnormal creatine kinase in past 12 months     No lab results found.             Passed - Recent (12 mo) or future (30 days) visit within the authorizing provider's specialty     Patient has had an office visit with the authorizing provider or a provider within the authorizing providers department within the previous 12 mos or has a future within next 30 days. See \"Patient Info\" tab in inbasket, or \"Choose Columns\" in Meds & Orders section of the refill encounter.              Passed - Medication is active on med list        Passed - Patient is age 18 or older        Passed - No active pregnancy on record        Passed - No positive pregnancy test in past 12 months             Rena Swanson RN 09/29/22 2:14 PM  "
1. Battery longevity 4.5 years  2. Lead impedances WNL  3. Sensing thresholds and RV pacing threshold WNL  4. Atrial pacing threshold not done 2/2 underlying Afib  5. Five PMT episodes stored on 5/2/21, available EGMs c/w pAFlutter  6. Twelve mode switches stored on 7/30/21, between 11:47am to 12:32am, duration 4 secs to 14 mins, available EGMs c/w Afib  7. AT/AF overview with 311 events since 10/31/2019, AF burden 39%, total time in AT/.2 days  8. Longest AT/AF was on 3/9/21 lasting >48hrs; Fastest VS rate on 5/2/21 at 126 bpm lasting >48hrs  9. Not PM dependent: Apaced 40%, Vpaced 98%; underlying Afib 60's  10. Normal pacemaker function    ROGERIO Sheehan-C  #[id number]

## 2023-02-10 NOTE — ED PROVIDER NOTE - RESPIRATORY, MLM
Azelaic Acid Counseling: Patient counseled that medicine may cause skin irritation and to avoid applying near the eyes.  In the event of skin irritation, the patient was advised to reduce the amount of the drug applied or use it less frequently.   The patient verbalized understanding of the proper use and possible adverse effects of azelaic acid.  All of the patient's questions and concerns were addressed.
Bactrim Counseling:  I discussed with the patient the risks of sulfa antibiotics including but not limited to GI upset, allergic reaction, drug rash, diarrhea, dizziness, photosensitivity, and yeast infections.  Rarely, more serious reactions can occur including but not limited to aplastic anemia, agranulocytosis, methemoglobinemia, blood dyscrasias, liver or kidney failure, lung infiltrates or desquamative/blistering drug rashes.
Breath sounds clear and equal bilaterally.
Tazorac Pregnancy And Lactation Text: This medication is not safe during pregnancy. It is unknown if this medication is excreted in breast milk.
Topical Sulfur Applications Counseling: Topical Sulfur Counseling: Patient counseled that this medication may cause skin irritation or allergic reactions.  In the event of skin irritation, the patient was advised to reduce the amount of the drug applied or use it less frequently.   The patient verbalized understanding of the proper use and possible adverse effects of topical sulfur application.  All of the patient's questions and concerns were addressed.
Isotretinoin Pregnancy And Lactation Text: This medication is Pregnancy Category X and is considered extremely dangerous during pregnancy. It is unknown if it is excreted in breast milk.
Azithromycin Pregnancy And Lactation Text: This medication is considered safe during pregnancy and is also secreted in breast milk.
Detail Level: Detailed
Azelaic Acid Pregnancy And Lactation Text: This medication is considered safe during pregnancy and breast feeding.
Topical Clindamycin Pregnancy And Lactation Text: This medication is Pregnancy Category B and is considered safe during pregnancy. It is unknown if it is excreted in breast milk.
Aklief Pregnancy And Lactation Text: It is unknown if this medication is safe to use during pregnancy.  It is unknown if this medication is excreted in breast milk.  Breastfeeding women should use the topical cream on the smallest area of the skin for the shortest time needed while breastfeeding.  Do not apply to nipple and areola.
Bactrim Pregnancy And Lactation Text: This medication is Pregnancy Category D and is known to cause fetal risk.  It is also excreted in breast milk.
Detail Level: Zone
High Dose Vitamin A Pregnancy And Lactation Text: High dose vitamin A therapy is contraindicated during pregnancy and breast feeding.
Benzoyl Peroxide Counseling: Patient counseled that medicine may cause skin irritation and bleach clothing.  In the event of skin irritation, the patient was advised to reduce the amount of the drug applied or use it less frequently.   The patient verbalized understanding of the proper use and possible adverse effects of benzoyl peroxide.  All of the patient's questions and concerns were addressed.
Erythromycin Pregnancy And Lactation Text: This medication is Pregnancy Category B and is considered safe during pregnancy. It is also excreted in breast milk.
Sarecycline Counseling: Patient advised regarding possible photosensitivity and discoloration of the teeth, skin, lips, tongue and gums.  Patient instructed to avoid sunlight, if possible.  When exposed to sunlight, patients should wear protective clothing, sunglasses, and sunscreen.  The patient was instructed to call the office immediately if the following severe adverse effects occur:  hearing changes, easy bruising/bleeding, severe headache, or vision changes.  The patient verbalized understanding of the proper use and possible adverse effects of sarecycline.  All of the patient's questions and concerns were addressed.
Winlevi Pregnancy And Lactation Text: This medication is considered safe during pregnancy and breastfeeding.
Minocycline Counseling: Patient advised regarding possible photosensitivity and discoloration of the teeth, skin, lips, tongue and gums.  Patient instructed to avoid sunlight, if possible.  When exposed to sunlight, patients should wear protective clothing, sunglasses, and sunscreen.  The patient was instructed to call the office immediately if the following severe adverse effects occur:  hearing changes, easy bruising/bleeding, severe headache, or vision changes.  The patient verbalized understanding of the proper use and possible adverse effects of minocycline.  All of the patient's questions and concerns were addressed.
Erythromycin Counseling:  I discussed with the patient the risks of erythromycin including but not limited to GI upset, allergic reaction, drug rash, diarrhea, increase in liver enzymes, and yeast infections.
Azithromycin Counseling:  I discussed with the patient the risks of azithromycin including but not limited to GI upset, allergic reaction, drug rash, diarrhea, and yeast infections.
Tazorac Counseling:  Patient advised that medication is irritating and drying.  Patient may need to apply sparingly and wash off after an hour before eventually leaving it on overnight.  The patient verbalized understanding of the proper use and possible adverse effects of tazorac.  All of the patient's questions and concerns were addressed.
Topical Retinoid Pregnancy And Lactation Text: This medication is Pregnancy Category C. It is unknown if this medication is excreted in breast milk.
Spironolactone Pregnancy And Lactation Text: This medication can cause feminization of the male fetus and should be avoided during pregnancy. The active metabolite is also found in breast milk.
Isotretinoin Counseling: Patient should get monthly blood tests, not donate blood, not drive at night if vision affected, not share medication, and not undergo elective surgery for 6 months after tx completed. Side effects reviewed, pt to contact office should one occur.
Include Pregnancy/Lactation Warning?: No
Topical Retinoid counseling:  Patient advised to apply a pea-sized amount only at bedtime and wait 30 minutes after washing their face before applying.  If too drying, patient may add a non-comedogenic moisturizer. The patient verbalized understanding of the proper use and possible adverse effects of retinoids.  All of the patient's questions and concerns were addressed.
Aklief counseling:  Patient advised to apply a pea-sized amount only at bedtime and wait 30 minutes after washing their face before applying.  If too drying, patient may add a non-comedogenic moisturizer.  The most commonly reported side effects including irritation, redness, scaling, dryness, stinging, burning, itching, and increased risk of sunburn.  The patient verbalized understanding of the proper use and possible adverse effects of retinoids.  All of the patient's questions and concerns were addressed.
Sarecycline Pregnancy And Lactation Text: This medication is Pregnancy Category D and not consider safe during pregnancy. It is also excreted in breast milk.
High Dose Vitamin A Counseling: Side effects reviewed, pt to contact office should one occur.
Topical Sulfur Applications Pregnancy And Lactation Text: This medication is Pregnancy Category C and has an unknown safety profile during pregnancy. It is unknown if this topical medication is excreted in breast milk.
Winlevi Counseling:  I discussed with the patient the risks of topical clascoterone including but not limited to erythema, scaling, itching, and stinging. Patient voiced their understanding.
Benzoyl Peroxide Pregnancy And Lactation Text: This medication is Pregnancy Category C. It is unknown if benzoyl peroxide is excreted in breast milk.
Doxycycline Pregnancy And Lactation Text: This medication is Pregnancy Category D and not consider safe during pregnancy. It is also excreted in breast milk but is considered safe for shorter treatment courses.
Dapsone Counseling: I discussed with the patient the risks of dapsone including but not limited to hemolytic anemia, agranulocytosis, rashes, methemoglobinemia, kidney failure, peripheral neuropathy, headaches, GI upset, and liver toxicity.  Patients who start dapsone require monitoring including baseline LFTs and weekly CBCs for the first month, then every month thereafter.  The patient verbalized understanding of the proper use and possible adverse effects of dapsone.  All of the patient's questions and concerns were addressed.
Birth Control Pills Pregnancy And Lactation Text: This medication should be avoided if pregnant and for the first 30 days post-partum.
Dapsone Pregnancy And Lactation Text: This medication is Pregnancy Category C and is not considered safe during pregnancy or breast feeding.
Topical Clindamycin Counseling: Patient counseled that this medication may cause skin irritation or allergic reactions.  In the event of skin irritation, the patient was advised to reduce the amount of the drug applied or use it less frequently.   The patient verbalized understanding of the proper use and possible adverse effects of clindamycin.  All of the patient's questions and concerns were addressed.
Doxycycline Counseling:  Patient counseled regarding possible photosensitivity and increased risk for sunburn.  Patient instructed to avoid sunlight, if possible.  When exposed to sunlight, patients should wear protective clothing, sunglasses, and sunscreen.  The patient was instructed to call the office immediately if the following severe adverse effects occur:  hearing changes, easy bruising/bleeding, severe headache, or vision changes.  The patient verbalized understanding of the proper use and possible adverse effects of doxycycline.  All of the patient's questions and concerns were addressed.
Birth Control Pills Counseling: Birth Control Pill Counseling: I discussed with the patient the potential side effects of OCPs including but not limited to increased risk of stroke, heart attack, thrombophlebitis, deep venous thrombosis, hepatic adenomas, breast changes, GI upset, headaches, and depression.  The patient verbalized understanding of the proper use and possible adverse effects of OCPs. All of the patient's questions and concerns were addressed.
Spironolactone Counseling: Patient advised regarding risks of diarrhea, abdominal pain, hyperkalemia, birth defects (for female patients), liver toxicity and renal toxicity. The patient may need blood work to monitor liver and kidney function and potassium levels while on therapy. The patient verbalized understanding of the proper use and possible adverse effects of spironolactone.  All of the patient's questions and concerns were addressed.
Tetracycline Counseling: Patient counseled regarding possible photosensitivity and increased risk for sunburn.  Patient instructed to avoid sunlight, if possible.  When exposed to sunlight, patients should wear protective clothing, sunglasses, and sunscreen.  The patient was instructed to call the office immediately if the following severe adverse effects occur:  hearing changes, easy bruising/bleeding, severe headache, or vision changes.  The patient verbalized understanding of the proper use and possible adverse effects of tetracycline.  All of the patient's questions and concerns were addressed. Patient understands to avoid pregnancy while on therapy due to potential birth defects.

## 2023-03-07 NOTE — PROGRESS NOTE ADULT - ASSESSMENT
ASSESSMENT: 89 y/o F with PMH afib not on AC, PPM, HTN, ovarian and colon ca, GERD presents to the ED as a transfer from [facility] for dysarthria and R hemiparesis. NIHSS 8 on repeat exam. R hemiparesis, R facial droop, aphasia (not fluent, not intact to repetition), and dysarthria. 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     Impression: R hemiparesis, R facial droop, aphasia (not fluent, not intact to repetition), and dysarthria likely 2/2 L brain dysfunction 2/2 L M1 occlusion found on CTA likely 2/2 cardioembolic etiology (hx of afib not on AC).     NEURO: Patient is neurologically stable. Continue close monitoring for neurologic deterioration. Mechanical thrombectomy was unsuccessful at time of admission. Nsx noted severe tortuosity preventing access to left ICA intracranially. Hemicrani deferred due to advanced age. Goal for SBP of 100-180. Patient on statin for LDL goal less than 70. MRI Brain would not change medical management as pt has hx of afib and was not on AC. Head CT as noted above. Physical therapy and OT recommend [facility].     ANTITHROMBOTIC THERAPY:  rectal. Will begin Eliquis 5mh BID in 2 weeks after repeat stable head CT.     PULMONARY: CXR clear, protecting airway, saturating well on room air     CARDIOVASCULAR: TTE is pending, continue cardiac monitoring. No events thus far                               SBP goal: 100-180 mmhg     GASTROINTESTINAL:  dysphagia screen- failed 7/26. Re eval today, 7/27. Family refusing NGT at this time. If fails again, will pursue PEG option with GI      Diet: NPO     RENAL: BUN/Cr within normal limits, good urine output.      Na Goal: Greater than 135     Kessler: no     HEMATOLOGY: H/H within normal limits, Platelets normal at 188      DVT ppx: lovenox subq     ID: afebrile, slight leukocytosis- down trending. no s/sx of infection. will continue to monitor     OTHER:     DISPOSITION: [facility] once stable and workup is complete      CORE MEASURES:        Admission NIHSS: 16      TPA: [] YES [x] NO      LDL/HDL: 123/30     Depression Screen: p     Statin Therapy: yes     Dysphagia Screen: [] PASS [x] FAIL     Smoking [] YES [x] NO      Afib [x] YES [] NO     Stroke Education [x] YES [] NO    Obtain screening lower extremity venous ultrasound in patients who meet 1 or more of the following criteria as patient is high risk for DVT/PE on admission:   [] History of DVT/PE  []Hypercoagulable states (Factor V Leiden, Cancer, OCP, etc. )  []Prolonged immobility (hemiplegia/hemiparesis/post operative or any other extended immobilization)  [] Transferred from outside facility (Rehab or Long term care)  [] Age </= to 50 ASSESSMENT: 89 y/o F with PMH afib not on AC, PPM, HTN, ovarian and colon ca, GERD presents to the ED as a transfer from [facility] for dysarthria and R hemiparesis. NIHSS 8 on repeat exam. R hemiparesis, R facial droop, aphasia (not fluent, not intact to repetition), and dysarthria. 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     Impression: R hemiparesis, R facial droop, aphasia (not fluent, not intact to repetition), and dysarthria likely 2/2 L brain dysfunction 2/2 L M1 occlusion found on CTA likely 2/2 cardioembolic etiology (hx of afib not on AC).     NEURO: Patient is neurologically stable. Continue close monitoring for neurologic deterioration. Mechanical thrombectomy was unsuccessful at time of admission. Nsx noted severe tortuosity preventing access to left ICA intracranially. Hemicrani deferred due to advanced age. Goal for SBP of 100-180. Patient on statin for LDL goal less than 70. MRI Brain would not change medical management as pt has hx of afib and was not on AC. Head CT as noted above. Physical therapy and OT recommend [facility].     ANTITHROMBOTIC THERAPY:  rectal. Will begin Eliquis 5mh BID in 2 weeks after repeat stable head CT.     PULMONARY: CXR clear, protecting airway, saturating well on room air     CARDIOVASCULAR: TTE shows mild-moderate mitral regurgitation, mild aortic regurgitation, severe global LV systolic dysfunction, normal RV function, mild tricuspid regurgitation. Thickened pericardium with small pericardial effusion.  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 Patient has PPM placed in 2019. EP interrogated and found multiple episodes of atrial fibrillation and flutter. Will continue cardiac monitoring.                             SBP goal: 100-180 mmhg     GASTROINTESTINAL:  dysphagia screen- failed 7/26. Re eval, 7/27 failed. PEG placed by IR 7/30 afternoon. Will begin tube feeds today at 5:30 pm      Diet: PEG placed, will begin TF at 5:30 pm     RENAL: BUN/Cr within normal limits, good urine output. Consistently has had hypokalemia despite repletion, on NS with KCl. Checking BMP q 12 hr.       Na Goal: Greater than 135     Kessler: no     HEMATOLOGY: H/H within normal limits, Platelets normal at 197      DVT ppx: lovenox subq     ID: afebrile, slight leukocytosis- down trending. UA -, concern for aspiration PNA on CXR. ID following, will be on zosyn 3-5 days. Will continue to monitor for s/sx of infection.      OTHER:     DISPOSITION: [facility] once stable and workup is complete      CORE MEASURES:        Admission NIHSS: 16      TPA: [] YES [x] NO      LDL/HDL: 123/30     Depression Screen: p     Statin Therapy: yes     Dysphagia Screen: [] PASS [x] FAIL     Smoking [] YES [x] NO      Afib [x] YES [] NO     Stroke Education [x] YES [] NO    Obtain screening lower extremity venous ultrasound in patients who meet 1 or more of the following criteria as patient is high risk for DVT/PE on admission:   [] History of DVT/PE  []Hypercoagulable states (Factor V Leiden, Cancer, OCP, etc. )  []Prolonged immobility (hemiplegia/hemiparesis/post operative or any other extended immobilization)  [] Transferred from outside facility (Rehab or Long term care)  [] Age </= to 50     ASSESSMENT: 89 y/o F with PMH afib not on AC, PPM, HTN, ovarian and colon ca, GERD presents to the ED as a transfer from [facility] for dysarthria and R hemiparesis. NIHSS 8 on repeat exam. R hemiparesis, R facial droop, aphasia (not fluent, not intact to repetition), and dysarthria. 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     Impression: R hemiparesis, R facial droop, aphasia (not fluent, not intact to repetition), and dysarthria likely 2/2 L brain dysfunction 2/2 L M1 occlusion found on CTA likely 2/2 cardioembolic etiology (hx of afib not on AC).     NEURO: Patient is neurologically stable. Continue close monitoring for neurologic deterioration. Mechanical thrombectomy was unsuccessful at time of admission. Nsx noted severe tortuosity preventing access to left ICA intracranially. Hemicrani deferred due to advanced age. Goal for SBP of 100-180. Patient on statin for LDL goal less than 70. MRI Brain would not change medical management as pt has hx of afib and was not on AC. Head CT as noted above. Physical therapy and OT recommend [facility].     ANTITHROMBOTIC THERAPY:  rectal. Will begin Eliquis 5mg BID in 2 weeks after repeat stable head CT.     PULMONARY: CXR clear, protecting airway, saturating well on room air     CARDIOVASCULAR: TTE shows mild-moderate mitral regurgitation, mild aortic regurgitation, severe global LV systolic dysfunction, normal RV function, mild tricuspid regurgitation. Thickened pericardium with small pericardial effusion.  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 Patient has PPM placed in 2019. EP interrogated and found multiple episodes of atrial fibrillation and flutter. Will continue cardiac monitoring.                             SBP goal: 100-180 mmhg     GASTROINTESTINAL:  dysphagia screen- failed 7/26. Re eval, 7/27 failed. PEG placed by IR 7/30 afternoon. Will begin tube feeds today at 5:30 pm      Diet: PEG placed, will begin TF at 5:30 pm     RENAL: BUN/Cr within normal limits, good urine output. Consistently has had hypokalemia despite repletion, on NS with KCl. Checking BMP q 12 hr.       Na Goal: Greater than 135     Kessler: no     HEMATOLOGY: H/H within normal limits, Platelets normal at 197      DVT ppx: lovenox subq     ID: afebrile, slight leukocytosis- down trending. UA -, concern for aspiration PNA on CXR. ID following, will be on zosyn 3-5 days. Will continue to monitor for s/sx of infection.      OTHER: Condition and plan of care d/w patient's daughter and son, discussed risks/benefits/alternatives to goals of care including but not limited to NGT vs. comfort feeds vs. PEG, at this time they wish to further discuss with palliative care and GI prior to making a decision. Palliative meeting held on 07/30/21 family all in agreement with PEG placement.    DISPOSITION: [facility] once stable and workup is complete    CORE MEASURES:        Admission NIHSS: 16      TPA: [] YES [x] NO      LDL/HDL: 123/30     Depression Screen: p     Statin Therapy: yes     Dysphagia Screen: [] PASS [x] FAIL     Smoking [] YES [x] NO      Afib [x] YES [] NO     Stroke Education [x] YES [] NO    Obtain screening lower extremity venous ultrasound in patients who meet 1 or more of the following criteria as patient is high risk for DVT/PE on admission:   [] History of DVT/PE  []Hypercoagulable states (Factor V Leiden, Cancer, OCP, etc. )  []Prolonged immobility (hemiplegia/hemiparesis/post operative or any other extended immobilization)  [] Transferred from outside facility (Rehab or Long term care)  [] Age </= to 50     None

## 2023-06-01 NOTE — PATIENT PROFILE ADULT - BRADEN SCORE
1) No driving for 1-2 weeks and no longer taking narcotics.   2) May shower / sponge bathe >24 hours after surgery, No tub baths/soaking   3) Do not lift / push / pull more then 20 lb. for 6 weeks   4) Pelvic rest for 6 weeks   5) Constipation is a common postoperative complaint.  Please use a stool softeners and laxatives as needed to facilitate bowel movements.   6) If you are discharged with an abdominal binder, this is to be used as needed for incisional comfort.  
18

## 2023-08-11 NOTE — CONSULT NOTE ADULT - CRITICAL CARE SERVICES
COPD EDUCATION by COPD CLINICAL EDUCATOR  8/11/2023 at 2:22 PM by Adela Baltazar RRT     Patient reviewed by COPD education team. Patient does not have a formal history or diagnosis of COPD and is a former smoker.  Therefore, patient not interested in COPD program. Did provide a short intervention completed with this patient covering: What is COPD (how the lungs work), daily medications rescue and maintenance, breathing techniques, infection prevention and oxygen safety were covered in detail.  A comprehensive packet including information about COPD, treatments, and oxygen safety was given.            COPD Screen  COPD Risk Screening  Do you have a history of COPD?: No (No hx dx COPD, No PFT)    COPD Assessment  COPD Clinical Specialists ONLY  COPD Education Initiated: Yes--Short Intervention (Pt denies COPD no PFT or formal dx, does not use inhalers, does have a 20 pk yr hx of smoking quit in 2006, asked if pt felt up to a bedside PFT, pt not feeling well does not want to get SOB or cause any N/V, will do OP when 100%)  Is this a COPD exacerbation patient?: No (PNA, Pulmonary nodule)  DME Company: None  Physician Name: AISHWARYA MARTINEZ M.D.  Pulmonologist Name: none - needs out pt follow up  Referrals Initiated: Yes  Pulmonary Rehab: N/A  Smoking Cessation: N/A  Hospice: N/A  Home Health Care: N/A  Mobile Urgent Care Services: N/A  Geriatric Specialty Group: N/A  Private In-Home Care Agency: N/A  (OP) Pulmonary Function Testing: Yes  Interdisciplinary Rounds: Attendance at Rounds (30 Min)    PFT Results    No results found for: PFT    Meds to Beds  Would the patient like to opt in for Bedside Medication Delivery at Discharge?: No     MY COPD ACTION PLAN     It is recommended that patients and physicians /healthcare providers complete this action plan together. This plan should be discussed at each physician visit and updated as needed.    The green, yellow and red zones show groups of symptoms of COPD. This  "list of symptoms is not comprehensive, and you may experience other symptoms. In the \"Actions\" column, your healthcare provider has recommended actions for you to take based on your symptoms.    Patient Name: Vaishali Emery   YOB: 1954   Last Updated on: 8/11/2023  2:22 PM   Green Zone:  I am doing well today Actions     Usual activitiy and exercise level   Take daily medications     Usual amounts of cough and phlegm/mucus   Use oxygen as prescribed     Sleep well at night   Continue regular exercise/diet plan     Appetite is good   At all times avoid cigarette smoke, inhaled irritants     Daily Medications (these medications are taken every day):                Yellow Zone:  I am having a bad day or a COPD flare Actions     More breathless than usual   Continue daily medications     I have less energy for my daily activities   Use quick relief inhaler as ordered     Increased or thicker phlegm/mucus   Use oxygen as prescribed     Using quick relief inhaler/nebulizer more often   Get plenty of rest     Swelling of ankles more than usual   Use pursed lip breathing     More coughing than usual   At all times avoid cigarette smoke, inhaled irritants     I feel like I have a \"chest cold\"     Poor sleep and my symptoms woke me up     My appetite is not good     My medicine is not helping      Call provider immediately if symptoms don’t improve     Continue daily medications, add rescue medications:               Medications to be used during a flare up, (as Discussed with Provider):              Red Zone:  I need urgent medical care Actions     Severe shortness of breath even at rest   Call 911 or seek medical care immediately     Not able to do any activity because of breathing      Fever or shaking chills      Feeling confused or very drowsy       Chest pains      Coughing up blood                  " 43

## 2023-08-16 NOTE — PROGRESS NOTE ADULT - PROBLEM SELECTOR PROBLEM 5
Reviewed pt's discharge paperwork with pt and parents including follow up care     Sulaiman Gonzalez RN  08/16/23 [id number]
Other persistent atrial fibrillation

## 2024-01-30 NOTE — PATIENT PROFILE ADULT - EQUIPMENT CURRENTLY USED AT HOME
OUTPATIENT ENCOUNTER  HEMATOLOGY ONCOLOGY CONSULT NOTE    ATTENDING PHYSICIAN:  Julianna Alva MD     REASON FOR CONSULT: Leukocytosis, polycythemia, thrombocythemia    HISTORY OF PRESENT ILLNESS:    Rajni Lo is a 57 year old  female   here for evaluation of leukocytosis, polycythemia and thrombocythemia.  On 10/20/2023 white blood cell count 13.6, hemoglobin 14.8, hematocrit 53.7, platelet count 623,000.  72.5% neutrophils, 19.2% lymphocytes, 4.6% monocytes, 2.9% eosinophils, 1.8% basophils.  CMP was within normal limits.    PAST MEDICAL HISTORY:    No past medical history on file.   PAST SURGICAL HISTORY:  No past surgical history on file.     MEDICATIONS:  (Not in a hospital admission)    ALLERGIES:  ALLERGIES:  No Known Allergies   SOCIAL HISTORY:  Social History     Tobacco Use    Smoking status: Never    Smokeless tobacco: Never   Substance Use Topics    Alcohol use: Not on file      FAMILY HISTORY:  No family history on file.       REVIEW OF SYSTEMS:     Review of Systems - Oncology                  MEDICATIONS:     Current Outpatient Medications   Medication Sig Dispense Refill    aspirin (ECOTRIN) 81 MG EC tablet Take 81 mg by mouth daily.       No current facility-administered medications for this visit.                 OBJECTIVE:    VITAL SIGNS:    Vital Last Value 24 Hour Range   Temperature   @FLOWSTAT(6:24::1)@   Pulse   @FLOWSTAT(8:24::1)@   Respiratory   @FLOWSTAT(9:24::1)@   Non-Invasive  Blood Pressure   @FLOWSTAT(5:24::1)@   Pulse Oximetry   @FLOWSTAT(10:24::1)@     Vital Today Admitted   Weight       Height N/A     BMI N/A           PHYSICAL EXAMINATION:    Physical Exam      LABORATORY DATA:    No results for input(s): \"WBC\", \"HGB\", \"HCT\", \"PLT\", \"LDH\", \"BUN\", \"CREATININE\", \"SODIUM\", \"POTASSIUM\", \"CHLORIDE\", \"CO2\", \"GLUCOSE\", \"PT\", \"INR\", \"BRISEIDA\", \"MG\", \"PHOS\" in the last 72 hours.  No results found for: \"APH\", \"APO2\", \"ASAT\", \"FIO2\", \"AHCO3\", \"APCO2\"    IMAGING STUDIES:    No  orders to display       No image results found.       ASSESSMENT / PLAN:    ***        Follow up:    Julianna Alva MD  Hematology/Oncology   [facility]   [facility], [place]        yes

## 2024-04-01 NOTE — DISCHARGE NOTE PROVIDER - NSCORESITESY/N_GEN_A_CORE_RD
Assessment/Plan    Routine physical examination  (primary encounter diagnosis)  Chest wall pain  Routine screening for STI (sexually transmitted infection)    Exam consistent with costochondritis   Reviewed doorframe stretches    Counseled on nutrition, exercise, sleep, mental health, immunizations  2022 labs were completely normal, no need to repeat today    Past medical, surgical, family, and social histories reviewed and updated in Epic.     Orders Placed This Encounter   • Chlamydia/Gonorrhea by Nucleic Acid Amplification   • Trichomonas vaginalis by Nucleic Acid Amplification   • HIV 1/HIV 2 Antigen/Antibody Screen   • RPR (Rapid Plasma Reagin) Traditional Syphilis Screen Algorithm         Return in about 1 year (around 4/1/2025) for Yearly Physical.     Subjective    CC:   Chief Complaint   Patient presents with   • Office Visit   • Physical   • Numbness     Pain under Left breast       HPI:     Presents for yearly physical accompanied by adorable daughter Brigida.    Patient has occasional pain or numbness in left-sided chest wall.  Not always an obvious trigger, sometimes bending forward.  Not associated with breathing. No syncope, SOB, palpitations, numbness/tingling.    Preventive health habits:    Exercise:  Walks a few blocks with daughter most days.    Sleep:  No concerns.    Diet: Getting 5/day, no soda, no fast food. Some stress eating.    Safety: Wears seatbelt.  No firearms in home. Feels safe in home.     Social Hx:    Lives with daughter.    Works at [employer] as a CNA.     Substances:    None    Sexual Hx:    Active in past year?: y   Current partners: 1, male             Plans for future pregnancy?: no   Contraception: birth control pills   STI protection: Monogamy             Last Pap 2022    ROS: Negative except as documented in HPI.    Objective    Vitals:    04/01/24 1128   BP: 132/70   Pulse: 81   Resp: 16   SpO2: 98%   Weight: 123.4 kg (272 lb)   Height: 5' 4\" (1.626 m)   LMP: 03/12/2024        PHYSICAL EXAM    Gen: Alert, oriented, no acute distress  CV: RRR, no murmurs  Pulm: normal respiratory effort, CTAB  Abd: Soft, nontender, nondistended, normal bowel sounds.  Extremities: No edema, warm and well perfused, no cyanosis.  Psych: Normal mood, affect, and thought processes.     Bernarda Lion MD  [facility]   Yes

## 2024-06-20 NOTE — PHYSICAL THERAPY INITIAL EVALUATION ADULT - ASSISTIVE DEVICE FOR TRANSFER: GAIT, REHAB EVAL
- 7 x weekly - 1-2 sets - 5-10 reps - 1-2 hold  Supine Bridge  - 3 x daily - 7 x weekly - 1-2 sets - 5-10 reps - 1-2 hold  Side-lying Shoulder PNF D2 Flexion and Extension  - 3 x daily - 7 x weekly - 1-2 sets - 5-10 reps - 1-2 hold  Prone hip rotations x 15   Educated on symptom management    Manual therapy (95361) x 15 min utilizing techniques to improve joint and/or soft tissue mobility, ROM, and function as well as helping to decrease pain/spasms and swelling.  Palpation and assessment of soft tissue, muscles, and landmarks   Grad 1-2 mobs to LS spine  STM to lumbar paraspinals  SCS tech: piriformis     Patient Education on the condition/pathology, involved anatomy, and exercise rationale.  MOVEMENT/HOME/ADL  MODIFICATION: limit sitting    ASSESSMENT     Patient with decreased guarding today. States radiating pain is not constant.     PLAN OF CARE     Effective Dates/Duration: 6/6/2024 TO 9/4/2024 (90 days).    Frequency:  1-2 per week    Interventions may include but are not limited to:   (06744) Therapeutic exercise to develop ROM, strength, endurance and flexibility  (76933) Therapeutic activities using dynamic activities to improve function  (24860) Gait training to address mechanics, proper step length and weight shifting to improve household and community mobility as well as overall safety with ADLs  (74391) Manual therapy techniques to improve joint and/or soft tissue mobility, ROM, and function as well as helping to decrease pain/spasms and swelling  (10390) Neuromuscular reeducation addressing impaired balance, coordination, kinesthetic sense, posture and proprioception  (47587) Self-care/home management training to improve activities of daily living skills and compensatory techniques to achieve independence  (12597) Mechanical traction to address spinal/nerve/disc compression, improve joint mobility, and/or soft tissue flexibility  (20560/20561) Dry needling for the management of neuromusculoskeletal  rolling walker

## 2025-01-02 NOTE — ED ADULT TRIAGE NOTE - ESI TRIAGE ACUITY LEVEL, MLM
Call Center TCM Note      Flowsheet Row Responses   [facility] patient discharged from? Non-  [[facility]]   Does the patient have one of the following disease processes/diagnoses(primary or secondary)? Other   TCM attempt successful? No   Unsuccessful attempts Attempt 2  [Did not attempt spouse Lex due to him being admitted to the hospital]            Solange Montero RN    1/2/2025, 14:56 EST        
2

## 2025-02-19 NOTE — DIETITIAN INITIAL EVALUATION ADULT. - MUSCLE MASS (LOSS OF MUSCLE)
Photo documentation obtained.     Will work to obtain PA for septorhinoplasty.     Tara Key RN  02/19/25 4:03 PM    
[facility]...

## 2025-07-11 NOTE — ASU PREOP CHECKLIST - ORDERS/MEDICATION ADMINISTRATION RECORD ON CHART
"Subjective   Patient ID: Magda Jaimes is a 64 y.o. female who presents for Follow-up.    Presents for follow up on HTN.   Current regimen: lisinopril 10mg, spironolactone 25mg. Rosuvastatin 10mg.   Continues with malaise, heartburn, fatigue.   Has failed beta blocker (nebivolol - had foot/leg aching and burning) and calcium channel blocker (amlodipine - chills) due to side effects.   States that since starting spironolactone she has heartburn, so she has been eating a lot of crackers.     Recent labs show hyponatremia and mild LFT elevation.   She admits to new onset shortness of breath with exertion - worse with stairs and exertion.   Would like to restart hydrochlorothiazide again. Discussed lab results and risk for worsening sodium levels.            Review of Systems   All other systems reviewed and are negative.      Objective   /90   Pulse 83   Ht 1.676 m (5' 6\")   Wt 82.6 kg (182 lb)   SpO2 97%   BMI 29.38 kg/m²     Physical Exam  Constitutional:       Appearance: Normal appearance.   HENT:      Right Ear: Tympanic membrane normal.      Left Ear: Tympanic membrane normal.      Mouth/Throat:      Pharynx: Oropharynx is clear.   Cardiovascular:      Rate and Rhythm: Normal rate and regular rhythm.   Pulmonary:      Breath sounds: Normal breath sounds.   Musculoskeletal:      Right lower leg: No edema.      Left lower leg: No edema.   Neurological:      Mental Status: She is alert.         Assessment/Plan   Diagnoses and all orders for this visit:  Essential hypertension, benign  -     Referral to Cardiology; Future  -     lisinopril 30 mg tablet; Take 1 tablet (30 mg) by mouth once daily.  Hyponatremia  -     Osmolality, urine; Future  -     Sodium, urine, random; Future  Dyspnea, unspecified type  -     Referral to Cardiology; Future  Pre-diabetes         " done